# Patient Record
Sex: FEMALE | Race: BLACK OR AFRICAN AMERICAN | Employment: OTHER | URBAN - METROPOLITAN AREA
[De-identification: names, ages, dates, MRNs, and addresses within clinical notes are randomized per-mention and may not be internally consistent; named-entity substitution may affect disease eponyms.]

---

## 2017-03-29 ENCOUNTER — HOSPITAL ENCOUNTER (OUTPATIENT)
Dept: GENERAL RADIOLOGY | Age: 82
Discharge: HOME | End: 2017-03-29
Payer: MEDICARE

## 2017-03-29 DIAGNOSIS — M54.2 CERVICALGIA: ICD-10-CM

## 2017-03-29 PROCEDURE — 72040 X-RAY EXAM NECK SPINE 2-3 VW: CPT

## 2018-05-18 ENCOUNTER — APPOINTMENT (OUTPATIENT)
Dept: GENERAL RADIOLOGY | Age: 83
DRG: 215 | End: 2018-05-18
Payer: MEDICARE

## 2018-05-18 ENCOUNTER — HOSPITAL ENCOUNTER (INPATIENT)
Age: 83
LOS: 6 days | Discharge: HOME | DRG: 215 | End: 2018-05-25
Attending: EMERGENCY MEDICINE | Admitting: INTERNAL MEDICINE
Payer: MEDICARE

## 2018-05-18 PROBLEM — I10 HTN (HYPERTENSION): Status: ACTIVE | Noted: 2018-05-18

## 2018-05-18 PROBLEM — Z87.19 HX OF DIVERTICULITIS OF COLON: Status: ACTIVE | Noted: 2018-05-18

## 2018-05-18 PROBLEM — I21.4 NSTEMI (NON-ST ELEVATED MYOCARDIAL INFARCTION) (HCC): Status: ACTIVE | Noted: 2018-05-18

## 2018-05-18 LAB
ALBUMIN SERPL-MCNC: 3.7 G/DL (ref 3.5–5)
ALBUMIN/GLOB SERPL: 1 {RATIO} (ref 1.1–2.2)
ALP SERPL-CCNC: 166 U/L (ref 45–117)
ALT SERPL-CCNC: 29 U/L (ref 12–78)
ANION GAP SERPL CALC-SCNC: 4 MMOL/L (ref 5–15)
APPEARANCE UR: CLEAR
APTT PPP: 26.8 SEC (ref 22.1–32)
APTT PPP: 51.8 SEC (ref 22.1–32)
AST SERPL-CCNC: 80 U/L (ref 15–37)
ATRIAL RATE: 79 BPM
BACTERIA URNS QL MICRO: NEGATIVE /HPF
BASOPHILS # BLD: 0 K/UL (ref 0–0.1)
BASOPHILS # BLD: 0 K/UL (ref 0–0.1)
BASOPHILS NFR BLD: 0 % (ref 0–1)
BASOPHILS NFR BLD: 0 % (ref 0–1)
BILIRUB SERPL-MCNC: 0.4 MG/DL (ref 0.2–1)
BILIRUB UR QL: NEGATIVE
BNP SERPL-MCNC: 3385 PG/ML (ref 0–450)
BUN SERPL-MCNC: 20 MG/DL (ref 6–20)
BUN/CREAT SERPL: 16 (ref 12–20)
CALCIUM SERPL-MCNC: 9.3 MG/DL (ref 8.5–10.1)
CALCULATED P AXIS, ECG09: -5 DEGREES
CALCULATED R AXIS, ECG10: -31 DEGREES
CALCULATED T AXIS, ECG11: 123 DEGREES
CHLORIDE SERPL-SCNC: 105 MMOL/L (ref 97–108)
CK MB CFR SERPL CALC: 7.4 % (ref 0–2.5)
CK MB CFR SERPL CALC: 7.5 % (ref 0–2.5)
CK MB SERPL-MCNC: 49.2 NG/ML (ref 5–25)
CK MB SERPL-MCNC: 52 NG/ML (ref 5–25)
CK SERPL-CCNC: 669 U/L (ref 26–192)
CK SERPL-CCNC: 692 U/L (ref 26–192)
CO2 SERPL-SCNC: 28 MMOL/L (ref 21–32)
COLOR UR: ABNORMAL
CREAT SERPL-MCNC: 1.24 MG/DL (ref 0.55–1.02)
DIAGNOSIS, 93000: NORMAL
DIFFERENTIAL METHOD BLD: ABNORMAL
DIFFERENTIAL METHOD BLD: ABNORMAL
EOSINOPHIL # BLD: 0.3 K/UL (ref 0–0.4)
EOSINOPHIL # BLD: 0.4 K/UL (ref 0–0.4)
EOSINOPHIL NFR BLD: 4 % (ref 0–7)
EOSINOPHIL NFR BLD: 4 % (ref 0–7)
EPITH CASTS URNS QL MICRO: ABNORMAL /LPF
ERYTHROCYTE [DISTWIDTH] IN BLOOD BY AUTOMATED COUNT: 13.5 % (ref 11.5–14.5)
ERYTHROCYTE [DISTWIDTH] IN BLOOD BY AUTOMATED COUNT: 13.5 % (ref 11.5–14.5)
GLOBULIN SER CALC-MCNC: 3.7 G/DL (ref 2–4)
GLUCOSE SERPL-MCNC: 111 MG/DL (ref 65–100)
GLUCOSE UR STRIP.AUTO-MCNC: NEGATIVE MG/DL
HCT VFR BLD AUTO: 33.5 % (ref 35–47)
HCT VFR BLD AUTO: 34.8 % (ref 35–47)
HGB BLD-MCNC: 11.1 G/DL (ref 11.5–16)
HGB BLD-MCNC: 11.4 G/DL (ref 11.5–16)
HGB UR QL STRIP: NEGATIVE
HYALINE CASTS URNS QL MICRO: ABNORMAL /LPF (ref 0–5)
IMM GRANULOCYTES # BLD: 0 K/UL (ref 0–0.04)
IMM GRANULOCYTES # BLD: 0 K/UL (ref 0–0.04)
IMM GRANULOCYTES NFR BLD AUTO: 0 % (ref 0–0.5)
IMM GRANULOCYTES NFR BLD AUTO: 0 % (ref 0–0.5)
KETONES UR QL STRIP.AUTO: NEGATIVE MG/DL
LEUKOCYTE ESTERASE UR QL STRIP.AUTO: ABNORMAL
LYMPHOCYTES # BLD: 2.4 K/UL (ref 0.8–3.5)
LYMPHOCYTES # BLD: 2.5 K/UL (ref 0.8–3.5)
LYMPHOCYTES NFR BLD: 30 % (ref 12–49)
LYMPHOCYTES NFR BLD: 32 % (ref 12–49)
MCH RBC QN AUTO: 29.8 PG (ref 26–34)
MCH RBC QN AUTO: 29.8 PG (ref 26–34)
MCHC RBC AUTO-ENTMCNC: 32.8 G/DL (ref 30–36.5)
MCHC RBC AUTO-ENTMCNC: 33.1 G/DL (ref 30–36.5)
MCV RBC AUTO: 90.1 FL (ref 80–99)
MCV RBC AUTO: 90.9 FL (ref 80–99)
MONOCYTES # BLD: 0.5 K/UL (ref 0–1)
MONOCYTES # BLD: 0.7 K/UL (ref 0–1)
MONOCYTES NFR BLD: 7 % (ref 5–13)
MONOCYTES NFR BLD: 9 % (ref 5–13)
NEUTS SEG # BLD: 4.2 K/UL (ref 1.8–8)
NEUTS SEG # BLD: 4.7 K/UL (ref 1.8–8)
NEUTS SEG NFR BLD: 56 % (ref 32–75)
NEUTS SEG NFR BLD: 57 % (ref 32–75)
NITRITE UR QL STRIP.AUTO: NEGATIVE
NRBC # BLD: 0 K/UL (ref 0–0.01)
NRBC # BLD: 0 K/UL (ref 0–0.01)
NRBC BLD-RTO: 0 PER 100 WBC
NRBC BLD-RTO: 0 PER 100 WBC
P-R INTERVAL, ECG05: 230 MS
PH UR STRIP: 5.5 [PH] (ref 5–8)
PLATELET # BLD AUTO: 265 K/UL (ref 150–400)
PLATELET # BLD AUTO: 267 K/UL (ref 150–400)
PMV BLD AUTO: 8.9 FL (ref 8.9–12.9)
PMV BLD AUTO: 8.9 FL (ref 8.9–12.9)
POTASSIUM SERPL-SCNC: 4.2 MMOL/L (ref 3.5–5.1)
PROT SERPL-MCNC: 7.4 G/DL (ref 6.4–8.2)
PROT UR STRIP-MCNC: NEGATIVE MG/DL
Q-T INTERVAL, ECG07: 384 MS
QRS DURATION, ECG06: 84 MS
QTC CALCULATION (BEZET), ECG08: 440 MS
RBC # BLD AUTO: 3.72 M/UL (ref 3.8–5.2)
RBC # BLD AUTO: 3.83 M/UL (ref 3.8–5.2)
RBC #/AREA URNS HPF: ABNORMAL /HPF (ref 0–5)
SODIUM SERPL-SCNC: 137 MMOL/L (ref 136–145)
SP GR UR REFRACTOMETRY: 1.01 (ref 1–1.03)
THERAPEUTIC RANGE,PTTT: ABNORMAL SECS (ref 58–77)
THERAPEUTIC RANGE,PTTT: NORMAL SECS (ref 58–77)
TROPONIN I SERPL-MCNC: 7.97 NG/ML
TROPONIN I SERPL-MCNC: 8.26 NG/ML
UA: UC IF INDICATED,UAUC: ABNORMAL
UROBILINOGEN UR QL STRIP.AUTO: 0.2 EU/DL (ref 0.2–1)
VENTRICULAR RATE, ECG03: 79 BPM
WBC # BLD AUTO: 7.5 K/UL (ref 3.6–11)
WBC # BLD AUTO: 8.3 K/UL (ref 3.6–11)
WBC URNS QL MICRO: ABNORMAL /HPF (ref 0–4)

## 2018-05-18 PROCEDURE — 93005 ELECTROCARDIOGRAM TRACING: CPT

## 2018-05-18 PROCEDURE — 80053 COMPREHEN METABOLIC PANEL: CPT | Performed by: PHYSICIAN ASSISTANT

## 2018-05-18 PROCEDURE — 99218 HC RM OBSERVATION: CPT

## 2018-05-18 PROCEDURE — 99284 EMERGENCY DEPT VISIT MOD MDM: CPT

## 2018-05-18 PROCEDURE — 74011250637 HC RX REV CODE- 250/637: Performed by: NURSE PRACTITIONER

## 2018-05-18 PROCEDURE — 85025 COMPLETE CBC W/AUTO DIFF WBC: CPT | Performed by: PHYSICIAN ASSISTANT

## 2018-05-18 PROCEDURE — 82550 ASSAY OF CK (CPK): CPT | Performed by: PHYSICIAN ASSISTANT

## 2018-05-18 PROCEDURE — 82553 CREATINE MB FRACTION: CPT | Performed by: PHYSICIAN ASSISTANT

## 2018-05-18 PROCEDURE — 36415 COLL VENOUS BLD VENIPUNCTURE: CPT | Performed by: NURSE PRACTITIONER

## 2018-05-18 PROCEDURE — 83880 ASSAY OF NATRIURETIC PEPTIDE: CPT | Performed by: NURSE PRACTITIONER

## 2018-05-18 PROCEDURE — 85730 THROMBOPLASTIN TIME PARTIAL: CPT | Performed by: NURSE PRACTITIONER

## 2018-05-18 PROCEDURE — 96365 THER/PROPH/DIAG IV INF INIT: CPT

## 2018-05-18 PROCEDURE — 72050 X-RAY EXAM NECK SPINE 4/5VWS: CPT

## 2018-05-18 PROCEDURE — 74011000250 HC RX REV CODE- 250: Performed by: NURSE PRACTITIONER

## 2018-05-18 PROCEDURE — 71046 X-RAY EXAM CHEST 2 VIEWS: CPT

## 2018-05-18 PROCEDURE — 84484 ASSAY OF TROPONIN QUANT: CPT | Performed by: PHYSICIAN ASSISTANT

## 2018-05-18 PROCEDURE — 74011250636 HC RX REV CODE- 250/636: Performed by: NURSE PRACTITIONER

## 2018-05-18 PROCEDURE — 74011250637 HC RX REV CODE- 250/637: Performed by: PHYSICIAN ASSISTANT

## 2018-05-18 PROCEDURE — 81001 URINALYSIS AUTO W/SCOPE: CPT | Performed by: EMERGENCY MEDICINE

## 2018-05-18 PROCEDURE — 96366 THER/PROPH/DIAG IV INF ADDON: CPT

## 2018-05-18 RX ORDER — LATANOPROST 50 UG/ML
1 SOLUTION/ DROPS OPHTHALMIC
Status: DISCONTINUED | OUTPATIENT
Start: 2018-05-19 | End: 2018-05-25 | Stop reason: HOSPADM

## 2018-05-18 RX ORDER — ASPIRIN 81 MG/1
81 TABLET ORAL DAILY
Status: ON HOLD | COMMUNITY

## 2018-05-18 RX ORDER — LATANOPROST 50 UG/ML
1 SOLUTION/ DROPS OPHTHALMIC
Status: ON HOLD | COMMUNITY

## 2018-05-18 RX ORDER — ALLOPURINOL 100 MG/1
100 TABLET ORAL DAILY
Status: DISCONTINUED | OUTPATIENT
Start: 2018-05-19 | End: 2018-05-25 | Stop reason: HOSPADM

## 2018-05-18 RX ORDER — HYDROCODONE BITARTRATE AND ACETAMINOPHEN 5; 325 MG/1; MG/1
1 TABLET ORAL
Status: COMPLETED | OUTPATIENT
Start: 2018-05-18 | End: 2018-05-18

## 2018-05-18 RX ORDER — HEPARIN SODIUM 5000 [USP'U]/ML
2000 INJECTION, SOLUTION INTRAVENOUS; SUBCUTANEOUS AS NEEDED
Status: DISCONTINUED | OUTPATIENT
Start: 2018-05-18 | End: 2018-05-25 | Stop reason: HOSPADM

## 2018-05-18 RX ORDER — SODIUM CHLORIDE 0.9 % (FLUSH) 0.9 %
5-10 SYRINGE (ML) INJECTION AS NEEDED
Status: DISCONTINUED | OUTPATIENT
Start: 2018-05-18 | End: 2018-05-25 | Stop reason: HOSPADM

## 2018-05-18 RX ORDER — ACETAMINOPHEN 500 MG
1000 TABLET ORAL
Status: DISCONTINUED | OUTPATIENT
Start: 2018-05-18 | End: 2018-05-25 | Stop reason: HOSPADM

## 2018-05-18 RX ORDER — ASPIRIN 81 MG/1
81 TABLET ORAL DAILY
Status: DISCONTINUED | OUTPATIENT
Start: 2018-05-19 | End: 2018-05-25 | Stop reason: HOSPADM

## 2018-05-18 RX ORDER — HEPARIN SODIUM 5000 [USP'U]/ML
4000 INJECTION, SOLUTION INTRAVENOUS; SUBCUTANEOUS AS NEEDED
Status: DISCONTINUED | OUTPATIENT
Start: 2018-05-18 | End: 2018-05-25 | Stop reason: HOSPADM

## 2018-05-18 RX ORDER — LORATADINE 10 MG/1
10 TABLET ORAL DAILY
Status: DISCONTINUED | OUTPATIENT
Start: 2018-05-19 | End: 2018-05-25 | Stop reason: HOSPADM

## 2018-05-18 RX ORDER — SODIUM CHLORIDE 0.9 % (FLUSH) 0.9 %
5-10 SYRINGE (ML) INJECTION EVERY 8 HOURS
Status: DISCONTINUED | OUTPATIENT
Start: 2018-05-19 | End: 2018-05-25 | Stop reason: HOSPADM

## 2018-05-18 RX ORDER — TIMOLOL MALEATE 5 MG/ML
1 SOLUTION OPHTHALMIC DAILY
Status: ON HOLD | COMMUNITY

## 2018-05-18 RX ORDER — TIMOLOL MALEATE 5 MG/ML
1 SOLUTION/ DROPS OPHTHALMIC DAILY
Status: DISCONTINUED | OUTPATIENT
Start: 2018-05-19 | End: 2018-05-20

## 2018-05-18 RX ORDER — ACETAMINOPHEN 500 MG
1000 TABLET ORAL
Status: ON HOLD | COMMUNITY

## 2018-05-18 RX ORDER — CYANOCOBALAMIN (VITAMIN B-12) 500 MCG
5000 TABLET ORAL DAILY
COMMUNITY
End: 2020-12-03

## 2018-05-18 RX ORDER — FLUTICASONE PROPIONATE 50 MCG
1 SPRAY, SUSPENSION (ML) NASAL DAILY
Status: DISCONTINUED | OUTPATIENT
Start: 2018-05-19 | End: 2018-05-25 | Stop reason: HOSPADM

## 2018-05-18 RX ORDER — ONDANSETRON 4 MG/1
4 TABLET, ORALLY DISINTEGRATING ORAL
Status: COMPLETED | OUTPATIENT
Start: 2018-05-18 | End: 2018-05-18

## 2018-05-18 RX ORDER — ALPRAZOLAM 0.25 MG/1
0.25 TABLET ORAL
Status: DISCONTINUED | OUTPATIENT
Start: 2018-05-18 | End: 2018-05-25 | Stop reason: HOSPADM

## 2018-05-18 RX ORDER — ATORVASTATIN CALCIUM 40 MG/1
80 TABLET, FILM COATED ORAL
Status: DISCONTINUED | OUTPATIENT
Start: 2018-05-19 | End: 2018-05-25 | Stop reason: HOSPADM

## 2018-05-18 RX ORDER — NITROGLYCERIN 20 MG/100ML
10 INJECTION INTRAVENOUS
Status: DISCONTINUED | OUTPATIENT
Start: 2018-05-18 | End: 2018-05-25 | Stop reason: HOSPADM

## 2018-05-18 RX ORDER — FLUTICASONE PROPIONATE 50 MCG
1 SPRAY, SUSPENSION (ML) NASAL DAILY
Status: ON HOLD | COMMUNITY

## 2018-05-18 RX ORDER — HEPARIN SODIUM 10000 [USP'U]/100ML
12-25 INJECTION, SOLUTION INTRAVENOUS
Status: DISCONTINUED | OUTPATIENT
Start: 2018-05-18 | End: 2018-05-25 | Stop reason: HOSPADM

## 2018-05-18 RX ORDER — LISINOPRIL 20 MG/1
20 TABLET ORAL DAILY
Status: DISCONTINUED | OUTPATIENT
Start: 2018-05-19 | End: 2018-05-20

## 2018-05-18 RX ORDER — HYDROCODONE BITARTRATE AND ACETAMINOPHEN 5; 325 MG/1; MG/1
1 TABLET ORAL
Status: DISCONTINUED | OUTPATIENT
Start: 2018-05-18 | End: 2018-05-25 | Stop reason: HOSPADM

## 2018-05-18 RX ORDER — GUAIFENESIN 100 MG/5ML
243 LIQUID (ML) ORAL
Status: COMPLETED | OUTPATIENT
Start: 2018-05-18 | End: 2018-05-18

## 2018-05-18 RX ORDER — METOPROLOL TARTRATE 25 MG/1
12.5 TABLET, FILM COATED ORAL EVERY 12 HOURS
Status: DISCONTINUED | OUTPATIENT
Start: 2018-05-19 | End: 2018-05-25 | Stop reason: HOSPADM

## 2018-05-18 RX ADMIN — Medication 10 ML: at 23:27

## 2018-05-18 RX ADMIN — HEPARIN SODIUM 12 UNITS/KG/HR: 10000 INJECTION, SOLUTION INTRAVENOUS at 15:39

## 2018-05-18 RX ADMIN — ASPIRIN 81 MG CHEWABLE TABLET 243 MG: 81 TABLET CHEWABLE at 13:01

## 2018-05-18 RX ADMIN — HYDROCODONE BITARTRATE AND ACETAMINOPHEN 1 TABLET: 5; 325 TABLET ORAL at 11:57

## 2018-05-18 RX ADMIN — METOPROLOL TARTRATE 12.5 MG: 25 TABLET ORAL at 23:57

## 2018-05-18 RX ADMIN — ONDANSETRON 4 MG: 4 TABLET, ORALLY DISINTEGRATING ORAL at 12:02

## 2018-05-18 RX ADMIN — NITROGLYCERIN 10 MCG/MIN: 20 INJECTION INTRAVENOUS at 14:44

## 2018-05-18 RX ADMIN — ATORVASTATIN CALCIUM 80 MG: 40 TABLET, FILM COATED ORAL at 23:56

## 2018-05-18 NOTE — ED NOTES
Assumed care of pt from triage. Pt ambulated to room without difficulty. Pt alert and oriented x 4. Denies SOB or chest pain but does have right sided shoulder pain and right neck pain constant, aching pain x 2 days  Pt has not ate since this morning, applesauce with meds. Daughters at bedside.

## 2018-05-18 NOTE — PROGRESS NOTES
TRANSFER - IN REPORT:    Verbal report received from [address], RN (name) on Karla Schmid  being received from ED (unit) for routine progression of care      Report consisted of patients Situation, Background, Assessment and   Recommendations(SBAR). Information from the following report(s) SBAR, ED Summary, Recent Results and Cardiac Rhythm NSR w/ 1st Degree AVB was reviewed with the receiving nurse. Opportunity for questions and clarification was provided. Assessment completed upon patients arrival to unit and care assumed. 2323 PTT 51.8 increased Heparin rate by 1u/kg/hr. Old rate 12. New Rate 13u/kg/hr or 9.5ml/hr. Verified by Bernard Allison RN. Next PTT due on 5/19 @ 0523. Verbal report given to oncoming nurse NIDA Veliz  Report consisted of patients Situation, Background, Assessment, and   Recommendations    Information was reviewed with the receiving nurse. Opportunity for questions and clarification was provided.       Mellissa Daniel RN

## 2018-05-18 NOTE — H&P
[id number] [facility] [address] [facility]  [phone]          CARDIOLOGY HISTORY AND PHYSICAL    Date of  Admission: 5/18/2018 11:32 AM     Admission type:Emergency     Subjective:      Concha Levy is a 80 y.o. female admitted for NSTEMI (non-ST elevated myocardial infarction) ([address] No previous cardiac hx, +HTN. Admitted with c/o right sided neck and back pain that started several days ago, constant, and this morning increase in intensity, and nausea. Denies SOB, chest discomfort, diaphoresis,  palpitations. ECG with ST wave changes ant/laterally, troponin 8.2, 7.9. NTproBNP 3385. Has received ASA, pain medications, zofran. Initial pain was 8/10, now 6/10. Family at bedside, patient is a very active 80year old with minimal hx of illnesses. Patient Active Problem List    Diagnosis Date Noted    NSTEMI (non-ST elevated myocardial infarction) ([address] 05/18/2018    HTN (hypertension) 05/18/2018    Hx of diverticulitis of colon 05/18/2018    Gout       aMnda Barrios NP  Past Medical History:   Diagnosis Date    Diverticulitis     Gout     HTN (hypertension) 5/18/2018    Hx of diverticulitis of colon 5/18/2018    Hypertension     Other ill-defined conditions(799.89)     high cholesterol      Social History     Social History    Marital status: SINGLE     Spouse name: N/A    Number of children: N/A    Years of education: N/A     Social History Main Topics    Smoking status: Never Smoker    Smokeless tobacco: Never Used    Alcohol use No    Drug use: No    Sexual activity: Not Asked     Other Topics Concern    None     Social History Narrative     Allergies   Allergen Reactions    Aspirin Nausea and Vomiting     Pt reports that she takes 81 mg ASA daily      History reviewed. No pertinent family history.    Current Facility-Administered Medications   Medication Dose Route Frequency    nitroglycerin (Tridil) 200 mcg/ml infusion  10 mcg/min IntraVENous TITRATE    heparin 25,000 units in D5W 250 ml infusion  12-25 Units/kg/hr IntraVENous TITRATE    heparin (porcine) injection 4,000 Units  4,000 Units IntraVENous PRN    Or    heparin (porcine) injection 2,000 Units  2,000 Units IntraVENous PRN     Current Outpatient Prescriptions   Medication Sig    cholecalciferol (VITAMIN D3) 400 unit tab tablet Take 400 Units by mouth daily.  aspirin delayed-release 81 mg tablet Take 81 mg by mouth daily.  timolol (TIMOPTIC-XE) 0.5 % ophthalmic gel-forming Administer 1 Drop to right eye daily.  latanoprost (XALATAN) 0.005 % ophthalmic solution Administer 1 Drop to both eyes nightly.  fluticasone (FLONASE ALLERGY RELIEF) 50 mcg/actuation nasal spray 1 [place] by Both Nostrils route daily.  acetaminophen (TYLENOL EXTRA STRENGTH) 500 mg tablet Take 1,000 mg by mouth every six (6) hours as needed for Pain.  fexofenadine (ALLEGRA) 180 mg tablet Take 180 mg by mouth nightly.  atorvastatin (LIPITOR) 80 mg tablet Take 80 mg by mouth nightly.  lisinopril (PRINIVIL, ZESTRIL) 20 mg tablet Take 20 mg by mouth daily.  allopurinol (ZYLOPRIM) 100 mg tablet Take 100 mg by mouth daily.  amLODIPine (NORVASC) 10 mg tablet Take 5 mg by mouth daily.  ALPRAZolam (XANAX) 0.25 mg tablet Take 0.25 mg by mouth daily as needed for Anxiety.          Review of Symptoms:  Constitutional: negative  Eyes: negative  Ears, nose, mouth, throat, and face: negative  Respiratory: negative  Cardiovascular: right jaw pain, neck and back pain  Gastrointestinal: negative  Genitourinary:negative  Musculoskeletal:negative  Neurological: negative  Behvioral/Psych: negative  Endocrine: negative     Objective:   Physical Exam:  General Appearance:  WNWD elderly AAF in no acute distress  Chest:   Clear  Cardiovascular:  Regular rate and rhythm, no murmur.   Abdomen:   Soft, non-tender, bowel sounds are active.   Extremities: no peripheral edema  Skin:  Warm and dry.     Visit Vitals    /64    Pulse 72    Temp 99.2 °F (37.3 °C)    Resp 11    Ht 5' 5\" (1.651 m)    Wt 73.2 kg (161 lb 6 oz)    SpO2 100%    BMI 26.85 kg/m2       Cardiographics    Telemetry: SR  ECG: SR with ant/lat [place] changes      Labs:  Recent Labs      05/18/18   1414  05/18/18   1214   WBC  8.3  7.5   HGB  11.1*  11.4*   HCT  33.5*  34.8*   PLT  265  267     Recent Labs      05/18/18   1214   NA  137   K  4.2   CL  105   CO2  28   GLU  111*   BUN  20   CREA  1.24*   CA  9.3   ALB  3.7   TBILI  0.4   SGOT  80*   ALT  29       Recent Labs      05/18/18   1414  05/18/18   1214   TROIQ  7.97*  8.26*   CPK  669*   --    CKMB  49.2*  52.0*          Assessment:       Active Problems:    NSTEMI (non-ST elevated myocardial infarction) ([facility] [address] (5/18/2018)      HTN (hypertension) (5/18/2018)      Hx of diverticulitis of colon (5/18/2018)         Plan:     NSTEMI:  Start IV heparin and IV nitroglycerin to manage CP, and ASA, low dose BB as BP allows. Continue on Lipitor, check lipids in AM.   Plan for cardiac cath, NPO currently, but may defer until tomorrow if symptoms subside. Dr. Peewee Corbin and I discussed the risks/benefits/alternatives of cardiac catheterization +/- PCI with the patient. Risks include (but are not limited to) bleeding, infection, cva/mi/tamponade/death. The patient understands and wishes to proceed. HTN:  Discontinue Norvasc, starting BB, continue on Lisinopril      Pt seen and examined in details. Agree with NP A&P. Pt now stable. Pain free. Continue current meds. No need for emergency cath. Admit to IVCU. Will follow.      Orly Orantes MD

## 2018-05-18 NOTE — PROGRESS NOTES
Pharmacy Clarification of Prior to Admission Medication Regimen     The patient was interviewed regarding clarification of the prior to admission medication regimen. Patient's family was present in room and obtained permission from patient to discuss drug regimen with visitor(s) present. Patient was questioned regarding use of any other inhalers, topical products, over the counter medications, herbal medications, vitamin products or ophthalmic/nasal/otic medication use. Information Obtained From: Rx Query and patient    Pertinent Pharmacy Findings: None      PTA medication list was corrected to the following:     Prior to Admission Medications   Prescriptions Last Dose Informant Patient Reported? Taking? ALPRAZolam (XANAX) 0.25 mg tablet 2018 at Unknown time Other Yes Yes   Sig: Take 0.25 mg by mouth daily as needed for Anxiety. acetaminophen (TYLENOL EXTRA STRENGTH) 500 mg tablet 2018 at Unknown time Self Yes Yes   Sig: Take 1,000 mg by mouth every six (6) hours as needed for Pain. allopurinol (ZYLOPRIM) 100 mg tablet 2018 at Unknown time Other Yes Yes   Sig: Take 100 mg by mouth daily. amLODIPine (NORVASC) 10 mg tablet 2018 at Unknown time Other Yes Yes   Sig: Take 5 mg by mouth daily. aspirin delayed-release 81 mg tablet 2018 at Unknown time Other Yes Yes   Sig: Take 81 mg by mouth daily. atorvastatin (LIPITOR) 80 mg tablet 2018 at Unknown time Other Yes Yes   Sig: Take 80 mg by mouth nightly. cholecalciferol (VITAMIN D3) 400 unit tab tablet 2018 at Unknown time Other Yes Yes   Sig: Take 400 Units by mouth daily. fexofenadine (ALLEGRA) 180 mg tablet 2018 at Unknown time Other Yes Yes   Sig: Take 180 mg by mouth nightly. fluticasone (FLONASE ALLERGY RELIEF) 50 mcg/actuation nasal spray 2018 at Unknown time Other Yes Yes   [place] by Both Nostrils route daily.    latanoprost (XALATAN) 0.005 % ophthalmic solution 2018 at Unknown time Other Yes Yes   Sig: Administer 1 Drop to both eyes nightly. lisinopril (PRINIVIL, ZESTRIL) 20 mg tablet 5/18/2018 at Unknown time Other Yes Yes   Sig: Take 20 mg by mouth daily. timolol (TIMOPTIC-XE) 0.5 % ophthalmic gel-forming 5/18/2018 at Unknown time Other Yes Yes   Sig: Administer 1 Drop to right eye daily.       Facility-Administered Medications: None          Thank you,  Ruiz Mirza CPhT  Medication History Pharmacy Technician

## 2018-05-18 NOTE — ED NOTES
Received patient to exam room, sitting in a chair in a position of comfort, call bell within reach; pt reports right sided neck and back pain x several days, she denies trauma, CP or SOB

## 2018-05-18 NOTE — ED PROVIDER NOTES
EMERGENCY DEPARTMENT HISTORY AND PHYSICAL EXAM      Date: 5/18/2018  Patient Name: Madelyn Vega    History of Presenting Illness     Chief Complaint   Patient presents with    Shoulder Pain     pt reports right shoulder, neck and arm pain x3 days    Arm Pain    Neck Pain     History Provided By: Patient    HPI: Madelyn Vega, 80 y.o. female with PMHx significant for HTN, gout, diverticulitis, and PShx for hysterectomy, presents ambulatory with her granddaughter to the ED with cc of new, gradual onset of a constant, worsening, moderate right sided neck, shoulder and upper back pain that began 3 days ago. pt denies any trauma or other injuries. She states she was sitting in her car when onset occurred. She denies any exacerbation of pain with neck movement. She specifically denies chest pain, SOB, or cough. Pt attempted to treat her sxs with Tylenol with no relief. Pt was unable to sleep last night secondary to pain. She denies any hx of neck surgeries. She denies any abdominal pain. She denies any changes in her bowel or urinary habits. Pt denies any hx of DM, HTN or HLD. Chief Complaint: neck/shoulder pain  Duration: 3  Days  Timing:  Gradual, Constant and Worsening  Location: right neck and shoulder  Quality: n/a  Severity: Moderate  Modifying Factors: tried Tylenol with no relief   Associated Symptoms: denies any other associated signs or symptoms    There are no other complaints, changes, or physical findings at this time.     Social Hx: - Tobacco, - EtOH, - Illicit Drugs    PCP: Kaiden White NP    Current Facility-Administered Medications   Medication Dose Route Frequency Provider Last Rate Last Dose    nitroglycerin (Tridil) 200 mcg/ml infusion  10 mcg/min IntraVENous TITRATE Macarena Wisdom NP 3 mL/hr at 05/18/18 1444 10 mcg/min at 05/18/18 1444    heparin 25,000 units in D5W 250 ml infusion  12-25 Units/kg/hr IntraVENous TITRATE Macarena Wisdom NP 8.8 mL/hr at 05/18/18 1539 12 Units/kg/hr at 05/18/18 1539    heparin (porcine) injection 4,000 Units  4,000 Units IntraVENous PRN Temdimas Rahman NP        Or    heparin (porcine) injection 2,000 Units  2,000 Units IntraVENous PRN Tony Rahman NP         Current Outpatient Prescriptions   Medication Sig Dispense Refill    cholecalciferol (VITAMIN D3) 400 unit tab tablet Take 400 Units by mouth daily.  aspirin delayed-release 81 mg tablet Take 81 mg by mouth daily.  timolol (TIMOPTIC-XE) 0.5 % ophthalmic gel-forming Administer 1 Drop to right eye daily.  latanoprost (XALATAN) 0.005 % ophthalmic solution Administer 1 Drop to both eyes nightly.  fluticasone (FLONASE ALLERGY RELIEF) 50 mcg/actuation nasal spray 1 [place] by Both Nostrils route daily.  acetaminophen (TYLENOL EXTRA STRENGTH) 500 mg tablet Take 1,000 mg by mouth every six (6) hours as needed for Pain.  fexofenadine (ALLEGRA) 180 mg tablet Take 180 mg by mouth nightly.  atorvastatin (LIPITOR) 80 mg tablet Take 80 mg by mouth nightly.  lisinopril (PRINIVIL, ZESTRIL) 20 mg tablet Take 20 mg by mouth daily.  allopurinol (ZYLOPRIM) 100 mg tablet Take 100 mg by mouth daily.  amLODIPine (NORVASC) 10 mg tablet Take 5 mg by mouth daily.  ALPRAZolam (XANAX) 0.25 mg tablet Take 0.25 mg by mouth daily as needed for Anxiety. Past History     Past Medical History:  Past Medical History:   Diagnosis Date    Diverticulitis     Gout     HTN (hypertension) 5/18/2018    Hx of diverticulitis of colon 5/18/2018    Hypertension     Other ill-defined conditions(799.89)     high cholesterol     Past Surgical History:  Past Surgical History:   Procedure Laterality Date    HX HYSTERECTOMY      NH COLONOSCOPY FLX DX W/COLLJ SPEC WHEN PFRMD  10/18/2013          Family History:  History reviewed. No pertinent family history.     Social History:  Social History   Substance Use Topics    Smoking status: Never Smoker    Smokeless tobacco: Never Used    Alcohol use No     Allergies: Allergies   Allergen Reactions    Aspirin Nausea and Vomiting     Pt reports that she takes 81 mg ASA daily     Review of Systems   Review of Systems   Constitutional: Negative for chills, fatigue and fever. HENT: Negative. Negative for congestion, rhinorrhea and sore throat. Eyes: Negative. Respiratory: Negative for cough, shortness of breath and wheezing. Cardiovascular: Negative for chest pain, palpitations and leg swelling. Gastrointestinal: Negative for abdominal pain, blood in stool, constipation, diarrhea, nausea and vomiting. Endocrine: Negative. Genitourinary: Negative for difficulty urinating, dysuria and hematuria. Musculoskeletal: Positive for arthralgias (right shoulder), back pain (upper) and neck pain (right sided). Negative for neck stiffness. Skin: Negative for rash and wound. Allergic/Immunologic: Negative. Neurological: Negative for dizziness, weakness, numbness and headaches. Hematological: Negative. Psychiatric/Behavioral: Negative. Negative for agitation and confusion. Physical Exam   Physical Exam   Constitutional: She is oriented to person, place, and time. She appears well-developed and well-nourished. No distress. HENT:   Head: Normocephalic and atraumatic. Nose: Nose normal.   Mouth/Throat: Oropharynx is clear and moist and mucous membranes are normal. No oropharyngeal exudate. Eyes: Conjunctivae and EOM are normal. Pupils are equal, round, and reactive to light. Right eye exhibits no discharge. Left eye exhibits no discharge. No scleral icterus. Neck: Normal range of motion. Neck supple. No JVD present. No tracheal deviation present. No thyromegaly present. Cardiovascular: Normal rate, regular rhythm, normal heart sounds and intact distal pulses. Exam reveals no gallop and no friction rub. No murmur heard.   No reproducible wall chest tenderness    Pulmonary/Chest: Effort normal and breath sounds normal. No stridor. No respiratory distress. She has no wheezes. She has no rales. Lungs clear to ausculation. Abdominal: Soft. Bowel sounds are normal. She exhibits no distension and no mass. There is no tenderness. There is no guarding. Musculoskeletal: Normal range of motion. She exhibits no edema or tenderness. Decreased A/P ROM to right shoulder due to tenderness with palpation/movement, no deformity, no crepitus, no erythema/rash/lesion/heat. 2+ distal pulses, NVI, sensation grossly intact to light touch. Lymphadenopathy:     She has no cervical adenopathy. Neurological: She is alert and oriented to person, place, and time. She exhibits normal muscle tone. Coordination normal.   Skin: Skin is warm and dry. No rash noted. She is not diaphoretic. Psychiatric: She has a normal mood and affect. Her behavior is normal. Judgment and thought content normal.   Nursing note and vitals reviewed.     Diagnostic Study Results     Labs -     Recent Results (from the past 12 hour(s))   EKG, 12 LEAD, INITIAL    Collection Time: 05/18/18 11:58 AM   Result Value Ref Range    Ventricular Rate 79 BPM    Atrial Rate 79 BPM    P-R Interval 230 ms    QRS Duration 84 ms    Q-T Interval 384 ms    QTC Calculation (Bezet) 440 ms    Calculated P Axis -5 degrees    Calculated R Axis -31 degrees    Calculated T Axis 123 degrees    Diagnosis       Sinus rhythm with 1st degree AV block  Left axis deviation  Septal infarct , age undetermined  When compared with ECG of 11-AUG-2016 19:53,  Septal infarct is now present  T wave amplitude has increased in Anterior leads  Inverted T waves have replaced nonspecific T wave abnormality in Lateral   leads     CBC WITH AUTOMATED DIFF    Collection Time: 05/18/18 12:14 PM   Result Value Ref Range    WBC 7.5 3.6 - 11.0 K/uL    RBC 3.83 3.80 - 5.20 M/uL    HGB 11.4 (L) 11.5 - 16.0 g/dL    HCT 34.8 (L) 35.0 - 47.0 %    MCV 90.9 80.0 - 99.0 FL    MCH 29.8 26.0 - 34.0 PG MCHC 32.8 30.0 - 36.5 g/dL    RDW 13.5 11.5 - 14.5 %    PLATELET 466 926 - 349 K/uL    MPV 8.9 8.9 - 12.9 FL    NRBC 0.0 0  WBC    ABSOLUTE NRBC 0.00 0.00 - 0.01 K/uL    NEUTROPHILS 56 32 - 75 %    LYMPHOCYTES 32 12 - 49 %    MONOCYTES 7 5 - 13 %    EOSINOPHILS 4 0 - 7 %    BASOPHILS 0 0 - 1 %    IMMATURE GRANULOCYTES 0 0.0 - 0.5 %    ABS. NEUTROPHILS 4.2 1.8 - 8.0 K/UL    ABS. LYMPHOCYTES 2.4 0.8 - 3.5 K/UL    ABS. MONOCYTES 0.5 0.0 - 1.0 K/UL    ABS. EOSINOPHILS 0.3 0.0 - 0.4 K/UL    ABS. BASOPHILS 0.0 0.0 - 0.1 K/UL    ABS. IMM. GRANS. 0.0 0.00 - 0.04 K/UL    DF AUTOMATED     METABOLIC PANEL, COMPREHENSIVE    Collection Time: 05/18/18 12:14 PM   Result Value Ref Range    Sodium 137 136 - 145 mmol/L    Potassium 4.2 3.5 - 5.1 mmol/L    Chloride 105 97 - 108 mmol/L    CO2 28 21 - 32 mmol/L    Anion gap 4 (L) 5 - 15 mmol/L    Glucose 111 (H) 65 - 100 mg/dL    BUN 20 6 - 20 MG/DL    Creatinine 1.24 (H) 0.55 - 1.02 MG/DL    BUN/Creatinine ratio 16 12 - 20      GFR est AA 50 (L) >60 ml/min/1.73m2    GFR est non-AA 41 (L) >60 ml/min/1.73m2    Calcium 9.3 8.5 - 10.1 MG/DL    Bilirubin, total 0.4 0.2 - 1.0 MG/DL    ALT (SGPT) 29 12 - 78 U/L    AST (SGOT) 80 (H) 15 - 37 U/L    Alk. phosphatase 166 (H) 45 - 117 U/L    Protein, total 7.4 6.4 - 8.2 g/dL    Albumin 3.7 3.5 - 5.0 g/dL    Globulin 3.7 2.0 - 4.0 g/dL    A-G Ratio 1.0 (L) 1.1 - 2.2     CK W/ REFLX CKMB    Collection Time: 05/18/18 12:14 PM   Result Value Ref Range     (H) 26 - 192 U/L   TROPONIN I    Collection Time: 05/18/18 12:14 PM   Result Value Ref Range    Troponin-I, Qt. 8.26 (H) <0.05 ng/mL   CK-MB,QUANT.     Collection Time: 05/18/18 12:14 PM   Result Value Ref Range    CK - MB 52.0 (H) <3.6 NG/ML    CK-MB Index 7.5 (H) 0 - 2.5     PTT    Collection Time: 05/18/18  2:14 PM   Result Value Ref Range    aPTT 26.8 22.1 - 32.0 sec    aPTT, therapeutic range     58.0 - 77.0 SECS   CBC WITH AUTOMATED DIFF    Collection Time: 05/18/18  2:14 PM Result Value Ref Range    WBC 8.3 3.6 - 11.0 K/uL    RBC 3.72 (L) 3.80 - 5.20 M/uL    HGB 11.1 (L) 11.5 - 16.0 g/dL    HCT 33.5 (L) 35.0 - 47.0 %    MCV 90.1 80.0 - 99.0 FL    MCH 29.8 26.0 - 34.0 PG    MCHC 33.1 30.0 - 36.5 g/dL    RDW 13.5 11.5 - 14.5 %    PLATELET 264 168 - 385 K/uL    MPV 8.9 8.9 - 12.9 FL    NRBC 0.0 0  WBC    ABSOLUTE NRBC 0.00 0.00 - 0.01 K/uL    NEUTROPHILS 57 32 - 75 %    LYMPHOCYTES 30 12 - 49 %    MONOCYTES 9 5 - 13 %    EOSINOPHILS 4 0 - 7 %    BASOPHILS 0 0 - 1 %    IMMATURE GRANULOCYTES 0 0.0 - 0.5 %    ABS. NEUTROPHILS 4.7 1.8 - 8.0 K/UL    ABS. LYMPHOCYTES 2.5 0.8 - 3.5 K/UL    ABS. MONOCYTES 0.7 0.0 - 1.0 K/UL    ABS. EOSINOPHILS 0.4 0.0 - 0.4 K/UL    ABS. BASOPHILS 0.0 0.0 - 0.1 K/UL    ABS. IMM. GRANS. 0.0 0.00 - 0.04 K/UL    DF AUTOMATED     TROPONIN I    Collection Time: 05/18/18  2:14 PM   Result Value Ref Range    Troponin-I, Qt. 7.97 (H) <0.05 ng/mL   NT-PRO BNP    Collection Time: 05/18/18  2:14 PM   Result Value Ref Range    NT pro-BNP 3385 (H) 0 - 450 PG/ML   CK W/ CKMB & INDEX    Collection Time: 05/18/18  2:14 PM   Result Value Ref Range     (H) 26 - 192 U/L    CK - MB 49.2 (H) <3.6 NG/ML    CK-MB Index 7.4 (H) 0 - 2.5       Radiologic Studies -     XR SPINE CERV 4 OR 5 V   Final Result     History: Neck pain.     AP, lateral, bilateral oblique, swimmer's, and odontoid radiographs of the  cervical spine demonstrate multilevel disc space narrowing. There is uncinate  process hypertrophy and facet arthropathy with neural foraminal narrowing  bilaterally. A fracture is not seen     IMPRESSION  IMPRESSION: Multilevel spondylosis. Interval progression since March 29 at C4-5. CXR Results  (Last 48 hours)               05/18/18 1233  XR CHEST PA LAT Final result    Impression:  IMPRESSION: Normal chest.           Narrative:  EXAM:  XR CHEST PA LAT. INDICATION: Neck and back pain. COMPARISON: 11/20/2016.        FINDINGS:    PA and lateral radiographs of the chest were obtained. Lungs: The lungs are clear of mass, nodule, airspace disease or edema. Pleura: There is no pleural effusion or pneumothorax. Mediastinum: The cardiac and mediastinal contours and pulmonary vascularity are   normal.   Bones and soft tissues: The bones and soft tissues are within normal limits. Medical Decision Making   I am the first provider for this patient. I reviewed the vital signs, available nursing notes, past medical history, past surgical history, family history and social history. Vital Signs-Reviewed the patient's vital signs. Patient Vitals for the past 12 hrs:   Temp Pulse Resp BP SpO2   05/18/18 1625 - - - - 100 %   05/18/18 1616 - 71 14 147/57 96 %   05/18/18 1600 - 65 20 142/55 98 %   05/18/18 1545 - 74 10 144/63 99 %   05/18/18 1542 - 71 11 - 98 %   05/18/18 1528 - 65 11 - 97 %   05/18/18 1515 - 71 12 146/58 97 %   05/18/18 1500 - 72 9 142/54 98 %   05/18/18 1445 - 72 11 154/64 100 %   05/18/18 1444 - 70 - 154/64 -   05/18/18 1434 - 71 8 - 98 %   05/18/18 1430 - 71 10 143/86 100 %   05/18/18 1421 - 76 10 155/56 99 %   05/18/18 1354 - 67 11 - 97 %   05/18/18 1345 - 69 10 143/46 100 %   05/18/18 1330 - 85 15 149/56 99 %   05/18/18 1324 - - - 186/76 -   05/18/18 1130 99.2 °F (37.3 °C) 79 16 149/80 100 %     Pulse Oximetry Analysis - 100% on RA    EKG interpretation: (Preliminary)  Rhythm: sinus with AV block; and regular . Rate (approx.): 79 bpm; Axis: left axis deviation; IA interval: prolonged; QRS interval: normal ; ST/T wave: non-specific ST changes; Interpreted by Jeannie Broussard DO. Records Reviewed: Nursing Notes and Old Medical Records    Provider Notes (Medical Decision Making):   Pt with diffuse upper back, R shoulder and neck pain. She denies hx of trauma. Labs showed an elevated troponin. Pt denies any CP or SOB. Will treat with ASA, consult cards and admit. ED Course:   Initial assessment performed.  The patients presenting problems have been discussed, and they are in agreement with the care plan formulated and outlined with them. I have encouraged them to ask questions as they arise throughout their visit. PROGRESS NOTE:  12:50 PM  Lab notified that pt's troponin is 8.26. Transferring care to Sravani Palma DO  Written by MARY Silva, as dictated by Amando Saenz PA-C    This note is prepared by Siva Macdonald acting as Scribe for [employer]. The scribe's documentation has been prepared under my direction and personally reviewed by me in its entirety. I confirm that the note above accurately reflects all work, treatment, procedures, and medical decision making performed by me. ELIZABETH Velasquez    1:17 PM  RCA paged for a consult. CONSULT NOTE:   1:28 PM  Sravani Palma DO spoke with Danisha Heaton NP  Specialty: cardiology  Discussed pt's hx, disposition, and available diagnostic and imaging results. Reviewed care plans. Consultant agrees with plans as outlined. Ej Harris will evaluate pt in the ER. Written by MARY Copeland, as dictated by Sravani Palma DO.    2:29 PM - Dr. Omer Ocampo is admitting pt to cardiology. Disposition:   2:29 PM   Patient is being admitted to the hospital by Dr. Omer Ocampo. The results of their tests and reasons for their admission have been discussed with them and/or available family. They convey agreement and understanding for the need to be admitted and for their admission diagnosis. Consultation has been made with the inpatient physician specialist for hospitalization. PLAN: Admit to Cardiology    Diagnosis     Clinical Impression: No diagnosis found. Attestation: This note is prepared by Dolores Chaudhry, acting as Scribe for Sravani Palma DO. The scribe's documentation has been prepared under my direction and personally reviewed by me in its entirety.  I confirm that the note above accurately reflects all work, treatment, procedures, and medical decision making performed by me.   Carmelia Duverney, DO

## 2018-05-18 NOTE — IP AVS SNAPSHOT
[address] [facility] 
[phone] Patient: Edgard Hawkins MRN: [id number] Kyle Tabor About your hospitalization You were admitted on:  May 18, 2018 You last received care in the:  [facility] 2 INTRVNTNL CARDIO You were discharged on:  May 25, 2018 Why you were hospitalized Your primary diagnosis was:  Nstemi (Non-St Elevated Myocardial Infarction) (Hcc) Your diagnoses also included:  Htn (Hypertension), Hx Of Diverticulitis Of Colon, S/P Coronary Artery Stent Placement Follow-up Information Follow up With Details Comments Contact Info Courtney Cason NP   [facility] 74 [facility] [phone] Dajuan Gant MD Schedule an appointment as soon as possible for a visit in 2 weeks  [address] [place] [address] 
[phone] [facility] C[facility] REHAB Go on 7/3/2018 cardiac rehab orientation at . Linda Lau [address] [facility] [address]   [address] [id number] [phone] Your Scheduled Appointments Tuesday July 03, 2018 10:30 AM EDT  
CR INTAKE with [facility] CARDIOPULM SPECIALTY  
[facility] CARDIOPULM [facility] ([address]) 200 [facility]  
[phone] Discharge Orders None A check iglesia indicates which time of day the medication should be taken. My Medications START taking these medications Instructions Each Dose to Equal  
 Morning Noon Evening Bedtime  
 metoprolol tartrate 25 mg tablet Commonly known as:  LOPRESSOR Your last dose was: Your next dose is: Take 0.5 Tabs by mouth every twelve (12) hours. 12.5 mg  
    
   
   
   
  
 potassium chloride SR 20 mEq tablet Commonly known as:  K-TAB Start taking on:  5/26/2018 Your last dose was: Your next dose is: Take 1 Tab by mouth daily. 20 mEq ticagrelor 90 mg tablet Commonly known as:  Renetta [employer] Your last dose was: Your next dose is: Take 1 Tab by mouth every twelve (12) hours every twelve (12) hours. 90 mg CONTINUE taking these medications Instructions Each Dose to Equal  
 Morning Noon Evening Bedtime  
 allopurinol 100 mg tablet Commonly known as:  Chao Veloz Your last dose was: Your next dose is: Take 100 mg by mouth daily. 100 mg  
    
   
   
   
  
 ALPRAZolam 0.25 mg tablet Commonly known as:  Donata Fess Your last dose was: Your next dose is: Take 0.25 mg by mouth daily as needed for Anxiety. 0.25 mg  
    
   
   
   
  
 aspirin delayed-release 81 mg tablet Your last dose was: Your next dose is: Take 81 mg by mouth daily. 81 mg  
    
   
   
   
  
 atorvastatin 80 mg tablet Commonly known as:  LIPITOR Your last dose was: Your next dose is: Take 80 mg by mouth nightly. 80 mg  
    
   
   
   
  
 cholecalciferol 400 unit Tab tablet Commonly known as:  VITAMIN D3 Your last dose was: Your next dose is: Take 400 Units by mouth daily. 400 Units  
    
   
   
   
  
 fexofenadine 180 mg tablet Commonly known as:  Yesi Burnett Your last dose was: Your next dose is: Take 180 mg by mouth nightly. 180 mg  
    
   
   
   
  
 FLONASE ALLERGY RELIEF 50 mcg/actuation nasal spray Generic drug:  fluticasone Your last dose was: Your next dose is:    
   
   
 1 Spray by Both Nostrils route daily. 1 Spray  
    
   
   
   
  
 lisinopril 20 mg tablet Commonly known as:  Lexa Simmons Your last dose was: Your next dose is: Take 20 mg by mouth daily. 20 mg  
    
   
   
   
  
 timolol 0.5 % ophthalmic gel-forming Commonly known as:  TIMOPTIC-XE  
   
 Your last dose was: Your next dose is:    
   
   
 Administer 1 Drop to right eye daily. 1 Drop TYLENOL EXTRA STRENGTH 500 mg tablet Generic drug:  acetaminophen Your last dose was: Your next dose is: Take 1,000 mg by mouth every six (6) hours as needed for Pain. 1000 mg  
    
   
   
   
  
 XALATAN 0.005 % ophthalmic solution Generic drug:  latanoprost  
   
Your last dose was: Your next dose is:    
   
   
 Administer 1 Drop to both eyes nightly. 1 Drop STOP taking these medications   
 amLODIPine 10 mg tablet Commonly known as:  Boaz Murillo Where to Get Your Medications These medications were sent to [facility], [address] [address] Phone:  [phone]  
  metoprolol tartrate 25 mg tablet  
 potassium chloride SR 20 mEq tablet  
 ticagrelor 90 mg tablet Discharge Instructions [address] [address]  [phone] CARDIOLOGY DISCHARGE INSTRUCTIONS Patient ID: 
Marlon Gomez [id number] 
16 y.o. 
8/15/1932 Admit Date: 5/18/2018 Discharge Date: 5/25/2018 Admitting Physician: Elvira Kenny MD  
 
Discharge Physician: Cecelia Preciado NP Admission Diagnoses:  
NSTEMI (non-ST elevated myocardial infarction) ([facility] [address] NSTEMI (non-ST elevated myocardial infarction) ([facility] [address] Discharge Diagnoses:  
Principal Problem: 
  NSTEMI (non-ST elevated myocardial infarction) ([facility] [address] (5/18/2018) Active Problems: 
  HTN (hypertension) (5/18/2018) Hx of diverticulitis of colon (5/18/2018) S/P coronary artery stent placement (5/25/2018) Overview: 5/24/18 PCI/ANA MARÍA to LAD and prox LCx Discharge Condition: Good Cardiology Procedures this Admission:  Left heart catheterization with PCI Disposition: home Reference discharge instructions provided by nursing for diet and activity. Signed: 
Daja Reardon NP 
5/25/2018 10:29 AM 
 
CARDIAC CATHETERIZATION/PCI DISCHARGE INSTRUCTIONS It is normal to feel tired the first couple days. Take it easy and follow the physicians instructions. CHECK THE CATHETER INSERTION SITE DAILY: 
 
You may shower 24 hours after the procedure, remove the bandage during showering. Wash with soap and water and pat dry. Gentle cleaning of the site with soap and water is sufficient, cover with a dry clean dressing or bandage. Do not apply creams or powders to the area. Do not sit in a bathtub or pool of water for 7 days or until wound has completely healed. Temporary bruising and discomfort is normal and may last a few weeks. You may have a  formation of a small lump at the site which may last up to 6 weeks. CALL THE PHYSICIANS: 
 
If the site becomes red, swollen or feels warm to the touch If there is bleeding or drainage or if there is unusual pain at the groin or down the leg. If there is any bleeding, lie down, apply pressure or have someone apply pressure with a clean cloth until the bleeding stops. If the bleeding continues, call 911 to be transported to the hospital. 
DO  [address] ACTIVITY: 
 
For the first 24-48 hours or as instructed by the physician: No lifting, pushing or pulling over 10 pounds and no straining the insertion site. Do not life grocery bags or the garbage can, do not run the vacuum  or  for 7 days. Start with short walks as in the hospital and gradually increase as tolerated each day. It is recommended to walk 30 minutes 5-7 days per week. Follow your physicians instructions on activity. Avoid walking outside in extremes of heat or cold. Walk inside when it is cold and windy or hot and humid. Things to keep in mind: No driving for at least 24 hours, or as designated by your physician. Limit the number of times you go up and down the stairs Take rests and pace yourself with activity. Be careful and do not strain with bowel movements. MEDICATIONS: 
 
Take all medications as prescribed Call your physician if you have any questions Keep an updated list of your medications with you at all times and give a list to your physician and pharmacist 
 
 
 
SIGNS AND SYMPTOMS: 
 
Be cautious of symptoms of angina or recurrent symptoms such as chest discomfort, unusual shortness of breath or fatigue. These could be symptoms of restenosis, a new blockage or a heart attack. If your symptoms are relieved with rest it is still recommended that you notify your physician of recurrent chest pain or discomfort. FOR CHEST PAIN or symptoms of angina not relieved with rest:  If the discomfort is not relieved with rest, and you have been prescribed Nitroglycerin, take as directed (taken under the tongue, one at a time 5 minutes apart for a total of 3 doses). If the discomfort is not relieved after the 3rd nitroglycerin, call 911. If you have not been prescribed Nitroglycerin  and your chest discomfort is not relieved with rest, call 911. AFTER CARE: 
 
Follow up with your physician as instructed. Follow a heart healthy diet with proper portion control, daily stress management, daily exercise, blood pressure and cholesterol control , and smoking cessation. [employer] Announcement We are excited to announce that we are making your provider's discharge notes available to you in [employer]. You will see these notes when they are completed and signed by the physician that discharged you from your recent hospital stay.   If you have any questions or concerns about any information you see in [facility], please call the [facility] Department where you were seen or reach out to your Primary Care Provider for more information about your plan of care. Introducing [facility]! Isabella Sykes introduces [employer] patient portal. Now you can access parts of your medical record, email your doctor's office, and request medication refills online. 1. In your internet browser, go to [email or web address] 2. Click on the First Time User? Click Here link in the Sign In box. You will see the New Member Sign Up page. 3. Enter your [employer] Access Code exactly as it appears below. You will not need to use this code after youve completed the sign-up process. If you do not sign up before the expiration date, you must request a new code. · [employer] Access Code: [email or web address] Expires: 8/16/2018  5:18 PM 
 
4. Enter the last four digits of your Social Security Number (xxxx) and Date of Birth (mm/dd/yyyy) as indicated and click Submit. You will be taken to the next sign-up page. 5. Create a [employer] ID. This will be your [employer] login ID and cannot be changed, so think of one that is secure and easy to remember. 6. Create a [employer] password. You can change your password at any time. 7. Enter your Password Reset Question and Answer. This can be used at a later time if you forget your password. 8. Enter your e-mail address. You will receive e-mail notification when new information is available in [address] 9. Click Sign Up. You can now view and download portions of your medical record. 10. Click the Download Summary menu link to download a portable copy of your medical information. If you have questions, please visit the Frequently Asked Questions section of the [employer] website. Remember, [employer] is NOT to be used for urgent needs. For medical emergencies, dial 911. Now available from your iPhone and Android! Introducing Giancarlo Michael As a [facility] [employer] patient, I wanted to make you aware of our electronic visit tool called Giancarlo DiggsWanshen. [employer] 24/7 allows you to connect within minutes with a medical provider 24 hours a day, seven days a week via a mobile device or tablet or logging into a secure website from your computer. You can access Giancarlo Curious Hatrocaelfin from anywhere in the [place]. A virtual visit might be right for you when you have a simple condition and feel like you just dont want to get out of bed, or cant get away from work for an appointment, when your regular [facility] provider is not available (evenings, weekends or holidays), or when youre out of town and need minor care. Electronic visits cost only $49 and if the StormyLanternCRM 24/7 provider determines a prescription is needed to treat your condition, one can be electronically transmitted to a nearby pharmacy*. Please take a moment to enroll today if you have not already done so. The enrollment process is free and takes just a few minutes. To enroll, please download the SDI-Solution 24/zePASS dallas to your tablet or phone, or visit [email or web address] to enroll on your computer. And, as an [address] [place] [address] patient with a [employer] account, the results of your visits will be scanned into your electronic medical record and your primary care provider will be able to view the scanned results. We urge you to continue to see your regular [facility] provider for your ongoing medical care. And while your primary care provider may not be the one available when you seek a Giancarlo Michael virtual visit, the peace of mind you get from getting a real diagnosis real time can be priceless. For more information on Giancarlo Osoriorocaelfin, view our Frequently Asked Questions (FAQs) at [email or web address]. Sincerely, 
 
Keisha Rivas MD 
Chief Medical Officer [employer] *:  certain medications cannot be prescribed via Giancarlo Michael Providers Seen During Your Hospitalization Provider Specialty Primary office phone Davina Beltre DO Emergency Medicine [phone] Aldo Zendejas MD Cardiology [phone] Your Primary Care Physician (PCP) Primary Care Physician Office Phone Office Fax Christiana Holbrook [phone] You are allergic to the following Allergen Reactions Aspirin Nausea and Vomiting Pt reports that she takes 81 mg ASA daily Recent Documentation Height Weight Breastfeeding? BMI OB Status Smoking Status 1.6 m 73.2 kg No 28.59 kg/m2 Hysterectomy Never Smoker Emergency Contacts Name Discharge Info Relation Home Work Mobile Shayy Ros DISCHARGE CAREGIVER [3] Child [2] [phone] Patient Belongings The following personal items are in your possession at time of discharge: 
  Dental Appliances: Uppers, Lowers  Visual Aid: Glasses, With patient      Home Medications: None   Jewelry: Earrings (gray metal earrings)  Clothing: With patient, Undergarments, Socks, Shirt, Pants, Jacket/Coat, Footwear    Other Valuables: None  Personal Items Sent to Safe: Declines Please provide this summary of care documentation to your next provider. Signatures-by signing, you are acknowledging that this After Visit Summary has been reviewed with you and you have received a copy. Patient Signature:  ____________________________________________________________ Date:  ____________________________________________________________  
  
Elbertenddarrionyn Finger Provider Signature:  ____________________________________________________________ Date:  ____________________________________________________________

## 2018-05-18 NOTE — ED NOTES
TRANSFER - OUT REPORT:    Verbal report given to Darshan dodson RN(name) on Valentina Cotton  being transferred to IVCU(unit) for routine progression of care       Report consisted of patients Situation, Background, Assessment and   Recommendations(SBAR). Information from the following report(s) SBAR, Kardex, ED Summary and MAR was reviewed with the receiving nurse. Lines:   Peripheral IV 05/18/18 Right Antecubital (Active)   Site Assessment Clean, dry, & intact 5/18/2018  1:25 PM   Phlebitis Assessment 0 5/18/2018  1:25 PM   Infiltration Assessment 0 5/18/2018  1:25 PM   Dressing Status Clean, dry, & intact 5/18/2018  1:25 PM   Dressing Type Transparent 5/18/2018  1:25 PM   Hub Color/Line Status Flushed 5/18/2018  1:25 PM   Action Taken Blood drawn 5/18/2018  1:25 PM       Peripheral IV 05/18/18 Left Hand (Active)   Site Assessment Clean, dry, & intact 5/18/2018  3:37 PM   Phlebitis Assessment 0 5/18/2018  3:37 PM   Infiltration Assessment 0 5/18/2018  3:37 PM   Dressing Status Clean, dry, & intact 5/18/2018  3:37 PM   Dressing Type Transparent 5/18/2018  3:37 PM   Hub Color/Line Status Flushed 5/18/2018  3:37 PM        Opportunity for questions and clarification was provided. Patient transported with:   Monitor, RN, pt belongings, labels/chart, and family members.

## 2018-05-18 NOTE — ED NOTES
Pt went to restroom but forgot to provide urine sample. Urine cup placed at bedside and family aware.

## 2018-05-18 NOTE — IP AVS SNAPSHOT
[address] [facility] 
[phone] Patient: Vikki Barrow MRN: [id number] Gisell Ji A check iglesia indicates which time of day the medication should be taken. My Medications START taking these medications Instructions Each Dose to Equal  
 Morning Noon Evening Bedtime  
 metoprolol tartrate 25 mg tablet Commonly known as:  LOPRESSOR Your last dose was: Your next dose is: Take 0.5 Tabs by mouth every twelve (12) hours. 12.5 mg  
    
   
   
   
  
 potassium chloride SR 20 mEq tablet Commonly known as:  K-TAB Start taking on:  5/26/2018 Your last dose was: Your next dose is: Take 1 Tab by mouth daily. 20 mEq  
    
   
   
   
  
 ticagrelor 90 mg tablet Commonly known as:  Renetta [employer] Your last dose was: Your next dose is: Take 1 Tab by mouth every twelve (12) hours every twelve (12) hours. 90 mg CONTINUE taking these medications Instructions Each Dose to Equal  
 Morning Noon Evening Bedtime  
 allopurinol 100 mg tablet Commonly known as:  Kristen Hereford Your last dose was: Your next dose is: Take 100 mg by mouth daily. 100 mg  
    
   
   
   
  
 ALPRAZolam 0.25 mg tablet Commonly known as:  Clarke Paul Your last dose was: Your next dose is: Take 0.25 mg by mouth daily as needed for Anxiety. 0.25 mg  
    
   
   
   
  
 aspirin delayed-release 81 mg tablet Your last dose was: Your next dose is: Take 81 mg by mouth daily. 81 mg  
    
   
   
   
  
 atorvastatin 80 mg tablet Commonly known as:  LIPITOR Your last dose was: Your next dose is: Take 80 mg by mouth nightly. 80 mg  
    
   
   
   
  
 cholecalciferol 400 unit Tab tablet Commonly known as:  VITAMIN D3  
   
 Your last dose was: Your next dose is: Take 400 Units by mouth daily. 400 Units  
    
   
   
   
  
 fexofenadine 180 mg tablet Commonly known as:  Jd Guillen Your last dose was: Your next dose is: Take 180 mg by mouth nightly. 180 mg  
    
   
   
   
  
 FLONASE ALLERGY RELIEF 50 mcg/actuation nasal spray Generic drug:  fluticasone Your last dose was: Your next dose is:    
   
   
 1 Spray by Both Nostrils route daily. 1 Spray  
    
   
   
   
  
 lisinopril 20 mg tablet Commonly known as:  Manuel Tomas Your last dose was: Your next dose is: Take 20 mg by mouth daily. 20 mg  
    
   
   
   
  
 timolol 0.5 % ophthalmic gel-forming Commonly known as:  TIMOPTIC-XE Your last dose was: Your next dose is:    
   
   
 Administer 1 Drop to right eye daily. 1 Drop TYLENOL EXTRA STRENGTH 500 mg tablet Generic drug:  acetaminophen Your last dose was: Your next dose is: Take 1,000 mg by mouth every six (6) hours as needed for Pain. 1000 mg  
    
   
   
   
  
 XALATAN 0.005 % ophthalmic solution Generic drug:  latanoprost  
   
Your last dose was: Your next dose is:    
   
   
 Administer 1 Drop to both eyes nightly. 1 Drop STOP taking these medications   
 amLODIPine 10 mg tablet Commonly known as:  Debbie Amaro Where to Get Your Medications [facility] [address]  [address] [place] [address] Phone:  [phone]  
  metoprolol tartrate 25 mg tablet  
 potassium chloride SR 20 mEq tablet  
 ticagrelor 90 mg tablet

## 2018-05-19 LAB
ANION GAP SERPL CALC-SCNC: 6 MMOL/L (ref 5–15)
APTT PPP: 32.3 SEC (ref 22.1–32)
APTT PPP: 51.8 SEC (ref 22.1–32)
APTT PPP: 80.7 SEC (ref 22.1–32)
ARTERIAL PATENCY WRIST A: NORMAL
ATRIAL RATE: 73 BPM
BASE DEFICIT BLDA-SCNC: 0.8 MMOL/L
BDY SITE: NORMAL
BREATHS.SPONTANEOUS ON VENT: 20
BUN SERPL-MCNC: 19 MG/DL (ref 6–20)
BUN/CREAT SERPL: 15 (ref 12–20)
CALCIUM SERPL-MCNC: 9 MG/DL (ref 8.5–10.1)
CALCULATED R AXIS, ECG10: -35 DEGREES
CALCULATED T AXIS, ECG11: 164 DEGREES
CHLORIDE SERPL-SCNC: 107 MMOL/L (ref 97–108)
CHOLEST SERPL-MCNC: 126 MG/DL
CO2 SERPL-SCNC: 25 MMOL/L (ref 21–32)
CREAT SERPL-MCNC: 1.23 MG/DL (ref 0.55–1.02)
DIAGNOSIS, 93000: NORMAL
GLUCOSE SERPL-MCNC: 109 MG/DL (ref 65–100)
HCO3 BLDA-SCNC: 23 MMOL/L (ref 22–26)
HDLC SERPL-MCNC: 57 MG/DL
HDLC SERPL: 2.2 {RATIO} (ref 0–5)
LDLC SERPL CALC-MCNC: 56.2 MG/DL (ref 0–100)
LIPID PROFILE,FLP: NORMAL
P-R INTERVAL, ECG05: 238 MS
PCO2 BLDA: 35 MMHG (ref 35–45)
PH BLDA: 7.43 [PH] (ref 7.35–7.45)
PO2 BLDA: 80 MMHG (ref 80–100)
POTASSIUM SERPL-SCNC: 4 MMOL/L (ref 3.5–5.1)
Q-T INTERVAL, ECG07: 396 MS
QRS DURATION, ECG06: 82 MS
QTC CALCULATION (BEZET), ECG08: 436 MS
SAO2 % BLD: 96 % (ref 92–97)
SAO2% DEVICE SAO2% SENSOR NAME: NORMAL
SODIUM SERPL-SCNC: 138 MMOL/L (ref 136–145)
SPECIMEN SITE: NORMAL
THERAPEUTIC RANGE,PTTT: ABNORMAL SECS (ref 58–77)
TRIGL SERPL-MCNC: 64 MG/DL (ref ?–150)
VENTRICULAR RATE, ECG03: 73 BPM
VLDLC SERPL CALC-MCNC: 12.8 MG/DL

## 2018-05-19 PROCEDURE — 74011250636 HC RX REV CODE- 250/636: Performed by: INTERNAL MEDICINE

## 2018-05-19 PROCEDURE — C1769 GUIDE WIRE: HCPCS

## 2018-05-19 PROCEDURE — 99218 HC RM OBSERVATION: CPT

## 2018-05-19 PROCEDURE — 4A023N7 MEASUREMENT OF CARDIAC SAMPLING AND PRESSURE, LEFT HEART, PERCUTANEOUS APPROACH: ICD-10-PCS | Performed by: INTERNAL MEDICINE

## 2018-05-19 PROCEDURE — 93005 ELECTROCARDIOGRAM TRACING: CPT

## 2018-05-19 PROCEDURE — 77030015766

## 2018-05-19 PROCEDURE — 36415 COLL VENOUS BLD VENIPUNCTURE: CPT | Performed by: NURSE PRACTITIONER

## 2018-05-19 PROCEDURE — 99153 MOD SED SAME PHYS/QHP EA: CPT

## 2018-05-19 PROCEDURE — B2111ZZ FLUOROSCOPY OF MULTIPLE CORONARY ARTERIES USING LOW OSMOLAR CONTRAST: ICD-10-PCS | Performed by: INTERNAL MEDICINE

## 2018-05-19 PROCEDURE — C1894 INTRO/SHEATH, NON-LASER: HCPCS

## 2018-05-19 PROCEDURE — 74011250636 HC RX REV CODE- 250/636: Performed by: NURSE PRACTITIONER

## 2018-05-19 PROCEDURE — 36600 WITHDRAWAL OF ARTERIAL BLOOD: CPT | Performed by: THORACIC SURGERY (CARDIOTHORACIC VASCULAR SURGERY)

## 2018-05-19 PROCEDURE — 74011250637 HC RX REV CODE- 250/637: Performed by: NURSE PRACTITIONER

## 2018-05-19 PROCEDURE — 77030019698 HC SYR ANGI MDLON MRTM -A

## 2018-05-19 PROCEDURE — 77030019569 HC BND COMPR RAD TERU -B

## 2018-05-19 PROCEDURE — 80061 LIPID PANEL: CPT | Performed by: NURSE PRACTITIONER

## 2018-05-19 PROCEDURE — 74011250636 HC RX REV CODE- 250/636

## 2018-05-19 PROCEDURE — 85730 THROMBOPLASTIN TIME PARTIAL: CPT | Performed by: NURSE PRACTITIONER

## 2018-05-19 PROCEDURE — 80048 BASIC METABOLIC PNL TOTAL CA: CPT | Performed by: NURSE PRACTITIONER

## 2018-05-19 PROCEDURE — 77030010221 HC SPLNT WR POS TELE -B

## 2018-05-19 PROCEDURE — 74011000250 HC RX REV CODE- 250

## 2018-05-19 PROCEDURE — 74011000250 HC RX REV CODE- 250: Performed by: INTERNAL MEDICINE

## 2018-05-19 PROCEDURE — 74011636320 HC RX REV CODE- 636/320

## 2018-05-19 PROCEDURE — 65660000000 HC RM CCU STEPDOWN

## 2018-05-19 PROCEDURE — 74011250637 HC RX REV CODE- 250/637: Performed by: INTERNAL MEDICINE

## 2018-05-19 PROCEDURE — 82803 BLOOD GASES ANY COMBINATION: CPT | Performed by: THORACIC SURGERY (CARDIOTHORACIC VASCULAR SURGERY)

## 2018-05-19 PROCEDURE — B2151ZZ FLUOROSCOPY OF LEFT HEART USING LOW OSMOLAR CONTRAST: ICD-10-PCS | Performed by: INTERNAL MEDICINE

## 2018-05-19 PROCEDURE — 77030004549 HC CATH ANGI DX PRF MRTM -A

## 2018-05-19 PROCEDURE — 77030008543 HC TBNG MON PRSS MRTM -A

## 2018-05-19 PROCEDURE — 74011250637 HC RX REV CODE- 250/637: Performed by: THORACIC SURGERY (CARDIOTHORACIC VASCULAR SURGERY)

## 2018-05-19 RX ORDER — VERAPAMIL HYDROCHLORIDE 2.5 MG/ML
INJECTION, SOLUTION INTRAVENOUS
Status: DISCONTINUED
Start: 2018-05-19 | End: 2018-05-25 | Stop reason: HOSPADM

## 2018-05-19 RX ORDER — BIVALIRUDIN 250 MG/5ML
INJECTION, POWDER, LYOPHILIZED, FOR SOLUTION INTRAVENOUS
Status: DISCONTINUED
Start: 2018-05-19 | End: 2018-05-25 | Stop reason: HOSPADM

## 2018-05-19 RX ORDER — SODIUM CHLORIDE 900 MG/100ML
INJECTION INTRAVENOUS
Status: DISCONTINUED
Start: 2018-05-19 | End: 2018-05-19 | Stop reason: ALTCHOICE

## 2018-05-19 RX ORDER — LIDOCAINE HYDROCHLORIDE 10 MG/ML
1-30 INJECTION, SOLUTION EPIDURAL; INFILTRATION; INTRACAUDAL; PERINEURAL
Status: DISCONTINUED | OUTPATIENT
Start: 2018-05-19 | End: 2018-05-19 | Stop reason: ALTCHOICE

## 2018-05-19 RX ORDER — HEPARIN SODIUM 200 [USP'U]/100ML
500 INJECTION, SOLUTION INTRAVENOUS ONCE
Status: COMPLETED | OUTPATIENT
Start: 2018-05-19 | End: 2018-05-20

## 2018-05-19 RX ORDER — FENTANYL CITRATE 50 UG/ML
25-50 INJECTION, SOLUTION INTRAMUSCULAR; INTRAVENOUS
Status: DISCONTINUED | OUTPATIENT
Start: 2018-05-19 | End: 2018-05-19 | Stop reason: ALTCHOICE

## 2018-05-19 RX ORDER — MIDAZOLAM HYDROCHLORIDE 1 MG/ML
.5-2 INJECTION, SOLUTION INTRAMUSCULAR; INTRAVENOUS
Status: DISCONTINUED | OUTPATIENT
Start: 2018-05-19 | End: 2018-05-19 | Stop reason: ALTCHOICE

## 2018-05-19 RX ORDER — DOPAMINE HYDROCHLORIDE 320 MG/100ML
INJECTION, SOLUTION INTRAVENOUS
Status: DISCONTINUED
Start: 2018-05-19 | End: 2018-05-19 | Stop reason: ALTCHOICE

## 2018-05-19 RX ORDER — HEPARIN SODIUM 1000 [USP'U]/ML
INJECTION, SOLUTION INTRAVENOUS; SUBCUTANEOUS
Status: DISCONTINUED
Start: 2018-05-19 | End: 2018-05-19 | Stop reason: ALTCHOICE

## 2018-05-19 RX ORDER — SODIUM CHLORIDE 9 MG/ML
100 INJECTION, SOLUTION INTRAVENOUS CONTINUOUS
Status: DISPENSED | OUTPATIENT
Start: 2018-05-19 | End: 2018-05-19

## 2018-05-19 RX ORDER — FENTANYL CITRATE 50 UG/ML
INJECTION, SOLUTION INTRAMUSCULAR; INTRAVENOUS
Status: COMPLETED
Start: 2018-05-19 | End: 2018-05-19

## 2018-05-19 RX ORDER — HEPARIN SODIUM 200 [USP'U]/100ML
500 INJECTION, SOLUTION INTRAVENOUS ONCE
Status: COMPLETED | OUTPATIENT
Start: 2018-05-19 | End: 2018-05-24

## 2018-05-19 RX ORDER — HEPARIN SODIUM 1000 [USP'U]/ML
2500 INJECTION, SOLUTION INTRAVENOUS; SUBCUTANEOUS ONCE
Status: DISCONTINUED | OUTPATIENT
Start: 2018-05-19 | End: 2018-05-19 | Stop reason: ALTCHOICE

## 2018-05-19 RX ORDER — DOCUSATE SODIUM 100 MG/1
100 CAPSULE, LIQUID FILLED ORAL
Status: DISCONTINUED | OUTPATIENT
Start: 2018-05-19 | End: 2018-05-25 | Stop reason: HOSPADM

## 2018-05-19 RX ORDER — HEPARIN SODIUM 200 [USP'U]/100ML
INJECTION, SOLUTION INTRAVENOUS
Status: DISCONTINUED
Start: 2018-05-19 | End: 2018-05-25 | Stop reason: HOSPADM

## 2018-05-19 RX ORDER — VERAPAMIL HYDROCHLORIDE 2.5 MG/ML
2.5 INJECTION, SOLUTION INTRAVENOUS ONCE
Status: COMPLETED | OUTPATIENT
Start: 2018-05-19 | End: 2018-05-19

## 2018-05-19 RX ORDER — SODIUM CHLORIDE 9 MG/ML
999 INJECTION, SOLUTION INTRAVENOUS CONTINUOUS
Status: DISCONTINUED | OUTPATIENT
Start: 2018-05-19 | End: 2018-05-25 | Stop reason: HOSPADM

## 2018-05-19 RX ORDER — ASCORBIC ACID 500 MG
1000 TABLET ORAL 2 TIMES DAILY
Status: DISCONTINUED | OUTPATIENT
Start: 2018-05-19 | End: 2018-05-25 | Stop reason: HOSPADM

## 2018-05-19 RX ORDER — MIDAZOLAM HYDROCHLORIDE 1 MG/ML
INJECTION, SOLUTION INTRAMUSCULAR; INTRAVENOUS
Status: COMPLETED
Start: 2018-05-19 | End: 2018-05-19

## 2018-05-19 RX ORDER — LIDOCAINE HYDROCHLORIDE 10 MG/ML
INJECTION, SOLUTION EPIDURAL; INFILTRATION; INTRACAUDAL; PERINEURAL
Status: COMPLETED
Start: 2018-05-19 | End: 2018-05-19

## 2018-05-19 RX ADMIN — VERAPAMIL HYDROCHLORIDE 2.5 MG: 2.5 INJECTION INTRAVENOUS at 10:06

## 2018-05-19 RX ADMIN — METOPROLOL TARTRATE 12.5 MG: 25 TABLET ORAL at 08:43

## 2018-05-19 RX ADMIN — Medication 10 ML: at 05:39

## 2018-05-19 RX ADMIN — MIDAZOLAM 1 MG: 1 INJECTION INTRAMUSCULAR; INTRAVENOUS at 10:01

## 2018-05-19 RX ADMIN — LIDOCAINE HYDROCHLORIDE 1 ML: 10 INJECTION, SOLUTION EPIDURAL; INFILTRATION; INTRACAUDAL; PERINEURAL at 10:05

## 2018-05-19 RX ADMIN — HEPARIN SODIUM 12 UNITS/KG/HR: 10000 INJECTION, SOLUTION INTRAVENOUS at 22:38

## 2018-05-19 RX ADMIN — IOPAMIDOL 60 ML: 755 INJECTION, SOLUTION INTRAVENOUS at 10:29

## 2018-05-19 RX ADMIN — DOCUSATE SODIUM 100 MG: 100 CAPSULE, LIQUID FILLED ORAL at 15:21

## 2018-05-19 RX ADMIN — Medication 10 ML: at 21:35

## 2018-05-19 RX ADMIN — SODIUM CHLORIDE 999 ML/HR: 900 INJECTION, SOLUTION INTRAVENOUS at 10:29

## 2018-05-19 RX ADMIN — METOPROLOL TARTRATE 12.5 MG: 25 TABLET ORAL at 21:29

## 2018-05-19 RX ADMIN — HEPARIN SODIUM 11 UNITS/KG/HR: 10000 INJECTION, SOLUTION INTRAVENOUS at 14:25

## 2018-05-19 RX ADMIN — FENTANYL CITRATE 50 MCG: 50 INJECTION, SOLUTION INTRAMUSCULAR; INTRAVENOUS at 10:01

## 2018-05-19 RX ADMIN — HEPARIN SODIUM 1000 UNITS: 200 INJECTION, SOLUTION INTRAVENOUS at 10:02

## 2018-05-19 RX ADMIN — OXYCODONE HYDROCHLORIDE AND ACETAMINOPHEN 1000 MG: 500 TABLET ORAL at 21:29

## 2018-05-19 RX ADMIN — SODIUM CHLORIDE 500 ML: 900 INJECTION, SOLUTION INTRAVENOUS at 11:26

## 2018-05-19 RX ADMIN — ALLOPURINOL 100 MG: 100 TABLET ORAL at 13:24

## 2018-05-19 RX ADMIN — SODIUM CHLORIDE 100 ML/HR: 900 INJECTION, SOLUTION INTRAVENOUS at 16:30

## 2018-05-19 RX ADMIN — ATORVASTATIN CALCIUM 80 MG: 40 TABLET, FILM COATED ORAL at 21:29

## 2018-05-19 RX ADMIN — NITROGLYCERIN 200 MCG: 5 INJECTION, SOLUTION INTRAVENOUS at 10:06

## 2018-05-19 RX ADMIN — Medication 10 ML: at 13:25

## 2018-05-19 RX ADMIN — MIDAZOLAM HYDROCHLORIDE 1 MG: 1 INJECTION, SOLUTION INTRAMUSCULAR; INTRAVENOUS at 10:05

## 2018-05-19 RX ADMIN — ASPIRIN 81 MG: 81 TABLET, COATED ORAL at 08:45

## 2018-05-19 RX ADMIN — MIDAZOLAM HYDROCHLORIDE 1 MG: 1 INJECTION, SOLUTION INTRAMUSCULAR; INTRAVENOUS at 10:01

## 2018-05-19 NOTE — PROGRESS NOTES
5/19/2018 8:14 AM    Admit Date: 5/18/2018    Admit Diagnosis: NSTEMI (non-ST elevated myocardial infarction) ([facility] [address]    Subjective:     Malia Cheung   denies chest pain, chest pressure/discomfort, dyspnea, palpitations, irregular heart beats, near-syncope, syncope, fatigue, orthopnea, paroxysmal nocturnal dyspnea, exertional chest pressure/discomfort, claudication, lower extremity edema.     Visit Vitals    /52 (BP 1 Location: Left arm, BP Patient Position: At rest)    Pulse 70    Temp 98.3 °F (36.8 °C)    Resp 16    Ht 5' 5\" (1.651 m)    Wt 161 lb 6 oz (73.2 kg)    SpO2 100%    Breastfeeding No    BMI 26.85 kg/m2     Current Facility-Administered Medications   Medication Dose Route Frequency    nitroglycerin (Tridil) 200 mcg/ml infusion  10 mcg/min IntraVENous TITRATE    heparin 25,000 units in D5W 250 ml infusion  12-25 Units/kg/hr IntraVENous TITRATE    heparin (porcine) injection 4,000 Units  4,000 Units IntraVENous PRN    Or    heparin (porcine) injection 2,000 Units  2,000 Units IntraVENous PRN    acetaminophen (TYLENOL) tablet 1,000 mg  1,000 mg Oral Q6H PRN    allopurinol (ZYLOPRIM) tablet 100 mg  100 mg Oral DAILY    ALPRAZolam (XANAX) tablet 0.25 mg  0.25 mg Oral DAILY PRN    aspirin delayed-release tablet 81 mg  81 mg Oral DAILY    atorvastatin (LIPITOR) tablet 80 mg  80 mg Oral QHS    loratadine (CLARITIN) tablet 10 mg  10 mg Oral DAILY    fluticasone (FLONASE) 50 mcg/actuation nasal spray 1 Spray  1 Spray Both Nostrils DAILY    latanoprost (XALATAN) 0.005 % ophthalmic solution 1 Drop  1 Drop Both Eyes QHS    lisinopril (PRINIVIL, ZESTRIL) tablet 20 mg  20 mg Oral DAILY    timolol (TIMOPTIC) 0.5 % ophthalmic solution 1 Drop  1 Drop Both Eyes DAILY    metoprolol tartrate (LOPRESSOR) tablet 12.5 mg  12.5 mg Oral Q12H    sodium chloride (NS) flush 5-10 mL  5-10 mL IntraVENous Q8H    sodium chloride (NS) flush 5-10 mL  5-10 mL IntraVENous PRN    HYDROcodone-acetaminophen (NORCO) 5-325 mg per tablet 1 Tab  1 Tab Oral Q4H PRN         Objective:      Visit Vitals    /52 (BP 1 Location: Left arm, BP Patient Position: At rest)    Pulse 70    Temp 98.3 °F (36.8 °C)    Resp 16    Ht 5' 5\" (1.651 m)    Wt 161 lb 6 oz (73.2 kg)    SpO2 100%    Breastfeeding No    BMI 26.85 kg/m2       Physical Exam:  Abdomen: soft, non-tender.  Bowel sounds   Extremities normal, atraumatic, no cyanosis or edema  Heart: regular rate and rhythm, S1, S2 normal, no murmur, click, rub or gallop  Lungs: clear to auscultation bilaterally  Neurologic: Grossly normal    Data Review:   Labs:    Recent Results (from the past 24 hour(s))   EKG, 12 LEAD, INITIAL    Collection Time: 05/18/18 11:58 AM   Result Value Ref Range    Ventricular Rate 79 BPM    Atrial Rate 79 BPM    P-R Interval 230 ms    QRS Duration 84 ms    Q-T Interval 384 ms    QTC Calculation (Bezet) 440 ms    Calculated P Axis -5 degrees    Calculated R Axis -31 degrees    Calculated T Axis 123 degrees    Diagnosis       Sinus rhythm with 1st degree AV block  Left axis deviation  Loss of anteroseptal forces  Nonspecific ST and T wave abnormality  When compared with ECG of 11-AUG-2016 19:53,  T wave amplitude has increased in Anterior leads  Nonspecific ST and T wave abnormality are more pronounced  Confirmed by Jolie Adam ([id number]) on 5/18/2018 5:48:56 PM     CBC WITH AUTOMATED DIFF    Collection Time: 05/18/18 12:14 PM   Result Value Ref Range    WBC 7.5 3.6 - 11.0 K/uL    RBC 3.83 3.80 - 5.20 M/uL    HGB 11.4 (L) 11.5 - 16.0 g/dL    HCT 34.8 (L) 35.0 - 47.0 %    MCV 90.9 80.0 - 99.0 FL    MCH 29.8 26.0 - 34.0 PG    MCHC 32.8 30.0 - 36.5 g/dL    RDW 13.5 11.5 - 14.5 %    PLATELET 691 439 - 015 K/uL    MPV 8.9 8.9 - 12.9 FL    NRBC 0.0 0  WBC    ABSOLUTE NRBC 0.00 0.00 - 0.01 K/uL    NEUTROPHILS 56 32 - 75 %    LYMPHOCYTES 32 12 - 49 %    MONOCYTES 7 5 - 13 %    EOSINOPHILS 4 0 - 7 %    BASOPHILS 0 0 - 1 % IMMATURE GRANULOCYTES 0 0.0 - 0.5 %    ABS. NEUTROPHILS 4.2 1.8 - 8.0 K/UL    ABS. LYMPHOCYTES 2.4 0.8 - 3.5 K/UL    ABS. MONOCYTES 0.5 0.0 - 1.0 K/UL    ABS. EOSINOPHILS 0.3 0.0 - 0.4 K/UL    ABS. BASOPHILS 0.0 0.0 - 0.1 K/UL    ABS. IMM. GRANS. 0.0 0.00 - 0.04 K/UL    DF AUTOMATED     METABOLIC PANEL, COMPREHENSIVE    Collection Time: 05/18/18 12:14 PM   Result Value Ref Range    Sodium 137 136 - 145 mmol/L    Potassium 4.2 3.5 - 5.1 mmol/L    Chloride 105 97 - 108 mmol/L    CO2 28 21 - 32 mmol/L    Anion gap 4 (L) 5 - 15 mmol/L    Glucose 111 (H) 65 - 100 mg/dL    BUN 20 6 - 20 MG/DL    Creatinine 1.24 (H) 0.55 - 1.02 MG/DL    BUN/Creatinine ratio 16 12 - 20      GFR est AA 50 (L) >60 ml/min/1.73m2    GFR est non-AA 41 (L) >60 ml/min/1.73m2    Calcium 9.3 8.5 - 10.1 MG/DL    Bilirubin, total 0.4 0.2 - 1.0 MG/DL    ALT (SGPT) 29 12 - 78 U/L    AST (SGOT) 80 (H) 15 - 37 U/L    Alk. phosphatase 166 (H) 45 - 117 U/L    Protein, total 7.4 6.4 - 8.2 g/dL    Albumin 3.7 3.5 - 5.0 g/dL    Globulin 3.7 2.0 - 4.0 g/dL    A-G Ratio 1.0 (L) 1.1 - 2.2     CK W/ REFLX CKMB    Collection Time: 05/18/18 12:14 PM   Result Value Ref Range     (H) 26 - 192 U/L   TROPONIN I    Collection Time: 05/18/18 12:14 PM   Result Value Ref Range    Troponin-I, Qt. 8.26 (H) <0.05 ng/mL   CK-MB,QUANT.     Collection Time: 05/18/18 12:14 PM   Result Value Ref Range    CK - MB 52.0 (H) <3.6 NG/ML    CK-MB Index 7.5 (H) 0 - 2.5     PTT    Collection Time: 05/18/18  2:14 PM   Result Value Ref Range    aPTT 26.8 22.1 - 32.0 sec    aPTT, therapeutic range     58.0 - 77.0 SECS   CBC WITH AUTOMATED DIFF    Collection Time: 05/18/18  2:14 PM   Result Value Ref Range    WBC 8.3 3.6 - 11.0 K/uL    RBC 3.72 (L) 3.80 - 5.20 M/uL    HGB 11.1 (L) 11.5 - 16.0 g/dL    HCT 33.5 (L) 35.0 - 47.0 %    MCV 90.1 80.0 - 99.0 FL    MCH 29.8 26.0 - 34.0 PG    MCHC 33.1 30.0 - 36.5 g/dL    RDW 13.5 11.5 - 14.5 %    PLATELET 713 238 - 790 K/uL    MPV 8.9 8.9 - 12.9 FL    NRBC 0.0 0  WBC    ABSOLUTE NRBC 0.00 0.00 - 0.01 K/uL    NEUTROPHILS 57 32 - 75 %    LYMPHOCYTES 30 12 - 49 %    MONOCYTES 9 5 - 13 %    EOSINOPHILS 4 0 - 7 %    BASOPHILS 0 0 - 1 %    IMMATURE GRANULOCYTES 0 0.0 - 0.5 %    ABS. NEUTROPHILS 4.7 1.8 - 8.0 K/UL    ABS. LYMPHOCYTES 2.5 0.8 - 3.5 K/UL    ABS. MONOCYTES 0.7 0.0 - 1.0 K/UL    ABS. EOSINOPHILS 0.4 0.0 - 0.4 K/UL    ABS. BASOPHILS 0.0 0.0 - 0.1 K/UL    ABS. IMM.  GRANS. 0.0 0.00 - 0.04 K/UL    DF AUTOMATED     TROPONIN I    Collection Time: 05/18/18  2:14 PM   Result Value Ref Range    Troponin-I, Qt. 7.97 (H) <0.05 ng/mL   NT-PRO BNP    Collection Time: 05/18/18  2:14 PM   Result Value Ref Range    NT pro-BNP 3385 (H) 0 - 450 PG/ML   CK W/ CKMB & INDEX    Collection Time: 05/18/18  2:14 PM   Result Value Ref Range     (H) 26 - 192 U/L    CK - MB 49.2 (H) <3.6 NG/ML    CK-MB Index 7.4 (H) 0 - 2.5     URINALYSIS W/ REFLEX CULTURE    Collection Time: 05/18/18  7:59 PM   Result Value Ref Range    Color YELLOW/STRAW      Appearance CLEAR CLEAR      Specific gravity 1.008 1.003 - 1.030      pH (UA) 5.5 5.0 - 8.0      Protein NEGATIVE  NEG mg/dL    Glucose NEGATIVE  NEG mg/dL    Ketone NEGATIVE  NEG mg/dL    Bilirubin NEGATIVE  NEG      Blood NEGATIVE  NEG      Urobilinogen 0.2 0.2 - 1.0 EU/dL    Nitrites NEGATIVE  NEG      Leukocyte Esterase TRACE (A) NEG      WBC 0-4 0 - 4 /hpf    RBC 0-5 0 - 5 /hpf    Epithelial cells FEW FEW /lpf    Bacteria NEGATIVE  NEG /hpf    UA:UC IF INDICATED CULTURE NOT INDICATED BY UA RESULT CNI      Hyaline cast 0-2 0 - 5 /lpf   PTT    Collection Time: 05/18/18  9:34 PM   Result Value Ref Range    aPTT 51.8 (H) 22.1 - 32.0 sec    aPTT, therapeutic range     58.0 - 77.0 SECS   EKG, 12 LEAD, INITIAL    Collection Time: 05/19/18  4:31 AM   Result Value Ref Range    Ventricular Rate 73 BPM    Atrial Rate 73 BPM    P-R Interval 238 ms    QRS Duration 82 ms    Q-T Interval 396 ms    QTC Calculation (Bezet) 436 ms Calculated R Axis -35 degrees    Calculated T Axis 164 degrees    Diagnosis       Sinus rhythm with 1st degree AV block  Left axis deviation  Nonspecific ST and T wave abnormality  When compared with ECG of 18-MAY-2018 11:58,  Criteria for Septal infarct are no longer present     PTT    Collection Time: 05/19/18  5:26 AM   Result Value Ref Range    aPTT 80.7 (H) 22.1 - 32.0 sec    aPTT, therapeutic range     58.0 - 39.6 SECS   METABOLIC PANEL, BASIC    Collection Time: 05/19/18  5:26 AM   Result Value Ref Range    Sodium 138 136 - 145 mmol/L    Potassium 4.0 3.5 - 5.1 mmol/L    Chloride 107 97 - 108 mmol/L    CO2 25 21 - 32 mmol/L    Anion gap 6 5 - 15 mmol/L    Glucose 109 (H) 65 - 100 mg/dL    BUN 19 6 - 20 MG/DL    Creatinine 1.23 (H) 0.55 - 1.02 MG/DL    BUN/Creatinine ratio 15 12 - 20      GFR est AA 50 (L) >60 ml/min/1.73m2    GFR est non-AA 42 (L) >60 ml/min/1.73m2    Calcium 9.0 8.5 - 10.1 MG/DL       Telemetry: normal sinus rhythm      Assessment:     Active Problems:    NSTEMI (non-ST elevated myocardial infarction) ([facility] [address] (5/18/2018)      HTN (hypertension) (5/18/2018)      Hx of diverticulitis of colon (5/18/2018)        Plan:     1. NSTEMI: for cardiac cath today. I discussed the risks/benefits/alternatives of the procedure with the patient. Risks include (but are not limited to) bleeding, infection, cva/mi/tamponade/death. The patient understands and agrees to proceed. On IV heparin, NTG, aspirin, statin and BB. 2. HTN: BP controlled. Continue current meds. 3. FLP pending.

## 2018-05-19 NOTE — PROGRESS NOTES
Problem: Falls - Risk of  Goal: *Absence of Falls  Document Sin Fall Risk and appropriate interventions in the flowsheet.    Outcome: Progressing Towards Goal  Fall Risk Interventions:            Medication Interventions: Evaluate medications/consider consulting pharmacy, Patient to call before getting OOB, Teach patient to arise slowly, Assess postural VS orthostatic hypotension    Elimination Interventions: Call light in reach, Patient to call for help with toileting needs, Toilet paper/wipes in reach, Toileting schedule/hourly rounds

## 2018-05-19 NOTE — PROGRESS NOTES
2330 Bedside report received from Health system and care assumed. Report given with SBAR , recent labs, and MAR . Heparin rate verified at bedside changes made according to protocol . Nitroglycerin gtt infusing , pt has no c/o chest pain . 0350 pt awake unable to sleep . Given tablet to view cardiac cath education  Video . [id number] Bedside shift change report given to Health system  (oncoming nurse) by me (offgoing nurse). Report given with SBAR, MAR and Recent Results.

## 2018-05-19 NOTE — PROGRESS NOTES
Bedside report received from Keren MONIQUE RN                  Assessment, Background, Procedure summary, Intake/Output, MAR, and recent results discussed. Care assumed. 3415 PTT 80.7 Decreased Heparin rate by 2u/kg/hr. Old rate 13. New Rate 11u/kg/hr or 9.5ml/hr. Verified by Giovani Tate RN. Next PTT due 5-19 @ 1422.  0930 Heparin gtt stopped prior to procedure. Cath team in room to transfer patient. TRANSFER - IN REPORT:    Verbal report received from Paris Timbo (name) on Thurlow Silver  being received from [facility] (unit) for routine post - op      Report consisted of patients Situation, Background, Assessment and   Recommendations(SBAR). Information from the following report(s) Procedure Summary, Recent Results and Cardiac Rhythm NSR was reviewed with the receiving nurse. Opportunity for questions and clarification was provided. Assessment completed upon patients arrival to unit and care assumed. Patient receiving 1 liter bolus for hypotension. Patient remains hypotensive following completion of bolus. Notified Dr. Bailey Anne and administered additional 500cc bolus per order. Dr. Bailey Anne in room to speak with patient and patient's daughter and granddaughter. Dr. Kavon Fuentes aware of consult. Restart Heparin gtt per Dr. Bailey Anne. Spoke with pharmacy regaurding rate at which heparin is to be resarted. Will obtain PTT and call back to determine start rate. 1445 PTT 32.3. Pharmacy notified. Informed to start PTT @ previous rate of 11u/kg/hr. Rate verified with Giovani Tate RN. Next PTT due on 5/19 @ 2045.

## 2018-05-19 NOTE — CONSULTS
Dafne Muniz is an 80year-old lady who complained she could not sleep because of pain in right side of neck and right shoulder. Dr. Bailey Anne asks us to consider CABG in treatment of CAD including LMCA stenosis. For four nights prior to presentation she had pain in right neck and right shoulder without chest pain and unrelated to exertion. She attributed this to too much air conditioning. On presentation she was seen to have elevated troponin-I consistent with NSTEMI. She has remained hemodynamically stable, denies dyspnea, and denies chest pain. Cardiac cath today via RRA shows Cx 100 at origin, probably old based on collaterals from RCA; LMCA terminal 80; LAD prox irregular; D2 origin 80; RCA dominant, mural irregularities. Contrast ventriculogram in WARNER shows good wall motion of LV with some MR that probably is catheter-induced. Echocardiogram is pending. There is past history of systemic hypertension, gout, glaucoma. She denies stroke and symptoms of transient neurologic ischemia. She denies diabetes. She is a non-smoker. In the past, she was a social drinker. Surgery has been limited to hysterectomy. She lives with a daughter. A grand-daughter accompanies her now. She is alert and coherent. I hear no neck bruits. Carotid, brachial, left radial, femoral pulses are palpable. The left dorsalis pedis is palpable but I feel no other ankle pulses. Lungs are clear to auscultation. Rhythm is regular. I hear no heart murmur. Abdomen is nontender. There is no ankle edema. Impression:   Coronary artery disease    Atypical angina    NSTEMI    Preserved LV systolic function    LMCA stenosis   Systemic hypertension   Gout   Glaucoma    I discussed with her the diagnosis of CAD and treatment options along with their pros and cons. Dr. Bailey Anne is not enthusiastic about the PCI option.   Risks discussed with respect to the surgical option included death, stroke, bleeding requiring transfusion or reoperation, failure of wound healing or wound infection, respiratory failure, renal failure, graft failure, MI, and limb loss. Echo, PFTs pending.

## 2018-05-19 NOTE — PROGRESS NOTES
Mrs. Carolina Tyson is an 80 YOF, admitted to OBS, dx NSTEMI. CM reviewed medical record, met with Patient/Family (present at bedside), introduced self and explained role. OBS letter given, signed, placed on chart. Patient lives with clay Estrella [phone] at home in [place], [place]. Reported continued independence with ADLs/IADLs, no DME, family assists with transportation. Disposition pending further recommendations. Patient stated transitional care goal remains to return home with family when medically cleared for discharge. No additional psychosocial needs/problems identified at this time, no additional questions/concerns reported. CM provided support, encouragement. Reason for Admission:   Dx NSTEMI                   RRAT Score:          13           Plan for utilizing home health:      Pending further recommendations                    Likelihood of Readmission:   Low                         Transition of Care Plan:                  Disposition TBD pending further recommendations    Care Management Interventions  PCP Verified by CM: Yes  Mode of Transport at Discharge:  Other (see comment) (pending disposition, family salvatore assist with d/c transportation)  Transition of Care Consult (CM Consult): Discharge Planning (disposition pending further commendations, Patient transitional care goal remains to return home)  Discharge Durable Medical Equipment: No  Physical Therapy Consult: No  Occupational Therapy Consult: No  Speech Therapy Consult: No  Current Support Network: Own Home, Other (lives with daughter Veronique Estrella [phone])  Confirm Follow Up Transport: Family  Plan discussed with Pt/Family/Caregiver: Yes  Discharge Location  Discharge Placement: [facility] [address] [facility], [address] [facility]

## 2018-05-20 LAB
APTT PPP: 67.8 SEC (ref 22.1–32)
APTT PPP: 71.8 SEC (ref 22.1–32)
THERAPEUTIC RANGE,PTTT: ABNORMAL SECS (ref 58–77)
THERAPEUTIC RANGE,PTTT: ABNORMAL SECS (ref 58–77)

## 2018-05-20 PROCEDURE — 74011250637 HC RX REV CODE- 250/637: Performed by: THORACIC SURGERY (CARDIOTHORACIC VASCULAR SURGERY)

## 2018-05-20 PROCEDURE — 65660000000 HC RM CCU STEPDOWN

## 2018-05-20 PROCEDURE — 74011250637 HC RX REV CODE- 250/637: Performed by: NURSE PRACTITIONER

## 2018-05-20 PROCEDURE — 85730 THROMBOPLASTIN TIME PARTIAL: CPT | Performed by: NURSE PRACTITIONER

## 2018-05-20 PROCEDURE — 74011250636 HC RX REV CODE- 250/636: Performed by: NURSE PRACTITIONER

## 2018-05-20 PROCEDURE — 74011250637 HC RX REV CODE- 250/637: Performed by: INTERNAL MEDICINE

## 2018-05-20 PROCEDURE — 36415 COLL VENOUS BLD VENIPUNCTURE: CPT | Performed by: NURSE PRACTITIONER

## 2018-05-20 PROCEDURE — 74011000250 HC RX REV CODE- 250: Performed by: NURSE PRACTITIONER

## 2018-05-20 RX ORDER — TIMOLOL MALEATE 5 MG/ML
1 SOLUTION/ DROPS OPHTHALMIC DAILY
Status: DISCONTINUED | OUTPATIENT
Start: 2018-05-20 | End: 2018-05-25 | Stop reason: HOSPADM

## 2018-05-20 RX ADMIN — METOPROLOL TARTRATE 12.5 MG: 25 TABLET ORAL at 21:07

## 2018-05-20 RX ADMIN — ALLOPURINOL 100 MG: 100 TABLET ORAL at 08:02

## 2018-05-20 RX ADMIN — NITROGLYCERIN 10 MCG/MIN: 20 INJECTION INTRAVENOUS at 07:49

## 2018-05-20 RX ADMIN — ASPIRIN 81 MG: 81 TABLET, COATED ORAL at 08:02

## 2018-05-20 RX ADMIN — ACETAMINOPHEN 1000 MG: 500 TABLET, FILM COATED ORAL at 08:02

## 2018-05-20 RX ADMIN — Medication 10 ML: at 05:40

## 2018-05-20 RX ADMIN — ATORVASTATIN CALCIUM 80 MG: 40 TABLET, FILM COATED ORAL at 21:07

## 2018-05-20 RX ADMIN — Medication 10 ML: at 13:32

## 2018-05-20 RX ADMIN — HEPARIN SODIUM 12 UNITS/KG/HR: 10000 INJECTION, SOLUTION INTRAVENOUS at 19:53

## 2018-05-20 RX ADMIN — OXYCODONE HYDROCHLORIDE AND ACETAMINOPHEN 1000 MG: 500 TABLET ORAL at 17:48

## 2018-05-20 RX ADMIN — ALPRAZOLAM 0.25 MG: 0.25 TABLET ORAL at 21:10

## 2018-05-20 RX ADMIN — DOCUSATE SODIUM 100 MG: 100 CAPSULE, LIQUID FILLED ORAL at 11:24

## 2018-05-20 RX ADMIN — Medication 10 ML: at 21:08

## 2018-05-20 RX ADMIN — OXYCODONE HYDROCHLORIDE AND ACETAMINOPHEN 1000 MG: 500 TABLET ORAL at 08:02

## 2018-05-20 RX ADMIN — METOPROLOL TARTRATE 12.5 MG: 25 TABLET ORAL at 08:02

## 2018-05-20 NOTE — PROGRESS NOTES
Bedside report received from  NIDA Veliz                 Assessment, Background, Procedure summary, Intake/Output, MAR, and recent results discussed. Care assumed. Bilateral BP completed. IS teaching completed. Verbal report given to oncoming nurse NIDA Veliz  Report consisted of patients Situation, Background, Assessment, and   Recommendations    Information was reviewed with the receiving nurse. Opportunity for questions and clarification was provided.       Gerda Baltazar RN

## 2018-05-20 NOTE — PROGRESS NOTES
Shift summary     1930 Bedside report received from Henry Kendrick 69  and care assumed. Report given with SBAR , recent labs,  and MAR . Pt remains on heparin drip at 11 units/kg/hr . S/P radial cath today , r radial dressing dry and intact + pulses noted . Denies c/o pain at site . grnadaughter in room with pt .   2230 PTT 51.8 heparin dose increased by 1 as per protocol. Next lab draw at 0430  0530 PTT 71.8 no rate change needed pt made aware . Next lab draw at 1045  0710Bedside shift change report given to [facility]  (oncoming nurse) by me (offgoing nurse). Report given with SBAR, MAR and Recent Results.

## 2018-05-20 NOTE — PROGRESS NOTES
5/20/2018 7:23 AM    Admit Date: 5/18/2018    Admit Diagnosis: NSTEMI (non-ST elevated myocardial infarction) (HCC);NSTEMI*    Subjective:     Delight Sinks  denies chest pain, chest pressure/discomfort, dyspnea, palpitations, irregular heart beats, near-syncope, syncope, fatigue, orthopnea, paroxysmal nocturnal dyspnea, exertional chest pressure/discomfort.     Visit Vitals    /55 (BP 1 Location: Left arm, BP Patient Position: At rest)    Pulse 95    Temp 98.9 °F (37.2 °C)    Resp 18    Ht 5' 5\" (1.651 m)    Wt 161 lb 6 oz (73.2 kg)    SpO2 100%    Breastfeeding No    BMI 26.85 kg/m2     Current Facility-Administered Medications   Medication Dose Route Frequency    bivalirudin (ANGIOMAX) 250 mg injection        0.9% sodium chloride infusion  999 mL/hr IntraVENous CONTINUOUS    ascorbic acid (vitamin C) (VITAMIN C) tablet 1,000 mg  1,000 mg Oral BID    docusate sodium (COLACE) capsule 100 mg  100 mg Oral DAILY PRN    nitroglycerin (Tridil) 200 mcg/ml infusion  10 mcg/min IntraVENous TITRATE    heparin 25,000 units in D5W 250 ml infusion  12-25 Units/kg/hr IntraVENous TITRATE    heparin (porcine) injection 4,000 Units  4,000 Units IntraVENous PRN    Or    heparin (porcine) injection 2,000 Units  2,000 Units IntraVENous PRN    acetaminophen (TYLENOL) tablet 1,000 mg  1,000 mg Oral Q6H PRN    allopurinol (ZYLOPRIM) tablet 100 mg  100 mg Oral DAILY    ALPRAZolam (XANAX) tablet 0.25 mg  0.25 mg Oral DAILY PRN    aspirin delayed-release tablet 81 mg  81 mg Oral DAILY    atorvastatin (LIPITOR) tablet 80 mg  80 mg Oral QHS    loratadine (CLARITIN) tablet 10 mg  10 mg Oral DAILY    fluticasone (FLONASE) 50 mcg/actuation nasal spray 1 Spray  1 Spray Both Nostrils DAILY    latanoprost (XALATAN) 0.005 % ophthalmic solution 1 Drop  1 Drop Both Eyes QHS    timolol (TIMOPTIC) 0.5 % ophthalmic solution 1 Drop  1 Drop Both Eyes DAILY    metoprolol tartrate (LOPRESSOR) tablet 12.5 mg  12.5 mg Oral Q12H    sodium chloride (NS) flush 5-10 mL  5-10 mL IntraVENous Q8H    sodium chloride (NS) flush 5-10 mL  5-10 mL IntraVENous PRN    HYDROcodone-acetaminophen (NORCO) 5-325 mg per tablet 1 Tab  1 Tab Oral Q4H PRN         Objective:      Visit Vitals    /55 (BP 1 Location: Left arm, BP Patient Position: At rest)    Pulse 95    Temp 98.9 °F (37.2 °C)    Resp 18    Ht 5' 5\" (1.651 m)    Wt 161 lb 6 oz (73.2 kg)    SpO2 100%    Breastfeeding No    BMI 26.85 kg/m2       Physical Exam:  Abdomen: soft, non-tender. Bowel sounds normal.   Extremities: no cyanosis or edema  Heart: regular rate and rhythm, S1, S2 normal, no murmur, click, rub or gallop  Lungs: clear to auscultation bilaterally  Neurologic: Grossly normal    Data Review:   Labs:    Recent Results (from the past 24 hour(s))   PTT    Collection Time: 05/19/18  1:23 PM   Result Value Ref Range    aPTT 32.3 (H) 22.1 - 32.0 sec    aPTT, therapeutic range     58.0 - 77.0 SECS   PTT    Collection Time: 05/19/18  9:18 PM   Result Value Ref Range    aPTT 51.8 (H) 22.1 - 32.0 sec    aPTT, therapeutic range     58.0 - 77.0 SECS   BLOOD GAS, ARTERIAL    Collection Time: 05/19/18  9:41 PM   Result Value Ref Range    pH 7.43 7.35 - 7.45      PCO2 35 35.0 - 45.0 mmHg    PO2 80 80 - 100 mmHg    O2 SAT 96 92 - 97 %    BICARBONATE 23 22 - 26 mmol/L    BASE DEFICIT 0.8 mmol/L    O2 METHOD ROOM AIR      SPONTANEOUS RATE 20.0      Sample source ARTERIAL      SITE LEFT BRACHIAL      NIA'S TEST N/A     PTT    Collection Time: 05/20/18  4:46 AM   Result Value Ref Range    aPTT 71.8 (H) 22.1 - 32.0 sec    aPTT, therapeutic range     58.0 - 77.0 SECS       Telemetry: normal sinus rhythm      Assessment:     Active Problems:    NSTEMI (non-ST elevated myocardial infarction) ([facility] [address] (5/18/2018)      HTN (hypertension) (5/18/2018)      Hx of diverticulitis of colon (5/18/2018)        Plan:     1. NSTEMI: multivessel disease. Echo pending.  CTS evaluation for CABG underway. Pt still thinking. Dc lisinopril for now due to borderline BP. Continue other meds.  D/w pt

## 2018-05-21 LAB
APTT PPP: 53.4 SEC (ref 22.1–32)
APTT PPP: 55.3 SEC (ref 22.1–32)
APTT PPP: 90.2 SEC (ref 22.1–32)
THERAPEUTIC RANGE,PTTT: ABNORMAL SECS (ref 58–77)

## 2018-05-21 PROCEDURE — 74011250637 HC RX REV CODE- 250/637: Performed by: INTERNAL MEDICINE

## 2018-05-21 PROCEDURE — 85730 THROMBOPLASTIN TIME PARTIAL: CPT | Performed by: INTERNAL MEDICINE

## 2018-05-21 PROCEDURE — 93880 EXTRACRANIAL BILAT STUDY: CPT

## 2018-05-21 PROCEDURE — 93922 UPR/L XTREMITY ART 2 LEVELS: CPT

## 2018-05-21 PROCEDURE — 74011250637 HC RX REV CODE- 250/637: Performed by: NURSE PRACTITIONER

## 2018-05-21 PROCEDURE — 74011250637 HC RX REV CODE- 250/637: Performed by: PHYSICIAN ASSISTANT

## 2018-05-21 PROCEDURE — 93306 TTE W/DOPPLER COMPLETE: CPT

## 2018-05-21 PROCEDURE — 84443 ASSAY THYROID STIM HORMONE: CPT | Performed by: PHYSICIAN ASSISTANT

## 2018-05-21 PROCEDURE — 74011250636 HC RX REV CODE- 250/636: Performed by: PHYSICIAN ASSISTANT

## 2018-05-21 PROCEDURE — 74011250637 HC RX REV CODE- 250/637: Performed by: THORACIC SURGERY (CARDIOTHORACIC VASCULAR SURGERY)

## 2018-05-21 PROCEDURE — 36415 COLL VENOUS BLD VENIPUNCTURE: CPT | Performed by: INTERNAL MEDICINE

## 2018-05-21 PROCEDURE — 65660000000 HC RM CCU STEPDOWN

## 2018-05-21 RX ORDER — ADHESIVE BANDAGE
30 BANDAGE TOPICAL DAILY PRN
Status: DISCONTINUED | OUTPATIENT
Start: 2018-05-21 | End: 2018-05-25 | Stop reason: HOSPADM

## 2018-05-21 RX ORDER — FUROSEMIDE 40 MG/1
40 TABLET ORAL
Status: DISCONTINUED | OUTPATIENT
Start: 2018-05-21 | End: 2018-05-24

## 2018-05-21 RX ORDER — MINERAL OIL
180 ENEMA (ML) RECTAL DAILY
Status: DISCONTINUED | OUTPATIENT
Start: 2018-05-21 | End: 2018-05-25 | Stop reason: HOSPADM

## 2018-05-21 RX ORDER — POTASSIUM CHLORIDE 750 MG/1
20 TABLET, FILM COATED, EXTENDED RELEASE ORAL DAILY
Status: DISCONTINUED | OUTPATIENT
Start: 2018-05-21 | End: 2018-05-25 | Stop reason: HOSPADM

## 2018-05-21 RX ORDER — SENNOSIDES 8.6 MG/1
1 TABLET ORAL DAILY
Status: DISCONTINUED | OUTPATIENT
Start: 2018-05-21 | End: 2018-05-25 | Stop reason: HOSPADM

## 2018-05-21 RX ADMIN — OXYCODONE HYDROCHLORIDE AND ACETAMINOPHEN 1000 MG: 500 TABLET ORAL at 18:38

## 2018-05-21 RX ADMIN — ALPRAZOLAM 0.25 MG: 0.25 TABLET ORAL at 22:51

## 2018-05-21 RX ADMIN — METOPROLOL TARTRATE 12.5 MG: 25 TABLET ORAL at 08:13

## 2018-05-21 RX ADMIN — FUROSEMIDE 40 MG: 40 TABLET ORAL at 10:24

## 2018-05-21 RX ADMIN — Medication 10 ML: at 21:39

## 2018-05-21 RX ADMIN — METOPROLOL TARTRATE 12.5 MG: 25 TABLET ORAL at 21:38

## 2018-05-21 RX ADMIN — SENNOSIDES 8.6 MG: 8.6 TABLET, FILM COATED ORAL at 08:12

## 2018-05-21 RX ADMIN — MAGNESIUM HYDROXIDE 30 ML: 400 SUSPENSION ORAL at 22:04

## 2018-05-21 RX ADMIN — TIMOLOL MALEATE 1 DROP: 5 SOLUTION/ DROPS OPHTHALMIC at 08:15

## 2018-05-21 RX ADMIN — ERYTHROPOIETIN 8000 UNITS: 4000 INJECTION, SOLUTION INTRAVENOUS; SUBCUTANEOUS at 19:45

## 2018-05-21 RX ADMIN — Medication 180 MG: at 12:57

## 2018-05-21 RX ADMIN — LATANOPROST 1 DROP: 50 SOLUTION/ DROPS OPHTHALMIC at 21:37

## 2018-05-21 RX ADMIN — ATORVASTATIN CALCIUM 80 MG: 40 TABLET, FILM COATED ORAL at 21:38

## 2018-05-21 RX ADMIN — ALLOPURINOL 100 MG: 100 TABLET ORAL at 08:13

## 2018-05-21 RX ADMIN — Medication 10 ML: at 06:06

## 2018-05-21 RX ADMIN — OXYCODONE HYDROCHLORIDE AND ACETAMINOPHEN 1000 MG: 500 TABLET ORAL at 08:16

## 2018-05-21 RX ADMIN — POTASSIUM CHLORIDE 20 MEQ: 750 TABLET, EXTENDED RELEASE ORAL at 10:24

## 2018-05-21 RX ADMIN — ASPIRIN 81 MG: 81 TABLET, COATED ORAL at 08:13

## 2018-05-21 RX ADMIN — Medication 10 ML: at 12:58

## 2018-05-21 RX ADMIN — FUROSEMIDE 40 MG: 40 TABLET ORAL at 17:25

## 2018-05-21 NOTE — PROGRESS NOTES
[id number] [facility]  Preliminary Report: Ankle Brachial Index    Pressure (mmHg) Right    Left    Brachial:  125   128  Ankle PTA:  109   125  Ankle DPA:  123   128  Great Toe:  62   66    Waveform:  Right   Left  CFA:       Popliteal:      PTA:   Biphasic  Biphasic  DPA:   Triphasic  Triphasic  Great Toe:  Reduced  Reduced    Right MANN:   0.96  Left MANN: 1.00  Right TBI: 0.48  Left TBI: 0.52    Final report to follow.     CFA = Common Femoral Artery  PTA = Posterior Tibial Artery  DPA = Dorsalis Pedis Artery  MANN = Ankle Brachial Index  TBI = Toe Brachial Index  NC = Non-compressible

## 2018-05-21 NOTE — PROGRESS NOTES
Bedside report received from NIDA Veliz                  Assessment, Background, Procedure summary, Intake/Output, MAR, and recent results discussed. Care assumed. PFT's, ECHO, & Venous & Carotid dopplers completed. 1024 PTT 53.4 Decreased Heparin rate by 2u/kg/hr. Old rate 12. New Rate 10u/kg/hr. Verified by Andrew Vasquez, NIDA, RN. Next PTT due 5/21@ 1645      1832 PTT 55.3  increased Heparin rate by 2u/kg/hr. Old rate 10. New Rate 11u/kg/hr. Verified by Thai Valencia RN, RN. Next PTT due 5/22 @ 0032    Verbal report given to oncoming nurse NIDA Veliz    Report consisted of patients Situation, Background, Assessment, and   Recommendations    Information was reviewed with the receiving nurse. Opportunity for questions and clarification was provided.       Iglesia Johnson RN

## 2018-05-21 NOTE — PROGRESS NOTES
[id number] [facility]  Preliminary Report:  Carotid Duplex Scan    Right:  Moderate plaque noted in the right carotid system. Right ICA velocities suggest 50-69% diameter reduction. Right vertebral artery flow is antegrade. Left:  Moderate plaque noted in the left carotid system. Left ICA velocities suggest 50-69% diameter reduction. Left vertebral artery flow is antegrade. Final report to follow.

## 2018-05-21 NOTE — CDMP QUERY
Dr. Shira Benitez. :  Patient is noted to have a BMI of 26.85. Please clarify if this patient is:     =>Morbidly obese (BMI ³ 40)  =>Obese (BMI 30 - 39.9)  =>Overweight (BMI 25 - 29.9)  =>Other explanation of clinical findings  =>Unable to determine (no explanation for clinical findings)    Presentation: 5'5\", 161 lbs 6 oz = BMI 26.85    REFERENCE:  The [address] [place] [address] has issued a statement indicating that, \"Individuals who are overweight, obese, or morbidly obese are at an increased risk for certain medical conditions when compared to persons of normal weight. Therefore, these conditions are always clinically significant and reportable when documented by the provider. Please clarify and document your clinical opinion in the progress notes and discharge summary, including the definitive and or presumptive diagnosis, (suspected or probable), related to the above clinical findings. Please include clinical findings supporting your diagnosis.      Thank Keo Fitzgerald  [phone]

## 2018-05-21 NOTE — PROGRESS NOTES
5/21/2018 11:24 AM    Admit Date: 5/18/2018    Admit Diagnosis: NSTEMI (non-ST elevated myocardial infarction) ([facility]);NSTEMI*    Subjective:     Kameron Escobedo   denies chest pain, chest pressure/discomfort, dyspnea, palpitations, irregular heart beats, near-syncope, syncope, orthopnea, paroxysmal nocturnal dyspnea.     Visit Vitals    /47 (BP 1 Location: Left arm, BP Patient Position: At rest)    Pulse 80    Temp 98 °F (36.7 °C)    Resp 18    Ht 5' 5\" (1.651 m)    Wt 161 lb 6 oz (73.2 kg)    SpO2 99%    Breastfeeding No    BMI 26.85 kg/m2     Current Facility-Administered Medications   Medication Dose Route Frequency    senna (SENOKOT) tablet 8.6 mg  1 Tab Oral DAILY    fexofenadine (ALLEGRA) tablet 180 mg (Patient Supplied)  180 mg Oral DAILY    furosemide (LASIX) tablet 40 mg  40 mg Oral ACB&D    potassium chloride SR (KLOR-CON 10) tablet 20 mEq  20 mEq Oral DAILY    epoetin letty (EPOGEN;PROCRIT) injection 8,000 Units  8,000 Units SubCUTAneous Q MON, WED & FRI    timolol (TIMOPTIC) 0.5 % ophthalmic solution 1 Drop (Patient Supplied)  1 Drop Right Eye DAILY    propylene glycol 0.6 % drop 1-2 Drop (Patient Supplied)  1-2 Drop Ophthalmic PRN    bivalirudin (ANGIOMAX) 250 mg injection        0.9% sodium chloride infusion  999 mL/hr IntraVENous CONTINUOUS    ascorbic acid (vitamin C) (VITAMIN C) tablet 1,000 mg  1,000 mg Oral BID    docusate sodium (COLACE) capsule 100 mg  100 mg Oral DAILY PRN    nitroglycerin (Tridil) 200 mcg/ml infusion  10 mcg/min IntraVENous TITRATE    heparin 25,000 units in D5W 250 ml infusion  12-25 Units/kg/hr IntraVENous TITRATE    heparin (porcine) injection 4,000 Units  4,000 Units IntraVENous PRN    Or    heparin (porcine) injection 2,000 Units  2,000 Units IntraVENous PRN    acetaminophen (TYLENOL) tablet 1,000 mg  1,000 mg Oral Q6H PRN    allopurinol (ZYLOPRIM) tablet 100 mg  100 mg Oral DAILY    ALPRAZolam (XANAX) tablet 0.25 mg  0.25 mg Oral DAILY PRN    aspirin delayed-release tablet 81 mg  81 mg Oral DAILY    atorvastatin (LIPITOR) tablet 80 mg  80 mg Oral QHS    loratadine (CLARITIN) tablet 10 mg  10 mg Oral DAILY    fluticasone (FLONASE) 50 mcg/actuation nasal spray 1 Spray  1 Spray Both Nostrils DAILY    latanoprost (XALATAN) 0.005 % ophthalmic solution 1 Drop (Patient Supplied)  1 Drop Both Eyes QHS    metoprolol tartrate (LOPRESSOR) tablet 12.5 mg  12.5 mg Oral Q12H    sodium chloride (NS) flush 5-10 mL  5-10 mL IntraVENous Q8H    sodium chloride (NS) flush 5-10 mL  5-10 mL IntraVENous PRN    HYDROcodone-acetaminophen (NORCO) 5-325 mg per tablet 1 Tab  1 Tab Oral Q4H PRN         Objective:      Visit Vitals    /47 (BP 1 Location: Left arm, BP Patient Position: At rest)    Pulse 80    Temp 98 °F (36.7 °C)    Resp 18    Ht 5' 5\" (1.651 m)    Wt 161 lb 6 oz (73.2 kg)    SpO2 99%    Breastfeeding No    BMI 26.85 kg/m2       Physical Exam:  Abdomen: soft, non-tender.  Bowel sounds normal.   Extremities: no cyanosis or edema  Heart: regular rate and rhythm, S1, S2 normal, no murmur, click, rub or gallop  Lungs: clear to auscultation bilaterally  Neurologic: Grossly normal    Data Review:   Labs:    Recent Results (from the past 24 hour(s))   PTT    Collection Time: 05/20/18 11:36 AM   Result Value Ref Range    aPTT 67.8 (H) 22.1 - 32.0 sec    aPTT, therapeutic range     58.0 - 77.0 SECS   PTT    Collection Time: 05/21/18 12:40 AM   Result Value Ref Range    aPTT 90.2 (HH) 22.1 - 32.0 sec    aPTT, therapeutic range     58.0 - 77.0 SECS   PTT    Collection Time: 05/21/18  8:30 AM   Result Value Ref Range    aPTT 53.4 (H) 22.1 - 32.0 sec    aPTT, therapeutic range     58.0 - 77.0 SECS       Telemetry: normal sinus rhythm      Assessment:     Active Problems:    NSTEMI (non-ST elevated myocardial infarction) ([facility] [address] (5/18/2018)      HTN (hypertension) (5/18/2018)      Hx of diverticulitis of colon (5/18/2018)        Plan: Undergoing CABG work up. Continue current meds.

## 2018-05-21 NOTE — PROGRESS NOTES
05/21/18 1152   Bedside Spirometry   FEV-1/Actual(liters) 1.32 Liters   FEV-1/ Predicted (liters) 2.03 Liters   FVC (liters) 1.65 Liters   Patient Postioning Sitting up in bed   Patient Effort Reproducable     The patient had a poor effort with short blow.     FEV1/FVC  = 80 and the  %Pred  108%  FEV1                                 % Pred   65%  FVC                                   % Pred 103%

## 2018-05-21 NOTE — PROGRESS NOTES
Procedure: MV Replacement + CAB   Risk of Mortality: 12.578%   Morbidity or Mortality: 53.747%   Long Length of Stay: 40.04%   Short Length of Stay: 2.001%   Permanent Stroke: 3.73%   Prolonged Ventilation: 42.452%   DSW Infection: 0.699%   Renal Failure: 19.489%   Reoperation: 20.14%

## 2018-05-21 NOTE — PROGRESS NOTES
0245 PTT 90.2 heparin drip off x 1 hour then restarted at 9 units/kg/hr per protocol . Next lab draw at 0730  0400 pt remains on 10 mcg Nitro denies c/o of shoulder pain or chest pain . 0715 Bedside shift change report given to Henry Kendrick 69  (oncoming nurse) by me (offgoing nurse). Report given with SBAR, MAR and Recent Results.

## 2018-05-21 NOTE — CARDIO/PULMONARY
Cardiac Rehab:     Pt visited, family at the bedside. MI education folder, with catheterization brochure, to bedside of [place]. Educated using teach back method. Reviewed MI diagnosis definition and purpose of intervention. Discussed risk factors for CAD to include the following: family history, elevated BMI, hyperlipidemia, hypertension, diabetes, stress, and smoking. Smoking Cessation Program link added to AVS. Discussed Heart Healthy/Low Sodium (2000 mg) diet. Reviewed the importance of medication compliance, the purpose of metoprolol, and potential side effects. Coronary Artery Bypass Graft teaching folder provided. Instructed to leave at the bedside an not to take packet home. Briefly discussed testing expected today and informed pt cardiac rehab will follow for pre op teaching as appropriate. Emphasized the value of cardiac rehab. Discussed Cardiac Rehab Program format, benefits, and encouraged enrollment to assist with risk modification and management. Initial Cardiac Rehab Program intake appointment scheduled for July 3, 2018 @ 56 and appointment information is on the AVS.    [place] verbalized understanding with questions answered.   Jennifer Gomez RN

## 2018-05-21 NOTE — PROGRESS NOTES
Cardiac Surgery Preoperative Note    Admit Date: 2018      Plan/Recommendations/Medical Decision Making:     NSTEMI on heparin and Tridil infusions    BNP 3400 Add diuresis    MR on cath but likely catheter induced ECHO today    PFT, Carotid dopplers today    Cr 1.23 Will need IV fluids when NPO for surgery    Reviewed B-Blocker/aspirin/statin/ACE criteria    Patient seen and reviewed with Dr. Indra Luque MD        Assessment:     Active Problems:    NSTEMI (non-ST elevated myocardial infarction) ([facility] [address] (2018)      HTN (hypertension) (2018)      Hx of diverticulitis of colon (2018)            Summary:     Stable hemodynamics in sinus rhythm without ectopy on no support. No chest pain or shortness of breath. No pain on heparin infusion. Patient more on board for surgery plans. Objective:     Visit Vitals    /47 (BP 1 Location: Left arm, BP Patient Position: At rest)    Pulse 80    Temp 98 °F (36.7 °C)    Resp 18    Ht 5' 5\" (1.651 m)    Wt 161 lb 6 oz (73.2 kg)    SpO2 99%    Breastfeeding No    BMI 26.85 kg/m2       Temp (24hrs), Av.5 °F (36.9 °C), Min:98 °F (36.7 °C), Max:99.1 °F (37.3 °C)      Last 3 Recorded Weights in this Encounter    18 1130   Weight: 161 lb 6 oz (73.2 kg)       Lab Data Reviewed: Recent Labs      18   0526  18   1414   WBC   --   8.3   HGB   --   11.1*   HCT   --   33.5*   PLT   --   265   CREA  1.23*   --        CXR Results  (Last 48 hours)    None          Last 24hr Input/Output:    Intake/Output Summary (Last 24 hours) at 18 0830  Last data filed at 18 2051   Gross per 24 hour   Intake           380.67 ml   Output                0 ml   Net           380.67 ml          Admission Weight: Last Weight   Weight: 161 lb 6 oz (73.2 kg) Weight: 161 lb 6 oz (73.2 kg)       EXAM:      Lungs:   Clear to auscultation bilaterally.        Heart:  Regular rate and rhythm, S1, S2 normal, no murmur, no click, no rub      Abdomen:   Soft, non-tender. Bowel sounds present. No masses,  No organomegaly. Extremities:  No edema     Neurologic:  Gross motor and sensory apparatus intact.          Signed By: DAYNE Ramirez

## 2018-05-21 NOTE — PROCEDURES
[facility]  *** FINAL REPORT ***    Name: Nadia Lindo  MRN: [id number]    Inpatient  : 15 Aug 1932  HIS Order #: [id number]  [address] [facility] Visit #: [id number]  Date: 21 May 2018    TYPE OF TEST: Cerebrovascular Duplex    REASON FOR TEST  Pre-Op CABG    Right Carotid:-             Proximal               Mid                 Distal  cm/s  Systolic  Diastolic  Systolic  Diastolic  Systolic  Diastolic  CCA:     03.3      11.0                            64.0      11.0  Bulb:  ECA:    101.0       0.0  ICA:    153.0      34.0       49.0      13.0       59.0      14.0  ICA/CCA:  2.4       3.1    ICA Stenosis: 50-69%    Right Vertebral:-  Finding: Antegrade  Sys:       66.0  Anni:       14.0    Right Subclavian: Normal    Left Carotid:-            Proximal                Mid                 Distal  cm/s  Systolic  Diastolic  Systolic  Diastolic  Systolic  Diastolic  CCA:     89.7      10.0                            92.0      20.0  Bulb:  ECA:    138.0      11.0  ICA:    205.0      42.0       91.0      21.0       66.0      16.0  ICA/CCA:  2.2       2.1    ICA Stenosis: 50-69%    Left Vertebral:-  Finding: Antegrade  Sys:       55.0  Anni:       16.0    Left Subclavian: Normal    INTERPRETATION/FINDINGS  PROCEDURE:  Carotid Duplex Examination using B-mode, color and  spectral Doppler of the extracranial cerebrovascular arteries. 1. Bilateral 50-69% stenosis of the internal carotid arteries. 2. No significant stenosis in the external carotid arteries  bilaterally. 3. Antegrade flow in both vertebral arteries. 4. Normal flow in both subclavian arteries. Plaque Morphology:  1. Calcific plaque in the bulb and right ICA. 2. Calcific plaque in the bulb and left ICA. ADDITIONAL COMMENTS    I have personally reviewed the data relevant to the interpretation of  this  study. TECHNOLOGIST: Dirk Bush RVT, JO  Signed: 2018 11:42 AM    PHYSICIAN: Amelia Looney MD  Signed: 2018 03:09 PM

## 2018-05-21 NOTE — PROCEDURES
[facility]  *** FINAL REPORT ***    Name: Marquise Bronson  MRN: [id number]    Inpatient  : 15 Aug 1932  HIS Order #: [id number]  [address] [facility] Visit #: [id number]  Date: 21 May 2018    TYPE OF TEST: Peripheral Arterial Testing    REASON FOR TEST  PAD    Right Leg  Doppler:    Normal  PVR:        Normal  Ankle/Brachial: 0.96    Left Leg  Doppler:    Normal  PVR:        Normal  Ankle/Brachial: 1.00    INTERPRETATION/FINDINGS  PROCEDURE:  Ankle, brachial and digital arterial pressures, CW Doppler   waveforms and digital PPG waveforms were performed. 1. Mild peripheral arterial disease indicated at rest in the right  leg. 2. No evidence of significant peripheral arterial disease at rest in  the left leg. 3. The right ankle/brachial index is 0.96 and the left ankle/brachial  index is 1.00.  4. The right great toe/brachial index is 0.48 and the left great  toe/brachial index is 0.52. ADDITIONAL COMMENTS    I have personally reviewed the data relevant to the interpretation of  this  study. TECHNOLOGIST: Juliano Duran.  RANDALL Bush, JO  Signed: 2018 11:41 AM    PHYSICIAN: Hudson Dougherty MD  Signed: 2018 03:09 PM

## 2018-05-22 LAB
APTT PPP: 63.6 SEC (ref 22.1–32)
APTT PPP: 67.5 SEC (ref 22.1–32)
EST. AVERAGE GLUCOSE BLD GHB EST-MCNC: 120 MG/DL
HBA1C MFR BLD: 5.8 % (ref 4.2–6.3)
HGB BLD-MCNC: 9.4 G/DL (ref 11.5–16)
THERAPEUTIC RANGE,PTTT: ABNORMAL SECS (ref 58–77)
THERAPEUTIC RANGE,PTTT: ABNORMAL SECS (ref 58–77)
TSH SERPL DL<=0.05 MIU/L-ACNC: 1.98 UIU/ML (ref 0.36–3.74)

## 2018-05-22 PROCEDURE — 36415 COLL VENOUS BLD VENIPUNCTURE: CPT | Performed by: PHYSICIAN ASSISTANT

## 2018-05-22 PROCEDURE — 85730 THROMBOPLASTIN TIME PARTIAL: CPT | Performed by: INTERNAL MEDICINE

## 2018-05-22 PROCEDURE — 74011250637 HC RX REV CODE- 250/637: Performed by: PHYSICIAN ASSISTANT

## 2018-05-22 PROCEDURE — 74011250637 HC RX REV CODE- 250/637: Performed by: THORACIC SURGERY (CARDIOTHORACIC VASCULAR SURGERY)

## 2018-05-22 PROCEDURE — 65660000000 HC RM CCU STEPDOWN

## 2018-05-22 PROCEDURE — 74011250636 HC RX REV CODE- 250/636: Performed by: NURSE PRACTITIONER

## 2018-05-22 PROCEDURE — 85018 HEMOGLOBIN: CPT | Performed by: INTERNAL MEDICINE

## 2018-05-22 PROCEDURE — 74011250637 HC RX REV CODE- 250/637: Performed by: NURSE PRACTITIONER

## 2018-05-22 PROCEDURE — 83036 HEMOGLOBIN GLYCOSYLATED A1C: CPT | Performed by: PHYSICIAN ASSISTANT

## 2018-05-22 RX ADMIN — OXYCODONE HYDROCHLORIDE AND ACETAMINOPHEN 1000 MG: 500 TABLET ORAL at 08:59

## 2018-05-22 RX ADMIN — POTASSIUM CHLORIDE 20 MEQ: 750 TABLET, EXTENDED RELEASE ORAL at 08:55

## 2018-05-22 RX ADMIN — Medication 10 ML: at 15:17

## 2018-05-22 RX ADMIN — FLUTICASONE PROPIONATE 1 SPRAY: 50 SPRAY, METERED NASAL at 09:27

## 2018-05-22 RX ADMIN — Medication 10 ML: at 22:25

## 2018-05-22 RX ADMIN — FUROSEMIDE 40 MG: 40 TABLET ORAL at 08:55

## 2018-05-22 RX ADMIN — Medication 10 ML: at 05:39

## 2018-05-22 RX ADMIN — ASPIRIN 81 MG: 81 TABLET, COATED ORAL at 08:55

## 2018-05-22 RX ADMIN — LORATADINE 10 MG: 10 TABLET ORAL at 08:55

## 2018-05-22 RX ADMIN — HEPARIN SODIUM 11 UNITS/KG/HR: 10000 INJECTION, SOLUTION INTRAVENOUS at 05:43

## 2018-05-22 RX ADMIN — METOPROLOL TARTRATE 12.5 MG: 25 TABLET ORAL at 22:26

## 2018-05-22 RX ADMIN — ATORVASTATIN CALCIUM 80 MG: 40 TABLET, FILM COATED ORAL at 22:25

## 2018-05-22 RX ADMIN — OXYCODONE HYDROCHLORIDE AND ACETAMINOPHEN 1000 MG: 500 TABLET ORAL at 17:54

## 2018-05-22 RX ADMIN — SENNOSIDES 8.6 MG: 8.6 TABLET, FILM COATED ORAL at 08:55

## 2018-05-22 RX ADMIN — Medication 180 MG: at 09:01

## 2018-05-22 RX ADMIN — LATANOPROST 1 DROP: 50 SOLUTION/ DROPS OPHTHALMIC at 22:00

## 2018-05-22 RX ADMIN — TIMOLOL MALEATE 1 DROP: 5 SOLUTION/ DROPS OPHTHALMIC at 09:01

## 2018-05-22 RX ADMIN — METOPROLOL TARTRATE 12.5 MG: 25 TABLET ORAL at 08:55

## 2018-05-22 RX ADMIN — ALLOPURINOL 100 MG: 100 TABLET ORAL at 08:55

## 2018-05-22 RX ADMIN — FUROSEMIDE 40 MG: 40 TABLET ORAL at 17:53

## 2018-05-22 NOTE — PROGRESS NOTES
Problem: Falls - Risk of  Goal: *Absence of Falls  Document Sin Fall Risk and appropriate interventions in the flowsheet.    Outcome: Progressing Towards Goal  Fall Risk Interventions:            Medication Interventions: Assess postural VS orthostatic hypotension, Evaluate medications/consider consulting pharmacy, Patient to call before getting OOB, Teach patient to arise slowly    Elimination Interventions: Call light in reach, Patient to call for help with toileting needs, Toilet paper/wipes in reach, Toileting schedule/hourly rounds

## 2018-05-22 NOTE — PROGRESS NOTES
1930 Bedside report received from [facility]  and care [facility]. Report given with SBAR , recent labs,  and MAR . Pt continues on Heparin titrated to 12 units ./kg Nitro at 10 mcg pt without c/o chest pain . Tena Loose Pt c/o constipation received colace and senokot with no relief . Encouraged to increase PO .   2100 medicated with MOM   2330 good results from MOM , PTT drawn . 0115 called lab for PTT results , states its still runing. 0135 PT 63.1 no rate change needed for heparin  0730 Ptt drawn and sent Bedside shift change report given to [id number] [facility]  (oncoming nurse) by me (offgoing nurse). Report given with SBAR, MAR and Recent Results.

## 2018-05-22 NOTE — PROGRESS NOTES
5/22/2018 8:51 AM    Admit Date: 5/18/2018    Admit Diagnosis: NSTEMI (non-ST elevated myocardial infarction) ([facility]);NSTEMI*    Subjective:     Barry Bores   denies chest pain, chest pressure/discomfort, dyspnea, palpitations, irregular heart beats, near-syncope, syncope, fatigue, orthopnea, paroxysmal nocturnal dyspnea, exertional chest pressure/discomfort.     Visit Vitals    /54 (BP 1 Location: Left arm, BP Patient Position: At rest)    Pulse 80    Temp 99.6 °F (37.6 °C)    Resp 17    Ht 5' 5\" (1.651 m)    Wt 161 lb 6 oz (73.2 kg)    SpO2 96%    Breastfeeding No    BMI 26.85 kg/m2     Current Facility-Administered Medications   Medication Dose Route Frequency    senna (SENOKOT) tablet 8.6 mg  1 Tab Oral DAILY    fexofenadine (ALLEGRA) tablet 180 mg (Patient Supplied)  180 mg Oral DAILY    furosemide (LASIX) tablet 40 mg  40 mg Oral ACB&D    potassium chloride SR (KLOR-CON 10) tablet 20 mEq  20 mEq Oral DAILY    epoetin letty (EPOGEN;PROCRIT) injection 8,000 Units  8,000 Units SubCUTAneous Q MON, WED & FRI    magnesium hydroxide (MILK OF MAGNESIA) 400 mg/5 mL oral suspension 30 mL  30 mL Oral DAILY PRN    timolol (TIMOPTIC) 0.5 % ophthalmic solution 1 Drop (Patient Supplied)  1 Drop Right Eye DAILY    propylene glycol 0.6 % drop 1-2 Drop (Patient Supplied)  1-2 Drop Ophthalmic PRN    bivalirudin (ANGIOMAX) 250 mg injection        0.9% sodium chloride infusion  999 mL/hr IntraVENous CONTINUOUS    ascorbic acid (vitamin C) (VITAMIN C) tablet 1,000 mg  1,000 mg Oral BID    docusate sodium (COLACE) capsule 100 mg  100 mg Oral DAILY PRN    nitroglycerin (Tridil) 200 mcg/ml infusion  10 mcg/min IntraVENous TITRATE    heparin 25,000 units in D5W 250 ml infusion  12-25 Units/kg/hr IntraVENous TITRATE    heparin (porcine) injection 4,000 Units  4,000 Units IntraVENous PRN    Or    heparin (porcine) injection 2,000 Units  2,000 Units IntraVENous PRN    acetaminophen (TYLENOL) tablet 1,000 mg  1,000 mg Oral Q6H PRN    allopurinol (ZYLOPRIM) tablet 100 mg  100 mg Oral DAILY    ALPRAZolam (XANAX) tablet 0.25 mg  0.25 mg Oral DAILY PRN    aspirin delayed-release tablet 81 mg  81 mg Oral DAILY    atorvastatin (LIPITOR) tablet 80 mg  80 mg Oral QHS    loratadine (CLARITIN) tablet 10 mg  10 mg Oral DAILY    fluticasone (FLONASE) 50 mcg/actuation nasal spray 1 Spray  1 Spray Both Nostrils DAILY    latanoprost (XALATAN) 0.005 % ophthalmic solution 1 Drop (Patient Supplied)  1 Drop Both Eyes QHS    metoprolol tartrate (LOPRESSOR) tablet 12.5 mg  12.5 mg Oral Q12H    sodium chloride (NS) flush 5-10 mL  5-10 mL IntraVENous Q8H    sodium chloride (NS) flush 5-10 mL  5-10 mL IntraVENous PRN    HYDROcodone-acetaminophen (NORCO) 5-325 mg per tablet 1 Tab  1 Tab Oral Q4H PRN         Objective:      Visit Vitals    /54 (BP 1 Location: Left arm, BP Patient Position: At rest)    Pulse 80    Temp 99.6 °F (37.6 °C)    Resp 17    Ht 5' 5\" (1.651 m)    Wt 161 lb 6 oz (73.2 kg)    SpO2 96%    Breastfeeding No    BMI 26.85 kg/m2       Physical Exam:  Abdomen: soft, non-tender.  Bowel sounds normal.   Extremities: no cyanosis or edema  Heart: regular rate and rhythm, S1, S2 normal, no murmur, click, rub or gallop  Lungs: clear to auscultation bilaterally  Neurologic: Grossly normal    Data Review:   Labs:    Recent Results (from the past 24 hour(s))   PTT    Collection Time: 05/21/18  4:41 PM   Result Value Ref Range    aPTT 55.3 (H) 22.1 - 32.0 sec    aPTT, therapeutic range     58.0 - 77.0 SECS   PTT    Collection Time: 05/21/18 11:22 PM   Result Value Ref Range    aPTT 63.6 (H) 22.1 - 32.0 sec    aPTT, therapeutic range     58.0 - 77.0 SECS   HEMOGLOBIN    Collection Time: 05/22/18  3:41 AM   Result Value Ref Range    HGB 9.4 (L) 11.5 - 16.0 g/dL   HEMOGLOBIN A1C WITH EAG    Collection Time: 05/22/18  3:45 AM   Result Value Ref Range    Hemoglobin A1c 5.8 4.2 - 6.3 %    Est. average glucose 120 mg/dL   PTT    Collection Time: 05/22/18  7:36 AM   Result Value Ref Range    aPTT 67.5 (H) 22.1 - 32.0 sec    aPTT, therapeutic range     58.0 - 77.0 SECS       Telemetry: normal sinus rhythm      Assessment:     Active Problems:    NSTEMI (non-ST elevated myocardial infarction) ([facility] [address] (5/18/2018)      HTN (hypertension) (5/18/2018)      Hx of diverticulitis of colon (5/18/2018)        Plan:     Case reviewed with CTS. Due to risk, not candidate for surgery. Option of high risk PCI d/w pt. She wants to think about it. Continue current meds.

## 2018-05-22 NOTE — PROGRESS NOTES
Spiritual Care Assessment/Progress Note  [facility]      NAME: Karla Schmid      MRN: [id number]  AGE: 80 y.o.  SEX: female  Zoroastrianism Affiliation: Gnosticist   Language: English     5/22/2018     Total Time (in minutes): 10     Spiritual Assessment begun in [facility] INTRV[facility] CARDIO through conversation with:         [x]Patient        [] Family    [] Friend(s)        Reason for Consult: Initial/Spiritual assessment, patient floor     Spiritual beliefs: (Please include comment if needed)     [x] Identifies with a raquel tradition:     [] Supported by a raquel community:      [] Claims no spiritual orientation:      [] Seeking spiritual identity:           [] Adheres to an individual form of spirituality:      [] Not able to assess:                     Identified resources for coping:      [] Prayer                               [] Music                  [] Guided Imagery     [x] Family/friends                 [] Pet visits     [] Devotional reading                         [] Unknown     [] Other:                                               Interventions offered during this visit: (See comments for more details)    Patient Interventions: Zoroastrianism beliefs/image of God discussed, Initial/Spiritual assessment, patient floor, Iconic (affirming the presence of God/Higher Power), Coping skills reviewed/reinforced           Plan of Care:     [] Support spiritual and/or cultural needs    [] Support AMD and/or advance care planning process      [] Support grieving process   [] Coordinate Rites and/or Rituals    [] Coordination with community clergy   [] No spiritual needs identified at this time   [] Detailed Plan of Care below (See Comments)  [] Make referral to Music Therapy  [] Make referral to Pet Therapy     [] Make referral to Addiction services  [] Make referral to [facility]  [] Make referral to Spiritual Care Partner  [] No future visits requested        [x] Follow up visits as needed Initial Spiritual Assessment in 2156. Pt laying in bed watching television appeared to be in positive spirits displaying a big smile. Led pt in processing. Found that pt is waiting to see if she will have surgery on Thursday. Pt did not express feeling too concerned about the procedure. Provided active listening as pt shared that she has great support from family. Pt identifies as Parris [address] pt's raquel. Provided blessing and advised of availability leaving card with pt. Will follow up as needed. NATALIE Balbuena. Damir Combs

## 2018-05-22 NOTE — PROGRESS NOTES
Problem: Falls - Risk of  Goal: *Absence of Falls  Document Sin Fall Risk and appropriate interventions in the flowsheet.    Outcome: Progressing Towards Goal  Fall Risk Interventions:            Medication Interventions: Assess postural VS orthostatic hypotension    Elimination Interventions: Call light in reach, Patient to call for help with toileting needs, Toilet paper/wipes in reach, Toileting schedule/hourly rounds

## 2018-05-23 LAB
APTT PPP: 68.9 SEC (ref 22.1–32)
ERYTHROCYTE [DISTWIDTH] IN BLOOD BY AUTOMATED COUNT: 13.6 % (ref 11.5–14.5)
HCT VFR BLD AUTO: 31.8 % (ref 35–47)
HGB BLD-MCNC: 10.7 G/DL (ref 11.5–16)
MCH RBC QN AUTO: 30.1 PG (ref 26–34)
MCHC RBC AUTO-ENTMCNC: 33.6 G/DL (ref 30–36.5)
MCV RBC AUTO: 89.6 FL (ref 80–99)
NRBC # BLD: 0 K/UL (ref 0–0.01)
NRBC BLD-RTO: 0 PER 100 WBC
PLATELET # BLD AUTO: 266 K/UL (ref 150–400)
PMV BLD AUTO: 9.3 FL (ref 8.9–12.9)
RBC # BLD AUTO: 3.55 M/UL (ref 3.8–5.2)
THERAPEUTIC RANGE,PTTT: ABNORMAL SECS (ref 58–77)
WBC # BLD AUTO: 7.5 K/UL (ref 3.6–11)

## 2018-05-23 PROCEDURE — 85027 COMPLETE CBC AUTOMATED: CPT | Performed by: INTERNAL MEDICINE

## 2018-05-23 PROCEDURE — 74011250636 HC RX REV CODE- 250/636: Performed by: PHYSICIAN ASSISTANT

## 2018-05-23 PROCEDURE — 36415 COLL VENOUS BLD VENIPUNCTURE: CPT | Performed by: INTERNAL MEDICINE

## 2018-05-23 PROCEDURE — 74011000250 HC RX REV CODE- 250: Performed by: NURSE PRACTITIONER

## 2018-05-23 PROCEDURE — 85730 THROMBOPLASTIN TIME PARTIAL: CPT | Performed by: INTERNAL MEDICINE

## 2018-05-23 PROCEDURE — 74011250637 HC RX REV CODE- 250/637: Performed by: NURSE PRACTITIONER

## 2018-05-23 PROCEDURE — 65660000000 HC RM CCU STEPDOWN

## 2018-05-23 PROCEDURE — 74011250637 HC RX REV CODE- 250/637: Performed by: THORACIC SURGERY (CARDIOTHORACIC VASCULAR SURGERY)

## 2018-05-23 PROCEDURE — 74011250637 HC RX REV CODE- 250/637: Performed by: PHYSICIAN ASSISTANT

## 2018-05-23 RX ORDER — SODIUM CHLORIDE 9 MG/ML
75 INJECTION, SOLUTION INTRAVENOUS CONTINUOUS
Status: DISCONTINUED | OUTPATIENT
Start: 2018-05-24 | End: 2018-05-25 | Stop reason: HOSPADM

## 2018-05-23 RX ADMIN — TICAGRELOR 90 MG: 90 TABLET ORAL at 22:26

## 2018-05-23 RX ADMIN — OXYCODONE HYDROCHLORIDE AND ACETAMINOPHEN 1000 MG: 500 TABLET ORAL at 17:56

## 2018-05-23 RX ADMIN — OXYCODONE HYDROCHLORIDE AND ACETAMINOPHEN 1000 MG: 500 TABLET ORAL at 09:06

## 2018-05-23 RX ADMIN — FLUTICASONE PROPIONATE 1 SPRAY: 50 SPRAY, METERED NASAL at 09:07

## 2018-05-23 RX ADMIN — ALLOPURINOL 100 MG: 100 TABLET ORAL at 09:00

## 2018-05-23 RX ADMIN — POTASSIUM CHLORIDE 20 MEQ: 750 TABLET, EXTENDED RELEASE ORAL at 09:00

## 2018-05-23 RX ADMIN — FUROSEMIDE 40 MG: 40 TABLET ORAL at 17:54

## 2018-05-23 RX ADMIN — ASPIRIN 81 MG: 81 TABLET, COATED ORAL at 09:00

## 2018-05-23 RX ADMIN — Medication 10 ML: at 22:25

## 2018-05-23 RX ADMIN — ATORVASTATIN CALCIUM 80 MG: 40 TABLET, FILM COATED ORAL at 22:25

## 2018-05-23 RX ADMIN — ERYTHROPOIETIN 8000 UNITS: 4000 INJECTION, SOLUTION INTRAVENOUS; SUBCUTANEOUS at 17:56

## 2018-05-23 RX ADMIN — SENNOSIDES 8.6 MG: 8.6 TABLET, FILM COATED ORAL at 09:00

## 2018-05-23 RX ADMIN — TIMOLOL MALEATE 1 DROP: 5 SOLUTION/ DROPS OPHTHALMIC at 09:10

## 2018-05-23 RX ADMIN — FUROSEMIDE 40 MG: 40 TABLET ORAL at 09:01

## 2018-05-23 RX ADMIN — METOPROLOL TARTRATE 12.5 MG: 25 TABLET ORAL at 22:25

## 2018-05-23 RX ADMIN — NITROGLYCERIN 10 MCG/MIN: 20 INJECTION INTRAVENOUS at 09:58

## 2018-05-23 RX ADMIN — Medication 10 ML: at 06:52

## 2018-05-23 RX ADMIN — Medication 180 MG: at 09:09

## 2018-05-23 RX ADMIN — METOPROLOL TARTRATE 12.5 MG: 25 TABLET ORAL at 09:01

## 2018-05-23 RX ADMIN — TICAGRELOR 180 MG: 90 TABLET ORAL at 09:06

## 2018-05-23 NOTE — PROGRESS NOTES
[id number] [address]  [phone]      Cardiology Progress Note      5/23/2018 8:30 AM    Admit Date: 5/18/2018    Admit Diagnosis:   NSTEMI (non-ST elevated myocardial infarction) [facility])  NSTEMI (non-ST elevated myocardial infarction) [facility])    Subjective:     Tg Veronika no c/o CP, SOB. Nitro IV restarted yesterday for CP, continues on 10mcg, and IV heparin. Daughter at bedside. Patient has decided to proceed with PCI tomorrow with Dr. Sandra Malik.      Visit Vitals    /59 (BP 1 Location: Right arm, BP Patient Position: At rest)    Pulse 82    Temp 98.1 °F (36.7 °C)    Resp 16    Ht 5' 5\" (1.651 m)    Wt 73.2 kg (161 lb 6 oz)    SpO2 94%    Breastfeeding No    BMI 26.85 kg/m2       Current Facility-Administered Medications   Medication Dose Route Frequency    senna (SENOKOT) tablet 8.6 mg  1 Tab Oral DAILY    fexofenadine (ALLEGRA) tablet 180 mg (Patient Supplied)  180 mg Oral DAILY    furosemide (LASIX) tablet 40 mg  40 mg Oral ACB&D    potassium chloride SR (KLOR-CON 10) tablet 20 mEq  20 mEq Oral DAILY    epoetin letty (EPOGEN;PROCRIT) injection 8,000 Units  8,000 Units SubCUTAneous Q MON, WED & FRI    magnesium hydroxide (MILK OF MAGNESIA) 400 mg/5 mL oral suspension 30 mL  30 mL Oral DAILY PRN    timolol (TIMOPTIC) 0.5 % ophthalmic solution 1 Drop (Patient Supplied)  1 Drop Right Eye DAILY    propylene glycol 0.6 % drop 1-2 Drop (Patient Supplied)  1-2 Drop Ophthalmic PRN    bivalirudin (ANGIOMAX) 250 mg injection        0.9% sodium chloride infusion  999 mL/hr IntraVENous CONTINUOUS    ascorbic acid (vitamin C) (VITAMIN C) tablet 1,000 mg  1,000 mg Oral BID    docusate sodium (COLACE) capsule 100 mg  100 mg Oral DAILY PRN    nitroglycerin (Tridil) 200 mcg/ml infusion  10 mcg/min IntraVENous TITRATE    heparin 25,000 units in D5W 250 ml infusion  12-25 Units/kg/hr IntraVENous TITRATE    heparin (porcine) injection 4,000 Units  4,000 Units IntraVENous PRN    Or    heparin (porcine) injection 2,000 Units  2,000 Units IntraVENous PRN    acetaminophen (TYLENOL) tablet 1,000 mg  1,000 mg Oral Q6H PRN    allopurinol (ZYLOPRIM) tablet 100 mg  100 mg Oral DAILY    ALPRAZolam (XANAX) tablet 0.25 mg  0.25 mg Oral DAILY PRN    aspirin delayed-release tablet 81 mg  81 mg Oral DAILY    atorvastatin (LIPITOR) tablet 80 mg  80 mg Oral QHS    loratadine (CLARITIN) tablet 10 mg  10 mg Oral DAILY    fluticasone (FLONASE) 50 mcg/actuation nasal spray 1 Spray  1 Spray Both Nostrils DAILY    latanoprost (XALATAN) 0.005 % ophthalmic solution 1 Drop (Patient Supplied)  1 Drop Both Eyes QHS    metoprolol tartrate (LOPRESSOR) tablet 12.5 mg  12.5 mg Oral Q12H    sodium chloride (NS) flush 5-10 mL  5-10 mL IntraVENous Q8H    sodium chloride (NS) flush 5-10 mL  5-10 mL IntraVENous PRN    HYDROcodone-acetaminophen (NORCO) 5-325 mg per tablet 1 Tab  1 Tab Oral Q4H PRN       Objective:      Physical Exam:  General Appearance:  elderly AAF in no acute idstress  Chest:   Clear  Cardiovascular:  Regular rate and rhythm, 1/6 murmur.   Abdomen:   Soft, non-tender, bowel sounds are active.   Extremities: no peripheral edema  Skin:  Warm and dry.     Data Review:   Recent Labs      05/23/18   0806  05/22/18   0341   WBC  7.5   --    HGB  10.7*  9.4*   HCT  31.8*   --    PLT  266   --      No results for input(s): NA, K, CL, CO2, GLU, BUN, CREA, CA, MG, PHOS, ALB, TBIL, TBILI, SGOT, ALT, INR in the last 72 hours. No lab exists for component: INREXT    No results for input(s): TROIQ, CPK, CKMB in the last 72 hours.       Intake/Output Summary (Last 24 hours) at 05/23/18 0830  Last data filed at 05/22/18 1456   Gross per 24 hour   Intake              540 ml   Output                0 ml   Net              540 ml        Telemetry: SR    Assessment:     Active Problems:    NSTEMI (non-ST elevated myocardial infarction) ([facility] [address] (5/18/2018)      HTN (hypertension) (5/18/2018) Hx of diverticulitis of colon (5/18/2018)        Plan:     NSTEMI:  Multi vessel disease, agrees to PCI tomorrow  Starting Brilinta today with 180mg load now, and will continue on 90mg BID  Continue on ASA, BB, statin, nitro and heparin IV    Latanya Paredes ACNP  Cardiology      Pt seen and examined in details. Agree with NP A&P. I discussed the risks/benefits/alternatives of the procedure with the patient. Risks include (but are not limited to) bleeding, infection, cva/mi/tamponade/death. The patient understands and agrees to proceed.     Lexy Castorena MD

## 2018-05-23 NOTE — PROGRESS NOTES
Problem: Falls - Risk of  Goal: *Absence of Falls  Document Sin Fall Risk and appropriate interventions in the flowsheet.    Outcome: Progressing Towards Goal  Fall Risk Interventions:            Medication Interventions: Patient to call before getting OOB    Elimination Interventions: Bed/chair exit alarm, Call light in reach, Toilet paper/wipes in reach

## 2018-05-23 NOTE — PROGRESS NOTES
Bedside and Verbal shift change report given to Jesse Medrano RN (oncoming nurse) by Kelsey Sutton RN (offgoing nurse). Report included the following information SBAR, Kardex, Intake/Output, MAR, Accordion, Recent Results and Cardiac Rhythm NSR.

## 2018-05-24 ENCOUNTER — ANESTHESIA EVENT (OUTPATIENT)
Dept: NON INVASIVE DIAGNOSTICS | Age: 83
DRG: 215 | End: 2018-05-24
Payer: MEDICARE

## 2018-05-24 ENCOUNTER — ANESTHESIA (OUTPATIENT)
Dept: NON INVASIVE DIAGNOSTICS | Age: 83
DRG: 215 | End: 2018-05-24
Payer: MEDICARE

## 2018-05-24 LAB
ACT BLD: 147 SECS (ref 79–138)
ACT BLD: 175 SECS (ref 79–138)
ANION GAP SERPL CALC-SCNC: 10 MMOL/L (ref 5–15)
BUN SERPL-MCNC: 31 MG/DL (ref 6–20)
BUN/CREAT SERPL: 19 (ref 12–20)
CALCIUM SERPL-MCNC: 9 MG/DL (ref 8.5–10.1)
CHLORIDE SERPL-SCNC: 97 MMOL/L (ref 97–108)
CO2 SERPL-SCNC: 28 MMOL/L (ref 21–32)
CREAT SERPL-MCNC: 1.62 MG/DL (ref 0.55–1.02)
GLUCOSE SERPL-MCNC: 121 MG/DL (ref 65–100)
POTASSIUM SERPL-SCNC: 3.4 MMOL/L (ref 3.5–5.1)
SODIUM SERPL-SCNC: 135 MMOL/L (ref 136–145)

## 2018-05-24 PROCEDURE — 74011000250 HC RX REV CODE- 250

## 2018-05-24 PROCEDURE — 74011250637 HC RX REV CODE- 250/637: Performed by: THORACIC SURGERY (CARDIOTHORACIC VASCULAR SURGERY)

## 2018-05-24 PROCEDURE — B246ZZ3 ULTRASONOGRAPHY OF RIGHT AND LEFT HEART, INTRAVASCULAR: ICD-10-PCS | Performed by: INTERNAL MEDICINE

## 2018-05-24 PROCEDURE — 77030013797 HC KT TRNSDUC PRSSR EDWD -A

## 2018-05-24 PROCEDURE — C1724 CATH, TRANS ATHEREC,ROTATION: HCPCS

## 2018-05-24 PROCEDURE — 36415 COLL VENOUS BLD VENIPUNCTURE: CPT | Performed by: NURSE PRACTITIONER

## 2018-05-24 PROCEDURE — C1753 CATH, INTRAVAS ULTRASOUND: HCPCS

## 2018-05-24 PROCEDURE — 02703ZZ DILATION OF CORONARY ARTERY, ONE ARTERY, PERCUTANEOUS APPROACH: ICD-10-PCS | Performed by: INTERNAL MEDICINE

## 2018-05-24 PROCEDURE — C1887 CATHETER, GUIDING: HCPCS

## 2018-05-24 PROCEDURE — 77030008543 HC TBNG MON PRSS MRTM -A

## 2018-05-24 PROCEDURE — 74011250636 HC RX REV CODE- 250/636

## 2018-05-24 PROCEDURE — 77030002996 HC SUT SLK J&J -A

## 2018-05-24 PROCEDURE — 77030010221 HC SPLNT WR POS TELE -B

## 2018-05-24 PROCEDURE — B2151ZZ FLUOROSCOPY OF LEFT HEART USING LOW OSMOLAR CONTRAST: ICD-10-PCS | Performed by: INTERNAL MEDICINE

## 2018-05-24 PROCEDURE — C1769 GUIDE WIRE: HCPCS

## 2018-05-24 PROCEDURE — 77030029065 HC DRSG HEMO QCLOT ZMED -B

## 2018-05-24 PROCEDURE — B4101ZZ FLUOROSCOPY OF ABDOMINAL AORTA USING LOW OSMOLAR CONTRAST: ICD-10-PCS | Performed by: INTERNAL MEDICINE

## 2018-05-24 PROCEDURE — 4A023N7 MEASUREMENT OF CARDIAC SAMPLING AND PRESSURE, LEFT HEART, PERCUTANEOUS APPROACH: ICD-10-PCS | Performed by: INTERNAL MEDICINE

## 2018-05-24 PROCEDURE — 77030014287

## 2018-05-24 PROCEDURE — 77030019569 HC BND COMPR RAD TERU -B

## 2018-05-24 PROCEDURE — 027135Z DILATION OF CORONARY ARTERY, TWO ARTERIES WITH TWO DRUG-ELUTING INTRALUMINAL DEVICES, PERCUTANEOUS APPROACH: ICD-10-PCS | Performed by: INTERNAL MEDICINE

## 2018-05-24 PROCEDURE — 74011250636 HC RX REV CODE- 250/636: Performed by: NURSE PRACTITIONER

## 2018-05-24 PROCEDURE — 92978 ENDOLUMINL IVUS OCT C 1ST: CPT

## 2018-05-24 PROCEDURE — C1894 INTRO/SHEATH, NON-LASER: HCPCS

## 2018-05-24 PROCEDURE — 74011250637 HC RX REV CODE- 250/637: Performed by: PHYSICIAN ASSISTANT

## 2018-05-24 PROCEDURE — 80048 BASIC METABOLIC PNL TOTAL CA: CPT | Performed by: NURSE PRACTITIONER

## 2018-05-24 PROCEDURE — C1725 CATH, TRANSLUMIN NON-LASER: HCPCS

## 2018-05-24 PROCEDURE — 65660000000 HC RM CCU STEPDOWN

## 2018-05-24 PROCEDURE — B2111ZZ FLUOROSCOPY OF MULTIPLE CORONARY ARTERIES USING LOW OSMOLAR CONTRAST: ICD-10-PCS | Performed by: INTERNAL MEDICINE

## 2018-05-24 PROCEDURE — 77030019697 HC SYR ANGI INFL MRTM -B

## 2018-05-24 PROCEDURE — 77030029641 HC PMP CARD PERC IMPELLA CP ABIM -L

## 2018-05-24 PROCEDURE — 74011000250 HC RX REV CODE- 250: Performed by: INTERNAL MEDICINE

## 2018-05-24 PROCEDURE — 5A0221D ASSISTANCE WITH CARDIAC OUTPUT USING IMPELLER PUMP, CONTINUOUS: ICD-10-PCS | Performed by: INTERNAL MEDICINE

## 2018-05-24 PROCEDURE — 02HA3RJ INSERTION OF SHORT-TERM EXTERNAL HEART ASSIST SYSTEM INTO HEART, INTRAOPERATIVE, PERCUTANEOUS APPROACH: ICD-10-PCS | Performed by: INTERNAL MEDICINE

## 2018-05-24 PROCEDURE — 74011636320 HC RX REV CODE- 636/320

## 2018-05-24 PROCEDURE — C1874 STENT, COATED/COV W/DEL SYS: HCPCS

## 2018-05-24 PROCEDURE — 74011250636 HC RX REV CODE- 250/636: Performed by: INTERNAL MEDICINE

## 2018-05-24 PROCEDURE — 74011250637 HC RX REV CODE- 250/637: Performed by: NURSE PRACTITIONER

## 2018-05-24 PROCEDURE — 85347 COAGULATION TIME ACTIVATED: CPT

## 2018-05-24 PROCEDURE — 77030004549 HC CATH ANGI DX PRF MRTM -A

## 2018-05-24 PROCEDURE — 77030019698 HC SYR ANGI MDLON MRTM -A

## 2018-05-24 PROCEDURE — 77030018729 HC ELECTRD DEFIB PAD CARD -B

## 2018-05-24 RX ORDER — LIDOCAINE HYDROCHLORIDE 20 MG/ML
INJECTION, SOLUTION EPIDURAL; INFILTRATION; INTRACAUDAL; PERINEURAL AS NEEDED
Status: DISCONTINUED | OUTPATIENT
Start: 2018-05-24 | End: 2018-05-24 | Stop reason: HOSPADM

## 2018-05-24 RX ORDER — HEPARIN SODIUM 200 [USP'U]/100ML
INJECTION, SOLUTION INTRAVENOUS
Status: DISCONTINUED
Start: 2018-05-24 | End: 2018-05-25 | Stop reason: HOSPADM

## 2018-05-24 RX ORDER — HEPARIN SODIUM 1000 [USP'U]/ML
INJECTION, SOLUTION INTRAVENOUS; SUBCUTANEOUS
Status: DISCONTINUED
Start: 2018-05-24 | End: 2018-05-25 | Stop reason: HOSPADM

## 2018-05-24 RX ORDER — SODIUM CHLORIDE 9 MG/ML
INJECTION, SOLUTION INTRAVENOUS
Status: DISCONTINUED | OUTPATIENT
Start: 2018-05-24 | End: 2018-05-24 | Stop reason: HOSPADM

## 2018-05-24 RX ORDER — PROPOFOL 10 MG/ML
INJECTION, EMULSION INTRAVENOUS
Status: DISCONTINUED | OUTPATIENT
Start: 2018-05-24 | End: 2018-05-24 | Stop reason: HOSPADM

## 2018-05-24 RX ORDER — FENTANYL CITRATE 50 UG/ML
25-50 INJECTION, SOLUTION INTRAMUSCULAR; INTRAVENOUS
Status: DISCONTINUED | OUTPATIENT
Start: 2018-05-24 | End: 2018-05-24 | Stop reason: HOSPADM

## 2018-05-24 RX ORDER — HEPARIN SODIUM 200 [USP'U]/100ML
500 INJECTION, SOLUTION INTRAVENOUS ONCE
Status: COMPLETED | OUTPATIENT
Start: 2018-05-24 | End: 2018-05-24

## 2018-05-24 RX ORDER — HEPARIN SODIUM 200 [USP'U]/100ML
INJECTION, SOLUTION INTRAVENOUS
Status: COMPLETED
Start: 2018-05-24 | End: 2018-05-24

## 2018-05-24 RX ORDER — HEPARIN SODIUM 1000 [USP'U]/ML
1000-10000 INJECTION, SOLUTION INTRAVENOUS; SUBCUTANEOUS
Status: DISCONTINUED | OUTPATIENT
Start: 2018-05-24 | End: 2018-05-24 | Stop reason: HOSPADM

## 2018-05-24 RX ORDER — LIDOCAINE HYDROCHLORIDE 10 MG/ML
1-30 INJECTION, SOLUTION EPIDURAL; INFILTRATION; INTRACAUDAL; PERINEURAL
Status: DISCONTINUED | OUTPATIENT
Start: 2018-05-24 | End: 2018-05-24 | Stop reason: HOSPADM

## 2018-05-24 RX ORDER — HEPARIN SODIUM 1000 [USP'U]/ML
INJECTION, SOLUTION INTRAVENOUS; SUBCUTANEOUS
Status: COMPLETED
Start: 2018-05-24 | End: 2018-05-24

## 2018-05-24 RX ORDER — PHENYLEPHRINE HCL IN 0.9% NACL 0.4MG/10ML
SYRINGE (ML) INTRAVENOUS AS NEEDED
Status: DISCONTINUED | OUTPATIENT
Start: 2018-05-24 | End: 2018-05-24 | Stop reason: HOSPADM

## 2018-05-24 RX ORDER — SODIUM CHLORIDE 9 MG/ML
75 INJECTION, SOLUTION INTRAVENOUS CONTINUOUS
Status: DISPENSED | OUTPATIENT
Start: 2018-05-24 | End: 2018-05-25

## 2018-05-24 RX ORDER — FENTANYL CITRATE 50 UG/ML
INJECTION, SOLUTION INTRAMUSCULAR; INTRAVENOUS AS NEEDED
Status: DISCONTINUED | OUTPATIENT
Start: 2018-05-24 | End: 2018-05-24 | Stop reason: HOSPADM

## 2018-05-24 RX ORDER — LIDOCAINE HYDROCHLORIDE 10 MG/ML
INJECTION, SOLUTION EPIDURAL; INFILTRATION; INTRACAUDAL; PERINEURAL
Status: COMPLETED
Start: 2018-05-24 | End: 2018-05-24

## 2018-05-24 RX ORDER — MIDAZOLAM HYDROCHLORIDE 1 MG/ML
INJECTION, SOLUTION INTRAMUSCULAR; INTRAVENOUS
Status: DISCONTINUED
Start: 2018-05-24 | End: 2018-05-25 | Stop reason: HOSPADM

## 2018-05-24 RX ORDER — IODIXANOL 320 MG/ML
INJECTION, SOLUTION INTRAVASCULAR
Status: COMPLETED
Start: 2018-05-24 | End: 2018-05-24

## 2018-05-24 RX ORDER — MIDAZOLAM HYDROCHLORIDE 1 MG/ML
.5-2 INJECTION, SOLUTION INTRAMUSCULAR; INTRAVENOUS
Status: DISCONTINUED | OUTPATIENT
Start: 2018-05-24 | End: 2018-05-24 | Stop reason: HOSPADM

## 2018-05-24 RX ORDER — VERAPAMIL HYDROCHLORIDE 2.5 MG/ML
2.5 INJECTION, SOLUTION INTRAVENOUS ONCE
Status: COMPLETED | OUTPATIENT
Start: 2018-05-24 | End: 2018-05-24

## 2018-05-24 RX ORDER — FENTANYL CITRATE 50 UG/ML
INJECTION, SOLUTION INTRAMUSCULAR; INTRAVENOUS
Status: DISCONTINUED
Start: 2018-05-24 | End: 2018-05-25 | Stop reason: HOSPADM

## 2018-05-24 RX ORDER — FENTANYL CITRATE 50 UG/ML
INJECTION, SOLUTION INTRAMUSCULAR; INTRAVENOUS
Status: COMPLETED
Start: 2018-05-24 | End: 2018-05-24

## 2018-05-24 RX ORDER — PROPOFOL 10 MG/ML
INJECTION, EMULSION INTRAVENOUS AS NEEDED
Status: DISCONTINUED | OUTPATIENT
Start: 2018-05-24 | End: 2018-05-24 | Stop reason: HOSPADM

## 2018-05-24 RX ORDER — VERAPAMIL HYDROCHLORIDE 2.5 MG/ML
INJECTION, SOLUTION INTRAVENOUS
Status: COMPLETED
Start: 2018-05-24 | End: 2018-05-24

## 2018-05-24 RX ORDER — IODIXANOL 320 MG/ML
0-100 INJECTION, SOLUTION INTRAVASCULAR
Status: DISCONTINUED | OUTPATIENT
Start: 2018-05-24 | End: 2018-05-24 | Stop reason: HOSPADM

## 2018-05-24 RX ORDER — LIDOCAINE HYDROCHLORIDE 10 MG/ML
INJECTION INFILTRATION; PERINEURAL
Status: DISCONTINUED
Start: 2018-05-24 | End: 2018-05-25 | Stop reason: HOSPADM

## 2018-05-24 RX ORDER — VERAPAMIL HYDROCHLORIDE 2.5 MG/ML
INJECTION, SOLUTION INTRAVENOUS
Status: DISCONTINUED
Start: 2018-05-24 | End: 2018-05-25 | Stop reason: HOSPADM

## 2018-05-24 RX ADMIN — LIDOCAINE HYDROCHLORIDE 1 ML: 10 INJECTION, SOLUTION EPIDURAL; INFILTRATION; INTRACAUDAL; PERINEURAL at 08:02

## 2018-05-24 RX ADMIN — FENTANYL CITRATE 25 MCG: 50 INJECTION, SOLUTION INTRAMUSCULAR; INTRAVENOUS at 07:50

## 2018-05-24 RX ADMIN — TICAGRELOR 90 MG: 90 TABLET ORAL at 22:26

## 2018-05-24 RX ADMIN — ASPIRIN 81 MG: 81 TABLET, COATED ORAL at 10:50

## 2018-05-24 RX ADMIN — LIDOCAINE HYDROCHLORIDE 40 MG: 20 INJECTION, SOLUTION EPIDURAL; INFILTRATION; INTRACAUDAL; PERINEURAL at 07:50

## 2018-05-24 RX ADMIN — Medication 120 MCG: at 08:06

## 2018-05-24 RX ADMIN — ATORVASTATIN CALCIUM 80 MG: 40 TABLET, FILM COATED ORAL at 22:26

## 2018-05-24 RX ADMIN — FENTANYL CITRATE 25 MCG: 50 INJECTION, SOLUTION INTRAMUSCULAR; INTRAVENOUS at 08:39

## 2018-05-24 RX ADMIN — NITROGLYCERIN 200 MCG: 5 INJECTION, SOLUTION INTRAVENOUS at 08:05

## 2018-05-24 RX ADMIN — VERAPAMIL HYDROCHLORIDE 2.5 MG: 2.5 INJECTION, SOLUTION INTRAVENOUS at 08:05

## 2018-05-24 RX ADMIN — IODIXANOL 75 ML: 320 INJECTION, SOLUTION INTRAVASCULAR at 10:15

## 2018-05-24 RX ADMIN — HEPARIN SODIUM 6 ML/HR: 5000 INJECTION, SOLUTION INTRAVENOUS; SUBCUTANEOUS at 08:30

## 2018-05-24 RX ADMIN — PROPOFOL 20 MG: 10 INJECTION, EMULSION INTRAVENOUS at 07:50

## 2018-05-24 RX ADMIN — METOPROLOL TARTRATE 12.5 MG: 25 TABLET ORAL at 06:31

## 2018-05-24 RX ADMIN — SODIUM CHLORIDE 75 ML/HR: 900 INJECTION, SOLUTION INTRAVENOUS at 15:00

## 2018-05-24 RX ADMIN — Medication 180 MG: at 10:51

## 2018-05-24 RX ADMIN — Medication 120 MCG: at 08:09

## 2018-05-24 RX ADMIN — HEPARIN SODIUM 1000 UNITS: 200 INJECTION, SOLUTION INTRAVENOUS at 07:44

## 2018-05-24 RX ADMIN — Medication 10 ML: at 06:32

## 2018-05-24 RX ADMIN — PROPOFOL 50 MCG/KG/MIN: 10 INJECTION, EMULSION INTRAVENOUS at 07:48

## 2018-05-24 RX ADMIN — HEPARIN SODIUM 1000 UNITS: 200 INJECTION, SOLUTION INTRAVENOUS at 07:45

## 2018-05-24 RX ADMIN — POTASSIUM CHLORIDE 20 MEQ: 750 TABLET, EXTENDED RELEASE ORAL at 10:51

## 2018-05-24 RX ADMIN — SODIUM CHLORIDE: 9 INJECTION, SOLUTION INTRAVENOUS at 08:55

## 2018-05-24 RX ADMIN — Medication 10 ML: at 22:27

## 2018-05-24 RX ADMIN — VERAPAMIL HYDROCHLORIDE 2.5 MG: 2.5 INJECTION INTRAVENOUS at 08:05

## 2018-05-24 RX ADMIN — TICAGRELOR 90 MG: 90 TABLET ORAL at 06:31

## 2018-05-24 RX ADMIN — SODIUM CHLORIDE 75 ML/HR: 900 INJECTION, SOLUTION INTRAVENOUS at 01:10

## 2018-05-24 RX ADMIN — TIMOLOL MALEATE 1 DROP: 5 SOLUTION/ DROPS OPHTHALMIC at 09:00

## 2018-05-24 RX ADMIN — HEPARIN SODIUM 7000 UNITS: 1000 INJECTION, SOLUTION INTRAVENOUS; SUBCUTANEOUS at 08:19

## 2018-05-24 RX ADMIN — IODIXANOL 90 ML: 320 INJECTION, SOLUTION INTRAVASCULAR at 08:48

## 2018-05-24 RX ADMIN — FLUTICASONE PROPIONATE 1 SPRAY: 50 SPRAY, METERED NASAL at 10:52

## 2018-05-24 RX ADMIN — SODIUM CHLORIDE: 9 INJECTION, SOLUTION INTRAVENOUS at 07:39

## 2018-05-24 RX ADMIN — IODIXANOL 90 ML: 320 INJECTION, SOLUTION INTRAVASCULAR at 09:45

## 2018-05-24 RX ADMIN — TICAGRELOR 90 MG: 90 TABLET ORAL at 10:53

## 2018-05-24 RX ADMIN — METOPROLOL TARTRATE 12.5 MG: 25 TABLET ORAL at 18:41

## 2018-05-24 RX ADMIN — OXYCODONE HYDROCHLORIDE AND ACETAMINOPHEN 1000 MG: 500 TABLET ORAL at 18:40

## 2018-05-24 RX ADMIN — ALLOPURINOL 100 MG: 100 TABLET ORAL at 10:50

## 2018-05-24 RX ADMIN — IODIXANOL 90 ML: 320 INJECTION, SOLUTION INTRAVASCULAR at 09:17

## 2018-05-24 RX ADMIN — LIDOCAINE HYDROCHLORIDE 10 ML: 10 INJECTION, SOLUTION EPIDURAL; INFILTRATION; INTRACAUDAL; PERINEURAL at 08:40

## 2018-05-24 RX ADMIN — FENTANYL CITRATE 25 MCG: 50 INJECTION, SOLUTION INTRAMUSCULAR; INTRAVENOUS at 09:46

## 2018-05-24 RX ADMIN — Medication 120 MCG: at 08:00

## 2018-05-24 RX ADMIN — LATANOPROST 1 DROP: 50 SOLUTION/ DROPS OPHTHALMIC at 20:00

## 2018-05-24 RX ADMIN — SENNOSIDES 8.6 MG: 8.6 TABLET, FILM COATED ORAL at 10:50

## 2018-05-24 RX ADMIN — OXYCODONE HYDROCHLORIDE AND ACETAMINOPHEN 1000 MG: 500 TABLET ORAL at 10:50

## 2018-05-24 NOTE — ANESTHESIA PREPROCEDURE EVALUATION
Anesthetic History   No history of anesthetic complications            Review of Systems / Medical History  Patient summary reviewed, nursing notes reviewed and pertinent labs reviewed    Pulmonary  Within defined limits                 Neuro/Psych   Within defined limits           Cardiovascular    Hypertension: well controlled          Past MI, CAD and hyperlipidemia    Exercise tolerance: <4 METS  Comments: NSTEMI (5/18/18)    TTE (5/21/18):   Mild MR, mild AS, mild to moderate AI, EF=55-60%   GI/Hepatic/Renal               Comments: H/O Diverticulosis/Diverticulitis Endo/Other  Within defined limits           Other Findings   Comments: Gout         Physical Exam    Airway  Mallampati: II  TM Distance: > 6 cm  Neck ROM: normal range of motion   Mouth opening: Normal     Cardiovascular  Regular rate and rhythm,  S1 and S2 normal,  no murmur, click, rub, or gallop             Dental    Dentition: Full upper dentures and Lower partial plate     Pulmonary  Breath sounds clear to auscultation               Abdominal  GI exam deferred       Other Findings            Anesthetic Plan    ASA: 4  Anesthesia type: MAC          Induction: Intravenous  Anesthetic plan and risks discussed with: Patient

## 2018-05-24 NOTE — PROGRESS NOTES
TRANSFER - OUT REPORT:    Verbal report given to Domingo RN(name) on [facility]  being transferred to IVCU(unit) for routine post - op       Report consisted of patients Situation, Background, Assessment and   Recommendations(SBAR). Information from the following report(s) SBAR, Procedure Summary, MAR and Recent Results was reviewed with the receiving nurse. Lines:   Peripheral IV 05/18/18 Right Antecubital (Active)   Site Assessment Clean, dry, & intact 5/24/2018  8:00 AM   Phlebitis Assessment 0 5/24/2018  8:00 AM   Infiltration Assessment 0 5/24/2018  8:00 AM   Dressing Status Clean, dry, & intact 5/24/2018  8:00 AM   Dressing Type Tape;Transparent 5/24/2018  8:00 AM   Hub Color/Line Status Pink;Capped 5/24/2018  8:00 AM   Action Taken Blood drawn 5/18/2018  1:25 PM       Peripheral IV 05/18/18 Left Hand (Active)   Site Assessment Clean, dry, & intact 5/24/2018  8:00 AM   Phlebitis Assessment 0 5/24/2018  8:00 AM   Infiltration Assessment 0 5/24/2018  8:00 AM   Dressing Status Clean, dry, & intact 5/24/2018  8:00 AM   Dressing Type Tape;Other (comments) 5/24/2018  8:00 AM   Hub Color/Line Status Pink;Capped 5/24/2018  8:00 AM        Opportunity for questions and clarification was provided.       Patient transported with:   Registered Nurse  Tech

## 2018-05-24 NOTE — PROGRESS NOTES
Problem: Falls - Risk of  Goal: *Absence of Falls  Document Sin Fall Risk and appropriate interventions in the flowsheet.    Outcome: Progressing Towards Goal  Fall Risk Interventions:            Medication Interventions: Patient to call before getting OOB, Assess postural VS orthostatic hypotension    Elimination Interventions: Bed/chair exit alarm, Call light in reach, Patient to call for help with toileting needs

## 2018-05-24 NOTE — PROGRESS NOTES
5/24/2018 7:55 AM    Admit Date: 5/18/2018    Admit Diagnosis: NSTEMI (non-ST elevated myocardial infarction) (HCC);NSTEMI*    Subjective:     Leonard Caruso   denies chest pain, chest pressure/discomfort, dyspnea, palpitations, irregular heart beats, near-syncope, syncope, fatigue, orthopnea, paroxysmal nocturnal dyspnea, exertional chest pressure/discomfort.     Visit Vitals    /60 (BP 1 Location: Right arm, BP Patient Position: At rest)    Pulse 93    Temp 98.5 °F (36.9 °C)    Resp 16    Ht 5' 3\" (1.6 m)    Wt 161 lb 6 oz (73.2 kg)    SpO2 97%    Breastfeeding No    BMI 28.59 kg/m2     Current Facility-Administered Medications   Medication Dose Route Frequency    fentaNYL citrate (PF) injection 25-50 mcg  25-50 mcg IntraVENous Multiple    lidocaine (PF) (XYLOCAINE) 10 mg/mL (1 %) injection 1-30 mL  1-30 mL IntraDERMal Multiple    midazolam (VERSED) injection 0.5-2 mg  0.5-2 mg IntraVENous Multiple    heparin (porcine) 1,000 unit/mL injection 1,000-10,000 Units  1,000-10,000 Units IntraVENous Multiple    nitroglycerin 100 mcg/ml compounded injection  2 mL IntraarTERial ONCE    verapamil (ISOPTIN) 2.5 mg/mL injection 2.5 mg  2.5 mg IntraarTERial ONCE    iodixanol (VISIPAQUE) 320 mg iodine/mL contrast injection 0-100 mL  0-100 mL IntraarTERial Multiple    nitroglycerin 100 mcg/ml compounded injection  0-1 Vial IntraCORONary Multiple    verapamil (ISOPTIN) 2.5 mg/mL injection        heparin (porcine) 1,000 unit/mL injection        nitroglycerin 1 mg/10mL injection        ticagrelor (BRILINTA) tablet 90 mg  90 mg Oral Q12H    0.9% sodium chloride infusion  75 mL/hr IntraVENous CONTINUOUS    senna (SENOKOT) tablet 8.6 mg  1 Tab Oral DAILY    fexofenadine (ALLEGRA) tablet 180 mg (Patient Supplied)  180 mg Oral DAILY    potassium chloride SR (KLOR-CON 10) tablet 20 mEq  20 mEq Oral DAILY    epoetin letty (EPOGEN;PROCRIT) injection 8,000 Units  8,000 Units SubCUTAneous Q MON, WED & FRI    magnesium hydroxide (MILK OF MAGNESIA) 400 mg/5 mL oral suspension 30 mL  30 mL Oral DAILY PRN    timolol (TIMOPTIC) 0.5 % ophthalmic solution 1 Drop (Patient Supplied)  1 Drop Right Eye DAILY    propylene glycol 0.6 % drop 1-2 Drop (Patient Supplied)  1-2 Drop Ophthalmic PRN    bivalirudin (ANGIOMAX) 250 mg injection        0.9% sodium chloride infusion  999 mL/hr IntraVENous CONTINUOUS    ascorbic acid (vitamin C) (VITAMIN C) tablet 1,000 mg  1,000 mg Oral BID    docusate sodium (COLACE) capsule 100 mg  100 mg Oral DAILY PRN    nitroglycerin (Tridil) 200 mcg/ml infusion  10 mcg/min IntraVENous TITRATE    heparin 25,000 units in D5W 250 ml infusion  12-25 Units/kg/hr IntraVENous TITRATE    heparin (porcine) injection 4,000 Units  4,000 Units IntraVENous PRN    Or    heparin (porcine) injection 2,000 Units  2,000 Units IntraVENous PRN    acetaminophen (TYLENOL) tablet 1,000 mg  1,000 mg Oral Q6H PRN    allopurinol (ZYLOPRIM) tablet 100 mg  100 mg Oral DAILY    ALPRAZolam (XANAX) tablet 0.25 mg  0.25 mg Oral DAILY PRN    aspirin delayed-release tablet 81 mg  81 mg Oral DAILY    atorvastatin (LIPITOR) tablet 80 mg  80 mg Oral QHS    loratadine (CLARITIN) tablet 10 mg  10 mg Oral DAILY    fluticasone (FLONASE) 50 mcg/actuation nasal spray 1 Spray  1 Spray Both Nostrils DAILY    latanoprost (XALATAN) 0.005 % ophthalmic solution 1 Drop (Patient Supplied)  1 Drop Both Eyes QHS    metoprolol tartrate (LOPRESSOR) tablet 12.5 mg  12.5 mg Oral Q12H    sodium chloride (NS) flush 5-10 mL  5-10 mL IntraVENous Q8H    sodium chloride (NS) flush 5-10 mL  5-10 mL IntraVENous PRN    HYDROcodone-acetaminophen (NORCO) 5-325 mg per tablet 1 Tab  1 Tab Oral Q4H PRN     Facility-Administered Medications Ordered in Other Encounters   Medication Dose Route Frequency    propofol (DIPRIVAN) 10 mg/mL injection   IntraVENous CONTINUOUS    lidocaine (PF) (XYLOCAINE) 20 mg/mL (2 %) injection IntraVENous PRN    fentaNYL citrate (PF) injection    PRN    propofol (DIPRIVAN) 10 mg/mL injection   IntraVENous PRN         Objective:      Visit Vitals    /60 (BP 1 Location: Right arm, BP Patient Position: At rest)    Pulse 93    Temp 98.5 °F (36.9 °C)    Resp 16    Ht 5' 3\" (1.6 m)    Wt 161 lb 6 oz (73.2 kg)    SpO2 97%    Breastfeeding No    BMI 28.59 kg/m2       Physical Exam:  Abdomen: soft, non-tender.  Bowel sounds normal.   Extremities: no cyanosis or edema  Heart: regular rate and rhythm, S1, S2 normal, no murmur, click, rub or gallop  Lungs: clear to auscultation bilaterally  Neurologic: Grossly normal    Data Review:   Labs:    Recent Results (from the past 24 hour(s))   PTT    Collection Time: 05/23/18  8:06 AM   Result Value Ref Range    aPTT 68.9 (H) 22.1 - 32.0 sec    aPTT, therapeutic range     58.0 - 77.0 SECS   CBC W/O DIFF    Collection Time: 05/23/18  8:06 AM   Result Value Ref Range    WBC 7.5 3.6 - 11.0 K/uL    RBC 3.55 (L) 3.80 - 5.20 M/uL    HGB 10.7 (L) 11.5 - 16.0 g/dL    HCT 31.8 (L) 35.0 - 47.0 %    MCV 89.6 80.0 - 99.0 FL    MCH 30.1 26.0 - 34.0 PG    MCHC 33.6 30.0 - 36.5 g/dL    RDW 13.6 11.5 - 14.5 %    PLATELET 007 374 - 194 K/uL    MPV 9.3 8.9 - 12.9 FL    NRBC 0.0 0  WBC    ABSOLUTE NRBC 0.00 0.00 - 7.81 K/uL   METABOLIC PANEL, BASIC    Collection Time: 05/24/18  1:16 AM   Result Value Ref Range    Sodium 135 (L) 136 - 145 mmol/L    Potassium 3.4 (L) 3.5 - 5.1 mmol/L    Chloride 97 97 - 108 mmol/L    CO2 28 21 - 32 mmol/L    Anion gap 10 5 - 15 mmol/L    Glucose 121 (H) 65 - 100 mg/dL    BUN 31 (H) 6 - 20 MG/DL    Creatinine 1.62 (H) 0.55 - 1.02 MG/DL    BUN/Creatinine ratio 19 12 - 20      GFR est AA 37 (L) >60 ml/min/1.73m2    GFR est non-AA 30 (L) >60 ml/min/1.73m2    Calcium 9.0 8.5 - 10.1 MG/DL       Telemetry: normal sinus rhythm      Assessment:     Active Problems:    NSTEMI (non-ST elevated myocardial infarction) ([facility] [address] (5/18/2018)      HTN (hypertension) (5/18/2018)      Hx of diverticulitis of colon (5/18/2018)        Plan: For high risk PCI today. Continue current meds. Dc lasix for now due to elevated Cr. Monitor.

## 2018-05-24 NOTE — PROGRESS NOTES
Bedside and Verbal shift change report given to [place] Andrew RN (oncoming nurse) by Adair Zapata RN (offgoing nurse). Report included the following information SBAR, Kardex, Intake/Output, MAR, Accordion, Recent Results and Cardiac Rhythm NSR.

## 2018-05-24 NOTE — PROGRESS NOTES
SHEATH PULL NOTE:    Patient informed of procedure with questions answered with review. Sheath site prepped with Chloraprep swab. 11 fr sheath in rfa pulled by ARY Stern RN. Hand hold and quick clot, with manual compression to site. No bleeding, no hematoma, no pain at site. Hemostasis obtained with hand hold/manual compression at site. Patient tolerated well. No change in status. Handhold for 25 minutes. No change at site. Occlusive dressing applied to site. No bleeding, no hematoma, no pain/discomfort at site. Groin instructions provided with review. Continue to monitor procedure site and patient status. *Advised patient to keep head flat and extremity flat to decrease risk of bleeding. *Recommended that patient not drink for ONE HOUR post sheath pull completion. *Recommended that patient not eat for TWO HOURS post sheath pull completion. *Instructed patient on rationale for delay of PO products to decrease risk for aspiration and if additional treatment to procedure site is required. Patient verbalized understanding of instructions with review.

## 2018-05-25 ENCOUNTER — TELEPHONE (OUTPATIENT)
Dept: CARDIOLOGY CLINIC | Age: 83
End: 2018-05-25

## 2018-05-25 ENCOUNTER — HOME HEALTH ADMISSION (OUTPATIENT)
Dept: HOME HEALTH SERVICES | Facility: HOME HEALTH | Age: 83
End: 2018-05-25

## 2018-05-25 VITALS
OXYGEN SATURATION: 96 % | SYSTOLIC BLOOD PRESSURE: 131 MMHG | RESPIRATION RATE: 16 BRPM | BODY MASS INDEX: 28.59 KG/M2 | HEART RATE: 94 BPM | DIASTOLIC BLOOD PRESSURE: 55 MMHG | WEIGHT: 161.38 LBS | HEIGHT: 63 IN | TEMPERATURE: 98.1 F

## 2018-05-25 PROBLEM — Z95.5 S/P CORONARY ARTERY STENT PLACEMENT: Status: ACTIVE | Noted: 2018-05-25

## 2018-05-25 LAB
ACT BLD: 158 SECS (ref 79–138)
ACT BLD: 246 SECS (ref 79–138)
ACT BLD: 252 SECS (ref 79–138)
ACT BLD: 323 SECS (ref 79–138)
ERYTHROCYTE [DISTWIDTH] IN BLOOD BY AUTOMATED COUNT: 13.6 % (ref 11.5–14.5)
HCT VFR BLD AUTO: 27.3 % (ref 35–47)
HGB BLD-MCNC: 9.1 G/DL (ref 11.5–16)
MCH RBC QN AUTO: 29.4 PG (ref 26–34)
MCHC RBC AUTO-ENTMCNC: 33.3 G/DL (ref 30–36.5)
MCV RBC AUTO: 88.3 FL (ref 80–99)
NRBC # BLD: 0 K/UL (ref 0–0.01)
NRBC BLD-RTO: 0 PER 100 WBC
PLATELET # BLD AUTO: 255 K/UL (ref 150–400)
PMV BLD AUTO: 9.1 FL (ref 8.9–12.9)
RBC # BLD AUTO: 3.09 M/UL (ref 3.8–5.2)
WBC # BLD AUTO: 7.4 K/UL (ref 3.6–11)

## 2018-05-25 PROCEDURE — 74011250637 HC RX REV CODE- 250/637: Performed by: PHYSICIAN ASSISTANT

## 2018-05-25 PROCEDURE — 36415 COLL VENOUS BLD VENIPUNCTURE: CPT | Performed by: INTERNAL MEDICINE

## 2018-05-25 PROCEDURE — 85027 COMPLETE CBC AUTOMATED: CPT | Performed by: INTERNAL MEDICINE

## 2018-05-25 PROCEDURE — 74011250637 HC RX REV CODE- 250/637: Performed by: NURSE PRACTITIONER

## 2018-05-25 PROCEDURE — 74011250637 HC RX REV CODE- 250/637: Performed by: THORACIC SURGERY (CARDIOTHORACIC VASCULAR SURGERY)

## 2018-05-25 RX ORDER — METOPROLOL TARTRATE 25 MG/1
12.5 TABLET, FILM COATED ORAL EVERY 12 HOURS
Qty: 30 TAB | Refills: 11 | Status: SHIPPED | OUTPATIENT
Start: 2018-05-25 | End: 2018-06-12 | Stop reason: SDUPTHER

## 2018-05-25 RX ORDER — POTASSIUM CHLORIDE 1500 MG/1
20 TABLET, FILM COATED, EXTENDED RELEASE ORAL DAILY
Qty: 30 TAB | Refills: 11 | Status: SHIPPED | OUTPATIENT
Start: 2018-05-26 | End: 2018-06-19 | Stop reason: SDUPTHER

## 2018-05-25 RX ADMIN — Medication 180 MG: at 08:22

## 2018-05-25 RX ADMIN — ALLOPURINOL 100 MG: 100 TABLET ORAL at 08:22

## 2018-05-25 RX ADMIN — ASPIRIN 81 MG: 81 TABLET, COATED ORAL at 08:22

## 2018-05-25 RX ADMIN — TICAGRELOR 90 MG: 90 TABLET ORAL at 08:22

## 2018-05-25 RX ADMIN — OXYCODONE HYDROCHLORIDE AND ACETAMINOPHEN 1000 MG: 500 TABLET ORAL at 08:25

## 2018-05-25 RX ADMIN — SENNOSIDES 8.6 MG: 8.6 TABLET, FILM COATED ORAL at 08:22

## 2018-05-25 RX ADMIN — TIMOLOL MALEATE 1 DROP: 5 SOLUTION/ DROPS OPHTHALMIC at 09:00

## 2018-05-25 RX ADMIN — FLUTICASONE PROPIONATE 1 SPRAY: 50 SPRAY, METERED NASAL at 09:00

## 2018-05-25 RX ADMIN — METOPROLOL TARTRATE 12.5 MG: 25 TABLET ORAL at 08:22

## 2018-05-25 RX ADMIN — POTASSIUM CHLORIDE 20 MEQ: 750 TABLET, EXTENDED RELEASE ORAL at 08:22

## 2018-05-25 NOTE — PROGRESS NOTES
CM received consult to evaluate for home health. Patient states she had not had home health in the past, but would be willing to have a nurse visit. She states that [facility] sends a nurse occasionally to visit. Patient was offered freedom of choice and has opted for [address] Referral sent via CC and signed [address] form placed on chart. Patient being taken home by her daughter and she will be staying in her own home with another daughter.     Nenita Medley RN, BSN, ACM   - Medical Oncology  [phone]

## 2018-05-25 NOTE — PROGRESS NOTES
Pt received discharge instructions and prescriptions. Pt states understanding of follow-up care and side effects of medications. Pt given opportunity for questions and clarifications. IV removed. Pt has all belongings.  Lonney Phoenix RN

## 2018-05-25 NOTE — ANESTHESIA POSTPROCEDURE EVALUATION
Post-Anesthesia Evaluation and Assessment    Patient: Hima Key MRN: [id number]  SSN: [id number]    YOB: 1932  Age: 80 y.o. Sex: female       Cardiovascular Function/Vital Signs  Visit Vitals    /55    Pulse 94    Temp 36.7 °C (98.1 °F)    Resp 16    Ht 5' 3\" (1.6 m)    Wt 73.2 kg (161 lb 6 oz)    SpO2 96%    Breastfeeding No    BMI 28.59 kg/m2       Patient is status post general, total IV anesthesia anesthesia for * No procedures listed *. Nausea/Vomiting: None    Postoperative hydration reviewed and adequate. Pain:  Pain Scale 1: Numeric (0 - 10) (05/25/18 1546)  Pain Intensity 1: 0 (05/25/18 0416)   Managed    Neurological Status:   Neuro  Neurologic State: Alert (05/25/18 1781)  Orientation Level: Oriented X4 (05/25/18 5354)  Cognition: Appropriate decision making; Appropriate safety awareness; Appropriate for age attention/concentration; Follows commands (05/25/18 1039)  Speech: Clear; Appropriate for age (05/23/18 1930)   At baseline    Mental Status and Level of Consciousness: Arousable    Pulmonary Status:   O2 Device: Room air (05/24/18 1045)   Adequate oxygenation and airway patent    Complications related to anesthesia: None    Post-anesthesia assessment completed.  No concerns    Signed By: Paola Overton MD     May 25, 2018

## 2018-05-25 NOTE — TELEPHONE ENCOUNTER
Pt called in,verified pt with two pt identifiers, pt advised she was just released from hospital today and wanted to know if okay to take her Vitamin C. I looked at her AVS and it was not noted to stop the Vitamin C. She advised that she was taking the Vitamin C in the hospital. Reymundo Oseguera her it would be okay for her to continue if they were giving her that in the hospital and a did not advise her to stop. She verbalized that she understood everything.

## 2018-05-25 NOTE — DISCHARGE SUMMARY
Cardiology Discharge Summary     Patient ID:  Kiya Garza  [id number]  32 y.o.  8/15/1932    Admit Date: 5/18/2018    Discharge Date: 5/25/2018     Admitting Physician: Galilea Urbina MD     Discharge Physician: Galilea Urbina MD    Admission Diagnoses:   NSTEMI (non-ST elevated myocardial infarction) (HCC);NSTEMI*    Discharge Diagnoses: Active Problems:    NSTEMI (non-ST elevated myocardial infarction) ([address] (5/18/2018)      HTN (hypertension) (5/18/2018)      Hx of diverticulitis of colon (5/18/2018)        Discharge Condition: Good    Cardiology Procedures this Admission:  Left heart catheterization with PCI  EchoCardiogram    Hospital Course: presented with NSTEMI. Turned down by CTS. Underwent PCI of LAD and LCx ostial lesion. Stable post procedure. Consults: Cardiac Surgery    Visit Vitals    /55    Pulse 94    Temp 98.1 °F (36.7 °C)    Resp 16    Ht 5' 3\" (1.6 m)    Wt 161 lb 6 oz (73.2 kg)    SpO2 96%    Breastfeeding No    BMI 28.59 kg/m2       Physical Exam  Abdomen: soft, non-tender. Bowel sounds normal.   Extremities: no cyanosis or edema  Heart: regular rate and rhythm, S1, S2 normal, no murmur, click, rub or gallop  Lungs: clear to auscultation bilaterally  Neurologic: Grossly normal  Pulses: 2+     Labs:   Recent Labs      05/25/18   0413  05/23/18   0806   WBC  7.4  7.5   HGB  9.1*  10.7*   HCT  27.3*  31.8*   PLT  255  266     Recent Labs      05/24/18   0116   NA  135*   K  3.4*   CL  97   CO2  28   GLU  121*   BUN  31*   CREA  1.62*   CA  9.0       No results for input(s): TROIQ, CPK, CKMB in the last 72 hours. Disposition: home    Patient Instructions:   Current Discharge Medication List          Referenced discharge instructions provided by nursing for diet and activity. Follow-up with me in couple of weeks. Signed:   Galilea Urbina MD  5/25/2018  8:42 AM

## 2018-05-25 NOTE — DISCHARGE INSTRUCTIONS
[address]  [phone]      [address] [place] [address] INSTRUCTIONS      Patient ID:  Kiya Garza  [id number]  80 y.o.  8/15/1932    Admit Date: 5/18/2018    Discharge Date: 5/25/2018     Admitting Physician: Galilea Urbina MD     Discharge Physician: Kamla Bedoya NP    Admission Diagnoses:   NSTEMI (non-ST elevated myocardial infarction) [facility])  NSTEMI (non-ST elevated myocardial infarction) [facility])    Discharge Diagnoses:   Principal Problem:    NSTEMI (non-ST elevated myocardial infarction) ([address] (5/18/2018)    Active Problems:    HTN (hypertension) (5/18/2018)      Hx of diverticulitis of colon (5/18/2018)      S/P coronary artery stent placement (5/25/2018)      Overview: 5/24/18 PCI/ANA MARÍA to LAD and prox LCx                      Discharge Condition: Good    Cardiology Procedures this Admission:  Left heart catheterization with PCI    Disposition: home      Reference discharge instructions provided by nursing for diet and activity. Signed:  Kamla Bedoya NP  5/25/2018  10:29 AM    CARDIAC CATHETERIZATION/PCI DISCHARGE INSTRUCTIONS    It is normal to feel tired the first couple days. Take it easy and follow the physicians instructions. CHECK THE CATHETER INSERTION SITE DAILY:    You may shower 24 hours after the procedure, remove the bandage during showering. Wash with soap and water and pat dry. Gentle cleaning of the site with soap and water is sufficient, cover with a dry clean dressing or bandage. Do not apply creams or powders to the area. Do not sit in a bathtub or pool of water for 7 days or until wound has completely healed. Temporary bruising and discomfort is normal and may last a few weeks. You may have a  formation of a small lump at the site which may last up to 6 weeks.     CALL THE PHYSICIANS:    If the site becomes red, swollen or feels warm to the touch  If there is bleeding or drainage or if there is unusual pain at the groin or down the leg. If there is any bleeding, lie down, apply pressure or have someone apply pressure with a clean cloth until the bleeding stops. If the bleeding continues, call 911 to be transported to the hospital.  DO NOT DRIVE YOURSELF, [address] ACTIVITY:    For the first 24-48 hours or as instructed by the physician:  No lifting, pushing or pulling over 10 pounds and no straining the insertion site. Do not life grocery bags or the garbage can, do not run the vacuum  or  for 7 days. Start with short walks as in the hospital and gradually increase as tolerated each day. It is recommended to walk 30 minutes 5-7 days per week. Follow your physicians instructions on activity. Avoid walking outside in extremes of heat or cold. Walk inside when it is cold and windy or hot and humid. Things to keep in mind:  No driving for at least 24 hours, or as designated by your physician. Limit the number of times you go up and down the stairs  Take rests and pace yourself with activity. Be careful and do not strain with bowel movements. MEDICATIONS:    Take all medications as prescribed  Call your physician if you have any questions  Keep an updated list of your medications with you at all times and give a list to your physician and pharmacist        SIGNS AND SYMPTOMS:    Be cautious of symptoms of angina or recurrent symptoms such as chest discomfort, unusual shortness of breath or fatigue. These could be symptoms of restenosis, a new blockage or a heart attack. If your symptoms are relieved with rest it is still recommended that you notify your physician of recurrent chest pain or discomfort.   FOR CHEST PAIN or symptoms of angina not relieved with rest:  If the discomfort is not relieved with rest, and you have been prescribed Nitroglycerin, take as directed (taken under the tongue, one at a time 5 minutes apart for a total of 3 doses). If the discomfort is not relieved after the 3rd nitroglycerin, call 911. If you have not been prescribed Nitroglycerin  and your chest discomfort is not relieved with rest, call 911. AFTER CARE:    Follow up with your physician as instructed. Follow a heart healthy diet with proper portion control, daily stress management, daily exercise, blood pressure and cholesterol control , and smoking cessation.

## 2018-05-28 ENCOUNTER — HOME CARE VISIT (OUTPATIENT)
Dept: HOME HEALTH SERVICES | Facility: HOME HEALTH | Age: 83
End: 2018-05-28

## 2018-05-30 ENCOUNTER — HOME CARE VISIT (OUTPATIENT)
Dept: SCHEDULING | Facility: HOME HEALTH | Age: 83
End: 2018-05-30

## 2018-05-30 PROCEDURE — G0299 HHS/HOSPICE OF RN EA 15 MIN: HCPCS

## 2018-06-12 ENCOUNTER — OFFICE VISIT (OUTPATIENT)
Dept: CARDIOLOGY CLINIC | Age: 83
End: 2018-06-12

## 2018-06-12 VITALS
DIASTOLIC BLOOD PRESSURE: 60 MMHG | OXYGEN SATURATION: 99 % | BODY MASS INDEX: 27.07 KG/M2 | WEIGHT: 152.8 LBS | SYSTOLIC BLOOD PRESSURE: 130 MMHG | RESPIRATION RATE: 18 BRPM | HEIGHT: 63 IN | HEART RATE: 80 BPM

## 2018-06-12 DIAGNOSIS — I10 ESSENTIAL HYPERTENSION: ICD-10-CM

## 2018-06-12 DIAGNOSIS — I25.10 CORONARY ARTERY DISEASE INVOLVING NATIVE CORONARY ARTERY OF NATIVE HEART WITHOUT ANGINA PECTORIS: Primary | ICD-10-CM

## 2018-06-12 DIAGNOSIS — I21.4 NSTEMI (NON-ST ELEVATED MYOCARDIAL INFARCTION) (HCC): ICD-10-CM

## 2018-06-12 DIAGNOSIS — Z95.5 S/P CORONARY ARTERY STENT PLACEMENT: ICD-10-CM

## 2018-06-12 RX ORDER — METOPROLOL TARTRATE 25 MG/1
12.5 TABLET, FILM COATED ORAL EVERY 12 HOURS
Qty: 90 TAB | Refills: 11 | Status: SHIPPED | OUTPATIENT
Start: 2018-06-12 | End: 2019-03-13 | Stop reason: SDUPTHER

## 2018-06-12 RX ORDER — ASCORBIC ACID 500 MG
1000 TABLET ORAL DAILY
COMMUNITY
End: 2019-03-04 | Stop reason: ALTCHOICE

## 2018-06-12 NOTE — PROGRESS NOTES
1. Have you been to the ER, urgent care clinic since your last visit? Hospitalized since your last visit? Yes this is hospital f/u.    2. Have you seen or consulted any other health care providers outside of the [address] [place] [address] since your last visit? Include any pap smears or colon screening. Seen by PCP.         Chief Complaint   Patient presents with   [facility] Follow Up     pt denies any cardiac symptoms

## 2018-06-12 NOTE — PROGRESS NOTES
6/12/2018 2:26 PM      Subjective:     Tonie Hooper is seen in office today for f/u visit after recent admission for NSTEMI. Underwent PCI and is doing very well. She denies chest pain, chest pressure/discomfort, dyspnea, palpitations, irregular heart beats, near-syncope, syncope, fatigue, orthopnea, paroxysmal nocturnal dyspnea, exertional chest pressure/discomfort, claudication, lower extremity edema, tachypnea. Visit Vitals    /60 (BP 1 Location: Right arm, BP Patient Position: Sitting)    Pulse 80    Resp 18    Ht 5' 3\" (1.6 m)    Wt 152 lb 12.8 oz (69.3 kg)    SpO2 99%    BMI 27.07 kg/m2     Current Outpatient Prescriptions   Medication Sig    ascorbic acid, vitamin C, (VITAMIN C) 500 mg tablet Take 1,000 mg by mouth daily.  metoprolol tartrate (LOPRESSOR) 25 mg tablet Take 0.5 Tabs by mouth every twelve (12) hours.  potassium chloride SR (K-TAB) 20 mEq tablet Take 1 Tab by mouth daily.  ticagrelor (BRILINTA) 90 mg tablet Take 1 Tab by mouth every twelve (12) hours every twelve (12) hours.  cholecalciferol (VITAMIN D3) 400 unit tab tablet Take 400 Units by mouth daily.  aspirin delayed-release 81 mg tablet Take 81 mg by mouth daily.  timolol (TIMOPTIC-XE) 0.5 % ophthalmic gel-forming Administer 1 Drop to right eye daily.  latanoprost (XALATAN) 0.005 % ophthalmic solution Administer 1 Drop to both eyes nightly.  fluticasone (FLONASE ALLERGY RELIEF) 50 mcg/actuation nasal spray 1 [place] by Both Nostrils route daily.  acetaminophen (TYLENOL EXTRA STRENGTH) 500 mg tablet Take 1,000 mg by mouth every six (6) hours as needed for Pain.  fexofenadine (ALLEGRA) 180 mg tablet Take 180 mg by mouth nightly.  atorvastatin (LIPITOR) 80 mg tablet Take 80 mg by mouth nightly.  lisinopril (PRINIVIL, ZESTRIL) 20 mg tablet Take 20 mg by mouth daily.  allopurinol (ZYLOPRIM) 100 mg tablet Take 100 mg by mouth daily.     ALPRAZolam (XANAX) 0.25 mg tablet Take 0.25 mg by mouth daily as needed for Anxiety. No current facility-administered medications for this visit. Objective:      Visit Vitals    /60 (BP 1 Location: Right arm, BP Patient Position: Sitting)    Pulse 80    Resp 18    Ht 5' 3\" (1.6 m)    Wt 152 lb 12.8 oz (69.3 kg)    SpO2 99%    BMI 27.07 kg/m2       Data Review:     EKG: Normal sinus rhythm, 1st degree AV block, Lateral T wave inversion. Reviewed and/or ordered active problem list, medication list tests    Past Medical History:   Diagnosis Date    Diverticulitis     Gout     HTN (hypertension) 5/18/2018    Hx of diverticulitis of colon 5/18/2018    Hypertension     Other ill-defined conditions(799.89)     high cholesterol    S/P coronary artery stent placement 5/25/2018      Past Surgical History:   Procedure Laterality Date    HX HYSTERECTOMY      [place] COLONOSCOPY FLX DX W/COLLJ SPEC WHEN PFRMD  10/18/2013          Allergies   Allergen Reactions    Aspirin Nausea and Vomiting     Pt reports that she takes 81 mg ASA daily      No family history on file. Social History     Social History    Marital status: SINGLE     Spouse name: N/A    Number of children: N/A    Years of education: N/A     Occupational History    Not on file. Social History Main Topics    Smoking status: Never Smoker    Smokeless tobacco: Never Used    Alcohol use No      Comment: occasionally    Drug use: No    Sexual activity: Not on file     Other Topics Concern    Not on file     Social History Narrative         Review of Systems     General: Not Present- Anorexia, Chills, Dietary Changes, Fatigue, Fever, Medication Changes, Night Sweats, Weight Gain > 10lbs. and Weight Loss > 10lbs. .  Skin: Not Present- Bruising and Excessive Sweating. HEENT: Not Present- Headache, Visual Loss and Vertigo. Respiratory: Not Present- Cough, Decreased Exercise Tolerance, Difficulty Breathing, Snoring and Wheezing.   Cardiovascular: Not Present- Abnormal Blood Pressure, Chest Pain, Claudications, Difficulty Breathing On Exertion, Edema, Fainting / Blacking Out, Irregular Heart Beat, Night Cramps, Orthopnea, Palpitations, Paroxysmal Nocturnal Dyspnea, Rapid Heart Rate, Shortness of Breath and Swelling of Extremities. Gastrointestinal: Not Present- Black, Tarry Stool, Bloody Stool, Diarrhea, Hematemesis, Rectal Bleeding and Vomiting. Musculoskeletal: Not Present- Muscle Pain and Muscle Weakness. Neurological: Not Present- Dizziness. Psychiatric: Not Present- Depression. Endocrine: Not Present- Cold Intolerance, Heat Intolerance and Thyroid Problems. Hematology: Not Present- Abnormal Bleeding, Anemia, Blood Clots and Easy Bruising.       Physical Exam   The physical exam findings are as follows:       General   Mental Status - Alert. General Appearance - Not in acute distress. Chest and Lung Exam   Inspection: Accessory muscles - No use of accessory muscles in breathing. Auscultation:   Breath sounds: - Normal.      Cardiovascular   Inspection: Jugular vein - Bilateral - Inspection Normal.  Palpation/Percussion:   Apical Impulse: - Normal.  Auscultation: Rhythm - Regular. Heart Sounds - S1 WNL and S2 WNL. No S3 or S4. Murmurs & Other Heart Sounds: Auscultation of the heart reveals - No Murmurs. Carotid arteries - No Carotid bruit. Peripheral Vascular   Upper Extremity: Inspection - Bilateral - No Cyanotic nailbeds or Digital clubbing. Lower Extremity:   Palpation: Edema - Bilateral - No edema. Assessment:       ICD-10-CM ICD-9-CM    1. Coronary artery disease involving native coronary artery of native heart without angina pectoris Q75.65 025.53 METABOLIC PANEL, COMPREHENSIVE   2. Essential hypertension I10 401.9 AMB POC EKG ROUTINE W/ 12 LEADS, INTER & REP      METABOLIC PANEL, COMPREHENSIVE   3. NSTEMI (non-ST elevated myocardial infarction) ([facility]) A64.4 308.12 METABOLIC PANEL, COMPREHENSIVE   4.  S/P coronary artery stent placement W53.9 K17.90 METABOLIC PANEL, COMPREHENSIVE       Plan:     1: CAD, NSTEMI: s/p PCI. Stable. Continue current meds. 2. HTN: BP controlled. Continue current meds. 3. On statin   4. Check CMP to f/u on Cr.

## 2018-06-12 NOTE — MR AVS SNAPSHOT
[address] [facility] [address] [facility] 
[phone] Patient: Kiya Garza MRN: [id number] Ellen Landa Visit Information Date & Time Provider Department Dept. Phone Encounter #  
 6/12/2018  1:15 PM Galilea Urbina, [address] [place] [address] [facility] [phone] [id number] Follow-up Instructions Return in about 6 months (around 12/12/2018). Upcoming Health Maintenance Date Due DTaP/Tdap/Td series (1 - Tdap) 8/15/1953 ZOSTER VACCINE AGE 60> 6/15/1992 GLAUCOMA SCREENING Q2Y 8/15/1997 Bone Densitometry (Dexa) Screening 8/15/1997 Pneumococcal 65+ Low/Medium Risk (1 of 2 - PCV13) 8/15/1997 MEDICARE YEARLY EXAM 3/20/2018 Influenza Age 5 to Adult 8/1/2018 Allergies as of 6/12/2018  Review Complete On: 6/12/2018 By: Galilea Urbina MD  
  
 Severity Noted Reaction Type Reactions Aspirin  07/28/2013   Intolerance Nausea and Vomiting Pt reports that she takes 81 mg ASA daily Current Immunizations  Never Reviewed No immunizations on file. Not reviewed this visit You Were Diagnosed With   
  
 Codes Comments Coronary artery disease involving native coronary artery of native heart without angina pectoris    -  Primary ICD-10-CM: I25.10 ICD-9-CM: 414.01 Essential hypertension     ICD-10-CM: I10 
ICD-9-CM: 401.9 NSTEMI (non-ST elevated myocardial infarction) ([facility]     ICD-10-CM: I21.4 ICD-9-CM: 410.70 S/P coronary artery stent placement     ICD-10-CM: Z95.5 ICD-9-CM: V45.82 Vitals BP Pulse Resp Height(growth percentile) Weight(growth percentile) SpO2  
 130/60 (BP 1 Location: Right arm, BP Patient Position: Sitting) 80 18 5' 3\" (1.6 m) 152 lb 12.8 oz (69.3 kg) 99% BMI OB Status Smoking Status 27.07 kg/m2 Hysterectomy Never Smoker Vitals History BMI and BSA Data Body Mass Index Body Surface Area 27.07 kg/m 2 1.76 m 2 Preferred Pharmacy Pharmacy Name Phone [facility], [place] - [address] [facility] [address] [phone] Your Updated Medication List  
  
   
This list is accurate as of 6/12/18  2:27 PM.  Always use your most recent med list.  
  
  
  
  
 allopurinol 100 mg tablet Commonly known as:  Mickey Polanco Take 100 mg by mouth daily. ALPRAZolam 0.25 mg tablet Commonly known as:  Iris Burgos Take 0.25 mg by mouth daily as needed for Anxiety. aspirin delayed-release 81 mg tablet Take 81 mg by mouth daily. atorvastatin 80 mg tablet Commonly known as:  LIPITOR Take 80 mg by mouth nightly. cholecalciferol 400 unit Tab tablet Commonly known as:  VITAMIN D3 Take 400 Units by mouth daily. fexofenadine 180 mg tablet Commonly known as:  Jazmin Asha Take 180 mg by mouth nightly. FLONASE ALLERGY RELIEF 50 mcg/actuation nasal spray Generic drug:  fluticasone 1 [place] by Both Nostrils route daily. lisinopril 20 mg tablet Commonly known as:  Lollie Jump Take 20 mg by mouth daily. metoprolol tartrate 25 mg tablet Commonly known as:  LOPRESSOR Take 0.5 Tabs by mouth every twelve (12) hours. potassium chloride SR 20 mEq tablet Commonly known as:  K-TAB Take 1 Tab by mouth daily. ticagrelor 90 mg tablet Commonly known as:  [employer] Take 1 Tab by mouth every twelve (12) hours every twelve (12) hours. timolol 0.5 % ophthalmic gel-forming Commonly known as:  TIMOPTIC-XE Administer 1 Drop to right eye daily. TYLENOL EXTRA STRENGTH 500 mg tablet Generic drug:  acetaminophen Take 1,000 mg by mouth every six (6) hours as needed for Pain. VITAMIN C 500 mg tablet Generic drug:  ascorbic acid (vitamin C) Take 1,000 mg by mouth daily. XALATAN 0.005 % ophthalmic solution Generic drug:  latanoprost  
Administer 1 Drop to both eyes nightly. Prescriptions Sent to Pharmacy Refills  
 ticagrelor (BRILINTA) 90 mg tablet 11 Sig: Take 1 Tab by mouth every twelve (12) hours every twelve (12) hours. Class: Normal  
 Pharmacy: [address] [place] [address] Ph #: [phone] Route: Oral  
 metoprolol tartrate (LOPRESSOR) 25 mg tablet 11 Sig: Take 0.5 Tabs by mouth every twelve (12) hours. Class: Normal  
 Pharmacy: [address] [place] [address] Ph #: [phone] Route: Oral  
  
We Performed the Following AMB POC EKG ROUTINE W/ 12 LEADS, INTER & REP [06622 CPT(R)] METABOLIC PANEL, COMPREHENSIVE [44098 CPT(R)] Follow-up Instructions Return in about 6 months (around 12/12/2018). To-Do List   
 07/03/2018 10:30 AM  
  Appointment with \[facility]\"" CARDIOPULM SPECIALTY at [facility] ([phone]) Introducing [facility]! Rhea Temple introduces [employer] patient portal. Now you can access parts of your medical record, email your doctor's office, and request medication refills online. 1. In your internet browser, go to [email or web address] 2. Click on the First Time User? Click Here link in the Sign In box. You will see the New Member Sign Up page. 3. Enter your [employer] Access Code exactly as it appears below. You will not need to use this code after youve completed the sign-up process. If you do not sign up before the expiration date, you must request a new code. · [employer] Access Code: [email or web address] Expires: 8/16/2018  5:18 PM 
 
4. Enter the last four digits of your Social Security Number (xxxx) and Date of Birth (mm/dd/yyyy) as indicated and click Submit. You will be taken to the next sign-up page. 5. Create a [employer] ID. This will be your [employer] login ID and cannot be changed, so think of one that is secure and easy to remember. 6. Create a [employer] password. You can change your password at any time. 7. Enter your Password Reset Question and Answer. This can be used at a later time if you forget your password. 8. Enter your e-mail address. You will receive e-mail notification when new information is available in [address] 9. Click Sign Up. You can now view and download portions of your medical record. 10. Click the Download Summary menu link to download a portable copy of your medical information. If you have questions, please visit the Frequently Asked Questions section of the [employer] website. Remember, [employer] is NOT to be used for urgent needs. For medical emergencies, dial 911. Now available from your iPhone and Android! Please provide this summary of care documentation to your next provider. Your primary care clinician is listed as Amador Velasco. If you have any questions after today's visit, please call [phone].

## 2018-06-13 LAB
ALBUMIN SERPL-MCNC: 4.3 G/DL (ref 3.5–4.7)
ALBUMIN/GLOB SERPL: 1.5 {RATIO} (ref 1.2–2.2)
ALP SERPL-CCNC: 145 IU/L (ref 39–117)
ALT SERPL-CCNC: 16 IU/L (ref 0–32)
AST SERPL-CCNC: 25 IU/L (ref 0–40)
BILIRUB SERPL-MCNC: 0.5 MG/DL (ref 0–1.2)
BUN SERPL-MCNC: 21 MG/DL (ref 8–27)
BUN/CREAT SERPL: 17 (ref 12–28)
CALCIUM SERPL-MCNC: 10.2 MG/DL (ref 8.7–10.3)
CHLORIDE SERPL-SCNC: 107 MMOL/L (ref 96–106)
CO2 SERPL-SCNC: 18 MMOL/L (ref 20–29)
CREAT SERPL-MCNC: 1.21 MG/DL (ref 0.57–1)
GFR SERPLBLD CREATININE-BSD FMLA CKD-EPI: 41 ML/MIN/1.73
GFR SERPLBLD CREATININE-BSD FMLA CKD-EPI: 47 ML/MIN/1.73
GLOBULIN SER CALC-MCNC: 2.8 G/DL (ref 1.5–4.5)
GLUCOSE SERPL-MCNC: 120 MG/DL (ref 65–99)
INTERPRETATION: NORMAL
POTASSIUM SERPL-SCNC: 6.3 MMOL/L (ref 3.5–5.2)
PROT SERPL-MCNC: 7.1 G/DL (ref 6–8.5)
SODIUM SERPL-SCNC: 141 MMOL/L (ref 134–144)

## 2018-06-14 ENCOUNTER — TELEPHONE (OUTPATIENT)
Dept: CARDIOLOGY CLINIC | Age: 83
End: 2018-06-14

## 2018-06-19 ENCOUNTER — TELEPHONE (OUTPATIENT)
Dept: CARDIOLOGY CLINIC | Age: 83
End: 2018-06-19

## 2018-06-19 NOTE — TELEPHONE ENCOUNTER
Refill request:      Ticagrelor, Metoprolol, and Potassium -  [facility]            Thanks-

## 2018-06-20 RX ORDER — POTASSIUM CHLORIDE 1500 MG/1
20 TABLET, FILM COATED, EXTENDED RELEASE ORAL DAILY
Qty: 90 TAB | Refills: 2 | Status: SHIPPED | COMMUNITY
Start: 2018-06-20 | End: 2018-12-28

## 2018-06-22 ENCOUNTER — TELEPHONE (OUTPATIENT)
Dept: CARDIOLOGY CLINIC | Age: 83
End: 2018-06-22

## 2018-06-22 NOTE — TELEPHONE ENCOUNTER
Please call [facility] regarding pt's birlinta med. It is to expensive for her fixed income. They used the med card and it gave it to her free for 30 days and another card given they were told they can't use.   Thanks

## 2018-06-25 RX ORDER — CLOPIDOGREL BISULFATE 75 MG/1
TABLET ORAL
COMMUNITY
End: 2018-06-25 | Stop reason: SDUPTHER

## 2018-06-25 NOTE — TELEPHONE ENCOUNTER
Verified patient with two identifiers. Spoke with pt advising her she can switch to plavix 75 mg daily. Pt stated she has 30 days of Brilinta, advised to continue to take the Brilinta until completed, then start plavix 75 mg daily. She verbalized understanding. MD Digna Kinney LPN        Caller: Unspecified (3 days ago,  8:57 AM)                     Switch her to plavix 75 mg daily.

## 2018-06-26 RX ORDER — CLOPIDOGREL BISULFATE 75 MG/1
75 TABLET ORAL DAILY
Qty: 90 TAB | Refills: 3 | Status: SHIPPED | OUTPATIENT
Start: 2018-06-26 | End: 2019-03-13 | Stop reason: SDUPTHER

## 2018-07-03 ENCOUNTER — HOSPITAL ENCOUNTER (OUTPATIENT)
Dept: CARDIAC REHAB | Age: 83
Discharge: HOME | End: 2018-07-03
Payer: MEDICARE

## 2018-07-03 VITALS — BODY MASS INDEX: 26.19 KG/M2 | WEIGHT: 153.4 LBS | HEIGHT: 64 IN

## 2018-07-03 DIAGNOSIS — I21.4 NON-ST ELEVATION MYOCARDIAL INFARCTION (NSTEMI) (HCC): ICD-10-CM

## 2018-07-03 PROCEDURE — 93798 PHYS/QHP OP CAR RHAB W/ECG: CPT

## 2018-07-03 NOTE — CARDIO/PULMONARY
Cardiopulmonary Rehab Orientation:      Patient is an 80year old patient of Dr. Duke Guajardo who presents to rehab for NSTEMI (5/18/2018) and PCI (5/24/2018). EF 55-60%. Patient presented to the ED with right sided neck and back pain that increased over a couple days with associated nausea. Elevated troponin. PCI to two vessels but patient has one vessel that is still 100% occluded. Medical history includes gout, HTN, and high blood pressure. Pt's VS were as follows: BP on R arm 133/62,  L arm 144/68, HR 80, oxygen saturation 100% RA   Lungs are clear to auscultation. Pt denies cough. No apparent edema noted. BMI 26.3. Heart rhythm on monitor showed NS. Smoking history assessed and patient is a non smoker. Pt immunizations, allergies, and medications were reviewed and are up to date. Patient completed 6MWT without symptoms or stops, and was able to walk 324 meteres. Patient has no limitations. Pt reported goals are:  1. Walk 2 days per week for 10-15 minutes  2. Increase fruits and vegetables to 5 servings daily. 3. Complete CP Rehab     PSYCHOSOCIAL:  Patient has a supportive family. She states she has very little stress but does take xanax for anxiety on occasion. She enjoys watching TV, going to parties, and eating out for stress relief.

## 2018-07-09 ENCOUNTER — HOSPITAL ENCOUNTER (OUTPATIENT)
Dept: CARDIAC REHAB | Age: 83
Discharge: HOME | End: 2018-07-09
Payer: MEDICARE

## 2018-07-09 VITALS — WEIGHT: 153 LBS | BODY MASS INDEX: 26.26 KG/M2

## 2018-07-11 ENCOUNTER — HOSPITAL ENCOUNTER (OUTPATIENT)
Dept: CARDIAC REHAB | Age: 83
Discharge: HOME | End: 2018-07-11
Payer: MEDICARE

## 2018-07-11 VITALS — BODY MASS INDEX: 26.33 KG/M2 | WEIGHT: 153.4 LBS

## 2018-07-11 PROCEDURE — 93798 PHYS/QHP OP CAR RHAB W/ECG: CPT

## 2018-07-16 ENCOUNTER — HOSPITAL ENCOUNTER (OUTPATIENT)
Dept: CARDIAC REHAB | Age: 83
Discharge: HOME | End: 2018-07-16
Payer: MEDICARE

## 2018-07-16 VITALS — WEIGHT: 152.2 LBS | BODY MASS INDEX: 26.13 KG/M2

## 2018-07-16 PROCEDURE — 93798 PHYS/QHP OP CAR RHAB W/ECG: CPT

## 2018-07-16 NOTE — CARDIO/PULMONARY
Cardiopulmonary Rehab Nursing Entry:    Pt has been scheduled to attend class on Mondays, unable to stay today due to transportation issues. She would like to attend this week on Wednesday. Computer adjusted. Pt advised that she needs to make arrangements to have her ride plan to stay longer on Mondays to attend class as scheduled during her orientation.

## 2018-07-18 ENCOUNTER — HOSPITAL ENCOUNTER (OUTPATIENT)
Dept: CARDIAC REHAB | Age: 83
Discharge: HOME | End: 2018-07-18
Payer: MEDICARE

## 2018-07-18 VITALS — WEIGHT: 153.3 LBS | BODY MASS INDEX: 26.31 KG/M2

## 2018-07-18 PROCEDURE — 93797 PHYS/QHP OP CAR RHAB WO ECG: CPT

## 2018-07-18 PROCEDURE — 93798 PHYS/QHP OP CAR RHAB W/ECG: CPT

## 2018-07-23 ENCOUNTER — HOSPITAL ENCOUNTER (OUTPATIENT)
Dept: CARDIAC REHAB | Age: 83
Discharge: HOME | End: 2018-07-23
Payer: MEDICARE

## 2018-07-23 VITALS — BODY MASS INDEX: 26.54 KG/M2 | WEIGHT: 154.6 LBS

## 2018-07-23 PROCEDURE — 93798 PHYS/QHP OP CAR RHAB W/ECG: CPT

## 2018-07-23 PROCEDURE — 93797 PHYS/QHP OP CAR RHAB WO ECG: CPT

## 2018-07-25 ENCOUNTER — HOSPITAL ENCOUNTER (OUTPATIENT)
Dept: CARDIAC REHAB | Age: 83
Discharge: HOME | End: 2018-07-25
Payer: MEDICARE

## 2018-07-25 VITALS — WEIGHT: 153.8 LBS | BODY MASS INDEX: 26.4 KG/M2

## 2018-07-25 PROCEDURE — 93798 PHYS/QHP OP CAR RHAB W/ECG: CPT

## 2018-07-30 ENCOUNTER — HOSPITAL ENCOUNTER (OUTPATIENT)
Dept: CARDIAC REHAB | Age: 83
Discharge: HOME | End: 2018-07-30
Payer: MEDICARE

## 2018-07-30 ENCOUNTER — APPOINTMENT (OUTPATIENT)
Dept: NON INVASIVE DIAGNOSTICS | Age: 83
DRG: 215 | End: 2018-07-30
Payer: MEDICARE

## 2018-07-30 VITALS — BODY MASS INDEX: 26.5 KG/M2 | WEIGHT: 154.4 LBS

## 2018-07-30 PROCEDURE — 93797 PHYS/QHP OP CAR RHAB WO ECG: CPT

## 2018-07-30 PROCEDURE — 93798 PHYS/QHP OP CAR RHAB W/ECG: CPT

## 2018-08-01 ENCOUNTER — HOSPITAL ENCOUNTER (OUTPATIENT)
Dept: CARDIAC REHAB | Age: 83
Discharge: HOME | End: 2018-08-01
Payer: MEDICARE

## 2018-08-01 VITALS — BODY MASS INDEX: 26.4 KG/M2 | WEIGHT: 153.8 LBS

## 2018-08-01 PROCEDURE — 93798 PHYS/QHP OP CAR RHAB W/ECG: CPT

## 2018-08-06 ENCOUNTER — HOSPITAL ENCOUNTER (OUTPATIENT)
Dept: CARDIAC REHAB | Age: 83
Discharge: HOME | End: 2018-08-06
Payer: MEDICARE

## 2018-08-06 VITALS — WEIGHT: 154 LBS | BODY MASS INDEX: 26.43 KG/M2

## 2018-08-06 PROCEDURE — 93797 PHYS/QHP OP CAR RHAB WO ECG: CPT

## 2018-08-06 PROCEDURE — 93798 PHYS/QHP OP CAR RHAB W/ECG: CPT

## 2018-08-08 ENCOUNTER — HOSPITAL ENCOUNTER (OUTPATIENT)
Dept: CARDIAC REHAB | Age: 83
Discharge: HOME | End: 2018-08-08
Payer: MEDICARE

## 2018-08-08 VITALS — WEIGHT: 152.2 LBS | BODY MASS INDEX: 26.13 KG/M2

## 2018-08-08 PROCEDURE — 93798 PHYS/QHP OP CAR RHAB W/ECG: CPT

## 2018-08-13 ENCOUNTER — HOSPITAL ENCOUNTER (OUTPATIENT)
Dept: CARDIAC REHAB | Age: 83
Discharge: HOME | End: 2018-08-13
Payer: MEDICARE

## 2018-08-13 VITALS — BODY MASS INDEX: 26.4 KG/M2 | WEIGHT: 153.8 LBS

## 2018-08-13 PROCEDURE — 93798 PHYS/QHP OP CAR RHAB W/ECG: CPT

## 2018-08-13 PROCEDURE — 93797 PHYS/QHP OP CAR RHAB WO ECG: CPT

## 2018-08-15 ENCOUNTER — HOSPITAL ENCOUNTER (OUTPATIENT)
Dept: CARDIAC REHAB | Age: 83
Discharge: HOME | End: 2018-08-15
Payer: MEDICARE

## 2018-08-15 VITALS — BODY MASS INDEX: 26.3 KG/M2 | WEIGHT: 153.2 LBS

## 2018-08-15 PROCEDURE — 93798 PHYS/QHP OP CAR RHAB W/ECG: CPT

## 2018-08-20 ENCOUNTER — HOSPITAL ENCOUNTER (OUTPATIENT)
Dept: CARDIAC REHAB | Age: 83
Discharge: HOME | End: 2018-08-20
Payer: MEDICARE

## 2018-08-20 VITALS — WEIGHT: 153.6 LBS | BODY MASS INDEX: 26.37 KG/M2

## 2018-08-20 PROCEDURE — 93798 PHYS/QHP OP CAR RHAB W/ECG: CPT

## 2018-08-22 ENCOUNTER — HOSPITAL ENCOUNTER (OUTPATIENT)
Dept: CARDIAC REHAB | Age: 83
Discharge: HOME | End: 2018-08-22
Payer: MEDICARE

## 2018-08-22 VITALS — WEIGHT: 153.6 LBS | BODY MASS INDEX: 26.37 KG/M2

## 2018-08-22 PROCEDURE — 93798 PHYS/QHP OP CAR RHAB W/ECG: CPT

## 2018-08-27 ENCOUNTER — HOSPITAL ENCOUNTER (OUTPATIENT)
Dept: CARDIAC REHAB | Age: 83
Discharge: HOME | End: 2018-08-27
Payer: MEDICARE

## 2018-08-27 VITALS — BODY MASS INDEX: 26.16 KG/M2 | WEIGHT: 152.4 LBS

## 2018-08-27 PROCEDURE — 93798 PHYS/QHP OP CAR RHAB W/ECG: CPT

## 2018-08-29 ENCOUNTER — HOSPITAL ENCOUNTER (OUTPATIENT)
Dept: CARDIAC REHAB | Age: 83
Discharge: HOME | End: 2018-08-29
Payer: MEDICARE

## 2018-08-29 VITALS — BODY MASS INDEX: 25.99 KG/M2 | WEIGHT: 151.4 LBS

## 2018-08-29 PROCEDURE — 93798 PHYS/QHP OP CAR RHAB W/ECG: CPT

## 2018-09-05 ENCOUNTER — HOSPITAL ENCOUNTER (OUTPATIENT)
Dept: CARDIAC REHAB | Age: 83
Discharge: HOME | End: 2018-09-05
Payer: MEDICARE

## 2018-09-05 VITALS — BODY MASS INDEX: 26.3 KG/M2 | WEIGHT: 153.2 LBS

## 2018-09-05 PROCEDURE — 93798 PHYS/QHP OP CAR RHAB W/ECG: CPT

## 2018-09-10 ENCOUNTER — HOSPITAL ENCOUNTER (OUTPATIENT)
Dept: CARDIAC REHAB | Age: 83
Discharge: HOME | End: 2018-09-10
Payer: MEDICARE

## 2018-09-10 VITALS — BODY MASS INDEX: 26.55 KG/M2 | WEIGHT: 154.7 LBS

## 2018-09-10 PROCEDURE — 93798 PHYS/QHP OP CAR RHAB W/ECG: CPT

## 2018-09-19 ENCOUNTER — HOSPITAL ENCOUNTER (OUTPATIENT)
Dept: CARDIAC REHAB | Age: 83
Discharge: HOME | End: 2018-09-19
Payer: MEDICARE

## 2018-09-19 VITALS — WEIGHT: 153.6 LBS | BODY MASS INDEX: 26.37 KG/M2

## 2018-09-19 PROCEDURE — 93798 PHYS/QHP OP CAR RHAB W/ECG: CPT

## 2018-09-24 ENCOUNTER — HOSPITAL ENCOUNTER (OUTPATIENT)
Dept: CARDIAC REHAB | Age: 83
Discharge: HOME | End: 2018-09-24
Payer: MEDICARE

## 2018-09-24 VITALS — WEIGHT: 154 LBS | BODY MASS INDEX: 26.43 KG/M2

## 2018-09-24 PROCEDURE — 93798 PHYS/QHP OP CAR RHAB W/ECG: CPT

## 2018-09-26 ENCOUNTER — HOSPITAL ENCOUNTER (OUTPATIENT)
Dept: CARDIAC REHAB | Age: 83
Discharge: HOME | End: 2018-09-26
Payer: MEDICARE

## 2018-09-26 VITALS — WEIGHT: 154 LBS | BODY MASS INDEX: 26.43 KG/M2

## 2018-09-26 PROCEDURE — 93798 PHYS/QHP OP CAR RHAB W/ECG: CPT

## 2018-10-01 ENCOUNTER — HOSPITAL ENCOUNTER (OUTPATIENT)
Dept: CARDIAC REHAB | Age: 83
Discharge: HOME | End: 2018-10-01
Payer: MEDICARE

## 2018-10-01 VITALS — BODY MASS INDEX: 26.3 KG/M2 | WEIGHT: 153.2 LBS

## 2018-10-01 PROCEDURE — 93798 PHYS/QHP OP CAR RHAB W/ECG: CPT

## 2018-10-03 ENCOUNTER — HOSPITAL ENCOUNTER (OUTPATIENT)
Dept: CARDIAC REHAB | Age: 83
Discharge: HOME | End: 2018-10-03
Payer: MEDICARE

## 2018-10-03 VITALS — WEIGHT: 152.6 LBS | BODY MASS INDEX: 26.19 KG/M2

## 2018-10-03 PROCEDURE — 93798 PHYS/QHP OP CAR RHAB W/ECG: CPT

## 2018-10-08 ENCOUNTER — HOSPITAL ENCOUNTER (OUTPATIENT)
Dept: CARDIAC REHAB | Age: 83
Discharge: HOME | End: 2018-10-08
Payer: MEDICARE

## 2018-10-08 VITALS — BODY MASS INDEX: 26.47 KG/M2 | WEIGHT: 154.2 LBS

## 2018-10-08 PROCEDURE — 93798 PHYS/QHP OP CAR RHAB W/ECG: CPT

## 2018-10-10 ENCOUNTER — HOSPITAL ENCOUNTER (OUTPATIENT)
Dept: CARDIAC REHAB | Age: 83
Discharge: HOME | End: 2018-10-10
Payer: MEDICARE

## 2018-10-10 VITALS — WEIGHT: 154.8 LBS | BODY MASS INDEX: 26.57 KG/M2

## 2018-10-10 PROCEDURE — 93798 PHYS/QHP OP CAR RHAB W/ECG: CPT

## 2018-10-15 ENCOUNTER — HOSPITAL ENCOUNTER (OUTPATIENT)
Dept: CARDIAC REHAB | Age: 83
Discharge: HOME | End: 2018-10-15
Payer: MEDICARE

## 2018-10-15 VITALS — WEIGHT: 154 LBS | BODY MASS INDEX: 26.43 KG/M2

## 2018-10-15 PROCEDURE — 93798 PHYS/QHP OP CAR RHAB W/ECG: CPT

## 2018-10-17 ENCOUNTER — HOSPITAL ENCOUNTER (OUTPATIENT)
Dept: CARDIAC REHAB | Age: 83
Discharge: HOME | End: 2018-10-17
Payer: MEDICARE

## 2018-10-17 VITALS — BODY MASS INDEX: 26.5 KG/M2 | WEIGHT: 154.4 LBS

## 2018-10-17 PROCEDURE — 93798 PHYS/QHP OP CAR RHAB W/ECG: CPT

## 2018-10-22 ENCOUNTER — HOSPITAL ENCOUNTER (OUTPATIENT)
Dept: CARDIAC REHAB | Age: 83
Discharge: HOME | End: 2018-10-22
Payer: MEDICARE

## 2018-10-22 VITALS — BODY MASS INDEX: 26.26 KG/M2 | WEIGHT: 153 LBS

## 2018-10-22 PROCEDURE — 93798 PHYS/QHP OP CAR RHAB W/ECG: CPT

## 2018-10-24 ENCOUNTER — HOSPITAL ENCOUNTER (OUTPATIENT)
Dept: CARDIAC REHAB | Age: 83
Discharge: HOME | End: 2018-10-24
Payer: MEDICARE

## 2018-10-24 VITALS — BODY MASS INDEX: 26.33 KG/M2 | WEIGHT: 153.4 LBS

## 2018-10-29 ENCOUNTER — HOSPITAL ENCOUNTER (OUTPATIENT)
Dept: CARDIAC REHAB | Age: 83
Discharge: HOME | End: 2018-10-29
Payer: MEDICARE

## 2018-10-29 VITALS — WEIGHT: 152.6 LBS | BODY MASS INDEX: 26.19 KG/M2

## 2018-10-29 PROCEDURE — 93798 PHYS/QHP OP CAR RHAB W/ECG: CPT

## 2018-10-31 ENCOUNTER — HOSPITAL ENCOUNTER (OUTPATIENT)
Dept: CARDIAC REHAB | Age: 83
Discharge: HOME | End: 2018-10-31
Payer: MEDICARE

## 2018-10-31 VITALS — BODY MASS INDEX: 26.23 KG/M2 | WEIGHT: 152.8 LBS

## 2018-10-31 PROCEDURE — 93798 PHYS/QHP OP CAR RHAB W/ECG: CPT

## 2018-10-31 NOTE — CARDIO/PULMONARY
Hubert Bernard Completed phase II cardiac rehab and attended 36 sessions from 7/3/18. Hubert Bernard is interested in maintaining optimal health and will work with Brenda Cerna MD.  Hubert Beranrd has improved her endurance and stamina through regular exercise. Her 6 minute walk test distance increased from 324 meters on intake to 357 meters on exit. Blood pressure is not WNL. Pt encouraged to continue following with MD, take meds, avoid stress and follow low salt diet. She has also improved her [employer], DASI and depression scores and these were reviewed with patient. Hubert Bernard plans to continue exercising at a [employer] exercise program 3 x a week. She will continue to try to increase her fruit and vegetable intake. Christian Ambrosio RN 
10/31/2018

## 2018-12-04 ENCOUNTER — OFFICE VISIT (OUTPATIENT)
Dept: CARDIOLOGY CLINIC | Age: 83
End: 2018-12-04

## 2018-12-04 VITALS
OXYGEN SATURATION: 99 % | BODY MASS INDEX: 25.88 KG/M2 | SYSTOLIC BLOOD PRESSURE: 162 MMHG | HEIGHT: 64 IN | DIASTOLIC BLOOD PRESSURE: 64 MMHG | HEART RATE: 78 BPM | WEIGHT: 151.6 LBS

## 2018-12-04 DIAGNOSIS — I21.4 NSTEMI (NON-ST ELEVATED MYOCARDIAL INFARCTION) (HCC): ICD-10-CM

## 2018-12-04 DIAGNOSIS — I10 ESSENTIAL HYPERTENSION: Primary | ICD-10-CM

## 2018-12-04 DIAGNOSIS — Z95.5 S/P CORONARY ARTERY STENT PLACEMENT: ICD-10-CM

## 2018-12-04 DIAGNOSIS — I25.10 CORONARY ARTERY DISEASE INVOLVING NATIVE CORONARY ARTERY OF NATIVE HEART WITHOUT ANGINA PECTORIS: ICD-10-CM

## 2018-12-04 RX ORDER — AMLODIPINE BESYLATE 5 MG/1
5 TABLET ORAL DAILY
Qty: 90 TAB | Refills: 1 | Status: SHIPPED | OUTPATIENT
Start: 2018-12-04 | End: 2018-12-14 | Stop reason: SDUPTHER

## 2018-12-04 NOTE — PROGRESS NOTES
Ayaan Herbert DNP, ANP-BC  Subjective/HPI:     Mike Rendon is a 80 y.o. female is here for 6 months follow-up post PTCA stenting of the LAD and proximal circumflex. Has a 90% lesion in the mid circumflex unable to cross. Patient reports she is feeling well, denies dyspnea on exertion or chest pain. Discussed her elevated blood pressure today and reports intermittently when she was at therapy she was advised of hypertensive readings however denies headache blurred vision lightheadedness or dizziness. She is compliant in taking all medications, recently diagnosed with iron deficiency started on prescription iron therapy reporting some intermittent constipation denies any blood in her stool. Cath 5.2018  CORONARY CIRCULATION:  --  Proximal LAD: There was a 90 % stenosis. There was MARIAMA grade 3 flow  through the vessel (brisk flow). --  Mid LAD: There was a 90 % stenosis. There was MARIAMA grade 3 flow through the vessel (brisk flow). --  Proximal  circumflex: There was a 90 % stenosis. There was MARIAMA grade 2 flow through  the vessel (partial perfusion). --  1ST LESION INTERVENTIONS:  --  A drug-eluting stent was performed on the 90 % lesion in the ostial  LAD. Following intervention there was a 0 % residual stenosis. --  Balloon  angioplasty was performed for post dilation, using a NC Trek RX 3x15mm  balloon, with 1 inflations and a maximum inflation pressure of 12 rosmery. --   A Synergy RX [id number] everolimus-eluting stent was placed across the  lesion and successfully deployed at a maximum inflation pressure of 11 rosmery. --  3RD LESION INTERVENTIONS:  --  A drug-eluting stent was performed on the 90 % lesion in the proximal  circumflex. Following intervention there was a 0 % residual stenosis. --   A Synergy RX [id number] everolimus-eluting stent was placed across the  lesion and successfully deployed at a maximum inflation pressure of 12 rosmery.     RECOMMENDATIONS:  Mid LCx has 90% stenosis, however unable to advance ballon or stent  through proximal lesion due to extreme angulation and new stent. Manage  medically for now. PCP Provider  Sebastien Saldivar NP  Past Medical History:   Diagnosis Date    Diverticulitis     Gout     HTN (hypertension) 5/18/2018    Hx of diverticulitis of colon 5/18/2018    Hypertension     Other ill-defined conditions(799.89)     high cholesterol    S/P coronary artery stent placement 5/25/2018      Past Surgical History:   Procedure Laterality Date    HX HYSTERECTOMY      [place] COLONOSCOPY FLX DX W/COLLJ SPEC WHEN PFRMD  10/18/2013          Allergies   Allergen Reactions    Aspirin Nausea and Vomiting     Pt reports that she takes 81 mg ASA daily      Family History   Problem Relation Age of Onset    Stroke Mother     Diabetes Sister       Current Outpatient Medications   Medication Sig    ferrous sulfate (IRON PO) Take 65 mg by mouth daily.  clopidogrel (PLAVIX) 75 mg tab Take 1 Tab by mouth daily.  metoprolol tartrate (LOPRESSOR) 25 mg tablet Take 0.5 Tabs by mouth every twelve (12) hours.  cholecalciferol (VITAMIN D3) 400 unit tab tablet Take 5,000 Units by mouth daily.  aspirin delayed-release 81 mg tablet Take 81 mg by mouth daily.  timolol (TIMOPTIC-XE) 0.5 % ophthalmic gel-forming Administer 1 Drop to right eye daily.  latanoprost (XALATAN) 0.005 % ophthalmic solution Administer 1 Drop to both eyes nightly.  fluticasone (FLONASE ALLERGY RELIEF) 50 mcg/actuation nasal spray 1 [place] by Both Nostrils route daily.  acetaminophen (TYLENOL EXTRA STRENGTH) 500 mg tablet Take 1,000 mg by mouth every six (6) hours as needed for Pain.  fexofenadine (ALLEGRA) 180 mg tablet Take 180 mg by mouth nightly.  atorvastatin (LIPITOR) 80 mg tablet Take 80 mg by mouth nightly.  lisinopril (PRINIVIL, ZESTRIL) 20 mg tablet Take 20 mg by mouth daily.  allopurinol (ZYLOPRIM) 100 mg tablet Take 100 mg by mouth daily.     ALPRAZolam (XANAX) 0.25 mg tablet Take 0.25 mg by mouth daily as needed for Anxiety.  potassium chloride SR (K-TAB) 20 mEq tablet Take 1 Tab by mouth daily.  ascorbic acid, vitamin C, (VITAMIN C) 500 mg tablet Take 1,000 mg by mouth daily. No current facility-administered medications for this visit. Vitals:    12/04/18 0908 12/04/18 0914   BP: 170/66 162/64   Pulse: 78    SpO2: 99%    Weight: 151 lb 9.6 oz (68.8 kg)    Height: 5' 4\" (1.626 m)      Social History     Socioeconomic History    Marital status: SINGLE     Spouse name: Not on file    Number of children: Not on file    Years of education: Not on file    Highest education level: Not on file   Social Needs    Financial resource strain: Not on file    Food insecurity - worry: Not on file    Food insecurity - inability: Not on file   [employer] needs - medical: Not on file   [employer] needs - non-medical: Not on file   Occupational History    Not on file   Tobacco Use    Smoking status: Never Smoker    Smokeless tobacco: Never Used   Substance and Sexual Activity    Alcohol use: No     Comment: occasionally    Drug use: No    Sexual activity: Not on file   Other Topics Concern    Not on file   Social History Narrative    Not on file       I have reviewed the nurses notes, vitals, problem list, allergy list, medical history, family, social history and medications. Review of Symptoms:    General: Pt denies excessive weight gain or loss. Pt is able to conduct ADL's  HEENT: Denies blurred vision, headaches, epistaxis and difficulty swallowing. Respiratory: Denies shortness of breath, RAMSEY, wheezing or stridor.   Cardiovascular: Denies precordial pain, palpitations, edema or PND  Gastrointestinal: Denies poor appetite, indigestion, abdominal pain or blood in stool  Musculoskeletal: Denies pain or swelling from muscles or joints  Neurologic: Denies tremor, paresthesias, or sensory motor disturbance  Skin: Denies rash, itching or texture change. Physical Exam:      General: Well developed, in no acute distress, cooperative and alert  HEENT: No carotid bruits, no JVD, trach is midline. Neck Supple, PEERL, EOM intact. Heart:  Normal S1/S2 negative S3 or S4. Regular, no murmur, gallop or rub.   Respiratory: Clear bilaterally x 4, no wheezing or rales  Abdomen:   Soft, non-tender, no masses, bowel sounds are active.   Extremities:  No edema, normal cap refill, no cyanosis, atraumatic. Neuro: A&Ox3, speech clear, gait stable. Skin: Skin color is normal. No rashes or lesions.  Non diaphoretic  Vascular: 2+ pulses symmetric in all extremities    Cardiographics    ECG: Initial rhythm with first-degree heart block  Results for orders placed or performed during the hospital encounter of 05/18/18   EKG, 12 LEAD, INITIAL   Result Value Ref Range    Ventricular Rate 73 BPM    Atrial Rate 73 BPM    P-R Interval 238 ms    QRS Duration 82 ms    Q-T Interval 396 ms    QTC Calculation (Bezet) 436 ms    Calculated R Axis -35 degrees    Calculated T Axis 164 degrees    Diagnosis       Sinus rhythm with 1st degree AV block  Left axis deviation  Nonspecific ST and T wave abnormality  When compared with ECG of 18-MAY-2018 11:58,  Criteria for Septal infarct are no longer present  Confirmed by Leeen List ([id number]) on 5/19/2018 1:10:37 PM           Cardiology Labs:  Lab Results   Component Value Date/Time    Cholesterol, total 126 05/19/2018 05:26 AM    HDL Cholesterol 57 05/19/2018 05:26 AM    LDL, calculated 56.2 05/19/2018 05:26 AM    Triglyceride 64 05/19/2018 05:26 AM    CHOL/HDL Ratio 2.2 05/19/2018 05:26 AM       Lab Results   Component Value Date/Time    Sodium 141 06/12/2018 02:44 PM    Potassium 6.3 (H) 06/12/2018 02:44 PM    Chloride 107 (H) 06/12/2018 02:44 PM    CO2 18 (L) 06/12/2018 02:44 PM    Anion gap 10 05/24/2018 01:16 AM    Glucose 120 (H) 06/12/2018 02:44 PM    BUN 21 06/12/2018 02:44 PM    Creatinine 1.21 (H) 06/12/2018 02:44 PM BUN/Creatinine ratio 17 06/12/2018 02:44 PM    GFR est AA 47 (L) 06/12/2018 02:44 PM    GFR est non-AA 41 (L) 06/12/2018 02:44 PM    Calcium 10.2 06/12/2018 02:44 PM    Bilirubin, total 0.5 06/12/2018 02:44 PM    AST (SGOT) 25 06/12/2018 02:44 PM    Alk. phosphatase 145 (H) 06/12/2018 02:44 PM    Protein, total 7.1 06/12/2018 02:44 PM    Albumin 4.3 06/12/2018 02:44 PM    Globulin 3.7 05/18/2018 12:14 PM    A-G Ratio 1.5 06/12/2018 02:44 PM    ALT (SGPT) 16 06/12/2018 02:44 PM           Assessment:     Assessment:     Diagnoses and all orders for this visit:    1. Essential hypertension  -     AMB POC EKG ROUTINE W/ 12 LEADS, INTER & REP    2. S/P coronary artery stent placement    3. Coronary artery disease involving native coronary artery of native heart without angina pectoris    4. NSTEMI (non-ST elevated myocardial infarction) ([facility]        ICD-10-CM ICD-9-CM    1. Essential hypertension I10 401.9 AMB POC EKG ROUTINE W/ 12 LEADS, INTER & REP   2. S/P coronary artery stent placement Z95.5 V45.82    3. Coronary artery disease involving native coronary artery of native heart without angina pectoris I25.10 414.01    4. NSTEMI (non-ST elevated myocardial infarction) ([facility] I21.4 410.70      Orders Placed This Encounter    AMB POC EKG ROUTINE W/ 12 LEADS, INTER & REP     Order Specific Question:   Reason for Exam:     Answer:   ROUTINE    ferrous sulfate (IRON PO)     Sig: Take 65 mg by mouth daily. Plan:     1. Atherosclerotic heart disease: 6 months post multivessel PTCA stenting with residual 90% lesion in the mid circumflex clinically stable and asymptomatic. Continue medical therapy. 2.  Hypertension: Elevated 162/64 confirmed reading, creatinine 1.21 we will continue lisinopril and add amlodipine 5 mg daily for hypertension and second antianginal therapy. Discussed with patient to obtain a home blood pressure monitor for closer surveillance.   3.  Hyperlipidemia: LDL 56 at the time of PTCA stenting continue statin therapy  4. Hyperkalemia seen on metabolic panel June 1934 we will repeat complete metabolic panel. Patient to follow-up in 3 months. Heidi Majano NP    This note was created using voice recognition software. Despite editing, there may be syntax errors. Patient seen and examined by me with nurse practitioner. Clarke Perales personally performed all components of the history, physical, and medical decision making and agree with the assessment and plan with minor modifications as noted. Last cath study reviewed.     Nonnie Heimlich, MD

## 2018-12-04 NOTE — PROGRESS NOTES
Chief Complaint   Patient presents with    Coronary Artery Disease     6 MO. F/U     1. Have you been to the ER, urgent care clinic since your last visit? Hospitalized since your last visit? NO    2. Have you seen or consulted any other health care providers outside of the [address] [place] [address] since your last visit? Include any pap smears or colon screening.  NO

## 2018-12-05 LAB
ALBUMIN SERPL-MCNC: 4.2 G/DL (ref 3.5–4.7)
ALBUMIN/GLOB SERPL: 1.6 {RATIO} (ref 1.2–2.2)
ALP SERPL-CCNC: 126 IU/L (ref 39–117)
ALT SERPL-CCNC: 14 IU/L (ref 0–32)
AST SERPL-CCNC: 25 IU/L (ref 0–40)
BILIRUB SERPL-MCNC: 0.4 MG/DL (ref 0–1.2)
BUN SERPL-MCNC: 32 MG/DL (ref 8–27)
BUN/CREAT SERPL: 22 (ref 12–28)
CALCIUM SERPL-MCNC: 9.9 MG/DL (ref 8.7–10.3)
CHLORIDE SERPL-SCNC: 107 MMOL/L (ref 96–106)
CHOLEST SERPL-MCNC: 154 MG/DL (ref 100–199)
CK SERPL-CCNC: 160 U/L (ref 24–173)
CO2 SERPL-SCNC: 24 MMOL/L (ref 20–29)
CREAT SERPL-MCNC: 1.44 MG/DL (ref 0.57–1)
GLOBULIN SER CALC-MCNC: 2.7 G/DL (ref 1.5–4.5)
GLUCOSE SERPL-MCNC: 100 MG/DL (ref 65–99)
HDLC SERPL-MCNC: 57 MG/DL
INTERPRETATION, 910389: NORMAL
INTERPRETATION: NORMAL
LDLC SERPL CALC-MCNC: 77 MG/DL (ref 0–99)
PDF IMAGE, 910387: NORMAL
POTASSIUM SERPL-SCNC: 5.1 MMOL/L (ref 3.5–5.2)
PROT SERPL-MCNC: 6.9 G/DL (ref 6–8.5)
SODIUM SERPL-SCNC: 144 MMOL/L (ref 134–144)
TRIGL SERPL-MCNC: 101 MG/DL (ref 0–149)
VLDLC SERPL CALC-MCNC: 20 MG/DL (ref 5–40)

## 2018-12-06 ENCOUNTER — TELEPHONE (OUTPATIENT)
Dept: CARDIOLOGY CLINIC | Age: 83
End: 2018-12-06

## 2018-12-06 DIAGNOSIS — I10 ESSENTIAL HYPERTENSION: Primary | ICD-10-CM

## 2018-12-06 DIAGNOSIS — Z95.5 S/P CORONARY ARTERY STENT PLACEMENT: ICD-10-CM

## 2018-12-06 DIAGNOSIS — I25.10 CORONARY ARTERY DISEASE INVOLVING NATIVE CORONARY ARTERY OF NATIVE HEART WITHOUT ANGINA PECTORIS: ICD-10-CM

## 2018-12-06 NOTE — PROGRESS NOTES
Mariela: Please call patient labs for cholesterol looks good, her renal function has slightly been strange and her potassium has been running on the higher range in the normal spectrum even higher over the summer. I want to reduce lisinopril from 20 mg down to 2.5 mg daily increase her amlodipine from 5 mg daily to 10 mg daily. Recheck BMP 1 month with blood pressure check.

## 2018-12-06 NOTE — TELEPHONE ENCOUNTER
----- Message from Mary Kay Brock MD sent at 12/5/2018  4:11 PM EST -----  Inform her labs are k  ----- Message -----  From: Marc Kaplan Lab Results In  Sent: 12/5/2018   8:42 AM  To: Mary Kay Brock MD        Result Notes for LIPID PANEL     Notes recorded by Jorge A Lopez NP on 12/6/2018 at 9:01 AM VICTOR MANUEL Sierra: Please call patient labs for cholesterol looks good, her renal function has slightly been strange and her potassium has been running on the higher range in the normal spectrum even higher over the summer.  I want to reduce lisinopril from 20 mg down to 2.5 mg daily increase her amlodipine from 5 mg daily to 10 mg daily.  Recheck BMP 1 month with blood pressure check. Tried to call pt but mail box is full. Will try tomorrow. 12/6/18      Tried to call pt again but mail box is full, 12/14/18. Called Sma on HIPPA form, explained I could not reach pt so I called daughter to relay lab results. Explained the results to the daughter above. Daughter wrote down medication changes. Advised her someone will call her to schedule the blood pressure check. She advised to mail out the lab slip to her mother. Advised the new scripts will be sent to pharmacy. Daughter verbalized understanding and had no further questions. Sent new scripts to Gama Mcdonough for approval and to send to pharmacy. Put lab slip in mail to pt. Will send to our PSR to schedule BP check in 1 month.

## 2018-12-14 RX ORDER — AMLODIPINE BESYLATE 10 MG/1
TABLET ORAL
Qty: 30 TAB | Refills: 3 | Status: SHIPPED | OUTPATIENT
Start: 2018-12-14 | End: 2019-04-10 | Stop reason: SDUPTHER

## 2018-12-14 RX ORDER — LISINOPRIL 2.5 MG/1
TABLET ORAL
Qty: 30 TAB | Refills: 3 | Status: SHIPPED | OUTPATIENT
Start: 2018-12-14 | End: 2019-04-10 | Stop reason: SDUPTHER

## 2018-12-19 ENCOUNTER — TELEPHONE (OUTPATIENT)
Dept: CARDIOLOGY CLINIC | Age: 83
End: 2018-12-19

## 2018-12-20 NOTE — TELEPHONE ENCOUNTER
Returned pt's call,verified pt with two pt identifiers, pt was confused about her medication change recently due to her abnormal lab work. Read over message from 12/6/18 regarding her labs results. Went over multiple times the medication changes. Advised Lisinopril 2.5 mg tab daily and Amlodipine 5 mg daily. Pt got her daughter on the phone with her. She advised she did receive the new Rx from the pharmacy and had the correct bottles. Pt and daughter both verbalized understanding.

## 2018-12-28 ENCOUNTER — OFFICE VISIT (OUTPATIENT)
Dept: CARDIOLOGY CLINIC | Age: 83
End: 2018-12-28

## 2018-12-28 VITALS
OXYGEN SATURATION: 99 % | BODY MASS INDEX: 25.73 KG/M2 | HEART RATE: 70 BPM | DIASTOLIC BLOOD PRESSURE: 60 MMHG | HEIGHT: 64 IN | WEIGHT: 150.7 LBS | SYSTOLIC BLOOD PRESSURE: 140 MMHG | RESPIRATION RATE: 16 BRPM

## 2018-12-28 DIAGNOSIS — I25.10 CORONARY ARTERY DISEASE INVOLVING NATIVE CORONARY ARTERY OF NATIVE HEART WITHOUT ANGINA PECTORIS: ICD-10-CM

## 2018-12-28 DIAGNOSIS — I10 ESSENTIAL HYPERTENSION: Primary | ICD-10-CM

## 2018-12-28 NOTE — PROGRESS NOTES
[address] [place] [address], [address] [facility]  [phone]     Subjective:      Fernanda Ortega is a 80 y.o. female is here for BP check. The patient denies chest pain/ shortness of breath, orthopnea, PND, LE edema, palpitations, syncope, or presyncope.        Patient Active Problem List    Diagnosis Date Noted    Essential hypertension 12/28/2018    Coronary artery disease involving native coronary artery of native heart without angina pectoris 06/12/2018    S/P coronary artery stent placement 05/25/2018    NSTEMI (non-ST elevated myocardial infarction) ([facility] 05/18/2018    HTN (hypertension) 05/18/2018    Hx of diverticulitis of colon 05/18/2018    Gout       Grady Scott NP  Past Medical History:   Diagnosis Date    Diverticulitis     Gout     HTN (hypertension) 5/18/2018    Hx of diverticulitis of colon 5/18/2018    Hyperlipidemia     Hypertension     Other ill-defined conditions(799.89)     high cholesterol    S/P coronary artery stent placement 5/25/2018      Past Surgical History:   Procedure Laterality Date    HX HYSTERECTOMY      [place] COLONOSCOPY FLX DX W/COLLJ SPEC WHEN PFRMD  10/18/2013          Allergies   Allergen Reactions    Aspirin Nausea and Vomiting     Pt reports that she takes 81 mg ASA daily      Family History   Problem Relation Age of Onset    Stroke Mother     Diabetes Sister       Social History     Socioeconomic History    Marital status: SINGLE     Spouse name: Not on file    Number of children: Not on file    Years of education: Not on file    Highest education level: Not on file   Social Needs    Financial resource strain: Not on file    Food insecurity - worry: Not on file    Food insecurity - inability: Not on file   [employer] needs - medical: Not on file   [employer] needs - non-medical: Not on file   Occupational History    Not on file   Tobacco Use    Smoking status: Never Smoker    Smokeless tobacco: Never Used Substance and Sexual Activity    Alcohol use: No     Comment: occasionally    Drug use: No    Sexual activity: Not on file   Other Topics Concern    Not on file   Social History Narrative    Not on file      Current Outpatient Medications   Medication Sig    amLODIPine (NORVASC) 10 mg tablet 1 tab by mouth daily    lisinopril (PRINIVIL, ZESTRIL) 2.5 mg tablet 1 tab by mouth daily    ferrous sulfate (IRON PO) Take 65 mg by mouth daily.  clopidogrel (PLAVIX) 75 mg tab Take 1 Tab by mouth daily.  ascorbic acid, vitamin C, (VITAMIN C) 500 mg tablet Take 1,000 mg by mouth daily.  metoprolol tartrate (LOPRESSOR) 25 mg tablet Take 0.5 Tabs by mouth every twelve (12) hours.  cholecalciferol (VITAMIN D3) 400 unit tab tablet Take 5,000 Units by mouth daily.  aspirin delayed-release 81 mg tablet Take 81 mg by mouth daily.  timolol (TIMOPTIC-XE) 0.5 % ophthalmic gel-forming Administer 1 Drop to right eye daily.  latanoprost (XALATAN) 0.005 % ophthalmic solution Administer 1 Drop to both eyes nightly.  fluticasone (FLONASE ALLERGY RELIEF) 50 mcg/actuation nasal spray 1 [place] by Both Nostrils route daily.  acetaminophen (TYLENOL EXTRA STRENGTH) 500 mg tablet Take 1,000 mg by mouth every six (6) hours as needed for Pain.  fexofenadine (ALLEGRA) 180 mg tablet Take 180 mg by mouth nightly.  atorvastatin (LIPITOR) 80 mg tablet Take 80 mg by mouth nightly.  allopurinol (ZYLOPRIM) 100 mg tablet Take 100 mg by mouth daily.  ALPRAZolam (XANAX) 0.25 mg tablet Take 0.25 mg by mouth daily as needed for Anxiety. No current facility-administered medications for this visit.           Review of Symptoms:  11 systems reviewed, negative other than as stated in the HPI    Physical ExamPhysical Exam:    Vitals:    12/28/18 1315 12/28/18 1316   BP: 130/60 140/60   Pulse: 70    Resp: 16    SpO2: 99%    Weight: 150 lb 11.2 oz (68.4 kg)    Height: 5' 4\" (1.626 m)      Body mass index is 25.87 kg/m². General PE   Gen:  NAD  Mental Status - Alert. General Appearance - Not in acute distress. Chest and Lung Exam   Inspection: Accessory muscles - No use of accessory muscles in breathing. Auscultation:   Breath sounds: - Normal.   Cardiovascular   Inspection: Jugular vein - Bilateral - Inspection Normal.   Palpation/Percussion:   Apical Impulse: - Normal.   Auscultation: Rhythm - Regular. Heart Sounds - S1 WNL and S2 WNL. No S3 or S4. Murmurs & Other Heart Sounds: Auscultation of the heart reveals - No Murmurs. Peripheral Vascular   Upper Extremity: Inspection - Bilateral - No Cyanotic nailbeds or Digital clubbing. Lower Extremity:   Palpation: Edema - Bilateral - No edema. Abdomen:   Soft, non-tender, bowel sounds are active. Neuro: A&O times 3, CN and motor grossly WNL    Labs:   Lab Results   Component Value Date/Time    Cholesterol, total 154 12/04/2018 10:08 AM    Cholesterol, total 126 05/19/2018 05:26 AM    HDL Cholesterol 57 12/04/2018 10:08 AM    HDL Cholesterol 57 05/19/2018 05:26 AM    LDL, calculated 77 12/04/2018 10:08 AM    LDL, calculated 56.2 05/19/2018 05:26 AM    Triglyceride 101 12/04/2018 10:08 AM    Triglyceride 64 05/19/2018 05:26 AM    CHOL/HDL Ratio 2.2 05/19/2018 05:26 AM     Lab Results   Component Value Date/Time     (H) 05/18/2018 02:14 PM     Lab Results   Component Value Date/Time    Sodium 144 12/04/2018 10:08 AM    Potassium 5.1 12/04/2018 10:08 AM    Chloride 107 (H) 12/04/2018 10:08 AM    CO2 24 12/04/2018 10:08 AM    Anion gap 10 05/24/2018 01:16 AM    Glucose 100 (H) 12/04/2018 10:08 AM    BUN 32 (H) 12/04/2018 10:08 AM    Creatinine 1.44 (H) 12/04/2018 10:08 AM    BUN/Creatinine ratio 22 12/04/2018 10:08 AM    GFR est AA 38 (L) 12/04/2018 10:08 AM    GFR est non-AA 33 (L) 12/04/2018 10:08 AM    Calcium 9.9 12/04/2018 10:08 AM    Bilirubin, total 0.4 12/04/2018 10:08 AM    AST (SGOT) 25 12/04/2018 10:08 AM    Alk.  phosphatase 126 (H) 12/04/2018 10:08 AM Protein, total 6.9 12/04/2018 10:08 AM    Albumin 4.2 12/04/2018 10:08 AM    Globulin 3.7 05/18/2018 12:14 PM    A-G Ratio 1.6 12/04/2018 10:08 AM    ALT (SGPT) 14 12/04/2018 10:08 AM       EKG:       Assessment:     Assessment:      1. Essential hypertension    2. Coronary artery disease involving native coronary artery of native heart without angina pectoris        No orders of the defined types were placed in this encounter. Plan:     Patient presents for BP check. Hypertension:   Improved with the addition of amlodipine 5 mg daily for hypertension and second antianginal therapy  States home readings has been in the 130s-140s (SBP). Continue current therapy    Discussed importance of diet and exercise - eventual goal of 30 - 60 minutes, 5-7 times a week as per AHA guideline. She has been walking daily, doing well. Continue current care and f/u with Dr Hollis Chavarria as previously discussed.     Annabelle Mcdonald NP

## 2018-12-28 NOTE — PROGRESS NOTES
1. Have you been to the ER, urgent care clinic since your last visit? Hospitalized since your last visit? No    2. Have you seen or consulted any other health care providers outside of the [address] [place] [address] since your last visit? Include any pap smears or colon screening.  No     Patient home BP this am 139/57 pulse 79 she has no cardiac complaints

## 2019-03-04 ENCOUNTER — OFFICE VISIT (OUTPATIENT)
Dept: CARDIOLOGY CLINIC | Age: 84
End: 2019-03-04

## 2019-03-04 VITALS
OXYGEN SATURATION: 97 % | DIASTOLIC BLOOD PRESSURE: 60 MMHG | WEIGHT: 152.1 LBS | SYSTOLIC BLOOD PRESSURE: 120 MMHG | HEART RATE: 70 BPM | HEIGHT: 64 IN | BODY MASS INDEX: 25.97 KG/M2 | RESPIRATION RATE: 16 BRPM

## 2019-03-04 DIAGNOSIS — I10 ESSENTIAL HYPERTENSION: ICD-10-CM

## 2019-03-04 DIAGNOSIS — Z95.5 S/P CORONARY ARTERY STENT PLACEMENT: ICD-10-CM

## 2019-03-04 DIAGNOSIS — E78.00 HIGH CHOLESTEROL: ICD-10-CM

## 2019-03-04 DIAGNOSIS — I25.10 CORONARY ARTERY DISEASE INVOLVING NATIVE CORONARY ARTERY OF NATIVE HEART WITHOUT ANGINA PECTORIS: Primary | ICD-10-CM

## 2019-03-04 NOTE — PROGRESS NOTES
Nava Navarro DNP, ANP-BC  Subjective/HPI:     Marlon Gomez is a 80 y.o. female is here for routine f/u. The patient denies chest pain/ shortness of breath, orthopnea, PND, LE edema, palpitations, syncope, presyncope or fatigue. Patient reports feeling well in her usual state of health. On dual antiplatelet therapy without excessive bruising or bleeding. Has some mild iron deficiency anemia on iron supplements her stool is persistently black. Blood pressure has been relatively stable since the addition of amlodipine at last visit, due to hyperkalemia her dose of lisinopril was decreased with resolution of her hyperkalemia on post medication changes labs. Using Zyrtec and Flonase for seasonal allergies with intermittent sinus pressure, she is aware of refraining from using decongestants. PCP Provider  Dejah Herrera NP  Past Medical History:   Diagnosis Date    Diverticulitis     Gout     HTN (hypertension) 5/18/2018    Hx of diverticulitis of colon 5/18/2018    Hyperlipidemia     Hypertension     Other ill-defined conditions(799.89)     high cholesterol    S/P coronary artery stent placement 5/25/2018      Past Surgical History:   Procedure Laterality Date    HX HYSTERECTOMY      ID COLONOSCOPY FLX DX W/COLLJ SPEC WHEN PFRMD  10/18/2013          Allergies   Allergen Reactions    Aspirin Nausea and Vomiting     Pt reports that she takes 81 mg ASA daily      Family History   Problem Relation Age of Onset    Stroke Mother     Diabetes Sister       Current Outpatient Medications   Medication Sig    docusate sodium (STOOL SOFTENER PO) Take  by mouth.  amLODIPine (NORVASC) 10 mg tablet 1 tab by mouth daily    lisinopril (PRINIVIL, ZESTRIL) 2.5 mg tablet 1 tab by mouth daily    ferrous sulfate (IRON PO) Take 65 mg by mouth daily.  clopidogrel (PLAVIX) 75 mg tab Take 1 Tab by mouth daily.     metoprolol tartrate (LOPRESSOR) 25 mg tablet Take 0.5 Tabs by mouth every twelve (12) hours.  cholecalciferol (VITAMIN D3) 400 unit tab tablet Take 5,000 Units by mouth daily.  aspirin delayed-release 81 mg tablet Take 81 mg by mouth daily.  timolol (TIMOPTIC-XE) 0.5 % ophthalmic gel-forming Administer 1 Drop to right eye daily.  latanoprost (XALATAN) 0.005 % ophthalmic solution Administer 1 Drop to both eyes nightly.  fluticasone (FLONASE ALLERGY RELIEF) 50 mcg/actuation nasal spray 1 [place] by Both Nostrils route daily.  acetaminophen (TYLENOL EXTRA STRENGTH) 500 mg tablet Take 1,000 mg by mouth every six (6) hours as needed for Pain.  fexofenadine (ALLEGRA) 180 mg tablet Take 180 mg by mouth nightly.  atorvastatin (LIPITOR) 80 mg tablet Take 80 mg by mouth nightly.  allopurinol (ZYLOPRIM) 100 mg tablet Take 100 mg by mouth daily.  ALPRAZolam (XANAX) 0.25 mg tablet Take 0.25 mg by mouth daily as needed for Anxiety. No current facility-administered medications for this visit.        Vitals:    03/04/19 0902 03/04/19 0903   BP: 130/60 120/60   Pulse: 70    Resp: 16    SpO2: 97%    Weight: 152 lb 1.6 oz (69 kg)    Height: 5' 4\" (1.626 m)      Social History     Socioeconomic History    Marital status: SINGLE     Spouse name: Not on file    Number of children: Not on file    Years of education: Not on file    Highest education level: Not on file   Social Needs    Financial resource strain: Not on file    Food insecurity - worry: Not on file    Food insecurity - inability: Not on file   [employer] needs - medical: Not on file   [employer] needs - non-medical: Not on file   Occupational History    Not on file   Tobacco Use    Smoking status: Never Smoker    Smokeless tobacco: Never Used   Substance and Sexual Activity    Alcohol use: No     Comment: occasionally    Drug use: No    Sexual activity: Not on file   Other Topics Concern    Not on file   Social History Narrative    Not on file       I have reviewed the nurses notes, vitals, problem list, allergy list, medical history, family, social history and medications. Review of Symptoms:    General: Pt denies excessive weight gain or loss. Pt is able to conduct ADL's  HEENT: Denies blurred vision, headaches, epistaxis and difficulty swallowing. Respiratory: Denies shortness of breath, RAMSEY, wheezing or stridor. Cardiovascular: Denies precordial pain, palpitations, edema or PND  Gastrointestinal: Denies poor appetite, indigestion, abdominal pain or blood in stool  Musculoskeletal: Denies pain or swelling from muscles or joints  Neurologic: Denies tremor, paresthesias, or sensory motor disturbance  Skin: Denies rash, itching or texture change. Physical Exam:      General: Well developed, in no acute distress, cooperative and alert  HEENT: No carotid bruits, no JVD, trach is midline. Neck Supple, PEERL, EOM intact. Heart:  Normal S1/S2 negative S3 or S4. Regular, no murmur, gallop or rub.   Respiratory: Clear bilaterally x 4, no wheezing or rales  Abdomen:   Soft, non-tender, no masses, bowel sounds are active.   Extremities:  No edema, normal cap refill, no cyanosis, atraumatic. Neuro: A&Ox3, speech clear, gait stable. Skin: Skin color is normal. No rashes or lesions.  Non diaphoretic  Vascular: 2+ pulses symmetric in all extremities    Cardiographics    ECG: Normal sinus rhythm with first-degree heart block  Results for orders placed or performed during the hospital encounter of 05/18/18   EKG, 12 LEAD, INITIAL   Result Value Ref Range    Ventricular Rate 73 BPM    Atrial Rate 73 BPM    P-R Interval 238 ms    QRS Duration 82 ms    Q-T Interval 396 ms    QTC Calculation (Bezet) 436 ms    Calculated R Axis -35 degrees    Calculated T Axis 164 degrees    Diagnosis       Sinus rhythm with 1st degree AV block  Left axis deviation  Nonspecific ST and T wave abnormality  When compared with ECG of 18-MAY-2018 11:58,  Criteria for Septal infarct are no longer present  Confirmed by Cherie Elder Nieves Crowley ([id number]) on 5/19/2018 1:10:37 PM           Cardiology Labs:  Lab Results   Component Value Date/Time    Cholesterol, total 154 12/04/2018 10:08 AM    HDL Cholesterol 57 12/04/2018 10:08 AM    LDL, calculated 77 12/04/2018 10:08 AM    Triglyceride 101 12/04/2018 10:08 AM    CHOL/HDL Ratio 2.2 05/19/2018 05:26 AM       Lab Results   Component Value Date/Time    Sodium 144 12/04/2018 10:08 AM    Potassium 5.1 12/04/2018 10:08 AM    Chloride 107 (H) 12/04/2018 10:08 AM    CO2 24 12/04/2018 10:08 AM    Anion gap 10 05/24/2018 01:16 AM    Glucose 100 (H) 12/04/2018 10:08 AM    BUN 32 (H) 12/04/2018 10:08 AM    Creatinine 1.44 (H) 12/04/2018 10:08 AM    BUN/Creatinine ratio 22 12/04/2018 10:08 AM    GFR est AA 38 (L) 12/04/2018 10:08 AM    GFR est non-AA 33 (L) 12/04/2018 10:08 AM    Calcium 9.9 12/04/2018 10:08 AM    Bilirubin, total 0.4 12/04/2018 10:08 AM    AST (SGOT) 25 12/04/2018 10:08 AM    Alk. phosphatase 126 (H) 12/04/2018 10:08 AM    Protein, total 6.9 12/04/2018 10:08 AM    Albumin 4.2 12/04/2018 10:08 AM    Globulin 3.7 05/18/2018 12:14 PM    A-G Ratio 1.6 12/04/2018 10:08 AM    ALT (SGPT) 14 12/04/2018 10:08 AM           Assessment:     Assessment:     Diagnoses and all orders for this visit:    1. Coronary artery disease involving native coronary artery of native heart without angina pectoris    2. Essential hypertension    3. S/P coronary artery stent placement    4. High cholesterol        ICD-10-CM ICD-9-CM    1. Coronary artery disease involving native coronary artery of native heart without angina pectoris I25.10 414.01    2. Essential hypertension I10 401.9    3. S/P coronary artery stent placement Z95.5 V45.82    4. High cholesterol E78.00 272.0      Orders Placed This Encounter    docusate sodium (STOOL SOFTENER PO)     Sig: Take  by mouth. Plan:     1.   Atherosclerotic heart disease: 10 months post multivessel PTCA stenting with residual 90% lesion in the mid circumflex clinically stable and asymptomatic. Continue medical therapy. 2.  Hypertension:  Improved with amlodipine now 120/60 continue current medications  3. Hyperlipidemia: LDL 77 December 2018 continue statin therapy  4. Hyperkalemia seen on metabolic panel June 9559, repeat potassium 5.1 now on 2.5 mg lisinopril. Patient may discontinue Plavix June 1, 2019, continue enteric-coated 81 mg aspirin lifelong. Patient to follow-up in 6 months    Richad Mortimer, NP    This note was created using voice recognition software. Despite editing, there may be syntax errors.

## 2019-03-13 RX ORDER — CLOPIDOGREL BISULFATE 75 MG/1
75 TABLET ORAL DAILY
Qty: 90 TAB | Refills: 0 | Status: SHIPPED | OUTPATIENT
Start: 2019-03-13 | End: 2019-09-16 | Stop reason: ALTCHOICE

## 2019-03-13 RX ORDER — METOPROLOL TARTRATE 25 MG/1
12.5 TABLET, FILM COATED ORAL EVERY 12 HOURS
Qty: 90 TAB | Refills: 2 | Status: ON HOLD | OUTPATIENT
Start: 2019-03-13

## 2019-03-13 NOTE — TELEPHONE ENCOUNTER
Pt called for refill on metoprolol and clopidogrel sent to [address] [place] [address] in [address] [place] [address],[address]    thanks

## 2019-04-10 RX ORDER — AMLODIPINE BESYLATE 10 MG/1
TABLET ORAL
Qty: 30 TAB | Refills: 3 | Status: SHIPPED | OUTPATIENT
Start: 2019-04-10 | End: 2022-10-28 | Stop reason: ALTCHOICE

## 2019-04-10 RX ORDER — LISINOPRIL 2.5 MG/1
TABLET ORAL
Qty: 30 TAB | Refills: 3 | Status: SHIPPED | OUTPATIENT
Start: 2019-04-10 | End: 2020-09-21

## 2019-05-15 ENCOUNTER — HOSPITAL ENCOUNTER (OUTPATIENT)
Dept: GENERAL RADIOLOGY | Age: 84
Discharge: HOME | End: 2019-05-15
Payer: MEDICARE

## 2019-05-15 DIAGNOSIS — R52 PAIN: ICD-10-CM

## 2019-05-15 PROCEDURE — 72100 X-RAY EXAM L-S SPINE 2/3 VWS: CPT

## 2019-09-16 ENCOUNTER — OFFICE VISIT (OUTPATIENT)
Dept: CARDIOLOGY CLINIC | Age: 84
End: 2019-09-16

## 2019-09-16 VITALS
BODY MASS INDEX: 26.5 KG/M2 | HEART RATE: 70 BPM | HEIGHT: 64 IN | WEIGHT: 155.2 LBS | OXYGEN SATURATION: 99 % | SYSTOLIC BLOOD PRESSURE: 130 MMHG | RESPIRATION RATE: 16 BRPM | DIASTOLIC BLOOD PRESSURE: 54 MMHG

## 2019-09-16 DIAGNOSIS — Z95.5 S/P CORONARY ARTERY STENT PLACEMENT: ICD-10-CM

## 2019-09-16 DIAGNOSIS — I25.10 CORONARY ARTERY DISEASE INVOLVING NATIVE CORONARY ARTERY OF NATIVE HEART WITHOUT ANGINA PECTORIS: Primary | ICD-10-CM

## 2019-09-16 DIAGNOSIS — I10 ESSENTIAL HYPERTENSION: ICD-10-CM

## 2019-09-16 NOTE — PROGRESS NOTES
Coral Yang DNP, ANP-BC  Subjective/HPI:     Orestes Dinh is a 80 y.o. female is here for routine f/u. The patient denies chest pain/ shortness of breath, orthopnea, PND, LE edema, palpitations, syncope, presyncope or fatigue. Patient reports feeling well in her usual state of health, denies dyspnea on exertion or chest pain. Has known lesion in the mid circumflex. She is greater than 1 year from PTCA stenting is off Plavix has remained on enteric-coated aspirin 81 mg daily. PCP Provider  Yen Hernandez NP  Past Medical History:   Diagnosis Date    Diverticulitis     Gout     HTN (hypertension) 5/18/2018    Hx of diverticulitis of colon 5/18/2018    Hyperlipidemia     Hypertension     Other ill-defined conditions(799.89)     high cholesterol    S/P coronary artery stent placement 5/25/2018      Past Surgical History:   Procedure Laterality Date    HX HYSTERECTOMY      [place] COLONOSCOPY FLX DX W/COLLJ SPEC WHEN PFRMD  10/18/2013          Allergies   Allergen Reactions    Aspirin Nausea and Vomiting     Pt reports that she takes 81 mg ASA daily      Family History   Problem Relation Age of Onset    Stroke Mother     Diabetes Sister       Current Outpatient Medications   Medication Sig    amLODIPine (NORVASC) 10 mg tablet TAKE 1 TABLET BY MOUTH  DAILY    lisinopril (PRINIVIL, ZESTRIL) 2.5 mg tablet TAKE 1 TABLET BY MOUTH DAILY    metoprolol tartrate (LOPRESSOR) 25 mg tablet Take 0.5 Tabs by mouth every twelve (12) hours.  docusate sodium (STOOL SOFTENER PO) Take  by mouth.  ferrous sulfate (IRON PO) Take 65 mg by mouth daily.  cholecalciferol (VITAMIN D3) 400 unit tab tablet Take 1,000 Units by mouth daily.  aspirin delayed-release 81 mg tablet Take 81 mg by mouth daily.  timolol (TIMOPTIC-XE) 0.5 % ophthalmic gel-forming Administer 1 Drop to right eye daily.  latanoprost (XALATAN) 0.005 % ophthalmic solution Administer 1 Drop to both eyes nightly.     fluticasone (FLONASE ALLERGY RELIEF) 50 mcg/actuation nasal spray 1 [place] by Both Nostrils route daily.  acetaminophen (TYLENOL EXTRA STRENGTH) 500 mg tablet Take 1,000 mg by mouth every six (6) hours as needed for Pain.  fexofenadine (ALLEGRA) 180 mg tablet Take 180 mg by mouth nightly.  atorvastatin (LIPITOR) 80 mg tablet Take 80 mg by mouth nightly.  allopurinol (ZYLOPRIM) 100 mg tablet Take 100 mg by mouth daily.  ALPRAZolam (XANAX) 0.25 mg tablet Take 0.25 mg by mouth daily as needed for Anxiety. No current facility-administered medications for this visit.        Vitals:    09/16/19 0908   BP: 130/54   Pulse: 70   Resp: 16   SpO2: 99%   Weight: 155 lb 3.2 oz (70.4 kg)   Height: 5' 4\" (1.626 m)     Social History     Socioeconomic History    Marital status: SINGLE     Spouse name: Not on file    Number of children: Not on file    Years of education: Not on file    Highest education level: Not on file   Occupational History    Not on file   Social Needs    Financial resource strain: Not on file    Food insecurity:     Worry: Not on file     Inability: Not on file    Transportation needs:     Medical: Not on file     Non-medical: Not on file   Tobacco Use    Smoking status: Never Smoker    Smokeless tobacco: Never Used   Substance and Sexual Activity    Alcohol use: No     Comment: occasionally    Drug use: No    Sexual activity: Not on file   Lifestyle    Physical activity:     Days per week: Not on file     Minutes per session: Not on file    Stress: Not on file   Relationships    Social connections:     Talks on phone: Not on file     Gets together: Not on file     Attends Christian service: Not on file     Active member of club or organization: Not on file     Attends meetings of clubs or organizations: Not on file     Relationship status: Not on file    Intimate partner violence:     Fear of current or ex partner: Not on file     Emotionally abused: Not on file Physically abused: Not on file     Forced sexual activity: Not on file   Other Topics Concern    Not on file   Social History Narrative    Not on file       I have reviewed the nurses notes, vitals, problem list, allergy list, medical history, family, social history and medications. Review of Symptoms:    General: Pt denies excessive weight gain or loss. Pt is able to conduct ADL's  HEENT: Denies blurred vision, headaches, epistaxis and difficulty swallowing. Respiratory: Denies shortness of breath, RAMSEY, wheezing or stridor. Cardiovascular: Denies precordial pain, palpitations, edema or PND  Gastrointestinal: Denies poor appetite, indigestion, abdominal pain or blood in stool  Musculoskeletal: Denies pain or swelling from muscles or joints  Neurologic: Denies tremor, paresthesias, or sensory motor disturbance  Skin: Denies rash, itching or texture change. Physical Exam:      General: Well developed, in no acute distress, cooperative and alert  HEENT: No carotid bruits, no JVD, trach is midline. Neck Supple, PERRL, EOM intact. Heart:  Normal S1/S2 negative S3 or S4. Regular, no murmur, gallop or rub. Respiratory: Clear bilaterally x 4, no wheezing or rales  Abdomen:   Soft, non-tender, no masses, bowel sounds are active. Extremities:  No edema, normal cap refill, no cyanosis, atraumatic. Neuro: A&Ox3, speech clear, gait stable. Skin: Skin color is normal. No rashes or lesions.  Non diaphoretic  Vascular: 2+ pulses symmetric in all extremities    Cardiographics    ECG: Sinus rhythm first-degree heart block  Results for orders placed or performed during the hospital encounter of 05/18/18   EKG, 12 LEAD, INITIAL   Result Value Ref Range    Ventricular Rate 73 BPM    Atrial Rate 73 BPM    P-R Interval 238 ms    QRS Duration 82 ms    Q-T Interval 396 ms    QTC Calculation (Bezet) 436 ms    Calculated R Axis -35 degrees    Calculated T Axis 164 degrees    Diagnosis       Sinus rhythm with 1st degree AV block  Left axis deviation  Nonspecific ST and T wave abnormality  When compared with ECG of 18-MAY-2018 11:58,  Criteria for Septal infarct are no longer present  Confirmed by Sheba Yeboah ([id number]) on 5/19/2018 1:10:37 PM           Cardiology Labs:  Lab Results   Component Value Date/Time    Cholesterol, total 154 12/04/2018 10:08 AM    HDL Cholesterol 57 12/04/2018 10:08 AM    LDL, calculated 77 12/04/2018 10:08 AM    Triglyceride 101 12/04/2018 10:08 AM    CHOL/HDL Ratio 2.2 05/19/2018 05:26 AM       Lab Results   Component Value Date/Time    Sodium 144 12/04/2018 10:08 AM    Potassium 5.1 12/04/2018 10:08 AM    Chloride 107 (H) 12/04/2018 10:08 AM    CO2 24 12/04/2018 10:08 AM    Anion gap 10 05/24/2018 01:16 AM    Glucose 100 (H) 12/04/2018 10:08 AM    BUN 32 (H) 12/04/2018 10:08 AM    Creatinine 1.44 (H) 12/04/2018 10:08 AM    BUN/Creatinine ratio 22 12/04/2018 10:08 AM    GFR est AA 38 (L) 12/04/2018 10:08 AM    GFR est non-AA 33 (L) 12/04/2018 10:08 AM    Calcium 9.9 12/04/2018 10:08 AM    Bilirubin, total 0.4 12/04/2018 10:08 AM    AST (SGOT) 25 12/04/2018 10:08 AM    Alk. phosphatase 126 (H) 12/04/2018 10:08 AM    Protein, total 6.9 12/04/2018 10:08 AM    Albumin 4.2 12/04/2018 10:08 AM    Globulin 3.7 05/18/2018 12:14 PM    A-G Ratio 1.6 12/04/2018 10:08 AM    ALT (SGPT) 14 12/04/2018 10:08 AM           Assessment:     Assessment:     Diagnoses and all orders for this visit:    1. Coronary artery disease involving native coronary artery of native heart without angina pectoris  -     AMB POC EKG ROUTINE W/ 12 LEADS, INTER & REP  -     METABOLIC PANEL, COMPREHENSIVE  -     CK  -     LIPID PANEL  -     TSH 3RD GENERATION  -     HEMOGLOBIN A1C W/O EAG    2. Essential hypertension  -     METABOLIC PANEL, COMPREHENSIVE  -     CK  -     LIPID PANEL  -     TSH 3RD GENERATION  -     HEMOGLOBIN A1C W/O EAG        ICD-10-CM ICD-9-CM    1.  Coronary artery disease involving native coronary artery of native heart without angina pectoris I25.10 414.01 AMB POC EKG ROUTINE W/ 12 LEADS, INTER & REP      METABOLIC PANEL, COMPREHENSIVE      CK      LIPID PANEL      TSH 3RD GENERATION      HEMOGLOBIN A1C W/O EAG   2. Essential hypertension T61 473.4 METABOLIC PANEL, COMPREHENSIVE      CK      LIPID PANEL      TSH 3RD GENERATION      HEMOGLOBIN A1C W/O EAG     Orders Placed This Encounter    METABOLIC PANEL, COMPREHENSIVE    CK    LIPID PANEL    TSH 3RD GENERATION    HEMOGLOBIN A1C W/O EAG    AMB POC EKG ROUTINE W/ 12 LEADS, INTER & REP     Order Specific Question:   Reason for Exam:     Answer:   routine        Plan:     1.  Atherosclerotic heart disease:  PTCA stenting, residual 90% lesion in the mid circumflex clinically stable and asymptomatic.  Continue medical therapy. Off Plavix. Continue enteric-coated aspirin 81 mg  2.  Hypertension: Stable 130/54  3.  Hyperlipidemia: LDL 77 December 2018 continue statin therapy we will recheck labs prior to years end.  4.  Recurrent gout: We will check O0K, metabolic panel, not on diuretics. Stable from cardiac perspective follow-up in 6 months. Amelia Hercules NP    This note was created using voice recognition software. Despite editing, there may be syntax errors. Patient seen and examined by me with nurse practitioner. I personally performed all components of the history, physical, and medical decision making and agree with the assessment and plan as noted.     Eric Ash MD

## 2019-09-16 NOTE — PROGRESS NOTES
1. Have you been to the ER, urgent care clinic since your last visit? Hospitalized since your last visit? No    2. Have you seen or consulted any other health care providers outside of the [address] [place] [address] since your last visit? Include any pap smears or colon screening. Dr Naya James eye exam 6/2019    Chief Complaint   Patient presents with    Follow-up    Coronary Artery Disease        Patient has no cardiac complaints.

## 2019-10-09 LAB
ALBUMIN SERPL-MCNC: 4.6 G/DL (ref 3.5–4.7)
ALBUMIN/GLOB SERPL: 1.7 {RATIO} (ref 1.2–2.2)
ALP SERPL-CCNC: 167 IU/L (ref 39–117)
ALT SERPL-CCNC: 13 IU/L (ref 0–32)
AST SERPL-CCNC: 23 IU/L (ref 0–40)
BILIRUB SERPL-MCNC: 0.4 MG/DL (ref 0–1.2)
BUN SERPL-MCNC: 26 MG/DL (ref 8–27)
BUN/CREAT SERPL: 20 (ref 12–28)
CALCIUM SERPL-MCNC: 10.2 MG/DL (ref 8.7–10.3)
CHLORIDE SERPL-SCNC: 99 MMOL/L (ref 96–106)
CHOLEST SERPL-MCNC: 185 MG/DL (ref 100–199)
CK SERPL-CCNC: 155 U/L (ref 24–173)
CO2 SERPL-SCNC: 25 MMOL/L (ref 20–29)
CREAT SERPL-MCNC: 1.29 MG/DL (ref 0.57–1)
GLOBULIN SER CALC-MCNC: 2.7 G/DL (ref 1.5–4.5)
GLUCOSE SERPL-MCNC: 99 MG/DL (ref 65–99)
HBA1C MFR BLD: 6.1 % (ref 4.8–5.6)
HDLC SERPL-MCNC: 66 MG/DL
INTERPRETATION, 910389: NORMAL
INTERPRETATION: NORMAL
LDLC SERPL CALC-MCNC: 93 MG/DL (ref 0–99)
PDF IMAGE, 910387: NORMAL
POTASSIUM SERPL-SCNC: 5.2 MMOL/L (ref 3.5–5.2)
PROT SERPL-MCNC: 7.3 G/DL (ref 6–8.5)
SODIUM SERPL-SCNC: 138 MMOL/L (ref 134–144)
TRIGL SERPL-MCNC: 131 MG/DL (ref 0–149)
TSH SERPL DL<=0.005 MIU/L-ACNC: 2.16 UIU/ML (ref 0.45–4.5)
VLDLC SERPL CALC-MCNC: 26 MG/DL (ref 5–40)

## 2020-08-31 ENCOUNTER — HOSPITAL ENCOUNTER (OUTPATIENT)
Dept: GENERAL RADIOLOGY | Age: 85
Discharge: HOME | End: 2020-08-31
Payer: MEDICARE

## 2020-08-31 DIAGNOSIS — M25.571 RIGHT ANKLE PAIN: ICD-10-CM

## 2020-08-31 DIAGNOSIS — M54.2 CERVICALGIA: ICD-10-CM

## 2020-08-31 DIAGNOSIS — M25.572 LEFT ANKLE PAIN: ICD-10-CM

## 2020-08-31 PROCEDURE — 73620 X-RAY EXAM OF FOOT: CPT

## 2020-08-31 PROCEDURE — 73610 X-RAY EXAM OF ANKLE: CPT

## 2020-08-31 PROCEDURE — 72050 X-RAY EXAM NECK SPINE 4/5VWS: CPT

## 2020-09-21 ENCOUNTER — OFFICE VISIT (OUTPATIENT)
Dept: CARDIOLOGY CLINIC | Age: 85
End: 2020-09-21
Payer: MEDICARE

## 2020-09-21 VITALS
HEART RATE: 72 BPM | SYSTOLIC BLOOD PRESSURE: 140 MMHG | RESPIRATION RATE: 16 BRPM | OXYGEN SATURATION: 99 % | WEIGHT: 154.5 LBS | BODY MASS INDEX: 26.38 KG/M2 | HEIGHT: 64 IN | DIASTOLIC BLOOD PRESSURE: 62 MMHG

## 2020-09-21 DIAGNOSIS — I25.10 CORONARY ARTERY DISEASE INVOLVING NATIVE CORONARY ARTERY OF NATIVE HEART WITHOUT ANGINA PECTORIS: Primary | ICD-10-CM

## 2020-09-21 DIAGNOSIS — I10 ESSENTIAL HYPERTENSION: ICD-10-CM

## 2020-09-21 DIAGNOSIS — E78.00 HIGH CHOLESTEROL: ICD-10-CM

## 2020-09-21 PROBLEM — I21.4 NSTEMI (NON-ST ELEVATED MYOCARDIAL INFARCTION) (HCC): Status: RESOLVED | Noted: 2018-05-18 | Resolved: 2020-09-21

## 2020-09-21 PROCEDURE — 1101F PT FALLS ASSESS-DOCD LE1/YR: CPT | Performed by: INTERNAL MEDICINE

## 2020-09-21 PROCEDURE — G8427 DOCREV CUR MEDS BY ELIG CLIN: HCPCS | Performed by: INTERNAL MEDICINE

## 2020-09-21 PROCEDURE — G8536 NO DOC ELDER MAL SCRN: HCPCS | Performed by: INTERNAL MEDICINE

## 2020-09-21 PROCEDURE — 99214 OFFICE O/P EST MOD 30 MIN: CPT | Performed by: INTERNAL MEDICINE

## 2020-09-21 PROCEDURE — G8419 CALC BMI OUT NRM PARAM NOF/U: HCPCS | Performed by: INTERNAL MEDICINE

## 2020-09-21 PROCEDURE — 1090F PRES/ABSN URINE INCON ASSESS: CPT | Performed by: INTERNAL MEDICINE

## 2020-09-21 PROCEDURE — 93000 ELECTROCARDIOGRAM COMPLETE: CPT | Performed by: INTERNAL MEDICINE

## 2020-09-21 PROCEDURE — G8510 SCR DEP NEG, NO PLAN REQD: HCPCS | Performed by: INTERNAL MEDICINE

## 2020-09-21 RX ORDER — BACLOFEN 10 MG/1
TABLET ORAL
COMMUNITY
Start: 2020-09-11 | End: 2020-10-29

## 2020-09-21 RX ORDER — TIZANIDINE 2 MG/1
TABLET ORAL
COMMUNITY
Start: 2020-09-03 | End: 2020-10-19

## 2020-09-21 NOTE — PROGRESS NOTES
1. Have you been to the ER, urgent care clinic since your last visit? Hospitalized since your last visit? No.    2. Have you seen or consulted any other health care providers outside of the [address] [place] [address] since your last visit? Include any pap smears or colon screening.    No.        Chief Complaint   Patient presents with    Annual Exam     swelling in feet and ankles

## 2020-09-21 NOTE — PROGRESS NOTES
Lulu Cunningham, KHUSHBUP-BC    Subjective/HPI:     Stephania Keith is a 80 y.o. female is here for routine f/u. She has a PMHx of CAD, HTN, HLD and gout. She is doing well. She denies complaints of chest pains, dizziness, orthopnea, PND or edema. She has some swelling around her left ankle only, is awaiting results of X-ray imaging done by PCP. She noticed a cough last week, stopped her lisinopril and her cough has resolved. Has previously been on lisinopril for a long time. PCP Provider  Rosanna Pike NP    Past Medical History:   Diagnosis Date    Diverticulitis     Gout     HTN (hypertension) 5/18/2018    Hx of diverticulitis of colon 5/18/2018    Hyperlipidemia     Hypertension     Other ill-defined conditions(799.89)     high cholesterol    S/P coronary artery stent placement 5/25/2018        Allergies   Allergen Reactions    Aspirin Nausea and Vomiting     Pt reports that she takes 81 mg ASA daily        Outpatient Encounter Medications as of 9/21/2020   Medication Sig Dispense Refill    baclofen (LIORESAL) 10 mg tablet TAKE 1 TABLET BY MOUTH THREE TIMES A DAY AS NEEDED FOR MUSCLE SPASMS      tiZANidine (ZANAFLEX) 2 mg tablet TAKE 1 TABLET (2 MG TOTAL) BY MOUTH EVERY 8 (EIGHT) HOURS AS NEEDED FOR MUSCLE SPASMS      amLODIPine (NORVASC) 10 mg tablet TAKE 1 TABLET BY MOUTH  DAILY 30 Tab 3    lisinopril (PRINIVIL, ZESTRIL) 2.5 mg tablet TAKE 1 TABLET BY MOUTH DAILY 30 Tab 3    metoprolol tartrate (LOPRESSOR) 25 mg tablet Take 0.5 Tabs by mouth every twelve (12) hours. 90 Tab 2    docusate sodium (STOOL SOFTENER PO) Take  by mouth as needed.  ferrous sulfate (IRON PO) Take 65 mg by mouth daily.  cholecalciferol (VITAMIN D3) 400 unit tab tablet Take 1,000 Units by mouth daily.  aspirin delayed-release 81 mg tablet Take 81 mg by mouth daily.  timolol (TIMOPTIC-XE) 0.5 % ophthalmic gel-forming Administer 1 Drop to right eye daily.       latanoprost (XALATAN) 0.005 % ophthalmic solution Administer 1 Drop to both eyes nightly.  fluticasone (FLONASE ALLERGY RELIEF) 50 mcg/actuation nasal spray 1 [place] by Both Nostrils route daily.  acetaminophen (TYLENOL EXTRA STRENGTH) 500 mg tablet Take 1,000 mg by mouth every six (6) hours as needed for Pain.  fexofenadine (ALLEGRA) 180 mg tablet Take 180 mg by mouth nightly.  atorvastatin (LIPITOR) 80 mg tablet Take 80 mg by mouth nightly.  allopurinol (ZYLOPRIM) 100 mg tablet Take 100 mg by mouth daily.  ALPRAZolam (XANAX) 0.25 mg tablet Take 0.25 mg by mouth daily as needed for Anxiety. No facility-administered encounter medications on file as of 9/21/2020. Review of Symptoms:    Review of Systems   Constitutional: Negative for chills, fever and weight loss. HENT: Negative for nosebleeds. Eyes: Negative for blurred vision and double vision. Respiratory: Negative for cough, shortness of breath and wheezing. Cardiovascular: Negative for chest pain, palpitations, orthopnea, leg swelling and PND. Gastrointestinal: Negative for abdominal pain, blood in stool, diarrhea, nausea and vomiting. Musculoskeletal: Negative for joint pain. Skin: Negative for rash. Neurological: Negative for dizziness, tingling and loss of consciousness. Endo/Heme/Allergies: Does not bruise/bleed easily. Physical Exam:      General: Well developed, in no acute distress, cooperative and alert  HEENT: Bilateral carotid bruits, no JVD, trach is midline. Neck Supple, PEERL, EOM intact. Heart:  reg rate and rhythm; normal S1/S2; no murmurs, no gallops or rubs. Respiratory: Clear bilaterally x 4, no wheezing or rales  Abdomen:   Soft, non-tender, no distention, no masses. + BS. Extremities:  Normal cap refill, no cyanosis, atraumatic. No edema. Neuro: A&Ox3, speech clear, gait stable. Skin: Skin color is normal. No rashes or lesions.  Non diaphoretic  Vascular: 2+ pulses symmetric in all extremities    Vitals:    09/21/20 0910   BP: (!) 140/62   Pulse: 72   Resp: 16   SpO2: 99%   Weight: 154 lb 8 oz (70.1 kg)   Height: 5' 4\" (1.626 m)       ECG: sinus rhythm; 1st degree AVB, Zane = 250 ms    Cardiology Labs:    Lab Results   Component Value Date/Time    Cholesterol, total 185 10/07/2019 11:09 AM    HDL Cholesterol 66 10/07/2019 11:09 AM    LDL, calculated 93 10/07/2019 11:09 AM    LDL, calculated 77 12/04/2018 10:08 AM    LDL, calculated 56.2 05/19/2018 05:26 AM    VLDL, calculated 26 10/07/2019 11:09 AM    CHOL/HDL Ratio 2.2 05/19/2018 05:26 AM       Lab Results   Component Value Date/Time    Hemoglobin A1c 6.1 (H) 10/07/2019 11:09 AM       Lab Results   Component Value Date/Time    Sodium 138 10/07/2019 11:09 AM    Potassium 5.2 10/07/2019 11:09 AM    Chloride 99 10/07/2019 11:09 AM    CO2 25 10/07/2019 11:09 AM    Glucose 99 10/07/2019 11:09 AM    BUN 26 10/07/2019 11:09 AM    Creatinine 1.29 (H) 10/07/2019 11:09 AM    BUN/Creatinine ratio 20 10/07/2019 11:09 AM    GFR est AA 43 (L) 10/07/2019 11:09 AM    GFR est non-AA 37 (L) 10/07/2019 11:09 AM    Calcium 10.2 10/07/2019 11:09 AM    Anion gap 10 05/24/2018 01:16 AM    Bilirubin, total 0.4 10/07/2019 11:09 AM    ALT (SGPT) 13 10/07/2019 11:09 AM    Alk. phosphatase 167 (H) 10/07/2019 11:09 AM    Protein, total 7.3 10/07/2019 11:09 AM    Albumin 4.6 10/07/2019 11:09 AM    Globulin 3.7 05/18/2018 12:14 PM    A-G Ratio 1.7 10/07/2019 11:09 AM          Assessment:       ICD-10-CM ICD-9-CM    1. Coronary artery disease involving native coronary artery of native heart without angina pectoris  I25.10 414.01 AMB POC EKG ROUTINE W/ 12 LEADS, INTER & REP   2. Essential hypertension  I10 401.9 AMB POC EKG ROUTINE W/ 12 LEADS, INTER & REP   3. High cholesterol  E78.00 272.0         Plan:     1.  Coronary artery disease involving native coronary artery of native heart without angina pectoris  Hx of ANA MARÍA to LAD and LCx in 5/2018  Without anginal or anginal equivalent symptoms  Continue amlodipine, metoprolol, ASA and statin therapy    2. Essential hypertension  Off lisinopril due to ACEi cough. Waiting on PCP for replacement. Continue present anti-hypertensives; encouraged low sodium diet. 3. High cholesterol  LDL 93 in 10/2019  Continue statin therapy and low fat, low cholesterol diet  Check lipids for this year; consider addition of Zetia if LDL not at goal < 70    4. Carotid bruit  Carotid duplex done 4/2019 with < 50% stenosis of DAVID, minimal stenosis of LICA  Continue statin therapy    F/u with Dr. Alex Carreno in 6 months    Violeta Dunlap NP       Patient seen and examined by me with nurse practitioner. I personally performed all components of the history, physical, and medical decision making and agree with the assessment and plan as noted.     Bala Stoddard MD

## 2020-09-23 ENCOUNTER — TELEPHONE (OUTPATIENT)
Dept: CARDIOLOGY CLINIC | Age: 85
End: 2020-09-23

## 2020-09-23 DIAGNOSIS — I25.10 CORONARY ARTERY DISEASE INVOLVING NATIVE CORONARY ARTERY OF NATIVE HEART WITHOUT ANGINA PECTORIS: Primary | ICD-10-CM

## 2020-09-23 DIAGNOSIS — Z95.5 S/P CORONARY ARTERY STENT PLACEMENT: ICD-10-CM

## 2020-09-23 DIAGNOSIS — I10 ESSENTIAL HYPERTENSION: ICD-10-CM

## 2020-09-23 DIAGNOSIS — E78.00 HIGH CHOLESTEROL: ICD-10-CM

## 2020-09-23 LAB
ALBUMIN SERPL-MCNC: 4.4 G/DL (ref 3.6–4.6)
ALBUMIN/GLOB SERPL: 1.8 {RATIO} (ref 1.2–2.2)
ALP SERPL-CCNC: 137 IU/L (ref 39–117)
ALT SERPL-CCNC: 22 IU/L (ref 0–32)
AST SERPL-CCNC: 32 IU/L (ref 0–40)
BILIRUB SERPL-MCNC: 0.6 MG/DL (ref 0–1.2)
BUN SERPL-MCNC: 24 MG/DL (ref 8–27)
BUN/CREAT SERPL: 19 (ref 12–28)
CALCIUM SERPL-MCNC: 10.1 MG/DL (ref 8.7–10.3)
CHLORIDE SERPL-SCNC: 103 MMOL/L (ref 96–106)
CHOLEST SERPL-MCNC: 142 MG/DL (ref 100–199)
CK SERPL-CCNC: 499 U/L (ref 26–161)
CO2 SERPL-SCNC: 23 MMOL/L (ref 20–29)
CREAT SERPL-MCNC: 1.28 MG/DL (ref 0.57–1)
GLOBULIN SER CALC-MCNC: 2.5 G/DL (ref 1.5–4.5)
GLUCOSE SERPL-MCNC: 108 MG/DL (ref 65–99)
HDLC SERPL-MCNC: 59 MG/DL
INTERPRETATION, 910389: NORMAL
INTERPRETATION: NORMAL
LDLC SERPL CALC-MCNC: 66 MG/DL (ref 0–99)
PDF IMAGE, 910387: NORMAL
POTASSIUM SERPL-SCNC: 4.7 MMOL/L (ref 3.5–5.2)
PROT SERPL-MCNC: 6.9 G/DL (ref 6–8.5)
SODIUM SERPL-SCNC: 139 MMOL/L (ref 134–144)
TRIGL SERPL-MCNC: 92 MG/DL (ref 0–149)
VLDLC SERPL CALC-MCNC: 17 MG/DL (ref 5–40)

## 2020-09-23 NOTE — PROGRESS NOTES
Mariela,    Please call patient and inform that lipids are at goal, which is good news. However, her CK levels are very high compared to last check in 10/2019. This is a marker of muscle injury. We see this commonly in patients who are taking high dose of cholesterol medications. Last time we checked this level in 10/2019, she was on the same dose of atorvastatin, so I doubt this is coming from her medications. Please ask her if she has been experiencing any muscle aches or pains or joint aches/pains. I would recommend holding her atorvastatin for 1 month, recheck CK levels then. We'll decide further from there.     Thanks,  [employer]

## 2020-09-23 NOTE — TELEPHONE ENCOUNTER
----- Message from Hawa Caraballo NP sent at 9/23/2020  8:52 AM EDT -----  [facility],    Please call patient and inform that lipids are at goal, which is good news. However, her CK levels are very high compared to last check in 10/2019. This is a marker of muscle injury. We see this commonly in patients who are taking high dose of cholesterol medications. Last time we checked this level in 10/2019, she was on the same dose of atorvastatin, so I doubt this is coming from her medications. Please ask her if she has been experiencing any muscle aches or pains or joint aches/pains. I would recommend holding her atorvastatin for 1 month, recheck CK levels then. We'll decide further from there. Thanks,  [facility] pt,verified pt with two pt identifiers, advised pt her CK level is elevated. She notes some muscle aches/pains but is in physical therapy. Advised she will need to stop her atorvastatin medication for 1 month and then recheck her CK level and we will go from there. Asked pt if she understood what medication. She advised she did and she would stop it. Advised I would send her a lab slip with the date of recheck for her lab work. Pt verbalized understanding. Put lab slip in mail w/ note to stop atorvastatin and recheck labs in 1 month around oct. 24 , 20.

## 2020-09-30 ENCOUNTER — TELEPHONE (OUTPATIENT)
Dept: CARDIOLOGY CLINIC | Age: 85
End: 2020-09-30

## 2020-09-30 NOTE — TELEPHONE ENCOUNTER
Patient would like to know more about why she have to have more blood work done. Letter came in the mail from nurse. Thanks.

## 2020-09-30 NOTE — TELEPHONE ENCOUNTER
Returned call, verified pt with two pt identifiers, advised pt her lab work recheck is for her CK level check. Advised that was higher than normal and they had her stop her statin medication to see if that is causing it. After being off the statin medication for 1 month she is to recheck her labs and see if it comes down. She advised her Ortho dr called last week about the same time I did and told her to stop her medication also for 1  Month. Pt verbalized understanding and would get her lab rechecked in 1 month.

## 2020-10-17 ENCOUNTER — APPOINTMENT (OUTPATIENT)
Dept: CT IMAGING | Age: 85
End: 2020-10-17
Attending: NURSE PRACTITIONER
Payer: MEDICARE

## 2020-10-17 ENCOUNTER — HOSPITAL ENCOUNTER (OUTPATIENT)
Age: 85
Setting detail: OBSERVATION
Discharge: HOME | End: 2020-10-19
Attending: INTERNAL MEDICINE | Admitting: INTERNAL MEDICINE
Payer: MEDICARE

## 2020-10-17 DIAGNOSIS — I65.23 BILATERAL CAROTID ARTERY STENOSIS: ICD-10-CM

## 2020-10-17 DIAGNOSIS — E78.00 HIGH CHOLESTEROL: ICD-10-CM

## 2020-10-17 DIAGNOSIS — I10 ESSENTIAL HYPERTENSION: ICD-10-CM

## 2020-10-17 DIAGNOSIS — R55 SYNCOPE AND COLLAPSE: ICD-10-CM

## 2020-10-17 DIAGNOSIS — G45.9 TIA (TRANSIENT ISCHEMIC ATTACK): ICD-10-CM

## 2020-10-17 PROCEDURE — 99218 HC RM OBSERVATION: CPT

## 2020-10-17 RX ORDER — ENOXAPARIN SODIUM 100 MG/ML
30 INJECTION SUBCUTANEOUS EVERY 24 HOURS
Status: DISCONTINUED | OUTPATIENT
Start: 2020-10-18 | End: 2020-10-18

## 2020-10-17 RX ORDER — ACETAMINOPHEN 650 MG/1
650 SUPPOSITORY RECTAL
Status: DISCONTINUED | OUTPATIENT
Start: 2020-10-17 | End: 2020-10-19 | Stop reason: HOSPADM

## 2020-10-17 RX ORDER — ACETAMINOPHEN 325 MG/1
650 TABLET ORAL
Status: DISCONTINUED | OUTPATIENT
Start: 2020-10-17 | End: 2020-10-19 | Stop reason: HOSPADM

## 2020-10-17 RX ORDER — SODIUM CHLORIDE 0.9 % (FLUSH) 0.9 %
10 SYRINGE (ML) INJECTION
Status: COMPLETED | OUTPATIENT
Start: 2020-10-18 | End: 2020-10-18

## 2020-10-18 ENCOUNTER — APPOINTMENT (OUTPATIENT)
Dept: CT IMAGING | Age: 85
End: 2020-10-18
Attending: NURSE PRACTITIONER
Payer: MEDICARE

## 2020-10-18 LAB
ALBUMIN SERPL-MCNC: 3.6 G/DL (ref 3.5–5)
ALBUMIN/GLOB SERPL: 0.9 {RATIO} (ref 1.1–2.2)
ALP SERPL-CCNC: 123 U/L (ref 45–117)
ALT SERPL-CCNC: 20 U/L (ref 12–78)
ANION GAP SERPL CALC-SCNC: 7 MMOL/L (ref 5–15)
AST SERPL-CCNC: 25 U/L (ref 15–37)
BASOPHILS # BLD: 0.1 K/UL (ref 0–0.1)
BASOPHILS NFR BLD: 1 % (ref 0–1)
BILIRUB SERPL-MCNC: 0.2 MG/DL (ref 0.2–1)
BUN SERPL-MCNC: 17 MG/DL (ref 6–20)
BUN/CREAT SERPL: 15 (ref 12–20)
CALCIUM SERPL-MCNC: 9.4 MG/DL (ref 8.5–10.1)
CHLORIDE SERPL-SCNC: 105 MMOL/L (ref 97–108)
CHOLEST SERPL-MCNC: 249 MG/DL
CK SERPL-CCNC: 117 U/L (ref 26–192)
CO2 SERPL-SCNC: 24 MMOL/L (ref 21–32)
CREAT SERPL-MCNC: 1.15 MG/DL (ref 0.55–1.02)
DIFFERENTIAL METHOD BLD: ABNORMAL
EOSINOPHIL # BLD: 0.4 K/UL (ref 0–0.4)
EOSINOPHIL NFR BLD: 5 % (ref 0–7)
ERYTHROCYTE [DISTWIDTH] IN BLOOD BY AUTOMATED COUNT: 13.8 % (ref 11.5–14.5)
EST. AVERAGE GLUCOSE BLD GHB EST-MCNC: 120 MG/DL
GLOBULIN SER CALC-MCNC: 3.9 G/DL (ref 2–4)
GLUCOSE BLD STRIP.AUTO-MCNC: 102 MG/DL (ref 65–100)
GLUCOSE BLD STRIP.AUTO-MCNC: 105 MG/DL (ref 65–100)
GLUCOSE SERPL-MCNC: 138 MG/DL (ref 65–100)
HBA1C MFR BLD: 5.8 % (ref 4–5.6)
HCT VFR BLD AUTO: 33 % (ref 35–47)
HDLC SERPL-MCNC: 61 MG/DL
HDLC SERPL: 4.1 {RATIO} (ref 0–5)
HGB BLD-MCNC: 11.1 G/DL (ref 11.5–16)
IMM GRANULOCYTES # BLD AUTO: 0 K/UL (ref 0–0.04)
IMM GRANULOCYTES NFR BLD AUTO: 0 % (ref 0–0.5)
LDLC SERPL CALC-MCNC: 146.4 MG/DL (ref 0–100)
LIPID PROFILE,FLP: ABNORMAL
LYMPHOCYTES # BLD: 2.8 K/UL (ref 0.8–3.5)
LYMPHOCYTES NFR BLD: 39 % (ref 12–49)
MCH RBC QN AUTO: 30.6 PG (ref 26–34)
MCHC RBC AUTO-ENTMCNC: 33.6 G/DL (ref 30–36.5)
MCV RBC AUTO: 90.9 FL (ref 80–99)
MONOCYTES # BLD: 0.7 K/UL (ref 0–1)
MONOCYTES NFR BLD: 10 % (ref 5–13)
NEUTS SEG # BLD: 3.3 K/UL (ref 1.8–8)
NEUTS SEG NFR BLD: 45 % (ref 32–75)
NRBC # BLD: 0 K/UL (ref 0–0.01)
NRBC BLD-RTO: 0 PER 100 WBC
PLATELET # BLD AUTO: 303 K/UL (ref 150–400)
PMV BLD AUTO: 8.8 FL (ref 8.9–12.9)
POTASSIUM SERPL-SCNC: 4.2 MMOL/L (ref 3.5–5.1)
PROT SERPL-MCNC: 7.5 G/DL (ref 6.4–8.2)
RBC # BLD AUTO: 3.63 M/UL (ref 3.8–5.2)
SERVICE CMNT-IMP: ABNORMAL
SERVICE CMNT-IMP: ABNORMAL
SODIUM SERPL-SCNC: 136 MMOL/L (ref 136–145)
TRIGL SERPL-MCNC: 208 MG/DL (ref ?–150)
TSH SERPL DL<=0.05 MIU/L-ACNC: 1.48 UIU/ML (ref 0.36–3.74)
VLDLC SERPL CALC-MCNC: 41.6 MG/DL
WBC # BLD AUTO: 7.1 K/UL (ref 3.6–11)

## 2020-10-18 PROCEDURE — 80061 LIPID PANEL: CPT

## 2020-10-18 PROCEDURE — 82962 GLUCOSE BLOOD TEST: CPT

## 2020-10-18 PROCEDURE — 74011250637 HC RX REV CODE- 250/637: Performed by: NURSE PRACTITIONER

## 2020-10-18 PROCEDURE — 94760 N-INVAS EAR/PLS OXIMETRY 1: CPT

## 2020-10-18 PROCEDURE — 99218 HC RM OBSERVATION: CPT

## 2020-10-18 PROCEDURE — 84443 ASSAY THYROID STIM HORMONE: CPT

## 2020-10-18 PROCEDURE — 96372 THER/PROPH/DIAG INJ SC/IM: CPT

## 2020-10-18 PROCEDURE — 74011250636 HC RX REV CODE- 250/636: Performed by: NURSE PRACTITIONER

## 2020-10-18 PROCEDURE — 94761 N-INVAS EAR/PLS OXIMETRY MLT: CPT

## 2020-10-18 PROCEDURE — 85025 COMPLETE CBC W/AUTO DIFF WBC: CPT

## 2020-10-18 PROCEDURE — 83036 HEMOGLOBIN GLYCOSYLATED A1C: CPT

## 2020-10-18 PROCEDURE — 82550 ASSAY OF CK (CPK): CPT

## 2020-10-18 PROCEDURE — 74011000636 HC RX REV CODE- 636: Performed by: INTERNAL MEDICINE

## 2020-10-18 PROCEDURE — 36415 COLL VENOUS BLD VENIPUNCTURE: CPT

## 2020-10-18 PROCEDURE — 80053 COMPREHEN METABOLIC PANEL: CPT

## 2020-10-18 PROCEDURE — 70496 CT ANGIOGRAPHY HEAD: CPT

## 2020-10-18 PROCEDURE — 99205 OFFICE O/P NEW HI 60 MIN: CPT | Performed by: PSYCHIATRY & NEUROLOGY

## 2020-10-18 PROCEDURE — 70450 CT HEAD/BRAIN W/O DYE: CPT

## 2020-10-18 PROCEDURE — 74011000250 HC RX REV CODE- 250: Performed by: NURSE PRACTITIONER

## 2020-10-18 RX ORDER — PROMETHAZINE HYDROCHLORIDE 25 MG/1
12.5 TABLET ORAL
Status: DISCONTINUED | OUTPATIENT
Start: 2020-10-17 | End: 2020-10-19 | Stop reason: HOSPADM

## 2020-10-18 RX ORDER — ALPRAZOLAM 0.25 MG/1
0.25 TABLET ORAL
Status: DISCONTINUED | OUTPATIENT
Start: 2020-10-18 | End: 2020-10-19 | Stop reason: HOSPADM

## 2020-10-18 RX ORDER — SODIUM CHLORIDE 0.9 % (FLUSH) 0.9 %
5-40 SYRINGE (ML) INJECTION AS NEEDED
Status: DISCONTINUED | OUTPATIENT
Start: 2020-10-17 | End: 2020-10-19 | Stop reason: HOSPADM

## 2020-10-18 RX ORDER — ASPIRIN 81 MG/1
81 TABLET ORAL DAILY
Status: DISCONTINUED | OUTPATIENT
Start: 2020-10-18 | End: 2020-10-19 | Stop reason: HOSPADM

## 2020-10-18 RX ORDER — METOPROLOL TARTRATE 25 MG/1
12.5 TABLET, FILM COATED ORAL EVERY 12 HOURS
Status: DISCONTINUED | OUTPATIENT
Start: 2020-10-18 | End: 2020-10-19 | Stop reason: HOSPADM

## 2020-10-18 RX ORDER — LATANOPROST 50 UG/ML
1 SOLUTION/ DROPS OPHTHALMIC
Status: DISCONTINUED | OUTPATIENT
Start: 2020-10-18 | End: 2020-10-19 | Stop reason: HOSPADM

## 2020-10-18 RX ORDER — AMLODIPINE BESYLATE 5 MG/1
10 TABLET ORAL DAILY
Status: DISCONTINUED | OUTPATIENT
Start: 2020-10-18 | End: 2020-10-19 | Stop reason: HOSPADM

## 2020-10-18 RX ORDER — TIMOLOL MALEATE 5 MG/ML
1 SOLUTION/ DROPS OPHTHALMIC 2 TIMES DAILY
Status: DISCONTINUED | OUTPATIENT
Start: 2020-10-18 | End: 2020-10-18 | Stop reason: SDUPTHER

## 2020-10-18 RX ORDER — ENOXAPARIN SODIUM 100 MG/ML
40 INJECTION SUBCUTANEOUS DAILY
Status: DISCONTINUED | OUTPATIENT
Start: 2020-10-18 | End: 2020-10-19 | Stop reason: HOSPADM

## 2020-10-18 RX ORDER — TIMOLOL MALEATE 5 MG/ML
1 SOLUTION OPHTHALMIC DAILY
Status: DISCONTINUED | OUTPATIENT
Start: 2020-10-18 | End: 2020-10-18 | Stop reason: SDUPTHER

## 2020-10-18 RX ORDER — POLYETHYLENE GLYCOL 3350 17 G/17G
17 POWDER, FOR SOLUTION ORAL DAILY PRN
Status: DISCONTINUED | OUTPATIENT
Start: 2020-10-17 | End: 2020-10-19 | Stop reason: HOSPADM

## 2020-10-18 RX ORDER — ALLOPURINOL 100 MG/1
100 TABLET ORAL DAILY
Status: DISCONTINUED | OUTPATIENT
Start: 2020-10-18 | End: 2020-10-19 | Stop reason: HOSPADM

## 2020-10-18 RX ORDER — TIMOLOL MALEATE 5 MG/ML
1 SOLUTION/ DROPS OPHTHALMIC DAILY
Status: DISCONTINUED | OUTPATIENT
Start: 2020-10-18 | End: 2020-10-18 | Stop reason: SDUPTHER

## 2020-10-18 RX ORDER — TIZANIDINE 2 MG/1
2 TABLET ORAL
Status: DISCONTINUED | OUTPATIENT
Start: 2020-10-18 | End: 2020-10-19 | Stop reason: HOSPADM

## 2020-10-18 RX ORDER — TIMOLOL MALEATE 6.8 MG/ML
1 SOLUTION/ DROPS OPHTHALMIC 2 TIMES DAILY
Status: DISCONTINUED | OUTPATIENT
Start: 2020-10-18 | End: 2020-10-19 | Stop reason: HOSPADM

## 2020-10-18 RX ORDER — ONDANSETRON 2 MG/ML
4 INJECTION INTRAMUSCULAR; INTRAVENOUS
Status: DISCONTINUED | OUTPATIENT
Start: 2020-10-17 | End: 2020-10-19 | Stop reason: HOSPADM

## 2020-10-18 RX ORDER — SODIUM CHLORIDE 0.9 % (FLUSH) 0.9 %
5-40 SYRINGE (ML) INJECTION EVERY 8 HOURS
Status: DISCONTINUED | OUTPATIENT
Start: 2020-10-18 | End: 2020-10-19 | Stop reason: HOSPADM

## 2020-10-18 RX ADMIN — METOPROLOL TARTRATE 12.5 MG: 25 TABLET, FILM COATED ORAL at 01:34

## 2020-10-18 RX ADMIN — Medication 10 ML: at 15:17

## 2020-10-18 RX ADMIN — Medication 10 ML: at 00:00

## 2020-10-18 RX ADMIN — AMLODIPINE BESYLATE 10 MG: 5 TABLET ORAL at 09:48

## 2020-10-18 RX ADMIN — METOPROLOL TARTRATE 12.5 MG: 25 TABLET, FILM COATED ORAL at 09:47

## 2020-10-18 RX ADMIN — METOPROLOL TARTRATE 12.5 MG: 25 TABLET, FILM COATED ORAL at 21:26

## 2020-10-18 RX ADMIN — Medication 10 ML: at 03:35

## 2020-10-18 RX ADMIN — IOPAMIDOL 100 ML: 755 INJECTION, SOLUTION INTRAVENOUS at 00:00

## 2020-10-18 RX ADMIN — ALLOPURINOL 100 MG: 100 TABLET ORAL at 09:48

## 2020-10-18 RX ADMIN — Medication 10 ML: at 21:26

## 2020-10-18 RX ADMIN — Medication 10 ML: at 01:35

## 2020-10-18 RX ADMIN — ENOXAPARIN SODIUM 40 MG: 40 INJECTION SUBCUTANEOUS at 09:49

## 2020-10-18 RX ADMIN — TIMOLOL MALEATE 1 DROP: 6.8 SOLUTION/ DROPS OPHTHALMIC at 18:42

## 2020-10-18 RX ADMIN — ASPIRIN 81 MG: 81 TABLET, COATED ORAL at 09:49

## 2020-10-18 RX ADMIN — TIMOLOL MALEATE 1 DROP: 6.8 SOLUTION/ DROPS OPHTHALMIC at 11:28

## 2020-10-18 RX ADMIN — LATANOPROST 1 DROP: 50 SOLUTION OPHTHALMIC at 21:27

## 2020-10-18 RX ADMIN — LATANOPROST 1 DROP: 50 SOLUTION OPHTHALMIC at 01:34

## 2020-10-18 NOTE — PROGRESS NOTES
Auto sub: timolol (TIMOPTIC-XE) 0.5 % ophthalmic gel-forming 1 Drop daily to Timolol Maleate 0.5% 1 drop into affected eye(s) BID

## 2020-10-18 NOTE — PROGRESS NOTES
Hospitalist Progress Note    NAME: Dwaine Lind   :  8/15/1932   MRN:  [id number]     I reviewed pertinent labs and imaging, and discussed /agreed on the plan of care with Dr. Megan Noble. Assessment / Plan:  TIa POA  Recurrent headache   Per patient's family witnessed transient onset of weakness accompanied by change in speech    CT head 10/18/2020:   IMPRESSION:  No acute findings and no change since 2016. CTA head/nec 10/18/2020: IMPRESSION:  1. Severe bilateral internal carotid artery stenosis. 2.  Severe stenosis in the proximal right vertebral artery. 3.  No intracranial large vessel occlusion. 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 Code status: Full  Prophylaxis: Lovenox  Recommended Disposition: Home w/Family     Subjective:     Chief Complaint / Reason for Physician Visit  Resting comfortably, Mentation back to baseline. No signs of distress noted. Discussed with RN events overnight.      Review of Systems:  Symptom Y/N Comments  Symptom Y/N Comments   Fever/Chills n   Chest Pain n    Poor Appetite n   Edema     Cough n   Abdominal Pain     Sputum n   Joint Pain     SOB/RAMSEY n   Pruritis/Rash n    Nausea/vomit n   Tolerating PT/OT     Diarrhea n   Tolerating Diet y    Constipation n   Other       Could NOT obtain due to: Objective:     VITALS:   Last 24hrs VS reviewed since prior progress note. Most recent are:  Patient Vitals for the past 24 hrs:   Temp Pulse Resp BP SpO2   10/18/20 1528 98.5 °F (36.9 °C) 73 12 (!) 136/52 100 %   10/18/20 1134 97.8 °F (36.6 °C) 75 18 (!) 169/56 99 %   10/18/20 0637 97.6 °F (36.4 °C) 76 20 (!) 165/83 100 %   10/18/20 0340    (!) 170/64    10/18/20 0333 98 °F (36.7 °C) 78 18 (!) 189/78 97 %   10/17/20 2233 98 °F (36.7 °C) 76 20 (!) 165/66 100 %     No intake or output data in the 24 hours ending 10/18/20 1817     PHYSICAL EXAM:  General:  Alert, cooperative, no acute distress    EENT:   Anicteric sclerae. normocephalic  Resp:  CTA bilaterally, no wheezing or rales. No accessory muscle use  CV:  Regular  rhythm,  No edema  GI:  Soft, Non distended, Non tender.  +Bowel sounds  Neurologic:  Alert and oriented X 3, normal speech,   Psych:   Fair insight. Not anxious nor agitated  Skin:  No rashes. No jaundice    Reviewed most current lab test results and cultures  YES  Reviewed most current radiology test results   YES  Review and summation of old records today    NO  Reviewed patient's current orders and MAR    YES  PMH/ reviewed - no change compared to H&P  ________________________________________________________________________  Care Plan discussed with:    Comments   Patient y    Family      RN y    Care Manager     Consultant                        Multidiciplinary team rounds were held today with , nursing, pharmacist and clinical coordinator. Patient's plan of care was discussed; medications were reviewed and discharge planning was addressed.      ________________________________________________________________________    ________________________________________________________________________  [place] NP     Procedures: see electronic medical records for all procedures/Xrays and details which were not copied into this note but were reviewed prior to creation of Plan.      LABS:  I reviewed today's most current labs and imaging studies.   Pertinent labs include:  Recent Labs     10/18/20  0023   WBC 7.1   HGB 11.1*   HCT 33.0*        Recent Labs     10/18/20  0023      K 4.2      CO2 24   *   BUN 17   CREA 1.15*   CA 9.4   ALB 3.6   TBILI 0.2   ALT 20       Signed: Calli Ferrell NP

## 2020-10-18 NOTE — ACP (ADVANCE CARE PLANNING)
Advance Care Planning Note      NAME: Coby Mchugh   :  8/15/1932   MRN:  [id number]     Date/Time:  10/18/2020 2:44 AM    Active Diagnoses:  Hospital Problems  Date Reviewed: 2020          Codes Class Noted POA    TIA (transient ischemic attack) ICD-10-CM: G45.9  ICD-9-CM: 435.9  10/17/2020 Unknown              These active diagnoses are of sufficient risk that focused discussion on advance care planning is indicated in order to allow the patient to thoughtfully consider personal goals of care, and if situations arise that prevent the ability to personally give input, to ensure appropriate representation of their personal desires for different levels and aggressiveness of care. Discussion:   Code status addressed and wants to be a Full Code. Patient wants central line and vasopressors if needed. Patient would also want a feeding tube, if needed, for nutritional support. Patient  would like to assign Bernard Wooten (daughter)  as the surrogate decision maker. Persons present and participating in discussion: Blaze Hodgson NP      Time Spent:   Total time spent face-to-face in education and discussion:   35  minutes.          Amelia Wu NP   Hospitalist

## 2020-10-18 NOTE — PROGRESS NOTES
Problem: Falls - Risk of  Goal: *Absence of Falls  Description: Document Tristan Bone Fall Risk and appropriate interventions in the flowsheet.   Outcome: Progressing Towards Goal  Note: Fall Risk Interventions:  Mobility Interventions: Communicate number of staff needed for ambulation/transfer                             Problem: TIA/CVA Stroke: 0-24 hours  Goal: Off Pathway (Use only if patient is Off Pathway)  Outcome: Progressing Towards Goal  Goal: Activity/Safety  Outcome: Progressing Towards Goal  Goal: Consults, if ordered  Outcome: Progressing Towards Goal  Goal: Diagnostic Test/Procedures  Outcome: Progressing Towards Goal  Goal: Nutrition/Diet  Outcome: Progressing Towards Goal  Goal: Discharge Planning  Outcome: Progressing Towards Goal  Goal: Medications  Outcome: Progressing Towards Goal  Goal: Respiratory  Outcome: Progressing Towards Goal  Goal: Treatments/Interventions/Procedures  Outcome: Progressing Towards Goal  Goal: Minimize risk of bleeding post-thrombolytic infusion  Outcome: Progressing Towards Goal  Goal: Monitor for complications post-thrombolytic infusion  Outcome: Progressing Towards Goal  Goal: Psychosocial  Outcome: Progressing Towards Goal  Goal: *Hemodynamically stable  Outcome: Progressing Towards Goal  Goal: *Neurologically stable  Description: Absence of additional neurological deficits    Outcome: Progressing Towards Goal  Goal: *Verbalizes anxiety and depression are reduced or absent  Outcome: Progressing Towards Goal  Goal: *Absence of Signs of Aspiration on Current Diet  Outcome: Progressing Towards Goal  Goal: *Absence of deep venous thrombosis signs and symptoms(Stroke Metric)  Outcome: Progressing Towards Goal  Goal: *Ability to perform ADLs and demonstrates progressive mobility and function  Outcome: Progressing Towards Goal  Goal: *Stroke education started(Stroke Metric)  Outcome: Progressing Towards Goal  Goal: *Dysphagia screen performed(Stroke Metric)  Outcome: Progressing Towards Goal  Goal: *Rehab consulted(Stroke Metric)  Outcome: Progressing Towards Goal

## 2020-10-18 NOTE — H&P
Hospitalist Admission Note    NAME: Queen Adriano   :  8/15/1932   MRN:  [id number]     Date/Time:  10/17/2020 11:01 PM    Patient PCP: Paty Wright NP  ______________________________________________________________________  Given the patient's current clinical presentation, I have a high level of concern for decompensation if discharged from the emergency department. Complex decision making was performed, which includes reviewing the patient's available past medical records, laboratory results, and x-ray films. My assessment of this patient's clinical condition and my plan of care is as follows. Assessment / Plan:  TIA, POA  Recurrent headache, POA  - witnessed transient onset of weakness accompanied by weird verbal noise as per niece  - CTA head/ neck W/O contrast:  No acute findings and no change since 2016  - CTA Head Neck w contrast: pending  - MRI WO contrast: pending  - Carotid ultrasound  - neurovascular checks per stroke protocol  - Neuro consult  - monitor    CAD s/p stent placement (Hx ANA MARÍA to LAD and LCx 2018), POA  Hypertension, POA  Hyperlipidemia, POA  CKD 3, POA (baseline Crea: 1.)  - continue home bp meds  - continue holding Atorvastatin as per pt PCP (monitored for elevated CK level)  - hold nephrotoxins  - monitor    Gout, POA  Glaucoma, POA  Anxiety  - continue home meds      Code Status: Full  Surrogate Decision Maker: daughter Nel Corona)    DVT Prophylaxis: Lovenox  GI Prophylaxis: not indicated    Baseline: home, with family, independent with ADL      Subjective:   CHIEF COMPLAINT: transient onset of weakness, persistent headache    HISTORY OF PRESENT ILLNESS:     Catina Jade is a 80 y.o.  female who presents with witnessed transient onset of weakness accompanied by making odd verbal noise as per her niece who called 911, who brought her over to [facility] ED. Head CT was done which was negative for any acute process. Prior to the event, patient was doing grocery shopping and have episode of her headache, went home ad was sitting on the chair with her niece when the symptoms were noted. Patient denies any loss of consciousness, vision changes or any nausea and dizziness. Patient is being work up by her PCP for her persistent headache and was recently transitioned off her Lisinopril. Currently on Amlodipine and Lopressor. She reports her pain comes on a daily basis, with duration ranging from few minutes to almost the whole day. Denies any associated weakness. We were asked to admit for work up and evaluation of the above problems. Past Medical History:   Diagnosis Date    Diverticulitis     Gout     HTN (hypertension) 2018    Hx of diverticulitis of colon 2018    Hyperlipidemia     Hypertension     Other ill-defined conditions(799.89)     high cholesterol    S/P coronary artery stent placement 2018        Past Surgical History:   Procedure Laterality Date    HX HYSTERECTOMY      VT COLONOSCOPY FLX DX W/COLLJ SPEC WHEN PFRMD  10/18/2013            Social History     Tobacco Use    Smoking status: Never Smoker    Smokeless tobacco: Never Used   Substance Use Topics    Alcohol use: No        Family History:  Father: unknown  Mother: stroke (  Sister:  in childbirth  Brother: ETOH abuse, PVD ()    Allergies   Allergen Reactions    Aspirin Nausea and Vomiting     Pt reports that she takes 81 mg ASA daily    Lisinopril Cough        Prior to Admission medications    Medication Sig Start Date End Date Taking?  Authorizing Provider   baclofen (LIORESAL) 10 mg tablet TAKE 1 TABLET BY MOUTH THREE TIMES A DAY AS NEEDED FOR MUSCLE SPASMS 20   Provider, Historical   tiZANidine (ZANAFLEX) 2 mg tablet TAKE 1 TABLET (2 MG TOTAL) BY MOUTH EVERY 8 (EIGHT) HOURS AS NEEDED FOR MUSCLE SPASMS 9/3/20   Provider, Historical   amLODIPine (NORVASC) 10 mg tablet TAKE 1 TABLET BY MOUTH  DAILY 4/10/19   Lester Mora NP   metoprolol tartrate (LOPRESSOR) 25 mg tablet Take 0.5 Tabs by mouth every twelve (12) hours. 3/13/19   Vidhya Gee MD   docusate sodium (STOOL SOFTENER PO) Take  by mouth as needed. Provider, Historical   ferrous sulfate (IRON PO) Take 65 mg by mouth daily. Provider, Historical   cholecalciferol (VITAMIN D3) 400 unit tab tablet Take 1,000 Units by mouth daily. Provider, Historical   aspirin delayed-release 81 mg tablet Take 81 mg by mouth daily. Provider, Historical   timolol (TIMOPTIC-XE) 0.5 % ophthalmic gel-forming Administer 1 Drop to right eye daily. Provider, Historical   latanoprost (XALATAN) 0.005 % ophthalmic solution Administer 1 Drop to both eyes nightly. Provider, Historical   fluticasone (FLONASE ALLERGY RELIEF) 50 mcg/actuation nasal spray 1 [place] by Both Nostrils route daily. Provider, Historical   acetaminophen (TYLENOL EXTRA STRENGTH) 500 mg tablet Take 1,000 mg by mouth every six (6) hours as needed for Pain. Provider, Historical   fexofenadine (ALLEGRA) 180 mg tablet Take 180 mg by mouth nightly. Papo Kirkpatrick MD   atorvastatin (LIPITOR) 80 mg tablet Take 80 mg by mouth nightly. Papo Kirkpatrick MD   allopurinol (ZYLOPRIM) 100 mg tablet Take 100 mg by mouth daily. Papo Kirkpatrick MD   ALPRAZolam (XANAX) 0.25 mg tablet Take 0.25 mg by mouth daily as needed for Anxiety. Papo Kirkpatrick MD       REVIEW OF SYSTEMS:     I am not able to complete the review of systems because:    The patient is intubated and sedated    The patient has altered mental status due to his acute medical problems    The patient has baseline aphasia from prior stroke(s)    The patient has baseline dementia and is not reliable historian    The patient is in acute medical distress and unable to provide information           Total of 12 systems reviewed as follows:       POSITIVE= bolded text  Negative = text not bolded  General:  fever, chills, sweats, generalized weakness, weight loss/gain,      loss of appetite   Eyes:    blurred vision, eye pain, loss of vision, double vision  ENT:    rhinorrhea, pharyngitis   Respiratory:   cough, sputum production, SOB, RAMSEY, wheezing, pleuritic pain   Cardiology:   chest pain, palpitations, orthopnea, PND, edema, syncope   Gastrointestinal:  abdominal pain , N/V, diarrhea, dysphagia, constipation, bleeding   Genitourinary:  frequency, urgency, dysuria, hematuria, incontinence   Muskuloskeletal :  arthralgia, myalgia, back pain  Hematology:  easy bruising, nose or gum bleeding, lymphadenopathy   Dermatological: rash, ulceration, pruritis, color change / jaundice  Endocrine:   hot flashes or polydipsia   Neurological:  headache, dizziness, confusion, focal weakness, paresthesia,     Speech difficulties, memory loss, gait difficulty  Psychological: Feelings of anxiety, depression, agitation    Objective:   VITALS:    Visit Vitals  BP (!) 165/66 (BP 1 Location: Left arm, BP Patient Position: At rest)   Pulse 76   Temp 98 °F (36.7 °C)   Resp 20   SpO2 100%       PHYSICAL EXAM:    General:    Alert, cooperative, no distress, appears stated age. HEENT: Atraumatic, anicteric sclerae, pink conjunctivae     No oral ulcers, mucosa moist, throat clear, dentition fair  Neck:  Supple, symmetrical,  thyroid: non tender  Lungs:   Clear to auscultation bilaterally. No Wheezing or Rhonchi. No rales. Chest wall:  No tenderness  No Accessory muscle use. Heart:   Regular  rhythm,  No  murmur   No edema  Abdomen:   Soft, non-tender. Not distended. Bowel sounds normal  Extremities: No cyanosis. No clubbing,  trace ankle swelling left (injury related)    Skin turgor normal, Capillary refill normal, Radial dial pulse 2+  Skin:     Not pale. Not Jaundiced  No rashes   Psych:  Good insight. Not depressed. Not anxious or agitated. Neurologic: EOMs intact. No facial asymmetry. No aphasia or slurred speech.  Symmetrical strength, Sensation grossly intact. Alert and oriented X 4.     _______________________________________________________________________  Care Plan discussed with:    Comments   Patient y    Family      RN y    Care Manager                    Consultant:      _______________________________________________________________________  Expected  Disposition:   Home with Family y   HH/PT/OT/RN    SNF/LTC    KRISTOFER    ________________________________________________________________________        Comments    y Reviewed previous records   >50% of visit spent in counseling and coordination of care y Discussion with patient and/or family and questions answered       ________________________________________________________________________  Signed: Micha Diaz NP    Procedures: see electronic medical records for all procedures/Xrays and details which were not copied into this note but were reviewed prior to creation of Plan. LAB DATA REVIEWED:    No results found for this or any previous visit (from the past 24 hour(s)).

## 2020-10-18 NOTE — CONSULTS
Brief Vascular Surgery  Consult Note    Impression:  Severe bilateral carotid stenosis w/ syncope  No neuro deficits  MRI pending    Plan:   Will need carotid intervention at some point  Will follow up tomorrow and make final plan when MRI done    Carol Keith MD

## 2020-10-18 NOTE — PROGRESS NOTES
TRANSFER - IN REPORT:    Verbal report received from Milo (name) on Tamra Ramon  being received from [facility] (unit) for routine progression of care      Report consisted of patients Situation, Background, Assessment and   Recommendations(SBAR). Information from the following report(s) SBAR was reviewed with the receiving nurse. Opportunity for questions and clarification was provided. Assessment completed upon patients arrival to unit and care assumed.

## 2020-10-18 NOTE — PROGRESS NOTES
9: 45am-CM met with pt at bedside. CM introduced self and role. Observation notice provided in writing to patient and/or caregiver as well as verbal explanation of the policy. Patients who are in outpatient status also receive the Observation notice. CM will continue to follow patient for discharge planning needs and arrange for services as deemed necessary.     Arabella Chao [address]  [phone]

## 2020-10-18 NOTE — PROGRESS NOTES
* No surgery found *  * No surgery found *  Bedside and Verbal shift change report given to Fanta (oncoming nurse) by Giuseppe Lundberg (offgoing nurse). Report included the following information [facility]. [facility] Phone:   [phone]      Significant changes during shift:  Admitted to [facility], CT and CTA completed         Patient Information    Fausto Romero  80 y.o.  10/17/2020 10:43 PM by Morgan Benitez MD. Fausto Romero was admitted from Home    Problem List    Patient Active Problem List    Diagnosis Date Noted    TIA (transient ischemic attack) 10/17/2020    High cholesterol 09/21/2020    Essential hypertension 12/28/2018    Coronary artery disease involving native coronary artery of native heart without angina pectoris 06/12/2018    S/P coronary artery stent placement 05/25/2018    HTN (hypertension) 05/18/2018    Hx of diverticulitis of colon 05/18/2018    Gout      Past Medical History:   Diagnosis Date    Diverticulitis     Gout     HTN (hypertension) 5/18/2018    Hx of diverticulitis of colon 5/18/2018    Hyperlipidemia     Hypertension     Other ill-defined conditions(799.89)     high cholesterol    S/P coronary artery stent placement 5/25/2018         Core Measures:    CVA: Yes Yes  CHF:No No  PNA:No No    Activity Status:    OOB to Chair No  Ambulated this shift Yes   Bed Rest No      DVT prophylaxis:    DVT prophylaxis Med- Yes  DVT prophylaxis SCD or GABO- No     Wounds: (If Applicable)    Wounds- No    Location     Patient Safety:    Falls Score Total Score: 2  Safety Level_______  Bed Alarm On? Yes  Sitter?  No    Plan for upcoming shift: MRI, neuro consult, duplex carotid         Discharge Plan: No; to be determined     Active Consults:  IP CONSULT TO NEUROLOGY

## 2020-10-18 NOTE — CONSULTS
IP CONSULT TO NEUROLOGY  Consult performed by: Ken Rhodes MD  Consult ordered by: Richard Ordoñez NP            NEUROLOGY CONSULT    NAME Derik Alonso AGE 80 y.o. MRN [id number]  8/15/1932     REQUESTING PHYSICIAN: Ciara Palumbo MD      CHIEF COMPLAINT:  syncope     This is a 80 y.o. female who was at home and had an episode of syncope while eating. This was preceded by a headaches. No previous history of syncope or seizures. Recently had a change in antihypertensives (seems to have been started on metoprolol). Home meds include tizanidine. ASSESSMENT AND PLAN     1. Syncope  Hold the tizanidine  Take orthostatic pressures  CT normal     2. Essential hypertension  Continue amlodipine    3. Hyperlipidemia  Atorvastatin held because of elevated CK   She has been off it for 30 days. Repeat CK level    4. Headaches  continue tylenol as needed    5.  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  Agree with MRI         ALLERGIES:  Aspirin and Lisinopril     Current Facility-Administered Medications   Medication Dose Route Frequency    sodium chloride (NS) flush 5-40 mL  5-40 mL IntraVENous Q8H    sodium chloride (NS) flush 5-40 mL  5-40 mL IntraVENous PRN    polyethylene glycol (MIRALAX) packet 17 g  17 g Oral DAILY PRN    promethazine (PHENERGAN) tablet 12.5 mg  12.5 mg Oral Q6H PRN    Or    ondansetron (ZOFRAN) injection 4 mg  4 mg IntraVENous Q6H PRN    enoxaparin (LOVENOX) injection 40 mg  40 mg SubCUTAneous DAILY    allopurinoL (ZYLOPRIM) tablet 100 mg  100 mg Oral DAILY    ALPRAZolam (XANAX) tablet 0.25 mg  0.25 mg Oral DAILY PRN    amLODIPine (NORVASC) tablet 10 mg  10 mg Oral DAILY    aspirin delayed-release tablet 81 mg  81 mg Oral DAILY    latanoprost (XALATAN) 0.005 % ophthalmic solution 1 Drop  1 Drop Both Eyes QHS    metoprolol tartrate (LOPRESSOR) tablet 12.5 mg  12.5 mg Oral Q12H    tiZANidine (ZANAFLEX) tablet 2 mg  2 mg Oral Q8H PRN    timoloL maleate 0.5 % ophthalmic solution 1 Drop  1 Drop Right Eye BID    acetaminophen (TYLENOL) tablet 650 mg  650 mg Oral Q4H PRN    Or    acetaminophen (TYLENOL) solution 650 mg  650 mg Per NG tube Q4H PRN    Or    acetaminophen (TYLENOL) suppository 650 mg  650 mg Rectal Q4H PRN       Past Medical History:   Diagnosis Date    Diverticulitis     Gout     HTN (hypertension) 5/18/2018    Hx of diverticulitis of colon 5/18/2018    Hyperlipidemia     Hypertension     Other ill-defined conditions(799.89)     high cholesterol    S/P coronary artery stent placement 5/25/2018       Social History     Tobacco Use    Smoking status: Never Smoker    Smokeless tobacco: Never Used   Substance Use Topics    Alcohol use: No       Family History   Problem Relation Age of Onset    Stroke Mother     Diabetes Sister      Review of Systems   Constitutional: Negative for chills and fever. HENT: Negative for ear pain. Eyes: Negative for pain and discharge. Respiratory: Negative for cough and hemoptysis. Cardiovascular: Negative for chest pain and claudication. Gastrointestinal: Negative for constipation and diarrhea. Genitourinary: Negative for flank pain and hematuria. Musculoskeletal: Positive for joint pain and myalgias. Negative for back pain. Skin: Negative for itching and rash. Neurological: Positive for headaches. Endo/Heme/Allergies: Negative for environmental allergies. Does not bruise/bleed easily. Psychiatric/Behavioral: Negative for depression and hallucinations. Visit Vitals  BP (!) 165/83 (BP 1 Location: Right arm, BP Patient Position: At rest)   Pulse 76   Temp 97.6 °F (36.4 °C)   Resp 20   Ht 5' 4.8\" (1.646 m)   Wt 158 lb 3.2 oz (71.8 kg)   SpO2 100%   BMI 26.49 kg/m²      General: Well developed, well nourished.  Patient in no distress   Head: Normocephalic, atraumatic, anicteric sclera   Neck Normal ROM, No thyromegally   Lungs:  Clear to auscultation bilaterally   Cardiac: Regular rate and rhythm with no murmurs. Abd: Bowel sounds were audible. Ext: No pedal edema   Skin: Supple no rash     NeurologicExam:  Mental Status: Alert and oriented to person place and time   Speech: Fluent no aphasia or dysarthria. Cranial Nerves:  II - XII Intact   Motor:  Full and symmetric strength of upper and lower extremities  Normal bulk and tone. Reflexes:   Deep tendon reflexes 2+/4 and symmetric. Sensory:   Symmetric and intact with no deficits    Gait:  Gait is balanced and fluid with normal arm swing. Tremor:   No tremor noted. Cerebellar:  Coordination intact. Neurovascular: No carotid bruits. No JVD       REVIEWED IMAGING:      CT:  Results from Hospital Encounter encounter on 10/17/20   CTA HEAD NECK W CONT    *PRELIMINARY REPORT*    No acute large vessel occlusion. Calcified plaque in the right carotid  bifurcation with focal severe stenosis at the internal carotid artery origin. More extensive calcified plaque in the left carotid bifurcation with severe  stenosis. Patent cervical internal carotid arteries. Short segment severe  stenosis of the proximal right P2 segment. Patent slightly dominant left  vertebral artery. No significant intracranial stenosis or intracranial  occlusion. Preliminary report was provided by Dr. Danelle Weston, the on-call radiologist, at  2:55 AM on 10/18/2020    Final report to follow.     *END PRELIMINARY REPORT*       REVIEWED LABS:  Lab Results   Component Value Date/Time    WBC 7.1 10/18/2020 12:23 AM    HCT 33.0 (L) 10/18/2020 12:23 AM    HGB 11.1 (L) 10/18/2020 12:23 AM    PLATELET 762 67/46/5655 12:23 AM     Lab Results   Component Value Date/Time    Sodium 136 10/18/2020 12:23 AM    Potassium 4.2 10/18/2020 12:23 AM    Chloride 105 10/18/2020 12:23 AM    CO2 24 10/18/2020 12:23 AM    Glucose 138 (H) 10/18/2020 12:23 AM    BUN 17 10/18/2020 12:23 AM    Creatinine 1.15 (H) 10/18/2020 12:23 AM    Calcium 9.4 10/18/2020 12:23 AM       Lab Results   Component Value Date/Time    LDL, calculated 146.4 (H) 10/18/2020 12:23 AM     Lab Results   Component Value Date/Time    Hemoglobin A1c 5.8 (H) 10/18/2020 12:03 AM

## 2020-10-18 NOTE — PROGRESS NOTES
Problem: Falls - Risk of  Goal: *Absence of Falls  Description: Document Neil Foster Fall Risk and appropriate interventions in the flowsheet.   Outcome: Progressing Towards Goal  Note: Fall Risk Interventions:  Mobility Interventions: Communicate number of staff needed for ambulation/transfer, OT consult for ADLs         Medication Interventions: Bed/chair exit alarm, Patient to call before getting OOB                   Problem: Patient Education: Go to Patient Education Activity  Goal: Patient/Family Education  Outcome: Progressing Towards Goal     Problem: Patient Education: Go to Patient Education Activity  Goal: Patient/Family Education  Outcome: Progressing Towards Goal     Problem: TIA/CVA Stroke: 0-24 hours  Goal: Off Pathway (Use only if patient is Off Pathway)  Outcome: Progressing Towards Goal  Goal: Activity/Safety  Outcome: Progressing Towards Goal  Goal: Consults, if ordered  Outcome: Progressing Towards Goal  Goal: Diagnostic Test/Procedures  Outcome: Progressing Towards Goal  Goal: Nutrition/Diet  Outcome: Progressing Towards Goal  Goal: Discharge Planning  Outcome: Progressing Towards Goal  Goal: Medications  Outcome: Progressing Towards Goal  Goal: Respiratory  Outcome: Progressing Towards Goal  Goal: Treatments/Interventions/Procedures  Outcome: Progressing Towards Goal  Goal: Minimize risk of bleeding post-thrombolytic infusion  Outcome: Progressing Towards Goal  Goal: Monitor for complications post-thrombolytic infusion  Outcome: Progressing Towards Goal  Goal: Psychosocial  Outcome: Progressing Towards Goal  Goal: *Hemodynamically stable  Outcome: Progressing Towards Goal  Goal: *Neurologically stable  Description: Absence of additional neurological deficits    Outcome: Progressing Towards Goal  Goal: *Verbalizes anxiety and depression are reduced or absent  Outcome: Progressing Towards Goal  Goal: *Absence of Signs of Aspiration on Current Diet  Outcome: Progressing Towards Goal  Goal: *Absence of deep venous thrombosis signs and symptoms(Stroke Metric)  Outcome: Progressing Towards Goal  Goal: *Ability to perform ADLs and demonstrates progressive mobility and function  Outcome: Progressing Towards Goal  Goal: *Stroke education started(Stroke Metric)  Outcome: Progressing Towards Goal  Goal: *Dysphagia screen performed(Stroke Metric)  Outcome: Progressing Towards Goal  Goal: *Rehab consulted(Stroke Metric)  Outcome: Progressing Towards Goal     Problem: TIA/CVA Stroke: Day 2 Until Discharge  Goal: Off Pathway (Use only if patient is Off Pathway)  Outcome: Progressing Towards Goal  Goal: Activity/Safety  Outcome: Progressing Towards Goal  Goal: Diagnostic Test/Procedures  Outcome: Progressing Towards Goal  Goal: Nutrition/Diet  Outcome: Progressing Towards Goal  Goal: Discharge Planning  Outcome: Progressing Towards Goal  Goal: Medications  Outcome: Progressing Towards Goal  Goal: Respiratory  Outcome: Progressing Towards Goal  Goal: Treatments/Interventions/Procedures  Outcome: Progressing Towards Goal  Goal: Psychosocial  Outcome: Progressing Towards Goal  Goal: *Verbalizes anxiety and depression are reduced or absent  Outcome: Progressing Towards Goal  Goal: *Absence of aspiration  Outcome: Progressing Towards Goal  Goal: *Absence of deep venous thrombosis signs and symptoms(Stroke Metric)  Outcome: Progressing Towards Goal  Goal: *Optimal pain control at patient's stated goal  Outcome: Progressing Towards Goal  Goal: *Tolerating diet  Outcome: Progressing Towards Goal  Goal: *Ability to perform ADLs and demonstrates progressive mobility and function  Outcome: Progressing Towards Goal  Goal: *Stroke education continued(Stroke Metric)  Outcome: Progressing Towards Goal     Problem: Ischemic Stroke: Discharge Outcomes  Goal: *Verbalizes anxiety and depression are reduced or absent  Outcome: Progressing Towards Goal  Goal: *Verbalize understanding of risk factor modification(Stroke Metric)  Outcome: Progressing Towards Goal  Goal: *Hemodynamically stable  Outcome: Progressing Towards Goal  Goal: *Absence of aspiration pneumonia  Outcome: Progressing Towards Goal  Goal: *Aware of needed dietary changes  Outcome: Progressing Towards Goal  Goal: *Verbalize understanding of prescribed medications including anti-coagulants, anti-lipid, and/or anti-platelets(Stroke Metric)  Outcome: Progressing Towards Goal  Goal: *Tolerating diet  Outcome: Progressing Towards Goal  Goal: *Aware of follow-up diagnostics related to anticoagulants  Outcome: Progressing Towards Goal  Goal: *Ability to perform ADLs and demonstrates progressive mobility and function  Outcome: Progressing Towards Goal  Goal: *Absence of DVT(Stroke Metric)  Outcome: Progressing Towards Goal  Goal: *Absence of aspiration  Outcome: Progressing Towards Goal  Goal: *Optimal pain control at patient's stated goal  Outcome: Progressing Towards Goal  Goal: *Home safety concerns addressed  Outcome: Progressing Towards Goal  Goal: *Describes available resources and support systems  Outcome: Progressing Towards Goal  Goal: *Verbalizes understanding of activation of EMS(911) for stroke symptoms(Stroke Metric)  Outcome: Progressing Towards Goal  Goal: *Understands and describes signs and symptoms to report to providers(Stroke Metric)  Outcome: Progressing Towards Goal  Goal: *Neurolgocially stable (absence of additional neurological deficits)  Outcome: Progressing Towards Goal  Goal: *Verbalizes importance of follow-up with primary care physician(Stroke Metric)  Outcome: Progressing Towards Goal  Goal: *Smoking cessation discussed,if applicable(Stroke Metric)  Outcome: Progressing Towards Goal  Goal: *Depression screening completed(Stroke Metric)  Outcome: Progressing Towards Goal

## 2020-10-18 NOTE — PROGRESS NOTES
* No surgery found *  * No surgery found *  Bedside and Verbal shift change report given to Cindi Decker (oncoming nurse) by Stella Childs (offgoing nurse). Report included the following information SBAR. Zone Phone:   [phone]      Significant changes during shift:  See progress notes        Patient Information    Amy Rater  80 y.o.  10/17/2020 10:43 PM by Dari Staley MD. Amy Rater was admitted from Home    Problem List    Patient Active Problem List    Diagnosis Date Noted    TIA (transient ischemic attack) 10/17/2020    High cholesterol 09/21/2020    Essential hypertension 12/28/2018    Coronary artery disease involving native coronary artery of native heart without angina pectoris 06/12/2018    S/P coronary artery stent placement 05/25/2018    HTN (hypertension) 05/18/2018    Hx of diverticulitis of colon 05/18/2018    Gout      Past Medical History:   Diagnosis Date    Diverticulitis     Gout     HTN (hypertension) 5/18/2018    Hx of diverticulitis of colon 5/18/2018    Hyperlipidemia     Hypertension     Other ill-defined conditions(799.89)     high cholesterol    S/P coronary artery stent placement 5/25/2018         Core Measures:    CVA: Yes Yes  CHF:No No  PNA:No No    Activity Status:    OOB to Chair No  Ambulated this shift Yes   Bed Rest No      DVT prophylaxis:    DVT prophylaxis Med- Yes  DVT prophylaxis SCD or GABO- No     Wounds: (If Applicable)    Wounds- No    Location     Patient Safety:    Falls Score Total Score: 2  Safety Level_______  Bed Alarm On? Yes  Sitter?  No    Plan for upcoming shift: MRI, neuro consult, duplex carotid         Discharge Plan: No; to be determined     Active Consults:  IP CONSULT TO NEUROLOGY  IP CONSULT TO VASCULAR SURGERY

## 2020-10-19 ENCOUNTER — APPOINTMENT (OUTPATIENT)
Dept: MRI IMAGING | Age: 85
End: 2020-10-19
Attending: NURSE PRACTITIONER
Payer: MEDICARE

## 2020-10-19 ENCOUNTER — APPOINTMENT (OUTPATIENT)
Dept: VASCULAR SURGERY | Age: 85
End: 2020-10-19
Attending: NURSE PRACTITIONER
Payer: MEDICARE

## 2020-10-19 VITALS
TEMPERATURE: 98.6 F | DIASTOLIC BLOOD PRESSURE: 74 MMHG | OXYGEN SATURATION: 100 % | HEART RATE: 102 BPM | RESPIRATION RATE: 15 BRPM | HEIGHT: 65 IN | BODY MASS INDEX: 26.36 KG/M2 | WEIGHT: 158.2 LBS | SYSTOLIC BLOOD PRESSURE: 157 MMHG

## 2020-10-19 PROBLEM — I65.23 BILATERAL CAROTID ARTERY STENOSIS: Status: ACTIVE | Noted: 2020-10-19

## 2020-10-19 LAB
ALBUMIN SERPL-MCNC: 3.2 G/DL (ref 3.5–5)
ALBUMIN/GLOB SERPL: 0.9 {RATIO} (ref 1.1–2.2)
ALP SERPL-CCNC: 102 U/L (ref 45–117)
ALT SERPL-CCNC: 17 U/L (ref 12–78)
ANION GAP SERPL CALC-SCNC: 9 MMOL/L (ref 5–15)
AST SERPL-CCNC: 22 U/L (ref 15–37)
BILIRUB SERPL-MCNC: 0.4 MG/DL (ref 0.2–1)
BUN SERPL-MCNC: 17 MG/DL (ref 6–20)
BUN/CREAT SERPL: 16 (ref 12–20)
CALCIUM SERPL-MCNC: 9.5 MG/DL (ref 8.5–10.1)
CHLORIDE SERPL-SCNC: 106 MMOL/L (ref 97–108)
CO2 SERPL-SCNC: 24 MMOL/L (ref 21–32)
CREAT SERPL-MCNC: 1.07 MG/DL (ref 0.55–1.02)
ERYTHROCYTE [DISTWIDTH] IN BLOOD BY AUTOMATED COUNT: 14.1 % (ref 11.5–14.5)
GLOBULIN SER CALC-MCNC: 3.7 G/DL (ref 2–4)
GLUCOSE SERPL-MCNC: 89 MG/DL (ref 65–100)
HCT VFR BLD AUTO: 31.5 % (ref 35–47)
HGB BLD-MCNC: 10.3 G/DL (ref 11.5–16)
LEFT CCA DIST DIAS: 13.7 CM/S
LEFT CCA DIST SYS: 111.3 CM/S
LEFT CCA PROX DIAS: 11.2 CM/S
LEFT CCA PROX SYS: 104.1 CM/S
LEFT ECA DIAS: 10.72 CM/S
LEFT ECA SYS: 142.7 CM/S
LEFT ICA DIST DIAS: 10.2 CM/S
LEFT ICA DIST SYS: 54.4 CM/S
LEFT ICA MID DIAS: 23 CM/S
LEFT ICA MID SYS: 106.6 CM/S
LEFT ICA PROX DIAS: 23 CM/S
LEFT ICA PROX SYS: 150.8 CM/S
LEFT ICA/CCA SYS: 1.35
LEFT SUBCLAVIAN DIAS: 0 CM/S
LEFT SUBCLAVIAN SYS: 109.1 CM/S
LEFT VERTEBRAL DIAS: 12.23 CM/S
LEFT VERTEBRAL SYS: 89.2 CM/S
MCH RBC QN AUTO: 30.3 PG (ref 26–34)
MCHC RBC AUTO-ENTMCNC: 32.7 G/DL (ref 30–36.5)
MCV RBC AUTO: 92.6 FL (ref 80–99)
NRBC # BLD: 0 K/UL (ref 0–0.01)
NRBC BLD-RTO: 0 PER 100 WBC
PLATELET # BLD AUTO: 276 K/UL (ref 150–400)
PMV BLD AUTO: 8.7 FL (ref 8.9–12.9)
POTASSIUM SERPL-SCNC: 3.9 MMOL/L (ref 3.5–5.1)
PROT SERPL-MCNC: 6.9 G/DL (ref 6.4–8.2)
RBC # BLD AUTO: 3.4 M/UL (ref 3.8–5.2)
RIGHT CCA DIST DIAS: 7.7 CM/S
RIGHT CCA DIST SYS: 100.3 CM/S
RIGHT CCA PROX DIAS: 8.6 CM/S
RIGHT CCA PROX SYS: 77.4 CM/S
RIGHT ECA DIAS: 0 CM/S
RIGHT ECA SYS: 173.9 CM/S
RIGHT ICA DIST DIAS: 12.6 CM/S
RIGHT ICA DIST SYS: 60.8 CM/S
RIGHT ICA MID DIAS: 11.4 CM/S
RIGHT ICA MID SYS: 90.4 CM/S
RIGHT ICA PROX DIAS: 30.7 CM/S
RIGHT ICA PROX SYS: 232.7 CM/S
RIGHT ICA/CCA SYS: 2.3
RIGHT SUBCLAVIAN DIAS: 0 CM/S
RIGHT SUBCLAVIAN SYS: 85.2 CM/S
RIGHT VERTEBRAL DIAS: 8.93 CM/S
RIGHT VERTEBRAL SYS: 56.8 CM/S
SODIUM SERPL-SCNC: 139 MMOL/L (ref 136–145)
WBC # BLD AUTO: 6.7 K/UL (ref 3.6–11)

## 2020-10-19 PROCEDURE — 80053 COMPREHEN METABOLIC PANEL: CPT

## 2020-10-19 PROCEDURE — 74011250636 HC RX REV CODE- 250/636: Performed by: NURSE PRACTITIONER

## 2020-10-19 PROCEDURE — 93880 EXTRACRANIAL BILAT STUDY: CPT

## 2020-10-19 PROCEDURE — 85027 COMPLETE CBC AUTOMATED: CPT

## 2020-10-19 PROCEDURE — 93880 EXTRACRANIAL BILAT STUDY: CPT | Performed by: PSYCHIATRY & NEUROLOGY

## 2020-10-19 PROCEDURE — 36415 COLL VENOUS BLD VENIPUNCTURE: CPT

## 2020-10-19 PROCEDURE — 96372 THER/PROPH/DIAG INJ SC/IM: CPT

## 2020-10-19 PROCEDURE — 99226 PR SBSQ OBSERVATION CARE/DAY 35 MINUTES: CPT | Performed by: PSYCHIATRY & NEUROLOGY

## 2020-10-19 PROCEDURE — 99218 HC RM OBSERVATION: CPT

## 2020-10-19 PROCEDURE — 94760 N-INVAS EAR/PLS OXIMETRY 1: CPT

## 2020-10-19 PROCEDURE — 74011250637 HC RX REV CODE- 250/637: Performed by: NURSE PRACTITIONER

## 2020-10-19 PROCEDURE — 70551 MRI BRAIN STEM W/O DYE: CPT

## 2020-10-19 RX ORDER — ATORVASTATIN CALCIUM 80 MG/1
80 TABLET, FILM COATED ORAL
Qty: 30 TAB | Refills: 1 | Status: ON HOLD | OUTPATIENT
Start: 2020-10-19

## 2020-10-19 RX ORDER — ATORVASTATIN CALCIUM 40 MG/1
40 TABLET, FILM COATED ORAL
Status: DISCONTINUED | OUTPATIENT
Start: 2020-10-19 | End: 2020-10-19 | Stop reason: HOSPADM

## 2020-10-19 RX ADMIN — METOPROLOL TARTRATE 12.5 MG: 25 TABLET, FILM COATED ORAL at 10:09

## 2020-10-19 RX ADMIN — ASPIRIN 81 MG: 81 TABLET, COATED ORAL at 10:09

## 2020-10-19 RX ADMIN — ALLOPURINOL 100 MG: 100 TABLET ORAL at 10:09

## 2020-10-19 RX ADMIN — Medication 10 ML: at 05:16

## 2020-10-19 RX ADMIN — ENOXAPARIN SODIUM 40 MG: 40 INJECTION SUBCUTANEOUS at 10:10

## 2020-10-19 RX ADMIN — AMLODIPINE BESYLATE 10 MG: 5 TABLET ORAL at 10:10

## 2020-10-19 RX ADMIN — TIMOLOL MALEATE 1 DROP: 6.8 SOLUTION/ DROPS OPHTHALMIC at 10:12

## 2020-10-19 NOTE — DISCHARGE SUMMARY
Hospitalist Discharge Summary     Patient ID:  John Moore  [id number]  80 y.o.  8/15/1932  10/17/2020    PCP on record: Lamberto Love NP    Admit date: 10/17/2020  Discharge date and time: 10/19/2020    DISCHARGE DIAGNOSIS:  TIA   CKD  carotid artery stenosis   HTN   CONSULTATIONS:  IP CONSULT TO NEUROLOGY  IP CONSULT TO VASCULAR SURGERY    Excerpted HPI from H&P of Lenin Pelaez MD:     HISTORY OF PRESENT ILLNESS:     Basilia Tolbert is a 80 y.o.  female who presents with witnessed transient onset of weakness accompanied by making odd verbal noise as per her niece who called 911, who brought her over to [facility] ED. Head CT was done which was negative for any acute process. Prior to the event, patient was doing grocery shopping and have episode of her headache, went home ad was sitting on the chair with her niece when the symptoms were noted. Patient denies any loss of consciousness, vision changes or any nausea and dizziness. Patient is being work up by her PCP for her persistent headache and was recently transitioned off her Lisinopril. Currently on Amlodipine and Lopressor. She reports her pain comes on a daily basis, with duration ranging from few minutes to almost the whole day. Denies any associated weakness.     We were asked to admit for work up and evaluation of the above problems. ______________________________________________________________________  DISCHARGE SUMMARY/HOSPITAL COURSE:  for full details see H&P, daily progress notes, labs, consult notes. TIA  Recurrent headache    CT head 10/18/2020:   No acute finding, MRI negative , vascular surgery evaluating for carotid artery stenosis. advised She can be D/C'd on Lipitor and ASA  Follow up w/ me in 1-2 wks  Syncope POA  Check orthostatic blood pressure  CAD s/p stent placement 2018 POA  Resume Lipitor, lower dose.   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     Code status: Full  Prophylaxis: Lovenox  Recommended Disposition: Home w/Family          _______________________________________________________________________  Patient seen and examined by me on discharge day. Pertinent Findings:  Gen:    Not in distress  Chest: Clear lungs  CVS:   Regular rhythm. No edema  Abd:  Soft, not distended, not tender  Neuro:  Alert, oriented x3  _______________________________________________________________________  DISCHARGE MEDICATIONS:   Current Discharge Medication List      CONTINUE these medications which have NOT CHANGED    Details   amLODIPine (NORVASC) 10 mg tablet TAKE 1 TABLET BY MOUTH  DAILY  Qty: 30 Tab, Refills: 3    Comments: Please consider 90 day supplies to promote better adherence      metoprolol tartrate (LOPRESSOR) 25 mg tablet Take 0.5 Tabs by mouth every twelve (12) hours. Qty: 90 Tab, Refills: 2      cholecalciferol (VITAMIN D3) 400 unit tab tablet Take 1,000 Units by mouth daily. aspirin delayed-release 81 mg tablet Take 81 mg by mouth daily. timolol (TIMOPTIC-XE) 0.5 % ophthalmic gel-forming Administer 1 Drop to right eye daily. latanoprost (XALATAN) 0.005 % ophthalmic solution Administer 1 Drop to both eyes nightly. fluticasone (FLONASE ALLERGY RELIEF) 50 mcg/actuation nasal spray 1 [place] by Both Nostrils route daily. acetaminophen (TYLENOL EXTRA STRENGTH) 500 mg tablet Take 1,000 mg by mouth every six (6) hours as needed for Pain. fexofenadine (ALLEGRA) 180 mg tablet Take 180 mg by mouth nightly. ALPRAZolam (XANAX) 0.25 mg tablet Take 0.25 mg by mouth daily as needed for Anxiety.       baclofen (LIORESAL) 10 mg tablet TAKE 1 TABLET BY MOUTH THREE TIMES A DAY AS NEEDED FOR MUSCLE SPASMS      docusate sodium (STOOL SOFTENER PO) Take  by mouth as needed. ferrous sulfate (IRON PO) Take 65 mg by mouth daily. atorvastatin (LIPITOR) 80 mg tablet Take 80 mg by mouth nightly. allopurinol (ZYLOPRIM) 100 mg tablet Take 100 mg by mouth daily. STOP taking these medications       tiZANidine (ZANAFLEX) 2 mg tablet Comments:   Reason for Stopping:                 Patient Follow Up Instructions:    Activity: PT/OT per Home Health  Diet: Resume previous diet  Wound Care: None needed    Follow-up with  PCP shortly after discharge and vascular surgery 1-2 weeks   Follow-up tests/labs no labs pending     Follow-up Information     Follow up With Specialties Details Why Contact Bro Aburto NP [place] [phone]          ________________________________________________________________    Risk of deterioration: Moderate    Condition at Discharge:  Stable  __________________________________________________________________    Disposition  Home with family and home health services    ____________________________________________________________________    Code Status: Full Code  ___________________________________________________________________      Total time in minutes spent coordinating this discharge (includes going over instructions, follow-up, prescriptions, and preparing report for sign off to her PCP) :  >30 minutes    Signed:  Emani Miranda MD

## 2020-10-19 NOTE — PROGRESS NOTES
Problem: Falls - Risk of  Goal: *Absence of Falls  Description: Document Rosalina Fatimai Fall Risk and appropriate interventions in the flowsheet.   Outcome: Progressing Towards Goal  Note: Fall Risk Interventions:  Mobility Interventions: Communicate number of staff needed for ambulation/transfer         Medication Interventions: Evaluate medications/consider consulting pharmacy

## 2020-10-19 NOTE — PROGRESS NOTES
Vascular    No c/o  No neuro events  MRI negative  Not a candidate for TCAR due to heavy calcification  I have advised her to have staged CEA w/ left side first  After a very long talk w/ the patient and her daughter, the patient could not make a decision about surgery  She can be D/C'd on Lipitor and ASA  Follow up w/ me in 1-2 wks

## 2020-10-19 NOTE — PROGRESS NOTES
JÚNIOR Plan:  home with follow up appointments   Daughter will transport home    Update: 4:03PM    CM aware of d/c order. Attempted to make PCP appointment, however office unable to schedule appointment and advised that pt must call to schedule it. CM able to make appointment with vascular surgery. See AVS for appointment details. Daughter at bedside and will transport home. No barriers to d/c identified at this time. CM completed needs of pt at this time. Initial note: CM acknowledged pt's observation status. CM reviewed pt's chart. CM attempted to meet with pt at bedside to introduce self and role and to complete assessment. Pt currently off of the floor for testing. Attempt unsuccessful. CM will re-attempt to make contact with pt later today and continue to follow for d/c planning needs. Ignacio Greene, MSW       Update 10:43AM    Pt available for assessment. Met with pt and daughter, Florencia Tomas ([phone]), at bedside this AM to introduce self and role. Assessment completed on this day. Pt verified home address and demographics. Pt resides in a one level home with 3 to 4 steps to enter, however she has ramp access to enter the home. Her other daughter, Moni Junior, lives in the home with her. Pt reports being independent with ADLs at baseline and is ambulatory without a device. She does not drive and family can assist with transportation upon d/c. Pt known to be active with PCP, Rah Gannon, with last visit about two months ago. Preferred pharmacy is [facility] in [place], [place]. Pt anticipates returning home with family upon d/c. Pt would benefit from PT/OT evaluations to assist with determination of needs for d/c planning. CM will continue to follow for d/c planning needs. Care Management Interventions  PCP Verified by CM: Deanna Castle NP )  Last Visit to PCP: 08/19/20  Mode of Transport at Discharge:  Other (see comment)(family to transport home at d/c )  Transition of Care Consult (CM Consult): Discharge Planning  Discharge Durable Medical Equipment: No  Physical Therapy Consult: No  Occupational Therapy Consult: No  Speech Therapy Consult: No  Current Support Network: Relative's Home(lives with daughter, Hollis Wylie )  Confirm Follow Up Transport: Family  Discharge Location  Discharge Placement: Home with family assistance    BENITO Madison

## 2020-10-19 NOTE — PROGRESS NOTES
Bedside and Verbal shift change report given to Veda (oncoming nurse) by Vladimir Sal (offgoing nurse). Report included the following information SBAR, ED Summary, MAR, Recent Results and Cardiac Rhythm normal sinus. Problem: Falls - Risk of  Goal: *Absence of Falls  Description: Document Nicolas Bonilla Fall Risk and appropriate interventions in the flowsheet.   Outcome: Progressing Towards Goal  Note: Fall Risk Interventions:  Mobility Interventions: Communicate number of staff needed for ambulation/transfer     Medication Interventions: Evaluate medications/consider consulting pharmacy     Problem: Ischemic Stroke: Discharge Outcomes  Goal: *Hemodynamically stable  Outcome: Progressing Towards Goal  Goal: *Tolerating diet  Outcome: Progressing Towards Goal  Goal: *Ability to perform ADLs and demonstrates progressive mobility and function  Outcome: Progressing Towards Goal  Goal: *Absence of aspiration  Outcome: Progressing Towards Goal  Goal: *Neurolgocially stable (absence of additional neurological deficits)  Outcome: Progressing Towards Goal

## 2020-10-19 NOTE — PROGRESS NOTES
Chief Complaint: Syncope    Patient was laying in bed asleep. She has no complaints. We discussed the findings on the CTA and the risk for stroke in view of these findings. Discussed the prospect of surgery and the risk factors involved. Encourage the patient to strongly. Assesment and Plan  1. Syncope  Hold the tizanidine  Take orthostatic pressures  CT normal      2. Essential hypertension  Continue amlodipine     3. Hyperlipidemia  Atorvastatin held because of elevated CK   She has been off it for 30 days.  Repeat CK level     4. Headaches  continue tylenol as needed     5. 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  MRI pending       Allergies  Aspirin and Lisinopril     Medications  Current Facility-Administered Medications   Medication Dose Route Frequency    sodium chloride (NS) flush 5-40 mL  5-40 mL IntraVENous Q8H    sodium chloride (NS) flush 5-40 mL  5-40 mL IntraVENous PRN    polyethylene glycol (MIRALAX) packet 17 g  17 g Oral DAILY PRN    promethazine (PHENERGAN) tablet 12.5 mg  12.5 mg Oral Q6H PRN    Or    ondansetron (ZOFRAN) injection 4 mg  4 mg IntraVENous Q6H PRN    enoxaparin (LOVENOX) injection 40 mg  40 mg SubCUTAneous DAILY    allopurinoL (ZYLOPRIM) tablet 100 mg  100 mg Oral DAILY    ALPRAZolam (XANAX) tablet 0.25 mg  0.25 mg Oral DAILY PRN    amLODIPine (NORVASC) tablet 10 mg  10 mg Oral DAILY    aspirin delayed-release tablet 81 mg  81 mg Oral DAILY    latanoprost (XALATAN) 0.005 % ophthalmic solution 1 Drop  1 Drop Both Eyes QHS    metoprolol tartrate (LOPRESSOR) tablet 12.5 mg  12.5 mg Oral Q12H    tiZANidine (ZANAFLEX) tablet 2 mg  2 mg Oral Q8H PRN    timoloL maleate 0.5 % ophthalmic solution 1 Drop  1 Drop Right Eye BID    acetaminophen (TYLENOL) tablet 650 mg  650 mg Oral Q4H PRN    Or    acetaminophen (TYLENOL) solution 650 mg  650 mg Per NG tube Q4H PRN Or    acetaminophen (TYLENOL) suppository 650 mg  650 mg Rectal Q4H PRN      Review of Systems   Eyes: Negative for blurred vision and double vision. Respiratory: Negative for cough and shortness of breath. Cardiovascular: Negative for chest pain, palpitations and orthopnea. Gastrointestinal: Negative for nausea and vomiting. Neurological: Positive for headaches. Negative for dizziness. Psychiatric/Behavioral: Negative for depression. The patient is not nervous/anxious. Medical History  Past Medical History:   Diagnosis Date    Diverticulitis     Gout     HTN (hypertension) 5/18/2018    Hx of diverticulitis of colon 5/18/2018    Hyperlipidemia     Hypertension     Other ill-defined conditions(799.89)     high cholesterol    S/P coronary artery stent placement 5/25/2018             Exam:    Visit Vitals  /61   Pulse 65   Temp 98 °F (36.7 °C)   Resp 16   Ht 5' 4.8\" (1.646 m)   Wt 158 lb 3.2 oz (71.8 kg)   SpO2 99%   BMI 26.49 kg/m²        General: Well developed, well nourished. Patient in no apparent distress   Head: Normocephalic, atraumatic, anicteric sclera   Neck Normal ROM, No thyromegally   Cardiac: Regular rate and rhythm   Ext: No pedal edema   Skin: Supple no rash     NeurologicExam:  Mental Status: Alert and oriented to person place and time   Speech: Fluent no aphasia or dysarthria. Cranial Nerves:  II - XII Intact   Motor:  Full and symmetric strength of upper and lower ext. Normal bulk and tone. Sensory:   Symmetric and intact    Gait:  Gait is balanced and fluid with normal arm swing. Tremor:   No tremor noted. Cerebellar:  Coordination intact.          Lab Review  Lab Results   Component Value Date/Time    WBC 6.7 10/19/2020 05:15 AM    HCT 31.5 (L) 10/19/2020 05:15 AM    HGB 10.3 (L) 10/19/2020 05:15 AM    PLATELET 343 10/80/2489 05:15 AM       Lab Results   Component Value Date/Time    Sodium 139 10/19/2020 05:15 AM    Potassium 3.9 10/19/2020 05:15 AM Chloride 106 10/19/2020 05:15 AM    CO2 24 10/19/2020 05:15 AM    Glucose 89 10/19/2020 05:15 AM    BUN 17 10/19/2020 05:15 AM    Creatinine 1.07 (H) 10/19/2020 05:15 AM    Calcium 9.5 10/19/2020 05:15 AM           Lab Results   Component Value Date/Time    Hemoglobin A1c 5.8 (H) 10/18/2020 12:03 AM          Lab Results   Component Value Date/Time    Cholesterol, total 249 (H) 10/18/2020 12:23 AM    HDL Cholesterol 61 10/18/2020 12:23 AM    LDL, calculated 146.4 (H) 10/18/2020 12:23 AM    VLDL, calculated 41.6 10/18/2020 12:23 AM    Triglyceride 208 (H) 10/18/2020 12:23 AM    CHOL/HDL Ratio 4.1 10/18/2020 12:23 AM

## 2020-10-19 NOTE — PROGRESS NOTES
Hospitalist Progress Note    NAME: Julee Lucio   :  8/15/1932   MRN:  [id number]     I reviewed pertinent labs and imaging, and discussed /agreed on the plan of care with Dr. Justyna Powell. Assessment / Plan:  TIA  Recurrent headache    CT head 10/18/2020:   No acute finding, MRI pending, vascular surgery evaluating for carotid artery stenosis. Syncope POA  Check orthostatic blood pressure  CAD s/p stent placement  POA  Resume Lipitor, lower dose. 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 Code status: Full  Prophylaxis: Lovenox  Recommended Disposition: Home w/Family     Subjective:     Chief Complaint / Reason for Physician Visit  Patient denies any new weakness numbness. Ambulating with no difficulties. Discussed with RN events overnight. Review of Systems:  Symptom Y/N Comments  Symptom Y/N Comments   Fever/Chills n   Chest Pain n    Poor Appetite n   Edema     Cough n   Abdominal Pain     Sputum n   Joint Pain     SOB/RAMSEY n   Pruritis/Rash n    Nausea/vomit n   Tolerating PT/OT     Diarrhea n   Tolerating Diet y    Constipation n   Other       Could NOT obtain due to:      Objective:     VITALS:   Last 24hrs VS reviewed since prior progress note.  Most recent are:  Patient Vitals for the past 24 hrs:   Temp Pulse Resp BP SpO2   10/19/20 1146 98.3 °F (36.8 °C) 75 16 (!) 143/66 99 %   10/19/20 0641 98 °F (36.7 °C) 65 16 131/61 99 %   10/19/20 0241 97.9 °F (36.6 °C) 65 16 (!) 159/50 100 %   10/18/20 2236 98.1 °F (36.7 °C) 67 14 (!) 139/46 100 %   10/18/20 1841 97.2 °F (36.2 °C) 70 12 (!) 148/50 99 %   10/18/20 1528 98.5 °F (36.9 °C) 73 12 (!) 136/52 100 %     No intake or output data in the 24 hours ending 10/19/20 6347     PHYSICAL EXAM:  General:  Alert, cooperative, no acute distress    EENT:   Anicteric sclerae. normocephalic  Resp:  CTA bilaterally, no wheezing or rales. No accessory muscle use  CV:  Regular  rhythm,  No edema  GI:  Soft, Non distended, Non tender.  +Bowel sounds  Neurologic:  Alert and oriented X 3, normal speech,   Psych:   Fair insight. Not anxious nor agitated  Skin:  No rashes. No jaundice    Reviewed most current lab test results and cultures  YES  Reviewed most current radiology test results   YES  Review and summation of old records today    NO  Reviewed patient's current orders and MAR    YES  PMH/SH reviewed - no change compared to H&P  ________________________________________________________________________  Care Plan discussed with:    Comments   Patient y    Family      RN y    Care Manager     Consultant                        Multidiciplinary team rounds were held today with , nursing, pharmacist and clinical coordinator. Patient's plan of care was discussed; medications were reviewed and discharge planning was addressed. ________________________________________________________________________    ________________________________________________________________________  Lydia Mendes MD     Procedures: see electronic medical records for all procedures/Xrays and details which were not copied into this note but were reviewed prior to creation of Plan. LABS:  I reviewed today's most current labs and imaging studies.   Pertinent labs include:  Recent Labs     10/19/20  0515 10/18/20  0023   WBC 6.7 7.1   HGB 10.3* 11.1*   HCT 31.5* 33.0*    303     Recent Labs     10/19/20  0515 10/18/20  0023    136   K 3.9 4.2    105   CO2 24 24   GLU 89 138*   BUN 17 17   CREA 1.07* 1.15*   CA 9.5 9.4   ALB 3.2* 3.6   TBILI 0.4 0.2   ALT 17 20       Signed: Lydia Mendes MD

## 2020-10-19 NOTE — CONSULTS
Vascular Surgery Consult Note  10/19/2020    Subjective:     Beulah Dial is a 80 y.o.  female with a pmhx significant for HTN, HLD, and ASHD. She is admitted to the hospital with TIA after presenting w/ a general weakness and abnl speech. She has had no further events during admission and her MRI of the brain is negative for acute CVA. Her  CTA on arrival was significant for bilateral carotid stenosis of 80% and were have been asked to evaluate. Past Medical History:   Diagnosis Date    Diverticulitis     Gout     HTN (hypertension) 5/18/2018    Hx of diverticulitis of colon 5/18/2018    Hyperlipidemia     Hypertension     Other ill-defined conditions(799.89)     high cholesterol    S/P coronary artery stent placement 5/25/2018      Past Surgical History:   Procedure Laterality Date    HX HYSTERECTOMY      MS COLONOSCOPY FLX DX W/COLLJ SPEC WHEN PFRMD  10/18/2013          Family History   Problem Relation Age of Onset    Stroke Mother     Diabetes Sister       Social History     Tobacco Use    Smoking status: Never Smoker    Smokeless tobacco: Never Used   Substance Use Topics    Alcohol use: No       Prior to Admission medications    Medication Sig Start Date End Date Taking? Authorizing Provider   atorvastatin (LIPITOR) 80 mg tablet Take 1 Tab by mouth nightly. 10/19/20  Yes Mark Nunez MD   amLODIPine (NORVASC) 10 mg tablet TAKE 1 TABLET BY MOUTH  DAILY 4/10/19  Yes Syeda Floyd NP   metoprolol tartrate (LOPRESSOR) 25 mg tablet Take 0.5 Tabs by mouth every twelve (12) hours. 3/13/19  Yes Jaleesa Thorne MD   cholecalciferol (VITAMIN D3) 400 unit tab tablet Take 1,000 Units by mouth daily. Yes Provider, Historical   aspirin delayed-release 81 mg tablet Take 81 mg by mouth daily. Yes Provider, Historical   timolol (TIMOPTIC-XE) 0.5 % ophthalmic gel-forming Administer 1 Drop to right eye daily.    Yes Provider, Historical   latanoprost (XALATAN) 0.005 % ophthalmic solution Administer 1 Drop to both eyes nightly. Yes Provider, Historical   fluticasone (FLONASE ALLERGY RELIEF) 50 mcg/actuation nasal spray 1 [place] by Both Nostrils route daily. Yes Provider, Historical   acetaminophen (TYLENOL EXTRA STRENGTH) 500 mg tablet Take 1,000 mg by mouth every six (6) hours as needed for Pain. Yes Provider, Historical   fexofenadine (ALLEGRA) 180 mg tablet Take 180 mg by mouth nightly. Yes Other, MD Papo   ALPRAZolam (XANAX) 0.25 mg tablet Take 0.25 mg by mouth daily as needed for Anxiety. Yes Other, MD Papo   baclofen (LIORESAL) 10 mg tablet TAKE 1 TABLET BY MOUTH THREE TIMES A DAY AS NEEDED FOR MUSCLE SPASMS 9/11/20   Provider, Historical   tiZANidine (ZANAFLEX) 2 mg tablet TAKE 1 TABLET (2 MG TOTAL) BY MOUTH EVERY 8 (EIGHT) HOURS AS NEEDED FOR MUSCLE SPASMS 9/3/20   Provider, Historical   docusate sodium (STOOL SOFTENER PO) Take  by mouth as needed. Provider, Historical   ferrous sulfate (IRON PO) Take 65 mg by mouth daily. Provider, Historical   allopurinol (ZYLOPRIM) 100 mg tablet Take 100 mg by mouth daily. Other, MD Papo     Allergies   Allergen Reactions    Aspirin Nausea and Vomiting     Pt reports that she takes 81 mg ASA daily    Lisinopril Cough      Review of Systems   Constitutional: Positive for activity change. Negative for chills, fatigue and fever. Eyes: Negative for visual disturbance. Respiratory: Negative for cough, chest tightness and shortness of breath. Cardiovascular: Negative for chest pain and leg swelling. Gastrointestinal: Negative for nausea and vomiting. Endocrine: Negative for polydipsia and polyuria. Genitourinary: Negative. Musculoskeletal: Negative for gait problem. Skin: Negative for color change and wound. Allergic/Immunologic: Negative for immunocompromised state. Neurological: Positive for speech difficulty and weakness. Hematological: Negative. Psychiatric/Behavioral: Negative. Objective:       Patient Vitals for the past 24 hrs:   BP Temp Pulse Resp SpO2   10/19/20 1544 (!) 157/74  (!) 102  100 %   10/19/20 1541 (!) 169/74  89  99 %   10/19/20 1527 (!) 159/77 98.6 °F (37 °C) 88 15 98 %   10/19/20 1146 (!) 143/66 98.3 °F (36.8 °C) 75 16 99 %   10/19/20 0641 131/61 98 °F (36.7 °C) 65 16 99 %   10/19/20 0241 (!) 159/50 97.9 °F (36.6 °C) 65 16 100 %   10/18/20 2236 (!) 139/46 98.1 °F (36.7 °C) 67 14 100 %   10/18/20 1841 (!) 148/50 97.2 °F (36.2 °C) 70 12 99 %     Physical Exam  Constitutional:       Comments: Pleasantly confused AAF in NAD    HENT:      Head: Normocephalic. Nose: Nose normal.      Mouth/Throat:      Mouth: Mucous membranes are moist.   Eyes:      Pupils: Pupils are equal, round, and reactive to light. Neck:      Musculoskeletal: Normal range of motion. Vascular: Carotid bruit (Bilateral ) present. Cardiovascular:      Rate and Rhythm: Normal rate and regular rhythm. Pulmonary:      Effort: Pulmonary effort is normal.      Breath sounds: Normal breath sounds. Abdominal:      General: Abdomen is flat. Palpations: Abdomen is soft. Musculoskeletal: Normal range of motion. Skin:     General: Skin is warm. Neurological:      General: No focal deficit present. Mental Status: She is alert. Mental status is at baseline. Psychiatric:         Mood and Affect: Mood normal.         Thought Content: Thought content normal.         Cognition and Memory: Cognition is impaired. Memory is impaired.        Pertinent Test Results:   Recent Results (from the past 24 hour(s))   CBC W/O DIFF    Collection Time: 10/19/20  5:15 AM   Result Value Ref Range    WBC 6.7 3.6 - 11.0 K/uL    RBC 3.40 (L) 3.80 - 5.20 M/uL    HGB 10.3 (L) 11.5 - 16.0 g/dL    HCT 31.5 (L) 35.0 - 47.0 %    MCV 92.6 80.0 - 99.0 FL    MCH 30.3 26.0 - 34.0 PG    MCHC 32.7 30.0 - 36.5 g/dL    RDW 14.1 11.5 - 14.5 %    PLATELET 840 341 - 659 K/uL    MPV 8.7 (L) 8.9 - 12.9 FL    NRBC 0.0 0  WBC    ABSOLUTE NRBC 0.00 0.00 - 0.12 K/uL   METABOLIC PANEL, COMPREHENSIVE    Collection Time: 10/19/20  5:15 AM   Result Value Ref Range    Sodium 139 136 - 145 mmol/L    Potassium 3.9 3.5 - 5.1 mmol/L    Chloride 106 97 - 108 mmol/L    CO2 24 21 - 32 mmol/L    Anion gap 9 5 - 15 mmol/L    Glucose 89 65 - 100 mg/dL    BUN 17 6 - 20 MG/DL    Creatinine 1.07 (H) 0.55 - 1.02 MG/DL    BUN/Creatinine ratio 16 12 - 20      GFR est AA 59 (L) >60 ml/min/1.73m2    GFR est non-AA 48 (L) >60 ml/min/1.73m2    Calcium 9.5 8.5 - 10.1 MG/DL    Bilirubin, total 0.4 0.2 - 1.0 MG/DL    ALT (SGPT) 17 12 - 78 U/L    AST (SGOT) 22 15 - 37 U/L    Alk.  phosphatase 102 45 - 117 U/L    Protein, total 6.9 6.4 - 8.2 g/dL    Albumin 3.2 (L) 3.5 - 5.0 g/dL    Globulin 3.7 2.0 - 4.0 g/dL    A-G Ratio 0.9 (L) 1.1 - 2.2     DUPLEX CAROTID BILATERAL    Collection Time: 10/19/20  8:50 AM   Result Value Ref Range    Right subclavian sys 85.2 cm/s    RIGHT SUBCLAVIAN ARTERY D 0.00 cm/s    Right cca dist sys 100.3 cm/s    Right CCA dist alejandre 7.7 cm/s    Right CCA prox sys 77.4 cm/s    Right CCA prox alejandre 8.6 cm/s    Right ICA dist sys 60.8 cm/s    Right ICA dist alejandre 12.6 cm/s    Right ICA mid sys 90.4 cm/s    Right ICA mid alejandre 11.4 cm/s    Right ICA prox sys 232.7 cm/s    Right ICA prox alejandre 30.7 cm/s    Right eca sys 173.9 cm/s    RIGHT EXTERNAL CAROTID ARTERY D 0.00 cm/s    Right vertebral sys 56.8 cm/s    RIGHT VERTEBRAL ARTERY D 8.93 cm/s    Right ICA/CCA sys 2.3     Left subclavian sys 109.1 cm/s    LEFT SUBCLAVIAN ARTERY D 0.00 cm/s    Left CCA dist sys 111.3 cm/s    Left CCA dist alejandre 13.7 cm/s    Left CCA prox sys 104.1 cm/s    Left CCA prox alejandre 11.2 cm/s    Left ICA dist sys 54.4 cm/s    Left ICA dist alejandre 10.2 cm/s    Left ICA mid sys 106.6 cm/s    Left ICA mid alejandre 23.0 cm/s    Left ICA prox sys 150.8 cm/s    Left ICA prox alejandre 23.0 cm/s    Left ECA sys 142.7 cm/s    LEFT EXTERNAL CAROTID ARTERY D 10.72 cm/s    Left vertebral sys 89.2 cm/s    LEFT VERTEBRAL ARTERY D 12.23 cm/s    Left ICA/CCA sys 1.35        Assessmen/Plan:     Consult problem  Symptomatic severe carotid stenosis   · Bilateral 80%  · Not a candidate for TCAR due to heavy calcification  · I have advised her to have staged CEA w/ left side first  · Dr. Luis Ji had a very long talk w/ the patient and her daughter, the patient could not make a decision about surgery  · She can be D/C'd on Lipitor and ASA  · Close follow in the clinic in 1-2 weeks     Active problems  Hypertension  Hyperlipidemia  · .4  ASHD  Anemia  Newly diagnoses prediabetes  · HA1c 5.8  Management of comorbid conditions by primary team.    VTE Prophylaxis:  LMWH     Disposition:  Likely home     Vascular surgery signing off. We appreciate the opportunity to participate in the care of Ms. Mills. Patient should f/u in 1-2 weeks with Dr. Luis Ji. Please reconsult as needed.         Signed By: Rula Sanches NP     October 19, 2020

## 2020-10-19 NOTE — PROGRESS NOTES
Pt was given discharge instructions and informed where to  prescription. Pt refused wheelchair and ambulated to the front of the hospital with daughter and discharged home.

## 2020-10-21 LAB — CK SERPL-CCNC: 133 U/L (ref 26–161)

## 2020-10-29 ENCOUNTER — TELEPHONE (OUTPATIENT)
Dept: CARDIOLOGY CLINIC | Age: 85
End: 2020-10-29

## 2020-10-29 ENCOUNTER — HOSPITAL ENCOUNTER (OUTPATIENT)
Dept: PREADMISSION TESTING | Age: 85
Discharge: HOME | End: 2020-10-29
Attending: SURGERY
Payer: MEDICARE

## 2020-10-29 VITALS
SYSTOLIC BLOOD PRESSURE: 116 MMHG | HEART RATE: 66 BPM | HEIGHT: 64 IN | RESPIRATION RATE: 16 BRPM | WEIGHT: 151.68 LBS | OXYGEN SATURATION: 99 % | DIASTOLIC BLOOD PRESSURE: 63 MMHG | TEMPERATURE: 98 F | BODY MASS INDEX: 25.89 KG/M2

## 2020-10-29 LAB
ABO + RH BLD: NORMAL
APPEARANCE UR: CLEAR
APTT PPP: 24.8 SEC (ref 22.1–32)
BACTERIA URNS QL MICRO: ABNORMAL /HPF
BILIRUB UR QL: NEGATIVE
BLOOD GROUP ANTIBODIES SERPL: NORMAL
COLOR UR: ABNORMAL
COMMENT, HOLDF: NORMAL
EPITH CASTS URNS QL MICRO: ABNORMAL /LPF
ERYTHROCYTE [DISTWIDTH] IN BLOOD BY AUTOMATED COUNT: 14.1 % (ref 11.5–14.5)
GLUCOSE UR STRIP.AUTO-MCNC: NEGATIVE MG/DL
HCT VFR BLD AUTO: 33 % (ref 35–47)
HGB BLD-MCNC: 10.7 G/DL (ref 11.5–16)
HGB UR QL STRIP: NEGATIVE
HYALINE CASTS URNS QL MICRO: ABNORMAL /LPF (ref 0–5)
INR PPP: 1 (ref 0.9–1.1)
KETONES UR QL STRIP.AUTO: NEGATIVE MG/DL
LEUKOCYTE ESTERASE UR QL STRIP.AUTO: ABNORMAL
MCH RBC QN AUTO: 30.4 PG (ref 26–34)
MCHC RBC AUTO-ENTMCNC: 32.4 G/DL (ref 30–36.5)
MCV RBC AUTO: 93.8 FL (ref 80–99)
NITRITE UR QL STRIP.AUTO: NEGATIVE
NRBC # BLD: 0 K/UL (ref 0–0.01)
NRBC BLD-RTO: 0 PER 100 WBC
PH UR STRIP: 5 [PH] (ref 5–8)
PLATELET # BLD AUTO: 319 K/UL (ref 150–400)
PMV BLD AUTO: 8.6 FL (ref 8.9–12.9)
PROT UR STRIP-MCNC: NEGATIVE MG/DL
PROTHROMBIN TIME: 10.3 SEC (ref 9–11.1)
RBC # BLD AUTO: 3.52 M/UL (ref 3.8–5.2)
RBC #/AREA URNS HPF: ABNORMAL /HPF (ref 0–5)
SAMPLES BEING HELD,HOLD: NORMAL
SP GR UR REFRACTOMETRY: 1.02 (ref 1–1.03)
SPECIMEN EXP DATE BLD: NORMAL
THERAPEUTIC RANGE,PTTT: NORMAL SECS (ref 58–77)
UA: UC IF INDICATED,UAUC: ABNORMAL
UROBILINOGEN UR QL STRIP.AUTO: 0.2 EU/DL (ref 0.2–1)
WBC # BLD AUTO: 5.9 K/UL (ref 3.6–11)
WBC URNS QL MICRO: ABNORMAL /HPF (ref 0–4)

## 2020-10-29 PROCEDURE — 85610 PROTHROMBIN TIME: CPT

## 2020-10-29 PROCEDURE — 36415 COLL VENOUS BLD VENIPUNCTURE: CPT

## 2020-10-29 PROCEDURE — 85027 COMPLETE CBC AUTOMATED: CPT

## 2020-10-29 PROCEDURE — 86900 BLOOD TYPING SEROLOGIC ABO: CPT

## 2020-10-29 PROCEDURE — 81001 URINALYSIS AUTO W/SCOPE: CPT

## 2020-10-29 PROCEDURE — 85730 THROMBOPLASTIN TIME PARTIAL: CPT

## 2020-10-29 PROCEDURE — 87635 SARS-COV-2 COVID-19 AMP PRB: CPT

## 2020-10-29 RX ORDER — POLYETHYLENE GLYCOL 3350 17 G/17G
17 POWDER, FOR SOLUTION ORAL AS NEEDED
Status: ON HOLD | COMMUNITY

## 2020-10-29 RX ORDER — SODIUM CHLORIDE, SODIUM LACTATE, POTASSIUM CHLORIDE, CALCIUM CHLORIDE 600; 310; 30; 20 MG/100ML; MG/100ML; MG/100ML; MG/100ML
25 INJECTION, SOLUTION INTRAVENOUS CONTINUOUS
Status: CANCELLED | OUTPATIENT
Start: 2020-11-02

## 2020-10-29 RX ORDER — ASCORBIC ACID 500 MG
TABLET ORAL
COMMUNITY
End: 2020-12-03

## 2020-10-29 RX ORDER — LOSARTAN POTASSIUM 25 MG/1
50 TABLET ORAL DAILY
COMMUNITY
End: 2022-10-29

## 2020-10-29 NOTE — PERIOP NOTES
Incentive Spirometer        Using the incentive spirometer helps expand the small air sacs of your lungs, helps you breathe deeply, and helps improve your lung function. Use your incentive spirometer twice a day (10 breaths each time) prior to surgery. How to Use Your Incentive Spirometer:  1. Hold the incentive spirometer in an upright position. 2. Breathe out as usual.   3. Place the mouthpiece in your mouth and seal your lips tightly around it. 4. Take a deep breath. Breathe in slowly and as deeply as possible. Keep the blue flow rate guide between the arrows. 5. Hold your breath as long as possible. Then exhale slowly and allow the piston to fall to the bottom of the column. 6. Rest for a few seconds and repeat steps one through five at least 10 times. PAT Tidal Volume____1500___________  x______2__________  Date__10/29/2020_____    Estrella Jubilee THE INCENTIVE SPIROMETER WITH YOU TO THE HOSPITAL ON THE DAY OF YOUR SURGERY. Opportunity given to ask and answer questions as well as to observe return demonstration.     Patient signature_____________________________          Witness____________________________

## 2020-10-29 NOTE — TELEPHONE ENCOUNTER
INFORMATIONAL PURPOSES ONLY     Patient having surgery on 11/2/20 Left Carotid Endarterectomy.     Lucy Chavarria

## 2020-10-29 NOTE — PERIOP NOTES
[facility]  Preoperative Instructions    Surgery Date 11/2/2020          Time of Arrival 1000  Contact # [phone] home    1. On the day of your surgery, please report to the Surgical Services Registration Desk and sign in at your designated time. The Surgery Center is located to the right of the Emergency Room. 2. You must have someone with you to drive you home. You should not drive a car for 24 hours following surgery. Please make arrangements for a friend or family member to stay with you for the first 24 hours after your surgery. 3. Do not have anything to eat or drink (including water, gum, mints, coffee, juice) after midnight 11/1/2020. ? This may not apply to medications prescribed by your physician. ?(Please note below the special instructions with medications to take the morning of your procedure.)    4. We recommend you do not drink any alcoholic beverages for 24 hours before and after your surgery. 5. Contact your surgeons office for instructions on the following medications: non-steroidal anti-inflammatory drugs (i.e. Advil, Aleve), vitamins, and supplements. (Some surgeons will want you to stop these medications prior to surgery and others may allow you to take them)  **If you are currently taking Plavix, Coumadin, Aspirin and/or other blood-thinning agents, contact your surgeon for instructions. ** Your surgeon will partner with the physician prescribing these medications to determine if it is safe to stop or if you need to continue taking. Please do not stop taking these medications without instructions from your surgeon    6. Wear comfortable clothes. Wear glasses instead of contacts. Do not bring any money or jewelry. Please bring picture ID, insurance card, and any prearranged co-payment or hospital payment. Do not wear make-up, particularly mascara the morning of your surgery. Do not wear nail polish, particularly if you are having foot /hand surgery. Wear your hair loose or down, no ponytails, buns, mehnaz pins or clips. All body piercings must be removed. Please shower with antibacterial soap for three consecutive days before and on the morning of surgery, but do not apply any lotions, powders or deodorants after the shower on the day of surgery. Please use a fresh towels after each shower. Please sleep in clean clothes and change bed linens the night before surgery. Please do not shave for 48 hours prior to surgery. Shaving of the face is acceptable. 7. You should understand that if you do not follow these instructions your surgery may be cancelled. If your physical condition changes (I.e. fever, cold or flu) please contact your surgeon as soon as possible. 8. It is important that you be on time. If a situation occurs where you may be late, please call [phone] (OR Holding Area). 9. If you have any questions and or problems, please call [phone] (Pre-admission Testing). 10. Your surgery time may be subject to change. You will receive a phone call the evening prior if your time changes. 11.  If having outpatient surgery, you must have someone to drive you here, stay with you during the duration of your stay, and to drive you home at time of discharge. Special Instructions:   Patient advised to self-quarantine after Covid test and up to day of surgery. Practice incentive spirometer per instructions and bring to hospital day of surgery. TAKE ALL MEDICATIONS DAY OF SURGERY EXCEPT: Losartan      I understand a pre-operative phone call will be made to verify my surgery time. In the event that I am not available, I give permission for a message to be left on my answering service and/or with another person?   yes         ___________________       __________   10/29/2020 @ 1120    (Signature of Patient)             (Witness)                (Date and Time)

## 2020-10-30 ENCOUNTER — ANESTHESIA EVENT (OUTPATIENT)
Dept: SURGERY | Age: 85
DRG: 039 | End: 2020-10-30
Payer: MEDICARE

## 2020-10-30 LAB
HEALTH STATUS, XMCV2T: NORMAL
SARS-COV-2, COV2NT: NOT DETECTED
SOURCE, COVRS: NORMAL
SPECIMEN SOURCE, FCOV2M: NORMAL
SPECIMEN TYPE, XMCV1T: NORMAL

## 2020-11-02 ENCOUNTER — ANESTHESIA (OUTPATIENT)
Dept: SURGERY | Age: 85
DRG: 039 | End: 2020-11-02
Payer: MEDICARE

## 2020-11-02 ENCOUNTER — HOSPITAL ENCOUNTER (INPATIENT)
Age: 85
LOS: 1 days | Discharge: HOME | DRG: 039 | End: 2020-11-03
Attending: SURGERY | Admitting: SURGERY
Payer: MEDICARE

## 2020-11-02 DIAGNOSIS — I65.23 BILATERAL CAROTID ARTERY STENOSIS: Primary | ICD-10-CM

## 2020-11-02 PROBLEM — I65.22 CAROTID STENOSIS, SYMPTOMATIC W/O INFARCT, LEFT: Status: ACTIVE | Noted: 2020-11-02

## 2020-11-02 PROCEDURE — 77030008684 HC TU ET CUF COVD -B: Performed by: ANESTHESIOLOGY

## 2020-11-02 PROCEDURE — 77030005401 HC CATH RAD ARRO -A: Performed by: ANESTHESIOLOGY

## 2020-11-02 PROCEDURE — 77030026438 HC STYL ET INTUB CARD -A: Performed by: ANESTHESIOLOGY

## 2020-11-02 PROCEDURE — 76210000016 HC OR PH I REC 1 TO 1.5 HR: Performed by: SURGERY

## 2020-11-02 PROCEDURE — 74011000250 HC RX REV CODE- 250: Performed by: SURGERY

## 2020-11-02 PROCEDURE — 77030006689 HC BLD OPHTH BVR BD -A: Performed by: SURGERY

## 2020-11-02 PROCEDURE — 77030002986 HC SUT PROL J&J -A: Performed by: SURGERY

## 2020-11-02 PROCEDURE — 74011000250 HC RX REV CODE- 250: Performed by: NURSE ANESTHETIST, CERTIFIED REGISTERED

## 2020-11-02 PROCEDURE — 74011250637 HC RX REV CODE- 250/637: Performed by: ANESTHESIOLOGY

## 2020-11-02 PROCEDURE — 77030002916 HC SUT ETHLN J&J -A: Performed by: SURGERY

## 2020-11-02 PROCEDURE — 07B20ZX EXCISION OF LEFT NECK LYMPHATIC, OPEN APPROACH, DIAGNOSTIC: ICD-10-PCS | Performed by: SURGERY

## 2020-11-02 PROCEDURE — 77030002987 HC SUT PROL J&J -B: Performed by: SURGERY

## 2020-11-02 PROCEDURE — 74011250636 HC RX REV CODE- 250/636: Performed by: ANESTHESIOLOGY

## 2020-11-02 PROCEDURE — 77030010516 HC APPL HEMA CLP TELE -B: Performed by: SURGERY

## 2020-11-02 PROCEDURE — 03CL0ZZ EXTIRPATION OF MATTER FROM LEFT INTERNAL CAROTID ARTERY, OPEN APPROACH: ICD-10-PCS | Performed by: SURGERY

## 2020-11-02 PROCEDURE — 77030031139 HC SUT VCRL2 J&J -A: Performed by: SURGERY

## 2020-11-02 PROCEDURE — 77030038692 HC WND DEB SYS IRMX -B: Performed by: SURGERY

## 2020-11-02 PROCEDURE — 76060000038 HC ANESTHESIA 3.5 TO 4 HR: Performed by: SURGERY

## 2020-11-02 PROCEDURE — 74011250636 HC RX REV CODE- 250/636: Performed by: SURGERY

## 2020-11-02 PROCEDURE — 2709999900 HC NON-CHARGEABLE SUPPLY: Performed by: SURGERY

## 2020-11-02 PROCEDURE — 74011250637 HC RX REV CODE- 250/637: Performed by: SURGERY

## 2020-11-02 PROCEDURE — 77030020061 HC IV BLD WRMR ADMIN SET 3M -B: Performed by: ANESTHESIOLOGY

## 2020-11-02 PROCEDURE — 77030002933 HC SUT MCRYL J&J -A: Performed by: SURGERY

## 2020-11-02 PROCEDURE — 2709999900 HC NON-CHARGEABLE SUPPLY

## 2020-11-02 PROCEDURE — 77030014008 HC SPNG HEMSTAT J&J -C: Performed by: SURGERY

## 2020-11-02 PROCEDURE — 88307 TISSUE EXAM BY PATHOLOGIST: CPT

## 2020-11-02 PROCEDURE — 77030010507 HC ADH SKN DERMBND J&J -B: Performed by: SURGERY

## 2020-11-02 PROCEDURE — 76010000174 HC OR TIME 3.5 TO 4 HR INTENSV-TIER 1: Performed by: SURGERY

## 2020-11-02 PROCEDURE — 03UL0JZ SUPPLEMENT LEFT INTERNAL CAROTID ARTERY WITH SYNTHETIC SUBSTITUTE, OPEN APPROACH: ICD-10-PCS | Performed by: SURGERY

## 2020-11-02 PROCEDURE — 77030002924 HC SUT GORTX WLGO -B: Performed by: SURGERY

## 2020-11-02 PROCEDURE — 77030020256 HC SOL INJ NACL 0.9%  500ML: Performed by: SURGERY

## 2020-11-02 PROCEDURE — 74011250636 HC RX REV CODE- 250/636: Performed by: NURSE ANESTHETIST, CERTIFIED REGISTERED

## 2020-11-02 PROCEDURE — 65660000000 HC RM CCU STEPDOWN

## 2020-11-02 PROCEDURE — 77030040922 HC BLNKT HYPOTHRM STRY -A

## 2020-11-02 PROCEDURE — 77030019908 HC STETH ESOPH SIMS -A: Performed by: ANESTHESIOLOGY

## 2020-11-02 PROCEDURE — 77030031753 HC SHR ENDO COAG HARM J&J -E: Performed by: SURGERY

## 2020-11-02 PROCEDURE — 77030012406 HC DRN WND PENRS BARD -A: Performed by: SURGERY

## 2020-11-02 PROCEDURE — 77030013567 HC DRN WND RESERV BARD -A: Performed by: SURGERY

## 2020-11-02 PROCEDURE — C1768 GRAFT, VASCULAR: HCPCS | Performed by: SURGERY

## 2020-11-02 PROCEDURE — 03HY32Z INSERTION OF MONITORING DEVICE INTO UPPER ARTERY, PERCUTANEOUS APPROACH: ICD-10-PCS | Performed by: SURGERY

## 2020-11-02 DEVICE — THIN WALL CAROTID PATCH GELATIN IMPREGNATED THIN WALL KNITTED CAROTID PATCH TAPERED PATCH
Type: IMPLANTABLE DEVICE | Site: CAROTID | Status: FUNCTIONAL
Brand: THINWALL

## 2020-11-02 RX ORDER — OXYCODONE AND ACETAMINOPHEN 5; 325 MG/1; MG/1
2 TABLET ORAL
Status: DISCONTINUED | OUTPATIENT
Start: 2020-11-02 | End: 2020-11-03 | Stop reason: HOSPADM

## 2020-11-02 RX ORDER — HYDROMORPHONE HYDROCHLORIDE 1 MG/ML
0.2 INJECTION, SOLUTION INTRAMUSCULAR; INTRAVENOUS; SUBCUTANEOUS
Status: DISCONTINUED | OUTPATIENT
Start: 2020-11-02 | End: 2020-11-02 | Stop reason: HOSPADM

## 2020-11-02 RX ORDER — FENTANYL CITRATE 50 UG/ML
25 INJECTION, SOLUTION INTRAMUSCULAR; INTRAVENOUS
Status: DISCONTINUED | OUTPATIENT
Start: 2020-11-02 | End: 2020-11-02 | Stop reason: HOSPADM

## 2020-11-02 RX ORDER — SODIUM CHLORIDE, SODIUM LACTATE, POTASSIUM CHLORIDE, CALCIUM CHLORIDE 600; 310; 30; 20 MG/100ML; MG/100ML; MG/100ML; MG/100ML
50 INJECTION, SOLUTION INTRAVENOUS CONTINUOUS
Status: DISCONTINUED | OUTPATIENT
Start: 2020-11-02 | End: 2020-11-02 | Stop reason: HOSPADM

## 2020-11-02 RX ORDER — FENTANYL CITRATE 50 UG/ML
50 INJECTION, SOLUTION INTRAMUSCULAR; INTRAVENOUS AS NEEDED
Status: DISCONTINUED | OUTPATIENT
Start: 2020-11-02 | End: 2020-11-02 | Stop reason: HOSPADM

## 2020-11-02 RX ORDER — SODIUM CHLORIDE 0.9 % (FLUSH) 0.9 %
5-40 SYRINGE (ML) INJECTION AS NEEDED
Status: DISCONTINUED | OUTPATIENT
Start: 2020-11-02 | End: 2020-11-02 | Stop reason: HOSPADM

## 2020-11-02 RX ORDER — ONDANSETRON 2 MG/ML
INJECTION INTRAMUSCULAR; INTRAVENOUS AS NEEDED
Status: DISCONTINUED | OUTPATIENT
Start: 2020-11-02 | End: 2020-11-02 | Stop reason: HOSPADM

## 2020-11-02 RX ORDER — GLYCOPYRROLATE 0.2 MG/ML
INJECTION INTRAMUSCULAR; INTRAVENOUS AS NEEDED
Status: DISCONTINUED | OUTPATIENT
Start: 2020-11-02 | End: 2020-11-02 | Stop reason: HOSPADM

## 2020-11-02 RX ORDER — TIMOLOL MALEATE 5 MG/ML
1 SOLUTION OPHTHALMIC DAILY
Status: DISCONTINUED | OUTPATIENT
Start: 2020-11-03 | End: 2020-11-02 | Stop reason: SDUPTHER

## 2020-11-02 RX ORDER — MIDAZOLAM HYDROCHLORIDE 1 MG/ML
1 INJECTION, SOLUTION INTRAMUSCULAR; INTRAVENOUS AS NEEDED
Status: DISCONTINUED | OUTPATIENT
Start: 2020-11-02 | End: 2020-11-02 | Stop reason: HOSPADM

## 2020-11-02 RX ORDER — TIMOLOL MALEATE 6.8 MG/ML
1 SOLUTION/ DROPS OPHTHALMIC DAILY
Status: DISCONTINUED | OUTPATIENT
Start: 2020-11-03 | End: 2020-11-03 | Stop reason: HOSPADM

## 2020-11-02 RX ORDER — SUCCINYLCHOLINE CHLORIDE 20 MG/ML
INJECTION INTRAMUSCULAR; INTRAVENOUS AS NEEDED
Status: DISCONTINUED | OUTPATIENT
Start: 2020-11-02 | End: 2020-11-02 | Stop reason: HOSPADM

## 2020-11-02 RX ORDER — SODIUM CHLORIDE 0.9 % (FLUSH) 0.9 %
5-40 SYRINGE (ML) INJECTION EVERY 8 HOURS
Status: DISCONTINUED | OUTPATIENT
Start: 2020-11-02 | End: 2020-11-02 | Stop reason: HOSPADM

## 2020-11-02 RX ORDER — SODIUM CHLORIDE 9 MG/ML
50 INJECTION, SOLUTION INTRAVENOUS CONTINUOUS
Status: DISCONTINUED | OUTPATIENT
Start: 2020-11-02 | End: 2020-11-03

## 2020-11-02 RX ORDER — NEOSTIGMINE METHYLSULFATE 1 MG/ML
INJECTION, SOLUTION INTRAVENOUS AS NEEDED
Status: DISCONTINUED | OUTPATIENT
Start: 2020-11-02 | End: 2020-11-02 | Stop reason: HOSPADM

## 2020-11-02 RX ORDER — ASPIRIN 81 MG/1
81 TABLET ORAL DAILY
Status: DISCONTINUED | OUTPATIENT
Start: 2020-11-03 | End: 2020-11-03 | Stop reason: HOSPADM

## 2020-11-02 RX ORDER — ATORVASTATIN CALCIUM 40 MG/1
80 TABLET, FILM COATED ORAL
Status: DISCONTINUED | OUTPATIENT
Start: 2020-11-02 | End: 2020-11-03 | Stop reason: HOSPADM

## 2020-11-02 RX ORDER — LATANOPROST 50 UG/ML
1 SOLUTION/ DROPS OPHTHALMIC
Status: DISCONTINUED | OUTPATIENT
Start: 2020-11-02 | End: 2020-11-03 | Stop reason: HOSPADM

## 2020-11-02 RX ORDER — LANOLIN ALCOHOL/MO/W.PET/CERES
325 CREAM (GRAM) TOPICAL DAILY
Status: DISCONTINUED | OUTPATIENT
Start: 2020-11-03 | End: 2020-11-03 | Stop reason: HOSPADM

## 2020-11-02 RX ORDER — METOPROLOL TARTRATE 25 MG/1
12.5 TABLET, FILM COATED ORAL EVERY 12 HOURS
Status: DISCONTINUED | OUTPATIENT
Start: 2020-11-02 | End: 2020-11-03 | Stop reason: HOSPADM

## 2020-11-02 RX ORDER — OXYCODONE AND ACETAMINOPHEN 5; 325 MG/1; MG/1
1 TABLET ORAL
Status: DISCONTINUED | OUTPATIENT
Start: 2020-11-02 | End: 2020-11-03 | Stop reason: HOSPADM

## 2020-11-02 RX ORDER — ACETAMINOPHEN 325 MG/1
650 TABLET ORAL ONCE
Status: COMPLETED | OUTPATIENT
Start: 2020-11-02 | End: 2020-11-02

## 2020-11-02 RX ORDER — HEPARIN SODIUM 1000 [USP'U]/ML
INJECTION, SOLUTION INTRAVENOUS; SUBCUTANEOUS AS NEEDED
Status: DISCONTINUED | OUTPATIENT
Start: 2020-11-02 | End: 2020-11-02 | Stop reason: HOSPADM

## 2020-11-02 RX ORDER — ACETAMINOPHEN 325 MG/1
650 TABLET ORAL
Status: DISCONTINUED | OUTPATIENT
Start: 2020-11-02 | End: 2020-11-03 | Stop reason: HOSPADM

## 2020-11-02 RX ORDER — ROCURONIUM BROMIDE 10 MG/ML
INJECTION, SOLUTION INTRAVENOUS AS NEEDED
Status: DISCONTINUED | OUTPATIENT
Start: 2020-11-02 | End: 2020-11-02 | Stop reason: HOSPADM

## 2020-11-02 RX ORDER — LOSARTAN POTASSIUM 25 MG/1
25 TABLET ORAL DAILY
Status: DISCONTINUED | OUTPATIENT
Start: 2020-11-03 | End: 2020-11-03 | Stop reason: HOSPADM

## 2020-11-02 RX ORDER — ALLOPURINOL 100 MG/1
100 TABLET ORAL DAILY
Status: DISCONTINUED | OUTPATIENT
Start: 2020-11-03 | End: 2020-11-03 | Stop reason: HOSPADM

## 2020-11-02 RX ORDER — FACIAL-BODY WIPES
10 EACH TOPICAL DAILY PRN
Status: DISCONTINUED | OUTPATIENT
Start: 2020-11-02 | End: 2020-11-03 | Stop reason: HOSPADM

## 2020-11-02 RX ORDER — PROPOFOL 10 MG/ML
INJECTION, EMULSION INTRAVENOUS AS NEEDED
Status: DISCONTINUED | OUTPATIENT
Start: 2020-11-02 | End: 2020-11-02 | Stop reason: HOSPADM

## 2020-11-02 RX ORDER — ONDANSETRON 2 MG/ML
4 INJECTION INTRAMUSCULAR; INTRAVENOUS
Status: DISCONTINUED | OUTPATIENT
Start: 2020-11-02 | End: 2020-11-03 | Stop reason: HOSPADM

## 2020-11-02 RX ORDER — DEXAMETHASONE SODIUM PHOSPHATE 4 MG/ML
INJECTION, SOLUTION INTRA-ARTICULAR; INTRALESIONAL; INTRAMUSCULAR; INTRAVENOUS; SOFT TISSUE AS NEEDED
Status: DISCONTINUED | OUTPATIENT
Start: 2020-11-02 | End: 2020-11-02 | Stop reason: HOSPADM

## 2020-11-02 RX ORDER — FENTANYL CITRATE 50 UG/ML
INJECTION, SOLUTION INTRAMUSCULAR; INTRAVENOUS AS NEEDED
Status: DISCONTINUED | OUTPATIENT
Start: 2020-11-02 | End: 2020-11-02 | Stop reason: HOSPADM

## 2020-11-02 RX ORDER — ADHESIVE BANDAGE
30 BANDAGE TOPICAL DAILY PRN
Status: DISCONTINUED | OUTPATIENT
Start: 2020-11-02 | End: 2020-11-03 | Stop reason: HOSPADM

## 2020-11-02 RX ORDER — SODIUM CHLORIDE, SODIUM LACTATE, POTASSIUM CHLORIDE, CALCIUM CHLORIDE 600; 310; 30; 20 MG/100ML; MG/100ML; MG/100ML; MG/100ML
25 INJECTION, SOLUTION INTRAVENOUS CONTINUOUS
Status: DISCONTINUED | OUTPATIENT
Start: 2020-11-02 | End: 2020-11-02 | Stop reason: HOSPADM

## 2020-11-02 RX ORDER — LIDOCAINE HYDROCHLORIDE 20 MG/ML
INJECTION, SOLUTION EPIDURAL; INFILTRATION; INTRACAUDAL; PERINEURAL AS NEEDED
Status: DISCONTINUED | OUTPATIENT
Start: 2020-11-02 | End: 2020-11-02 | Stop reason: HOSPADM

## 2020-11-02 RX ORDER — LIDOCAINE HYDROCHLORIDE 10 MG/ML
0.1 INJECTION, SOLUTION EPIDURAL; INFILTRATION; INTRACAUDAL; PERINEURAL AS NEEDED
Status: DISCONTINUED | OUTPATIENT
Start: 2020-11-02 | End: 2020-11-02 | Stop reason: HOSPADM

## 2020-11-02 RX ORDER — EPHEDRINE SULFATE/0.9% NACL/PF 50 MG/5 ML
SYRINGE (ML) INTRAVENOUS AS NEEDED
Status: DISCONTINUED | OUTPATIENT
Start: 2020-11-02 | End: 2020-11-02 | Stop reason: HOSPADM

## 2020-11-02 RX ORDER — ONDANSETRON 2 MG/ML
4 INJECTION INTRAMUSCULAR; INTRAVENOUS AS NEEDED
Status: DISCONTINUED | OUTPATIENT
Start: 2020-11-02 | End: 2020-11-02 | Stop reason: HOSPADM

## 2020-11-02 RX ORDER — MORPHINE SULFATE 2 MG/ML
2 INJECTION, SOLUTION INTRAMUSCULAR; INTRAVENOUS
Status: DISCONTINUED | OUTPATIENT
Start: 2020-11-02 | End: 2020-11-03 | Stop reason: HOSPADM

## 2020-11-02 RX ORDER — AMLODIPINE BESYLATE 5 MG/1
10 TABLET ORAL DAILY
Status: DISCONTINUED | OUTPATIENT
Start: 2020-11-03 | End: 2020-11-03 | Stop reason: HOSPADM

## 2020-11-02 RX ADMIN — Medication 5 MG: at 13:29

## 2020-11-02 RX ADMIN — Medication 5 MG: at 13:55

## 2020-11-02 RX ADMIN — FENTANYL CITRATE 25 MCG: 50 INJECTION, SOLUTION INTRAMUSCULAR; INTRAVENOUS at 17:30

## 2020-11-02 RX ADMIN — Medication 3 MG: at 16:06

## 2020-11-02 RX ADMIN — ONDANSETRON HYDROCHLORIDE 4 MG: 2 INJECTION, SOLUTION INTRAMUSCULAR; INTRAVENOUS at 15:30

## 2020-11-02 RX ADMIN — SODIUM CHLORIDE 50 ML/HR: 900 INJECTION, SOLUTION INTRAVENOUS at 18:10

## 2020-11-02 RX ADMIN — PROPOFOL 40 MG: 10 INJECTION, EMULSION INTRAVENOUS at 12:57

## 2020-11-02 RX ADMIN — ATORVASTATIN CALCIUM 80 MG: 40 TABLET, FILM COATED ORAL at 22:39

## 2020-11-02 RX ADMIN — OXYCODONE HYDROCHLORIDE AND ACETAMINOPHEN 1 TABLET: 5; 325 TABLET ORAL at 22:39

## 2020-11-02 RX ADMIN — PROPOFOL 20 MG: 10 INJECTION, EMULSION INTRAVENOUS at 15:23

## 2020-11-02 RX ADMIN — FENTANYL CITRATE 12.5 MCG: 50 INJECTION, SOLUTION INTRAMUSCULAR; INTRAVENOUS at 15:32

## 2020-11-02 RX ADMIN — GLYCOPYRROLATE 0.2 MG: 0.2 INJECTION, SOLUTION INTRAMUSCULAR; INTRAVENOUS at 14:17

## 2020-11-02 RX ADMIN — ACETAMINOPHEN 650 MG: 325 TABLET ORAL at 10:31

## 2020-11-02 RX ADMIN — PROPOFOL 30 MG: 10 INJECTION, EMULSION INTRAVENOUS at 15:37

## 2020-11-02 RX ADMIN — Medication 10 MG: at 13:17

## 2020-11-02 RX ADMIN — PROPOFOL 20 MG: 10 INJECTION, EMULSION INTRAVENOUS at 13:41

## 2020-11-02 RX ADMIN — Medication 5 MG: at 13:33

## 2020-11-02 RX ADMIN — ROCURONIUM BROMIDE 10 MG: 10 INJECTION INTRAVENOUS at 13:42

## 2020-11-02 RX ADMIN — ROCURONIUM BROMIDE 25 MG: 10 INJECTION INTRAVENOUS at 13:02

## 2020-11-02 RX ADMIN — PROPOFOL 40 MG: 10 INJECTION, EMULSION INTRAVENOUS at 15:24

## 2020-11-02 RX ADMIN — SODIUM CHLORIDE, SODIUM LACTATE, POTASSIUM CHLORIDE, AND CALCIUM CHLORIDE: 600; 310; 30; 20 INJECTION, SOLUTION INTRAVENOUS at 15:58

## 2020-11-02 RX ADMIN — SODIUM CHLORIDE 25 MCG/MIN: 900 INJECTION, SOLUTION INTRAVENOUS at 13:17

## 2020-11-02 RX ADMIN — FENTANYL CITRATE 50 MCG: 50 INJECTION, SOLUTION INTRAMUSCULAR; INTRAVENOUS at 12:57

## 2020-11-02 RX ADMIN — LIDOCAINE HYDROCHLORIDE 80 MG: 20 INJECTION, SOLUTION EPIDURAL; INFILTRATION; INTRACAUDAL; PERINEURAL at 12:57

## 2020-11-02 RX ADMIN — GLYCOPYRROLATE 0.4 MG: 0.2 INJECTION, SOLUTION INTRAMUSCULAR; INTRAVENOUS at 16:06

## 2020-11-02 RX ADMIN — Medication 3 AMPULE: at 10:31

## 2020-11-02 RX ADMIN — LATANOPROST 1 DROP: 50 SOLUTION OPHTHALMIC at 22:39

## 2020-11-02 RX ADMIN — GLYCOPYRROLATE 0.1 MG: 0.2 INJECTION, SOLUTION INTRAMUSCULAR; INTRAVENOUS at 13:38

## 2020-11-02 RX ADMIN — ROCURONIUM BROMIDE 5 MG: 10 INJECTION INTRAVENOUS at 12:57

## 2020-11-02 RX ADMIN — SODIUM CHLORIDE 25 MCG/MIN: 900 INJECTION, SOLUTION INTRAVENOUS at 13:00

## 2020-11-02 RX ADMIN — WATER 2 G: 1 INJECTION INTRAMUSCULAR; INTRAVENOUS; SUBCUTANEOUS at 13:01

## 2020-11-02 RX ADMIN — SUCCINYLCHOLINE CHLORIDE 120 MG: 20 INJECTION, SOLUTION INTRAMUSCULAR; INTRAVENOUS at 12:57

## 2020-11-02 RX ADMIN — DEXAMETHASONE SODIUM PHOSPHATE 8 MG: 4 INJECTION, SOLUTION INTRAMUSCULAR; INTRAVENOUS at 13:07

## 2020-11-02 RX ADMIN — HEPARIN SODIUM 6000 UNITS: 1000 INJECTION, SOLUTION INTRAVENOUS; SUBCUTANEOUS at 14:22

## 2020-11-02 RX ADMIN — SODIUM CHLORIDE, SODIUM LACTATE, POTASSIUM CHLORIDE, AND CALCIUM CHLORIDE 25 ML/HR: 600; 310; 30; 20 INJECTION, SOLUTION INTRAVENOUS at 10:31

## 2020-11-02 RX ADMIN — ROCURONIUM BROMIDE 10 MG: 10 INJECTION INTRAVENOUS at 13:55

## 2020-11-02 RX ADMIN — METOPROLOL TARTRATE 12.5 MG: 25 TABLET, FILM COATED ORAL at 22:39

## 2020-11-02 RX ADMIN — Medication 5 MG: at 14:30

## 2020-11-02 NOTE — ANESTHESIA POSTPROCEDURE EVALUATION
Procedure(s):  LEFT CAROTID ENDARTERECTOMY AND INCISION BIOPSY OF CERVICAL LYMPH NODE.    general    Anesthesia Post Evaluation        Patient location during evaluation: PACU  Note status: Adequate. Level of consciousness: responsive to verbal stimuli and sleepy but conscious  Pain management: satisfactory to patient  Airway patency: patent  Anesthetic complications: no  Cardiovascular status: acceptable  Respiratory status: acceptable  Hydration status: acceptable  Comments: +Post-Anesthesia Evaluation and Assessment    Patient: Tia Watkins MRN: [id number]  SSN: [id number]   YOB: 1932  Age: 80 y.o. Sex: female      Cardiovascular Function/Vital Signs    BP (!) 138/50   Pulse 65   Temp 36.4 °C (97.6 °F)   Resp 19   Ht 5' 4\" (1.626 m)   Wt 66.2 kg (145 lb 15.1 oz)   SpO2 100%   BMI 25.05 kg/m²     Patient is status post Procedure(s):  LEFT CAROTID ENDARTERECTOMY AND INCISION BIOPSY OF CERVICAL LYMPH NODE. Nausea/Vomiting: Controlled. Postoperative hydration reviewed and adequate. Pain:  Pain Scale 1: Numeric (0 - 10) (11/02/20 1625)  Pain Intensity 1: 0 (11/02/20 1625)   Managed. Neurological Status:   Neuro (WDL): Exceptions to WDL(Neuro facial nerve checks WNL) (11/02/20 1620)   At baseline. Mental Status and Level of Consciousness: Arousable. Pulmonary Status:   O2 Device: Nasal cannula (11/02/20 1630)   Adequate oxygenation and airway patent. Complications related to anesthesia: None    Post-anesthesia assessment completed. No concerns. Signed By: Efrain Piña MD    11/2/2020  Post anesthesia nausea and vomiting:  controlled      INITIAL Post-op Vital signs:   Vitals Value Taken Time   /50 11/2/2020  4:45 PM   Temp 36.4 °C (97.6 °F) 11/2/2020  4:27 PM   Pulse 68 11/2/2020  4:59 PM   Resp 17 11/2/2020  4:59 PM   SpO2 99 % 11/2/2020  4:59 PM   Vitals shown include unvalidated device data.

## 2020-11-02 NOTE — PERIOP NOTES
Handoff Report  PACU    Report received from Antoinette Ding RN regarding Malissa Caruso. Surgeon(s):  Barry Kirkpatrick MD  And Procedure(s) (LRB):  LEFT CAROTID ENDARTERECTOMY AND INCISION BIOPSY OF CERVICAL LYMPH NODE (Left)  confirmed   with allergies, drains and dressings discussed. Anesthesia type, drugs, patient history, complications, estimated blood loss, vital signs, intake and output, and last pain medication, lines, reversal medications and temperature were reviewed. Verbal order received from Dr. Fletcher August to discontinue A line. 18 Spoke with patient's daughter and provided update and visitor information. TRANSFER - OUT REPORT:    Verbal report given to Kp Diaz RN (name) on Malissa Caruso  being transferred to PCU (unit) for routine post - op       Report consisted of patients Situation, Background, Assessment and   Recommendations(SBAR). Information from the following report(s) SBAR, Kardex, OR Summary, Procedure Summary, Intake/Output, MAR, Recent Results, Med Rec Status and Cardiac Rhythm NSR was reviewed with the receiving nurse. Lines:   Peripheral IV 11/02/20 Right Hand (Active)   Site Assessment Clean, dry, & intact 11/02/20 1745   Phlebitis Assessment 0 11/02/20 1745   Infiltration Assessment 0 11/02/20 1745   Dressing Status Clean, dry, & intact 11/02/20 1745   Dressing Type Tape;Transparent 11/02/20 1745   Hub Color/Line Status Pink;Capped 11/02/20 1745        Opportunity for questions and clarification was provided.       Patient transported with:   Monitor  Registered Nurse  Tech, patient belongings

## 2020-11-02 NOTE — BRIEF OP NOTE
Brief Postoperative Note    Patient: Yamila Bang  YOB: 1932  MRN: [id number]    Date of Procedure: 11/2/2020     Pre-Op Diagnosis: SEVERE LEFT CAROTID STENOSIS    Post-Op Diagnosis: Same as preoperative diagnosis. Procedure(s):  LEFT CAROTID ENDARTERECTOMY AND EXCISIONAL BIOPSY OF CERVICAL LYMPH NODES    Surgeon(s):  Faby Tee MD    Surgical Assistant: Surg Asst-1: Araseli Ogden    Anesthesia: General     Estimated Blood Loss (mL): 154    Complications: None    Specimens:   ID Type Source Tests Collected by Time Destination   1 : Large left cervical lymph nodes Preservative Lymph Node  Faby Tee MD 11/2/2020 1356 Pathology        Implants:   Implant Name Type Inv. Item Serial No.  Lot No. LRB No. Used Action   GRAFT PTCH TW GEL SEAL [id number] -- Gustavo Buckley[employer]re[employer]  GRAFT PTCH TW GEL SEAL [id number] -- [employer]se Bosworth [id number] [address] [place], [place] [id number] Left 1 Implanted       Drains:   Panfil[place]sp[place] Drain 11/02/20 Left Neck (Active)   Site Assessment Clean, dry, & intact 11/02/20 1700   Dressing Status Clean, dry, & intact 11/02/20 1700   Status Patent; Charged;Draining 11/02/20 1700   Drainage Color Sanguinous 11/02/20 1700   Output (ml) 10 ml 11/02/20 1700       Findings: Severe stenosis. Good result w/ CEA. Several enlarged nodes sent but are most likely reactive.     Electronically Signed by Rosamaria House MD on 11/2/2020 at 5:24 PM

## 2020-11-02 NOTE — ANESTHESIA PROCEDURE NOTES
Arterial Line Placement    Start time: 11/2/2020 11:31 AM  End time: 11/2/2020 11:37 AM  Performed by: Brian Arauz MD  Authorized by:  Brian Arauz MD     Pre-Procedure  Indications:  Arterial pressure monitoring  Preanesthetic Checklist: patient identified, risks and benefits discussed, site marked, patient being monitored, timeout performed and patient being monitored      Procedure:   Prep:  Chlorhexidine  Seldinger Technique?: Yes    Orientation:  Left  Location:  Radial artery  Catheter size:  20 G  Number of attempts:  1  Cont Cardiac Output Sensor: No      Assessment:   Post-procedure:  Line secured and sterile dressing applied  Patient Tolerance:  Patient tolerated the procedure well with no immediate complications

## 2020-11-02 NOTE — H&P
History and Physical    Subjective:     Tiana Brooks is a 80 y.o. female who has severe bilateral carotid stenosis, L>R w/ episodes of syncope. Past Medical History:   Diagnosis Date    CAD (coronary artery disease)     Diverticulitis     Glaucoma     Gout     HTN (hypertension) 5/18/2018    Hx of diverticulitis of colon 5/18/2018    Hyperlipidemia     Hypertension     Other ill-defined conditions(799.89)     high cholesterol    S/P coronary artery stent placement 05/25/2018    Stroke ([address] 10/2020    TIA      Past Surgical History:   Procedure Laterality Date    CARDIAC SURG PROCEDURE UNLIST  05/2018    x2 cardiac stent    HX HYSTERECTOMY      [place] COLONOSCOPY FLX DX W/COLLJ SPEC WHEN PFRMD  10/18/2013          Family History   Problem Relation Age of Onset    Stroke Mother     Diabetes Sister     No Known Problems Father       Social History     Tobacco Use    Smoking status: Never Smoker    Smokeless tobacco: Never Used   Substance Use Topics    Alcohol use: No       Prior to Admission medications    Medication Sig Start Date End Date Taking? Authorizing Provider   polyethylene glycol (Miralax) 17 gram packet Take 17 g by mouth as needed for Constipation. Yes Provider, Historical   losartan (COZAAR) 25 mg tablet Take 25 mg by mouth daily. Yes Provider, Historical   atorvastatin (LIPITOR) 80 mg tablet Take 1 Tab by mouth nightly. 10/19/20  Yes Fox Chaudhry MD   amLODIPine (NORVASC) 10 mg tablet TAKE 1 TABLET BY MOUTH  DAILY 4/10/19  Yes Panfilo Cantu NP   metoprolol tartrate (LOPRESSOR) 25 mg tablet Take 0.5 Tabs by mouth every twelve (12) hours. 3/13/19  Yes Melissa Shearer MD   ferrous sulfate (IRON PO) Take 65 mg by mouth daily. Yes Provider, Historical   cholecalciferol (VITAMIN D3) 400 unit tab tablet Take 5,000 Units by mouth daily. Yes Provider, Historical   aspirin delayed-release 81 mg tablet Take 81 mg by mouth daily.    Yes Provider, Historical   timolol (TIMOPTIC-XE) 0.5 % ophthalmic gel-forming Administer 1 Drop to right eye daily. Yes Provider, Historical   latanoprost (XALATAN) 0.005 % ophthalmic solution Administer 1 Drop to both eyes nightly. Yes Provider, Historical   fluticasone (FLONASE ALLERGY RELIEF) 50 mcg/actuation nasal spray 1 [place] by Both Nostrils route daily. Yes Provider, Historical   acetaminophen (TYLENOL EXTRA STRENGTH) 500 mg tablet Take 1,000 mg by mouth every six (6) hours as needed for Pain. Yes Provider, Historical   fexofenadine (ALLEGRA) 180 mg tablet Take 180 mg by mouth nightly. Yes Other, MD Papo   ALPRAZolam (XANAX) 0.25 mg tablet Take 0.25 mg by mouth daily as needed for Anxiety. Yes Other, MD Papo   trolamine salicylate (Aspercreme) 10 % lotion Apply  to affected area as needed. Provider, Historical   ascorbic acid, vitamin C, (Vitamin C) 500 mg tablet Take  by mouth. Provider, Historical   docusate sodium (STOOL SOFTENER PO) Take  by mouth as needed. Provider, Historical   allopurinol (ZYLOPRIM) 100 mg tablet Take 100 mg by mouth daily. Other, MD Papo     Allergies   Allergen Reactions    Aspirin Nausea and Vomiting     Pt reports that she takes 81 mg ASA daily    Lisinopril Cough        Review of Systems:  Denies CP/SOB    Objective:     Physical Exam:   Visit Vitals  BP (!) 165/68 (BP 1 Location: Right arm, BP Patient Position: At rest)   Pulse 99   Temp 98.7 °F (37.1 °C)   Resp 15   Ht 5' 4\" (1.626 m)   Wt 66.2 kg (145 lb 15.1 oz)   SpO2 100%   BMI 25.05 kg/m²     General:  Alert, cooperative, no distress, appears stated age. Head:  Normocephalic, without obvious abnormality, atraumatic. Neck: Supple, symmetrical, trachea midline, no adenopathy, thyroid: no enlargement/tenderness/nodules, no carotid bruit and no JVD. Lungs:   Clear to auscultation bilaterally. Heart:  Regular rate and rhythm, S1, S2 normal, no murmur, click, rub or gallop.        Abdomen:   Soft, non-tender. Bowel sounds normal. No masses,  No organomegaly. Extremities: Extremities normal, atraumatic, no cyanosis or edema. Pulses: 2+ and symmetric upper extremities. Neurologic: Normal strength, sensation throughout.        Assessment:         Plan:     Left CEA    Signed By: Toribio Lopez MD     November 2, 2020

## 2020-11-02 NOTE — ANESTHESIA PREPROCEDURE EVALUATION
Anesthetic History   No history of anesthetic complications            Review of Systems / Medical History  Patient summary reviewed, nursing notes reviewed and pertinent labs reviewed    Pulmonary  Within defined limits                 Neuro/Psych         TIA     Cardiovascular    Hypertension: well controlled          Past MI, CAD, PAD and hyperlipidemia    Exercise tolerance: <4 METS  Comments: TTE (5/21/18):   Mild MR, mild AS, mild to moderate AI, EF=55-60%   GI/Hepatic/Renal         Renal disease: CRI      Comments: H/O Diverticulosis/Diverticulitis Endo/Other  Within defined limits           Other Findings   Comments: Gout           Physical Exam    Airway  Mallampati: II  TM Distance: > 6 cm  Neck ROM: normal range of motion   Mouth opening: Normal     Cardiovascular  Regular rate and rhythm,  S1 and S2 normal,  no murmur, click, rub, or gallop             Dental    Dentition: Full upper dentures and Lower partial plate     Pulmonary  Breath sounds clear to auscultation               Abdominal  GI exam deferred       Other Findings            Anesthetic Plan    ASA: 3  Anesthesia type: general    Monitoring Plan: Arterial line      Induction: Intravenous  Anesthetic plan and risks discussed with: Patient

## 2020-11-02 NOTE — PERIOP NOTES
Handoff Report from Operating Room to PACU    Report received from KAYDEN Duque RN and Devika Foy CRNA regarding Tiana Brooks. Surgeon(s):  Shaneka La MD  And Procedure(s) (LRB):  LEFT CAROTID ENDARTERECTOMY AND INCISION BIOPSY OF CERVICAL LYMPH NODE (Left)  confirmed   with allergies, drains and dressings discussed. Anesthesia type, drugs, patient history, complications, estimated blood loss, vital signs, intake and output, and last pain medication, lines, reversal medications and temperature were reviewed.

## 2020-11-03 VITALS
DIASTOLIC BLOOD PRESSURE: 54 MMHG | HEIGHT: 64 IN | TEMPERATURE: 97.7 F | WEIGHT: 145.94 LBS | RESPIRATION RATE: 15 BRPM | SYSTOLIC BLOOD PRESSURE: 145 MMHG | HEART RATE: 101 BPM | OXYGEN SATURATION: 100 % | BODY MASS INDEX: 24.92 KG/M2

## 2020-11-03 LAB
ANION GAP SERPL CALC-SCNC: 7 MMOL/L (ref 5–15)
BUN SERPL-MCNC: 20 MG/DL (ref 6–20)
BUN/CREAT SERPL: 19 (ref 12–20)
CALCIUM SERPL-MCNC: 9 MG/DL (ref 8.5–10.1)
CHLORIDE SERPL-SCNC: 106 MMOL/L (ref 97–108)
CO2 SERPL-SCNC: 24 MMOL/L (ref 21–32)
CREAT SERPL-MCNC: 1.08 MG/DL (ref 0.55–1.02)
ERYTHROCYTE [DISTWIDTH] IN BLOOD BY AUTOMATED COUNT: 14.2 % (ref 11.5–14.5)
GLUCOSE SERPL-MCNC: 136 MG/DL (ref 65–100)
HCT VFR BLD AUTO: 28 % (ref 35–47)
HGB BLD-MCNC: 9.5 G/DL (ref 11.5–16)
MCH RBC QN AUTO: 31.1 PG (ref 26–34)
MCHC RBC AUTO-ENTMCNC: 33.9 G/DL (ref 30–36.5)
MCV RBC AUTO: 91.8 FL (ref 80–99)
NRBC # BLD: 0 K/UL (ref 0–0.01)
NRBC BLD-RTO: 0 PER 100 WBC
PLATELET # BLD AUTO: 275 K/UL (ref 150–400)
PMV BLD AUTO: 8.8 FL (ref 8.9–12.9)
POTASSIUM SERPL-SCNC: 4.3 MMOL/L (ref 3.5–5.1)
RBC # BLD AUTO: 3.05 M/UL (ref 3.8–5.2)
SODIUM SERPL-SCNC: 137 MMOL/L (ref 136–145)
WBC # BLD AUTO: 7.9 K/UL (ref 3.6–11)

## 2020-11-03 PROCEDURE — 97161 PT EVAL LOW COMPLEX 20 MIN: CPT | Performed by: PHYSICAL THERAPIST

## 2020-11-03 PROCEDURE — 97530 THERAPEUTIC ACTIVITIES: CPT | Performed by: PHYSICAL THERAPIST

## 2020-11-03 PROCEDURE — 74011250637 HC RX REV CODE- 250/637: Performed by: SURGERY

## 2020-11-03 PROCEDURE — 80048 BASIC METABOLIC PNL TOTAL CA: CPT

## 2020-11-03 PROCEDURE — 97535 SELF CARE MNGMENT TRAINING: CPT

## 2020-11-03 PROCEDURE — 97165 OT EVAL LOW COMPLEX 30 MIN: CPT

## 2020-11-03 PROCEDURE — 2709999900 HC NON-CHARGEABLE SUPPLY

## 2020-11-03 PROCEDURE — 36415 COLL VENOUS BLD VENIPUNCTURE: CPT

## 2020-11-03 PROCEDURE — 97116 GAIT TRAINING THERAPY: CPT | Performed by: PHYSICAL THERAPIST

## 2020-11-03 PROCEDURE — 85027 COMPLETE CBC AUTOMATED: CPT

## 2020-11-03 RX ORDER — NALOXONE HYDROCHLORIDE 4 MG/.1ML
SPRAY NASAL
Qty: 1 EACH | Refills: 0 | Status: ON HOLD | OUTPATIENT
Start: 2020-11-03

## 2020-11-03 RX ORDER — OXYCODONE HYDROCHLORIDE 5 MG/1
5 TABLET ORAL
Qty: 12 TAB | Refills: 0 | Status: SHIPPED | OUTPATIENT
Start: 2020-11-03 | End: 2020-11-06

## 2020-11-03 RX ADMIN — ALLOPURINOL 100 MG: 100 TABLET ORAL at 09:17

## 2020-11-03 RX ADMIN — METOPROLOL TARTRATE 12.5 MG: 25 TABLET, FILM COATED ORAL at 09:17

## 2020-11-03 RX ADMIN — AMLODIPINE BESYLATE 10 MG: 5 TABLET ORAL at 09:17

## 2020-11-03 RX ADMIN — ASPIRIN 81 MG: 81 TABLET, COATED ORAL at 09:17

## 2020-11-03 RX ADMIN — LOSARTAN POTASSIUM 25 MG: 25 TABLET, FILM COATED ORAL at 09:17

## 2020-11-03 RX ADMIN — FERROUS SULFATE TAB 325 MG (65 MG ELEMENTAL FE) 325 MG: 325 (65 FE) TAB at 09:17

## 2020-11-03 NOTE — DISCHARGE SUMMARY
Vascular Surgery Discharge Summary     Patient ID:  Antony Hodge  [id number]  43 y.o.  8/15/1932    Admitting Provider: Eric Kirkland MD  Discharging Provider: Eric Kirkland MD    Admit Date: 11/2/2020    Discharge Date: 11/3/2020    Discharge Diagnoses:    Severe symptomatic left carotid stenosis   · S/p left CEA   Enlarged cervical lymph node  · Bx pending   Severe right ICA stenosis   · 80%  · With plan for procedural intervention once recovered from this procedure  · Continue Lipitor and ASA   First degree AV-block  · Chronic   Hypertension  · Stable  Hyperlipidemia  · .4 10/2020  ASHD  Anemia  · Stable   Recently diagnosed prediabetes  · HA1c 5.8 10/2020    Procedure(s):  LEFT CAROTID ENDARTERECTOMY AND INCISION BIOPSY OF CERVICAL LYMPH NODE  11/2/2020    Hospital Course:   Antony Hodge is a 80 y.o.  female with a pmhx significant for HTN, HLD, and ASHD. She was recently admitted to the hospital with TIA. after presenting w/ a general weakness and abnl speech. She is now admitted to the hospital status post left CEA for an 80% ICA stenosis. Her post procedure course was uncomplicated. She was noted during the procedure to have an enlarged cervical lymph node which was biopsied. She also has a known right ICA stenosis with plan for procedural intervention in the near future once recovered.      Pertinent Results:   Recent Results (from the past 24 hour(s))   METABOLIC PANEL, BASIC    Collection Time: 11/03/20  4:10 AM   Result Value Ref Range    Sodium 137 136 - 145 mmol/L    Potassium 4.3 3.5 - 5.1 mmol/L    Chloride 106 97 - 108 mmol/L    CO2 24 21 - 32 mmol/L    Anion gap 7 5 - 15 mmol/L    Glucose 136 (H) 65 - 100 mg/dL    BUN 20 6 - 20 MG/DL    Creatinine 1.08 (H) 0.55 - 1.02 MG/DL    BUN/Creatinine ratio 19 12 - 20      GFR est AA 58 (L) >60 ml/min/1.73m2    GFR est non-AA 48 (L) >60 ml/min/1.73m2    Calcium 9.0 8.5 - 10.1 MG/DL   CBC W/O DIFF Collection Time: 11/03/20  4:10 AM   Result Value Ref Range    WBC 7.9 3.6 - 11.0 K/uL    RBC 3.05 (L) 3.80 - 5.20 M/uL    HGB 9.5 (L) 11.5 - 16.0 g/dL    HCT 28.0 (L) 35.0 - 47.0 %    MCV 91.8 80.0 - 99.0 FL    MCH 31.1 26.0 - 34.0 PG    MCHC 33.9 30.0 - 36.5 g/dL    RDW 14.2 11.5 - 14.5 %    PLATELET 780 411 - 045 K/uL    MPV 8.8 (L) 8.9 - 12.9 FL    NRBC 0.0 0  WBC    ABSOLUTE NRBC 0.00 0.00 - 0.01 K/uL       Vital signs:   Patient Vitals for the past 24 hrs:   BP Temp Pulse Resp SpO2 Height Weight   11/03/20 0744 (!) 117/44 97.7 °F (36.5 °C) 86 15 100 %     11/03/20 0400 (!) 128/50 97.7 °F (36.5 °C) 75 13 100 %     11/02/20 2300 (!) 142/51 97.9 °F (36.6 °C) 79 15 100 %     11/02/20 1916 (!) 121/46 97.8 °F (36.6 °C) (!) 108 17 100 %     11/02/20 1840 (!) 105/41         11/02/20 1821 (!) 119/43  90 18 100 %     11/02/20 1757 (!) 127/45 98.3 °F (36.8 °C) 71 14 100 %     11/02/20 1745 (!) 118/40 97.9 °F (36.6 °C) 69 12 99 %     11/02/20 1730 (!) 111/94  68 11 100 %     11/02/20 1715 (!) 132/49  67 12 100 %     11/02/20 1700 (!) 150/50 97.6 °F (36.4 °C) 66 15 100 %     11/02/20 1645 (!) 138/50  65 19 100 %     11/02/20 1635 (!) 128/48  68 16 100 %     11/02/20 1630 (!) 118/51  75 18 100 %     11/02/20 1627 (!) 134/54 97.6 °F (36.4 °C) 88 15 98 %     11/02/20 1625 (!) 131/49  78 14 100 %     11/02/20 1622   80 13 100 %     11/02/20 1620 (!) 134/54 97.6 °F (36.4 °C) 81 13 94 %     11/02/20 1004 (!) 165/68         11/02/20 0954 (!) 177/67 98.7 °F (37.1 °C) 99 15 100 % 5' 4\" (1.626 m) 66.2 kg (145 lb 15.1 oz)       Patient Weight:   Last 3 Recorded Weights in this Encounter    11/02/20 0954   Weight: 66.2 kg (145 lb 15.1 oz)       Consults: None    Patient Condition at Discharge: stable    Disposition: home    Patient Instructions:   Current Discharge Medication List      START taking these medications    Details   oxyCODONE IR (Roxicodone) 5 mg immediate release tablet Take 1 Tab by mouth every six (6) hours as needed for Pain for up to 3 days. Max Daily Amount: 20 mg.  Qty: 12 Tab, Refills: 0    Associated Diagnoses: Bilateral carotid artery stenosis      naloxone (Narcan) 4 mg/actuation nasal spray Use 1 spray intranasally, then discard. Repeat with new spray every 2 min as needed for opioid overdose symptoms, alternating nostrils. Qty: 1 Each, Refills: 0         CONTINUE these medications which have NOT CHANGED    Details   polyethylene glycol (Miralax) 17 gram packet Take 17 g by mouth as needed for Constipation. losartan (COZAAR) 25 mg tablet Take 25 mg by mouth daily. atorvastatin (LIPITOR) 80 mg tablet Take 1 Tab by mouth nightly. Qty: 30 Tab, Refills: 1      amLODIPine (NORVASC) 10 mg tablet TAKE 1 TABLET BY MOUTH  DAILY  Qty: 30 Tab, Refills: 3    Comments: Please consider 90 day supplies to promote better adherence      metoprolol tartrate (LOPRESSOR) 25 mg tablet Take 0.5 Tabs by mouth every twelve (12) hours. Qty: 90 Tab, Refills: 2      ferrous sulfate (IRON PO) Take 65 mg by mouth daily. cholecalciferol (VITAMIN D3) 400 unit tab tablet Take 5,000 Units by mouth daily. aspirin delayed-release 81 mg tablet Take 81 mg by mouth daily. timolol (TIMOPTIC-XE) 0.5 % ophthalmic gel-forming Administer 1 Drop to right eye daily. latanoprost (XALATAN) 0.005 % ophthalmic solution Administer 1 Drop to both eyes nightly. fluticasone (FLONASE ALLERGY RELIEF) 50 mcg/actuation nasal spray 1 [place] by Both Nostrils route daily. acetaminophen (TYLENOL EXTRA STRENGTH) 500 mg tablet Take 1,000 mg by mouth every six (6) hours as needed for Pain. fexofenadine (ALLEGRA) 180 mg tablet Take 180 mg by mouth nightly. ALPRAZolam (XANAX) 0.25 mg tablet Take 0.25 mg by mouth daily as needed for Anxiety. trolamine salicylate (Aspercreme) 10 % lotion Apply  to affected area as needed.       ascorbic acid, vitamin C, (Vitamin C) 500 mg tablet Take  by mouth. docusate sodium (STOOL SOFTENER PO) Take  by mouth as needed. allopurinol (ZYLOPRIM) 100 mg tablet Take 100 mg by mouth daily. Diet: Cardiac & Diabetic Diet    Activity/Wound Care:   Discharge Instructions for Carotid Endarterectomy   A carotid endarterectomy restores normal blood flow through the vessels that carry blood to your brain. These vessels are called the carotid arteries. During the surgery, a surgeon made a small incision in the side of your neck, just below your jaw. The artery was opened and the blockage was cleared. This procedure was done to reduce your risk of a stroke, which can occur when the carotid arteries are severely blocked or narrowed. Home care  Spend your first few days after surgery relaxing at home. It's OK to do quiet activities such as reading or watching TV. Take your medicines exactly as instructed. Dont skip doses. You may drive when you are no longer taking pain medication. It's OK to shower. Don't scrub your incision. Don't do strenuous activity for 7 to 10 days after your surgery. Dont lift anything heavier than 10 pounds for 2 to 3 weeks after your surgery. You may return to work after 2 weeks. Gradually increase your activity. It may take some time for you to return to your normal activities. Check your incision every day for signs of infection (redness, swelling, drainage, or warmth). Dont be alarmed if you have some loss of feeling along your jaw line, the incision line, and earlobe. This is a result of the incision and usually goes away after 6 to 12 months. When to call 911  A stroke is a medical emergency.  Call 911 right away if you have any of these symptoms of stroke:    Weakness, tingling, or loss of feeling on one side of your face or body    Sudden double vision or trouble seeing in one or both eyes    Sudden trouble talking or slurred speech    Sudden, severe headache    F.A.S.T. is an easy way to remember the signs of stroke. When you see these signs, call 911 fast.    F.A.S.T. stands for:    F is for face drooping. One side of the face is drooping or numb. When the person smiles, the smile is uneven. A is for arm weakness. One arm is weak or numb. When the person lifts both arms at the same time, one arm may drift downward. S is for speech difficulty. You may notice slurred speech or trouble speaking. The person can't repeat a simple sentence correctly when asked. T is for time to call 911. If someone shows any of these symptoms, even if they go away, call 911 right away. Make note of the time the symptoms first appeared. When to call your healthcare provider  Call your healthcare provider right away if you have any of the following:    Neck swelling    Redness, pain, swelling, or drainage from your incision    Fever above 100.4°F (38°C), or higher, or as advised    Call the office at [phone] if there are any problems. Long-term changes at home  Eat a healthy, low-fat, low cholesterol, and low calorie diet. Maintain your ideal body weight. After you have recovered from surgery, try to exercise more, especially walking. If you smoke, ask your primary doctor for help quitting.          Follow-up Information     Follow up With Specialties Details Why [facility], [address] [place] [address], NP Family Medicine   [address], Emerita Stahl MD Vascular Surgery  2-3 weeks  [address] [place] [address] 83.  [phone]            Signed:  Abundio Guerrero NP  11/3/2020  9:12 AM

## 2020-11-03 NOTE — PROGRESS NOTES
Bedside shift change report given to Jorge Bhatti (oncoming nurse) by Lakshmi Palma (offgoing nurse). Report included the following information SBAR, Kardex, OR Summary, Procedure Summary, Intake/Output, MAR, Recent Results and Cardiac Rhythm NSR.     1916: Pt ambulated to the bathroom. No complaints of pain, no sob, no n/v. VSS. Will continue to monitor. 2239: Pt reported pain 7/10. Percocet given. 2300: No complaints of pain. 0700: Bedside shift change report given to NATHALIE Lester (oncoming nurse) by Shanta Salazar RN (offgoing nurse). Report included the following information SBAR, Kardex, OR Summary, Procedure Summary, Intake/Output, MAR, Recent Results and Cardiac Rhythm NSR/AFib.

## 2020-11-03 NOTE — PROGRESS NOTES
Problem: Falls - Risk of  Goal: *Absence of Falls  Description: Document Kevazalea Sophie Fall Risk and appropriate interventions in the flowsheet.   Outcome: Progressing Towards Goal  Note: Fall Risk Interventions:  Mobility Interventions: Bed/chair exit alarm, Patient to call before getting OOB         Medication Interventions: Bed/chair exit alarm, Patient to call before getting OOB, Teach patient to arise slowly    Elimination Interventions: Patient to call for help with toileting needs

## 2020-11-03 NOTE — ADDENDUM NOTE
Addendum  created 11/03/20 0756 by Christy Lux MD    Attestation recorded in [facility], [facility] 97 filed

## 2020-11-03 NOTE — PROGRESS NOTES
OCCUPATIONAL THERAPY EVALUATION/DISCHARGE  Patient: Neela Meyer (23 y.o. female)  Date: 11/3/2020  Primary Diagnosis: Carotid stenosis, symptomatic w/o infarct, left [I65.22]  Procedure(s) (LRB):  LEFT CAROTID ENDARTERECTOMY AND INCISION BIOPSY OF CERVICAL LYMPH NODE (Left) 1 Day Post-Op   Precautions:        ASSESSMENT  Based on the objective data described below, the patient presents with swelling and mild pain in B feet slightly impairing functional mobility and generally decreased UE ROM and strength. The pt was independent in all ADLs/IADL prior to admission for a L carotid endarterectomy and biopsy of cervical lymph node. The pt remains independent w/ doffing and donning socks, and states she feels like she is at her baseline w/o any difficulty. The pt was SBA for bathroom mobility w/o RW and required no physical assistance. Post walking down the hallway the pt's HR increased to 150s but decreased with a seated rest break. The pt was instructed to use her [facility] as needed for balance as well as wearing sturdy shoes in the home. The pt lives w/ family who is willing to assist her as needed when she returns home for ADL/IADLs. Current Level of Function (ADLs/self-care): Independent in ADLs, supervision bathing and grooming depending on standing balance. Functional Outcome Measure: The patient scored 85/100 on the Barthel Index outcome measure which is indicative of mild impairment. Other factors to consider for discharge: Pt has swelling and mild pain in B feet which slightly impair functional mobility. May benefit from using [facility] as needed for additional balance. PLAN :    Recommendation for discharge: (in order for the patient to meet his/her long term goals)  No skilled occupational therapy/ follow up rehabilitation needs identified at this time.     This discharge recommendation:  Has been made in collaboration with the attending provider and/or case management    IF patient discharges home will need the following DME: cane: straight and walker: rolling       SUBJECTIVE:   Patient stated It does hurt my feet.     OBJECTIVE DATA SUMMARY:   HISTORY:   Past Medical History:   Diagnosis Date    CAD (coronary artery disease)     Diverticulitis     Glaucoma     Gout     HTN (hypertension) 5/18/2018    Hx of diverticulitis of colon 5/18/2018    Hyperlipidemia     Hypertension     Other ill-defined conditions(799.89)     high cholesterol    S/P coronary artery stent placement 05/25/2018    Stroke ([address] 10/2020    TIA     Past Surgical History:   Procedure Laterality Date    CARDIAC SURG PROCEDURE UNLIST  05/2018    x2 cardiac stent    HX HYSTERECTOMY      NH COLONOSCOPY FLX DX W/COLLJ SPEC WHEN PFRMD  10/18/2013            Prior Level of Function/Environment/Context: Pt was independent with all ADL/IADL. Expanded or extensive additional review of patient history:   Home Situation  Home Environment: Private residence  # Steps to Enter: 0  Wheelchair Ramp: Yes  One/Two Story Residence: One story  Living Alone: No  Support Systems: Family member(s)  Patient Expects to be Discharged to[de-identified] Private residence  Current DME Used/Available at Home: Walker, rolling, Commode, bedside, Cane, straight  Tub or Shower Type: Tub/Shower combination    Hand dominance: Right    EXAMINATION OF PERFORMANCE DEFICITS:  Cognitive/Behavioral Status:        Perception: Appears intact  Perseveration: No perseveration noted  Safety/Judgement: Awareness of environment      Hearing: Auditory  Auditory Impairment: Hard of hearing, bilateral  Hearing Aids/Status: Does not own       Range of Motion:  AROM: Generally decreased, functional(LUE>RUE at baseline)                         Strength:  Strength: Generally decreased, functional(at baseline)                Coordination:  Coordination: Within functional limits  Fine Motor Skills-Upper: Left Intact; Right Intact    Gross Motor Skills-Upper: Left Intact; Right Intact    Tone & Sensation:  Tone: Normal  Sensation: Intact                      Balance:  Sitting: Intact  Standing: Impaired  Standing - Static: Good  Standing - Dynamic : Fair    Functional Mobility and Transfers for ADLs:  Bed Mobility:  Rolling: Independent  Supine to Sit: Stand-by assistance    Transfers:  Sit to Stand: Stand-by assistance  Stand to Sit: Stand-by assistance  Bathroom Mobility: Stand-by assistance  Toilet Transfer : Supervision    ADL Assessment:  Feeding: Independent    Oral Facial Hygiene/Grooming: Supervision    Bathing: Supervision    Upper Body Dressing: Independent    Lower Body Dressing: Independent    Toileting: Independent                ADL Intervention and task modifications:       Grooming  Position Performed: Standing  Washing Hands: Stand-by assistance      Toileting  Bladder Hygiene: Independent  Clothing Management: Stand-by assistance    Cognitive Retraining  Safety/Judgement: Awareness of environment      Functional Measure:  Barthel Index:    Bathin  Bladder: 10  Bowels: 10  Groomin  Dressing: 10  Feeding: 10  Mobility: 10  Stairs: 5  Toilet Use: 10  Transfer (Bed to Chair and Back): 10  Total: 85/100        The Barthel ADL Index: Guidelines  1. The index should be used as a record of what a patient does, not as a record of what a patient could do. 2. The main aim is to establish degree of independence from any help, physical or verbal, however minor and for whatever reason. 3. The need for supervision renders the patient not independent. 4. A patient's performance should be established using the best available evidence. Asking the patient, friends/relatives and nurses are the usual sources, but direct observation and common sense are also important. However direct testing is not needed. 5. Usually the patient's performance over the preceding 24-48 hours is important, but occasionally longer periods will be relevant.   6. Middle categories imply that the patient supplies over 50 per cent of the effort. 7. Use of aids to be independent is allowed. Edman Pulse., Barthel, D.W. (0469). Functional evaluation: the Barthel Index. [address] [facility] (14)2. TAMERA Tyson Orvil Songster., Sheridan Holbrook., [place], [address] (1999). Measuring the change indisability after inpatient rehabilitation; comparison of the responsiveness of the Barthel Index and Functional [place] Measure. Journal of Neurology, Neurosurgery, and Psychiatry, 66(4), 265-508. Melodie Flower, N.J.A, OBEY Parham, & Enoch Valdez M.A. (2004.) Assessment of post-stroke quality of life in cost-effectiveness studies: The usefulness of the Barthel Index and the EuroQoL-5D. Quality of Life Research, 15, 817-67       Occupational Therapy Evaluation Charge Determination   History Examination Decision-Making   LOW Complexity : Brief history review  LOW Complexity : 1-3 performance deficits relating to physical, cognitive , or psychosocial skils that result in activity limitations and / or participation restrictions  LOW Complexity : No comorbidities that affect functional and no verbal or physical assistance needed to complete eval tasks       Based on the above components, the patient evaluation is determined to be of the following complexity level: LOW   Pain Rating:  Mild pain in B feet    Activity Tolerance:   Good    After treatment patient left in no apparent distress:    Sitting in chair, Call bell within reach and Caregiver / family present    COMMUNICATION/EDUCATION:   The patients plan of care was discussed with: Physical therapist and Registered nurse. Thank you for this referral.  Shahla Strickland  Time Calculation: 29 mins   Regarding student involvement in patient care:  A student participated in this treatment session. Per CMS Medicare statements and AOTA guidelines I certify that the following was true:  1. I was present and directly observed the entire session.   2. I made all skilled judgments and clinical decisions regarding care. 3. I am the practitioner responsible for assessment, treatment, and documentation.

## 2020-11-03 NOTE — PROGRESS NOTES
0700- Report given to Dayna Quigley, RN and Naila Toribio, RN by off going nurse. 1030- Pt's HR increased to 120-150 while working with PT/OT. Telemetry strip still looks like sinus rhythm. Twin Miller NP notified. Pt ok for DC.     0945- REAGAN drain removed without difficulty. Site stable. Dressing applied. 1145- Discharge instructions, prescriptions, and follow up appointments reviewed with pt. Opportunity for questions and clarification provided. PIV and telemetry removed. Pt taken via wheelchair to exit for DC.

## 2020-11-03 NOTE — PROGRESS NOTES
PHYSICAL THERAPY EVALUATION/DISCHARGE  Patient: Neela Meyer (41 y.o. female)  Date: 11/3/2020  Primary Diagnosis: Carotid stenosis, symptomatic w/o infarct, left [I65.22]  Procedure(s) (LRB):  LEFT CAROTID ENDARTERECTOMY AND INCISION BIOPSY OF CERVICAL LYMPH NODE (Left) 1 Day Post-Op       ASSESSMENT  Based on the objective data described below, the patient presents with c/o pain in B feet which she relates to gout. This foot pain impairs ambulation demonstrating antalgic gait and mild path deviations but no overt LOB noted. Patient able to complete bed mobility independently and requires only SBA for transfers and ambulation. Patient able to ambulate 220 feet in halls today and noted to have HR increase to 155.- nursing notified  Patient reports independence at baseline and lives with her daughter. Patient is below her functional baseline due to foot pain. Patient states she deals with this pain at home occasionally and feels safe to return to home without further services. .    Functional Outcome Measure: The patient scored 85/100 on the Barthel index outcome measure which is indicative of 15% functional impairment. Other factors to consider for discharge: good family support     Further skilled acute physical therapy is not indicated at this time. PLAN :  Recommendation for discharge: (in order for the patient to meet his/her long term goals)  No skilled physical therapy/ follow up rehabilitation needs identified at this time. This discharge recommendation:  Has not yet been discussed the attending provider and/or case management    IF patient discharges home will need the following DME: patient owns DME required for discharge       SUBJECTIVE:   Patient stated \"My feet hurt because of my gout.     OBJECTIVE DATA SUMMARY:   HISTORY:    Past Medical History:   Diagnosis Date    CAD (coronary artery disease)     Diverticulitis     Glaucoma     Gout     HTN (hypertension) 5/18/2018    Hx of diverticulitis of colon 5/18/2018    Hyperlipidemia     Hypertension     Other ill-defined conditions(799.89)     high cholesterol    S/P coronary artery stent placement 05/25/2018    Stroke ([facility] [address] 10/2020    TIA     Past Surgical History:   Procedure Laterality Date    CARDIAC SURG PROCEDURE UNLIST  05/2018    x2 cardiac stent    HX HYSTERECTOMY      DE COLONOSCOPY FLX DX W/COLLJ SPEC WHEN PFRMD  10/18/2013            Prior level of function: complete independence      Home Situation  Home Environment: Private residence  # Steps to Enter: 0  Wheelchair Ramp: Yes  One/Two Story Residence: One story  Living Alone: No  Support Systems: Family member(s)  Patient Expects to be Discharged to[de-identified] Private residence  Current DME Used/Available at Home: Walker, rolling, Commode, bedside, Cane, straight  Tub or Shower Type: Tub/Shower combination    EXAMINATION/PRESENTATION/DECISION MAKING:   Critical Behavior:  Neurologic State: Alert  Orientation Level: Oriented X4  Cognition: Follows commands  Safety/Judgement: Awareness of environment  Hearing: Auditory  Auditory Impairment: Hard of hearing, bilateral  Hearing Aids/Status: Does not own    Range Of Motion:  AROM: Generally decreased, functional(LUE>RUE at baseline)                       Strength:    Strength: Generally decreased, functional(at baseline)                    Tone & Sensation:   Tone: Normal              Sensation: Intact               Coordination:  Coordination: Within functional limits  Functional Mobility:  Bed Mobility:  Rolling: Independent  Supine to Sit: Stand-by assistance     Scooting: Stand-by assistance  Transfers:  Sit to Stand: Stand-by assistance  Stand to Sit: Stand-by assistance        Bed to Chair: Stand-by assistance              Balance:   Sitting: Intact  Standing: Impaired  Standing - Static: Good; Unsupported  Standing - Dynamic : Fair;Unsupported  Ambulation/Gait Training:  Distance (ft): 220 Feet (ft)  Assistive Device: Gait belt  Ambulation - Level of Assistance: Stand-by assistance        Gait Abnormalities: Antalgic;Decreased step clearance;Trunk sway increased; Path deviations        Base of Support: Widened     Speed/Rhonda: Pace decreased (<100 feet/min); Slow  Step Length: Left shortened;Right shortened      gait is slow and mildly unsteady; antalgic but no overt LOB noted         Functional Measure:  Barthel Index:    Bathin  Bladder: 10  Bowels: 10  Groomin  Dressing: 10  Feeding: 10  Mobility: 10  Stairs: 5  Toilet Use: 10  Transfer (Bed to Chair and Back): 10  Total: 85/100       The Barthel ADL Index: Guidelines  1. The index should be used as a record of what a patient does, not as a record of what a patient could do. 2. The main aim is to establish degree of independence from any help, physical or verbal, however minor and for whatever reason. 3. The need for supervision renders the patient not independent. 4. A patient's performance should be established using the best available evidence. Asking the patient, friends/relatives and nurses are the usual sources, but direct observation and common sense are also important. However direct testing is not needed. 5. Usually the patient's performance over the preceding 24-48 hours is important, but occasionally longer periods will be relevant. 6. Middle categories imply that the patient supplies over 50 per cent of the effort. 7. Use of aids to be independent is allowed. Hai Burger., Barthel, D.W. (1591). Functional evaluation: the Barthel Index. [facility] (14)2. TAMERA Romero, Sanjuanita Aragon, Zac Wong, [address] [place] [address] (). Measuring the change indisability after inpatient rehabilitation; comparison of the responsiveness of the Barthel Index and Functional [place] Measure. Journal of Neurology, Neurosurgery, and Psychiatry, 66(4), 158-523.   Chad Blanchard, N.J.A, AKI Parham.NATALIE, & Sully Farley M.A. (2004.) Assessment of post-stroke quality of life in cost-effectiveness studies: The usefulness of the Barthel Index and the EuroQoL-5D. Quality of Life Research, 13, 894-00           Activity Tolerance:   HR increased to 155 during ambulation      After treatment patient left in no apparent distress:   Sitting in chair, Call bell within reach and Caregiver / family present    COMMUNICATION/EDUCATION:   The patients plan of care was discussed with: Occupational therapist and Registered nurse. Fall prevention education was provided and the patient/caregiver indicated understanding., Patient/family have participated as able in goal setting and plan of care. and Patient/family agree to work toward stated goals and plan of care.     Thank you for this referral.  Sarah Chiang, PT   Time Calculation: 29 mins

## 2020-11-03 NOTE — DISCHARGE INSTRUCTIONS
Discharge Instructions for Carotid Endarterectomy   A carotid endarterectomy restores normal blood flow through the vessels that carry blood to your brain. These vessels are called the carotid arteries. During the surgery, a surgeon made a small incision in the side of your neck, just below your jaw. The artery was opened and the blockage was cleared. This procedure was done to reduce your risk of a stroke, which can occur when the carotid arteries are severely blocked or narrowed. Home care  Spend your first few days after surgery relaxing at home. It's OK to do quiet activities such as reading or watching TV. Take your medicines exactly as instructed. Dont skip doses. You may drive when you are no longer taking pain medication. It's OK to shower. Don't scrub your incision. Don't do strenuous activity for 7 to 10 days after your surgery. Dont lift anything heavier than 10 pounds for 2 to 3 weeks after your surgery. You may return to work after 2 weeks. Gradually increase your activity. It may take some time for you to return to your normal activities. Check your incision every day for signs of infection (redness, swelling, drainage, or warmth). Dont be alarmed if you have some loss of feeling along your jaw line, the incision line, and earlobe. This is a result of the incision and usually goes away after 6 to 12 months. When to call 911  A stroke is a medical emergency. Call 911 right away if you have any of these symptoms of stroke:    Weakness, tingling, or loss of feeling on one side of your face or body    Sudden double vision or trouble seeing in one or both eyes    Sudden trouble talking or slurred speech    Sudden, severe headache    F.A.S.T. is an easy way to remember the signs of stroke. When you see these signs, call 911 fast.    F.A.S.T. stands for:    F is for face drooping. One side of the face is drooping or numb. When the person smiles, the smile is uneven.     A is for arm weakness. One arm is weak or numb. When the person lifts both arms at the same time, one arm may drift downward. S is for speech difficulty. You may notice slurred speech or trouble speaking. The person can't repeat a simple sentence correctly when asked. T is for time to call 911. If someone shows any of these symptoms, even if they go away, call 911 right away. Make note of the time the symptoms first appeared. When to call your healthcare provider  Call your healthcare provider right away if you have any of the following:    Neck swelling    Redness, pain, swelling, or drainage from your incision    Fever above 100.4°F (38°C), or higher, or as advised    Call the office at [phone] if there are any problems. Long-term changes at home  Eat a healthy, low-fat, low cholesterol, and low calorie diet. Maintain your ideal body weight. After you have recovered from surgery, try to exercise more, especially walking. If you smoke, ask your primary doctor for help quitting.

## 2020-11-17 NOTE — OP NOTES
[address]  OPERATIVE REPORT    Name:  Jose Bustillo  MR#:  [id number]  :  08/15/1932  ACCOUNT #:  [de-identified]  DATE OF SERVICE:  2020    PREOPERATIVE DIAGNOSIS:  Asymptomatic severe left carotid stenosis. POSTOPERATIVE DIAGNOSIS:  Asymptomatic severe left carotid stenosis. PROCEDURE PERFORMED:  1. Left carotid endarterectomy. 2.  Excisional biopsy of left cervical lymph nodes. SURGEON:  Maureen Quintana MD    ASSISTANT:  None. ANESTHESIA:  General.    COMPLICATIONS:  None. SPECIMENS REMOVED:  Left cervical lymph nodes. IMPLANTS:  Vascutek carotid patch. ESTIMATED BLOOD LOSS:  500 mL. DRAINS:  #10 Panfilo-Ospina. INDICATIONS:  The patient is an 70-year-old female with a greater than 80% stenosis of the left internal carotid artery. She presents for left carotid endarterectomy for stroke risk reduction. PROCEDURE:  After informed consent was obtained, the patient was given preoperative intravenous antibiotics within 1 hour of the incision. She was taken to the operating room and after induction of adequate general anesthesia, the left neck was prepped and draped as a sterile field. An incision was made along the anterior border of the sternomastoid muscle and the platysma was divided with electrocautery. Dissection was carried down to the carotid sheath and the common facial vein was identified and divided between silk ties. There were multiple enlarged bulky lymph nodes in the field of dissection, which actually interfered with dissection of the carotid artery and therefore these were mobilized and excised and their blood supply and lymphatic drainage was ligated with silk ties. These were sent for permanent specimen. The sternomastoid muscle and jugular vein were retracted laterally. The distal common carotid artery was encircled with a vessel loop.   The vagus nerve was identified in its usual posterolateral position and protected from injury. Dissection was carried cephalad and the carotid bifurcation was dissected free. There was obvious bulky calcified plaque and the carotid bifurcation extending into both the external and internal carotid. The internal carotid was dissected free beyond any significant disease and encircled with a vessel loop. The hypoglossal nerve was identified and was protected from injury. The origin of the external carotid was encircled with a vessel loop and the patient was systemically heparinized. The internal carotid was occluded followed by the external carotid and then the common carotid. A longitudinal arteriotomy was made extending from the distal common carotid artery through the bifurcation and through severe stenosis in the proximal internal carotid, where there was densely calcified plaque. There was good backbleeding from the internal carotid and a #10 Rincon shunt was placed without difficulty. An endarterectomy plane was developed in the distal common carotid and continued circumferentially. The plaque was transected proximal to any significant disease. The endarterectomy was continued cephalad and an eversion endarterectomy was performed on the external carotid. The plaque was then elevated through the proximal internal carotid beyond any significant disease, where it feathered off to a good endpoint. All loose debris was removed from the lumen. The proximal and distal endpoints were irrigated forcefully with heparinized saline and were secured. The arteriotomy was closed with a Vascutek Dacron patch using running 5-0 Prolene suture. Prior to completing the patch closure, the shunt was removed and the final sutures were placed. Flushing maneuvers had been performed prior to completing the patch closure. Flow was restored first to the external carotid and then to the internal carotid.   A few repair sutures using 6-0 Prolene figure-of-eight sutures were necessary for some bleeding points along the suture line. After this was done, the suture line was hemostatic. There was normal low resistance flow in the internal carotid when interrogated with a continuous wave Doppler. There was also flow in the external carotid. The neck wound was irrigated copiously with antibiotic irrigation. A routine #10 Panfilo-Ospina drain was placed through a separate stab incision at the base of the neck and secured with a 3-0 nylon suture. The platysma was closed with running 3-0 Vicryl suture and the skin with 4-0 Monocryl subcuticular suture. Dry dressings were applied and the patient was extubated and transferred to the PACU in stable condition. All counts were correct.         Annika Ott MD      [id number]  D:  11/16/2020 23:17  T:  11/17/2020 5:03  JOB #:  [id number]

## 2020-12-03 ENCOUNTER — HOSPITAL ENCOUNTER (OUTPATIENT)
Dept: PREADMISSION TESTING | Age: 85
Discharge: HOME | End: 2020-12-03
Attending: SURGERY
Payer: MEDICARE

## 2020-12-03 VITALS
WEIGHT: 153 LBS | BODY MASS INDEX: 28.89 KG/M2 | OXYGEN SATURATION: 100 % | TEMPERATURE: 98 F | DIASTOLIC BLOOD PRESSURE: 43 MMHG | SYSTOLIC BLOOD PRESSURE: 145 MMHG | HEART RATE: 71 BPM | HEIGHT: 61 IN | RESPIRATION RATE: 16 BRPM

## 2020-12-03 LAB
ABO + RH BLD: NORMAL
APPEARANCE UR: CLEAR
BACTERIA URNS QL MICRO: ABNORMAL /HPF
BILIRUB UR QL: NEGATIVE
BLOOD GROUP ANTIBODIES SERPL: NORMAL
COLOR UR: ABNORMAL
EPITH CASTS URNS QL MICRO: ABNORMAL /LPF
ERYTHROCYTE [DISTWIDTH] IN BLOOD BY AUTOMATED COUNT: 13.8 % (ref 11.5–14.5)
GLUCOSE UR STRIP.AUTO-MCNC: NEGATIVE MG/DL
HCT VFR BLD AUTO: 33.3 % (ref 35–47)
HGB BLD-MCNC: 10.9 G/DL (ref 11.5–16)
HGB UR QL STRIP: NEGATIVE
KETONES UR QL STRIP.AUTO: NEGATIVE MG/DL
LEUKOCYTE ESTERASE UR QL STRIP.AUTO: ABNORMAL
MCH RBC QN AUTO: 30.4 PG (ref 26–34)
MCHC RBC AUTO-ENTMCNC: 32.7 G/DL (ref 30–36.5)
MCV RBC AUTO: 92.8 FL (ref 80–99)
NITRITE UR QL STRIP.AUTO: NEGATIVE
NRBC # BLD: 0 K/UL (ref 0–0.01)
NRBC BLD-RTO: 0 PER 100 WBC
PH UR STRIP: 5.5 [PH] (ref 5–8)
PLATELET # BLD AUTO: 307 K/UL (ref 150–400)
PMV BLD AUTO: 8.7 FL (ref 8.9–12.9)
PROT UR STRIP-MCNC: NEGATIVE MG/DL
RBC # BLD AUTO: 3.59 M/UL (ref 3.8–5.2)
RBC #/AREA URNS HPF: ABNORMAL /HPF (ref 0–5)
SP GR UR REFRACTOMETRY: 1.02 (ref 1–1.03)
SPECIMEN EXP DATE BLD: NORMAL
UA: UC IF INDICATED,UAUC: ABNORMAL
UROBILINOGEN UR QL STRIP.AUTO: 0.2 EU/DL (ref 0.2–1)
WBC # BLD AUTO: 6.3 K/UL (ref 3.6–11)
WBC URNS QL MICRO: ABNORMAL /HPF (ref 0–4)

## 2020-12-03 PROCEDURE — 36415 COLL VENOUS BLD VENIPUNCTURE: CPT

## 2020-12-03 PROCEDURE — 85027 COMPLETE CBC AUTOMATED: CPT

## 2020-12-03 PROCEDURE — 81001 URINALYSIS AUTO W/SCOPE: CPT

## 2020-12-03 PROCEDURE — 86900 BLOOD TYPING SEROLOGIC ABO: CPT

## 2020-12-03 RX ORDER — CHOLECALCIFEROL TAB 125 MCG (5000 UNIT) 125 MCG
5000 TAB ORAL DAILY
Status: ON HOLD | COMMUNITY

## 2020-12-03 RX ORDER — SODIUM CHLORIDE, SODIUM LACTATE, POTASSIUM CHLORIDE, CALCIUM CHLORIDE 600; 310; 30; 20 MG/100ML; MG/100ML; MG/100ML; MG/100ML
25 INJECTION, SOLUTION INTRAVENOUS CONTINUOUS
Status: CANCELLED | OUTPATIENT
Start: 2020-12-10

## 2020-12-03 NOTE — PERIOP NOTES
[facility]  Preoperative Instructions    Surgery Date 12/10/2020          Time of Tyler Diaz  Contact # [phone]    1. On the day of your surgery, please report to the Surgical Services Registration Desk and sign in at your designated time. The Surgery Center is located to the right of the Emergency Room. 2. You must have someone with you to drive you home. You should not drive a car for 24 hours following surgery. Please make arrangements for a friend or family member to stay with you for the first 24 hours after your surgery. 3. Do not have anything to eat or drink (including water, gum, mints, coffee, juice) after midnight 12/9/2020. ? This may not apply to medications prescribed by your physician. ?(Please note below the special instructions with medications to take the morning of your procedure.)    4. We recommend you do not drink any alcoholic beverages for 24 hours before and after your surgery. 5. Contact your surgeons office for instructions on the following medications: non-steroidal anti-inflammatory drugs (i.e. Advil, Aleve), vitamins, and supplements. (Some surgeons will want you to stop these medications prior to surgery and others may allow you to take them)  **If you are currently taking Plavix, Coumadin, Aspirin and/or other blood-thinning agents, contact your surgeon for instructions. ** Your surgeon will partner with the physician prescribing these medications to determine if it is safe to stop or if you need to continue taking. Please do not stop taking these medications without instructions from your surgeon    6. Wear comfortable clothes. Wear glasses instead of contacts. Do not bring any money or jewelry. Please bring picture ID, insurance card, and any prearranged co-payment or hospital payment. Do not wear make-up, particularly mascara the morning of your surgery. Do not wear nail polish, particularly if you are having foot /hand surgery.   Wear your hair loose or down, no ponytails, buns, mehnaz pins or clips. All body piercings must be removed. Please shower with antibacterial soap for three consecutive days before and on the morning of surgery, but do not apply any lotions, powders or deodorants after the shower on the day of surgery. Please use a fresh towels after each shower. Please sleep in clean clothes and change bed linens the night before surgery. Please do not shave for 48 hours prior to surgery. Shaving of the face is acceptable. 7. You should understand that if you do not follow these instructions your surgery may be cancelled. If your physical condition changes (I.e. fever, cold or flu) please contact your surgeon as soon as possible. 8. It is important that you be on time. If a situation occurs where you may be late, please call [phone] (OR Holding Area). 9. If you have any questions and or problems, please call [phone] (Pre-admission Testing). 10. Your surgery time may be subject to change. You will receive a phone call the evening prior if your time changes. 11.  If having outpatient surgery, you must have someone to drive you here, stay with you during the duration of your stay, and to drive you home at time of discharge. Special Instructions:   Covid test scheduled 12/6/2020 @ 7-10 AM. Please see attached directions/location. Patient advised to self-quarantine after test and up to day of surgery. TAKE ALL MEDICATIONS DAY OF SURGERY EXCEPT: Losartan      I understand a pre-operative phone call will be made to verify my surgery time. In the event that I am not available, I give permission for a message to be left on my answering service and/or with another person?   yes         ___________________       __________   12/3/2020 @ 4577    (Signature of Patient)             (Witness)                (Date and Time)

## 2020-12-03 NOTE — PERIOP NOTES
Incentive Spirometer        Using the incentive spirometer helps expand the small air sacs of your lungs, helps you breathe deeply, and helps improve your lung function. Use your incentive spirometer twice a day (10 breaths each time) prior to surgery. How to Use Your Incentive Spirometer:  1. Hold the incentive spirometer in an upright position. 2. Breathe out as usual.   3. Place the mouthpiece in your mouth and seal your lips tightly around it. 4. Take a deep breath. Breathe in slowly and as deeply as possible. Keep the blue flow rate guide between the arrows. 5. Hold your breath as long as possible. Then exhale slowly and allow the piston to fall to the bottom of the column. 6. Rest for a few seconds and repeat steps one through five at least 10 times. PAT Tidal Volume_____1500_______  x____2____________  Date_____10/29//2020_____  Patient states has IS from last PAT visit and would not like another IS. BRING THE INCENTIVE SPIROMETER WITH YOU TO THE HOSPITAL ON THE DAY OF YOUR SURGERY. Opportunity given to ask and answer questions as well as to observe return demonstration.     Patient signature_____________________________          Witness____________________________

## 2020-12-06 ENCOUNTER — HOSPITAL ENCOUNTER (OUTPATIENT)
Dept: PREADMISSION TESTING | Age: 85
Discharge: HOME | End: 2020-12-06
Payer: MEDICARE

## 2020-12-06 PROCEDURE — 87635 SARS-COV-2 COVID-19 AMP PRB: CPT

## 2020-12-09 RX ORDER — MORPHINE SULFATE 10 MG/ML
2 INJECTION, SOLUTION INTRAMUSCULAR; INTRAVENOUS
Status: CANCELLED | OUTPATIENT
Start: 2020-12-09

## 2020-12-09 RX ORDER — SODIUM CHLORIDE 0.9 % (FLUSH) 0.9 %
5-40 SYRINGE (ML) INJECTION AS NEEDED
Status: CANCELLED | OUTPATIENT
Start: 2020-12-09

## 2020-12-09 RX ORDER — DIPHENHYDRAMINE HYDROCHLORIDE 50 MG/ML
12.5 INJECTION, SOLUTION INTRAMUSCULAR; INTRAVENOUS
Status: CANCELLED | OUTPATIENT
Start: 2020-12-09 | End: 2020-12-10

## 2020-12-09 RX ORDER — ONDANSETRON 2 MG/ML
4 INJECTION INTRAMUSCULAR; INTRAVENOUS AS NEEDED
Status: CANCELLED | OUTPATIENT
Start: 2020-12-09

## 2020-12-09 RX ORDER — SODIUM CHLORIDE, SODIUM LACTATE, POTASSIUM CHLORIDE, CALCIUM CHLORIDE 600; 310; 30; 20 MG/100ML; MG/100ML; MG/100ML; MG/100ML
25 INJECTION, SOLUTION INTRAVENOUS CONTINUOUS
Status: CANCELLED | OUTPATIENT
Start: 2020-12-09

## 2020-12-09 RX ORDER — FENTANYL CITRATE 50 UG/ML
25 INJECTION, SOLUTION INTRAMUSCULAR; INTRAVENOUS
Status: CANCELLED | OUTPATIENT
Start: 2020-12-09

## 2020-12-09 RX ORDER — SODIUM CHLORIDE 0.9 % (FLUSH) 0.9 %
5-40 SYRINGE (ML) INJECTION EVERY 8 HOURS
Status: CANCELLED | OUTPATIENT
Start: 2020-12-09

## 2020-12-10 ENCOUNTER — HOSPITAL ENCOUNTER (OUTPATIENT)
Age: 85
Setting detail: OUTPATIENT SURGERY
Discharge: HOME | End: 2020-12-10
Attending: SURGERY | Admitting: SURGERY

## 2020-12-10 RX ORDER — SODIUM CHLORIDE 0.9 % (FLUSH) 0.9 %
5-40 SYRINGE (ML) INJECTION AS NEEDED
Status: DISCONTINUED | OUTPATIENT
Start: 2020-12-10 | End: 2020-12-10 | Stop reason: HOSPADM

## 2020-12-10 RX ORDER — SODIUM CHLORIDE, SODIUM LACTATE, POTASSIUM CHLORIDE, CALCIUM CHLORIDE 600; 310; 30; 20 MG/100ML; MG/100ML; MG/100ML; MG/100ML
25 INJECTION, SOLUTION INTRAVENOUS CONTINUOUS
Status: DISCONTINUED | OUTPATIENT
Start: 2020-12-10 | End: 2020-12-10 | Stop reason: HOSPADM

## 2020-12-10 RX ORDER — LIDOCAINE HYDROCHLORIDE 10 MG/ML
0.1 INJECTION, SOLUTION EPIDURAL; INFILTRATION; INTRACAUDAL; PERINEURAL AS NEEDED
Status: DISCONTINUED | OUTPATIENT
Start: 2020-12-10 | End: 2020-12-10 | Stop reason: HOSPADM

## 2020-12-10 RX ORDER — SODIUM CHLORIDE 0.9 % (FLUSH) 0.9 %
5-40 SYRINGE (ML) INJECTION EVERY 8 HOURS
Status: DISCONTINUED | OUTPATIENT
Start: 2020-12-10 | End: 2020-12-10 | Stop reason: HOSPADM

## 2020-12-10 NOTE — PERIOP NOTES
Patient procedure cancelled today due to not having pre-approval from [employer].  Discharged safely to waiting room with family

## 2020-12-11 NOTE — PERIOP NOTES
[facility]  Preoperative Instructions        Surgery Date 12/16/20          Time of Arrival 0545    1. On the day of your surgery, please report to the Surgical Services Registration Desk and sign in at your designated time. The Surgery Center is located to the right of the Emergency Room. 2. You must have someone with you to drive you home. You should not drive a car for 24 hours following surgery. Please make arrangements for a friend or family member to stay with you for the first 24 hours after your surgery. 3. Do not have anything to eat or drink (including water, gum, mints, coffee, juice) after midnight ?12/15/20? Maurice Santos ? This may not apply to medications prescribed by your physician. ?(Please note below the special instructions with medications to take the morning of your procedure.)    4. We recommend you do not drink any alcoholic beverages for 24 hours before and after your surgery. 5. Contact your surgeons office for instructions on the following medications: non-steroidal anti-inflammatory drugs (i.e. Advil, Aleve), vitamins, and supplements. (Some surgeons will want you to stop these medications prior to surgery and others may allow you to take them)  **If you are currently taking Plavix, Coumadin, Aspirin and/or other blood-thinning agents, contact your surgeon for instructions. ** Your surgeon will partner with the physician prescribing these medications to determine if it is safe to stop or if you need to continue taking. Please do not stop taking these medications without instructions from your surgeon    6. Wear comfortable clothes. Wear glasses instead of contacts. Do not bring any money or jewelry. Please bring picture ID, insurance card, and any prearranged co-payment or hospital payment. Do not wear make-up, particularly mascara the morning of your surgery. Do not wear nail polish, particularly if you are having foot /hand surgery.   Wear your hair loose or down, no ponytails, buns, mehnaz pins or clips. All body piercings must be removed. Please shower with antibacterial soap for three consecutive days before and on the morning of surgery, but do not apply any lotions, powders or deodorants after the shower on the day of surgery. Please use a fresh towels after each shower. Please sleep in clean clothes and change bed linens the night before surgery. Please do not shave for 48 hours prior to surgery. Shaving of the face is acceptable. 7. You should understand that if you do not follow these instructions your surgery may be cancelled. If your physical condition changes (I.e. fever, cold or flu) please contact your surgeon as soon as possible. 8. It is important that you be on time. If a situation occurs where you may be late, please call [phone] (OR Holding Area). 9. If you have any questions and or problems, please call [phone] (Pre-admission Testing). 10. Your surgery time may be subject to change. You will receive a phone call the evening prior if your time changes. 11.  If having outpatient surgery, you must have someone to drive you here, stay with you during the duration of your stay, and to drive you home at time of discharge. Special Instructions:     TAKE ALL MEDICATIONS DAY OF SURGERY EXCEPT:  losartan    I understand a pre-operative phone call will be made to verify my surgery time. In the event that I am not available, I give permission for a message to be left on my answering service and/or with another person?   Yes [phone]         ___________________      __________   _________    (Signature of Patient)             (Witness)                (Date and Time)

## 2020-12-11 NOTE — PERIOP NOTES
Spoke with patient, no changes in PAT assessment or medications since seen in PAT 12/3/20. New covid scheduled and PAT for new type and screen scheduled. Patient without questions at this time.

## 2020-12-12 ENCOUNTER — HOSPITAL ENCOUNTER (OUTPATIENT)
Dept: PREADMISSION TESTING | Age: 85
Discharge: NURSING, REHAB OR LONG-TERM CARE | End: 2020-12-12
Payer: MEDICARE

## 2020-12-12 PROCEDURE — 87635 SARS-COV-2 COVID-19 AMP PRB: CPT

## 2020-12-15 ENCOUNTER — HOSPITAL ENCOUNTER (OUTPATIENT)
Dept: PREADMISSION TESTING | Age: 85
Discharge: HOME | DRG: 039 | End: 2020-12-15
Admitting: SURGERY
Payer: MEDICARE

## 2020-12-15 ENCOUNTER — ANESTHESIA EVENT (OUTPATIENT)
Dept: SURGERY | Age: 85
DRG: 039 | End: 2020-12-15
Payer: MEDICARE

## 2020-12-15 LAB
ABO + RH BLD: NORMAL
BLOOD GROUP ANTIBODIES SERPL: NORMAL
SPECIMEN EXP DATE BLD: NORMAL

## 2020-12-15 PROCEDURE — 36415 COLL VENOUS BLD VENIPUNCTURE: CPT

## 2020-12-15 PROCEDURE — 86900 BLOOD TYPING SEROLOGIC ABO: CPT

## 2020-12-15 RX ORDER — LIDOCAINE HYDROCHLORIDE 10 MG/ML
0.1 INJECTION, SOLUTION EPIDURAL; INFILTRATION; INTRACAUDAL; PERINEURAL AS NEEDED
Status: CANCELLED | OUTPATIENT
Start: 2020-12-15

## 2020-12-15 RX ORDER — SODIUM CHLORIDE 0.9 % (FLUSH) 0.9 %
5-40 SYRINGE (ML) INJECTION EVERY 8 HOURS
Status: CANCELLED | OUTPATIENT
Start: 2020-12-15

## 2020-12-15 RX ORDER — SODIUM CHLORIDE 0.9 % (FLUSH) 0.9 %
5-40 SYRINGE (ML) INJECTION AS NEEDED
Status: CANCELLED | OUTPATIENT
Start: 2020-12-15

## 2020-12-15 NOTE — ANESTHESIA PREPROCEDURE EVALUATION
Anesthetic History   No history of anesthetic complications            Review of Systems / Medical History  Patient summary reviewed, nursing notes reviewed and pertinent labs reviewed    Pulmonary  Within defined limits                 Neuro/Psych       CVA  TIA    Comments: Glaucoma Cardiovascular    Hypertension: well controlled          Past MI, CAD, PAD, cardiac stents and hyperlipidemia    Exercise tolerance: <4 METS  Comments: Bilateral Carotid Stenosis S/P Left CEA (11/2/20)    ECG (9/21/20): Sinus  Rhythm    First degree A-V block   Diffuse nonspecific T-abnormality. TTE (5/21/18):   Mild MR, mild AS, mild to moderate AI, EF=55-60%   GI/Hepatic/Renal         Renal disease: CRI      Comments: H/O Diverticulosis/Diverticulitis Endo/Other        Arthritis and anemia     Other Findings   Comments: Gout           Physical Exam    Airway  Mallampati: II  TM Distance: > 6 cm  Neck ROM: normal range of motion   Mouth opening: Normal     Cardiovascular  Regular rate and rhythm,  S1 and S2 normal,  no murmur, click, rub, or gallop             Dental    Dentition: Full upper dentures and Lower partial plate     Pulmonary  Breath sounds clear to auscultation               Abdominal  GI exam deferred       Other Findings            Anesthetic Plan    ASA: 3  Anesthesia type: general    Monitoring Plan: Arterial line and BIS      Induction: Intravenous  Anesthetic plan and risks discussed with: Patient      Previous conventional grade 1 view here

## 2020-12-16 ENCOUNTER — ANESTHESIA (OUTPATIENT)
Dept: SURGERY | Age: 85
DRG: 039 | End: 2020-12-16
Payer: MEDICARE

## 2020-12-16 ENCOUNTER — HOSPITAL ENCOUNTER (INPATIENT)
Age: 85
LOS: 1 days | Discharge: HOME | DRG: 039 | End: 2020-12-17
Attending: SURGERY | Admitting: SURGERY
Payer: MEDICARE

## 2020-12-16 DIAGNOSIS — I65.21 CAROTID STENOSIS, ASYMPTOMATIC, RIGHT: Primary | ICD-10-CM

## 2020-12-16 PROCEDURE — 77030013079 HC BLNKT BAIR HGGR 3M -A: Performed by: ANESTHESIOLOGY

## 2020-12-16 PROCEDURE — 77030019908 HC STETH ESOPH SIMS -A: Performed by: ANESTHESIOLOGY

## 2020-12-16 PROCEDURE — 74011250636 HC RX REV CODE- 250/636: Performed by: SURGERY

## 2020-12-16 PROCEDURE — 74011000250 HC RX REV CODE- 250: Performed by: NURSE ANESTHETIST, CERTIFIED REGISTERED

## 2020-12-16 PROCEDURE — 03UK0KZ SUPPLEMENT RIGHT INTERNAL CAROTID ARTERY WITH NONAUTOLOGOUS TISSUE SUBSTITUTE, OPEN APPROACH: ICD-10-PCS | Performed by: SURGERY

## 2020-12-16 PROCEDURE — 74011000250 HC RX REV CODE- 250: Performed by: SURGERY

## 2020-12-16 PROCEDURE — 97161 PT EVAL LOW COMPLEX 20 MIN: CPT

## 2020-12-16 PROCEDURE — 77030014008 HC SPNG HEMSTAT J&J -C: Performed by: SURGERY

## 2020-12-16 PROCEDURE — 77030002916 HC SUT ETHLN J&J -A: Performed by: SURGERY

## 2020-12-16 PROCEDURE — 76210000016 HC OR PH I REC 1 TO 1.5 HR: Performed by: SURGERY

## 2020-12-16 PROCEDURE — 77030010938 HC CLP LIG TELE -A: Performed by: SURGERY

## 2020-12-16 PROCEDURE — 74011250636 HC RX REV CODE- 250/636: Performed by: ANESTHESIOLOGY

## 2020-12-16 PROCEDURE — 77030031139 HC SUT VCRL2 J&J -A: Performed by: SURGERY

## 2020-12-16 PROCEDURE — 77030013567 HC DRN WND RESERV BARD -A: Performed by: SURGERY

## 2020-12-16 PROCEDURE — 77030002987 HC SUT PROL J&J -B: Performed by: SURGERY

## 2020-12-16 PROCEDURE — 74011250636 HC RX REV CODE- 250/636: Performed by: NURSE ANESTHETIST, CERTIFIED REGISTERED

## 2020-12-16 PROCEDURE — 77030012406 HC DRN WND PENRS BARD -A: Performed by: SURGERY

## 2020-12-16 PROCEDURE — 76010000172 HC OR TIME 2.5 TO 3 HR INTENSV-TIER 1: Performed by: SURGERY

## 2020-12-16 PROCEDURE — 77030008684 HC TU ET CUF COVD -B: Performed by: ANESTHESIOLOGY

## 2020-12-16 PROCEDURE — 77030010507 HC ADH SKN DERMBND J&J -B: Performed by: SURGERY

## 2020-12-16 PROCEDURE — 77030002933 HC SUT MCRYL J&J -A: Performed by: SURGERY

## 2020-12-16 PROCEDURE — 74011250637 HC RX REV CODE- 250/637: Performed by: NURSE PRACTITIONER

## 2020-12-16 PROCEDURE — 76060000036 HC ANESTHESIA 2.5 TO 3 HR: Performed by: SURGERY

## 2020-12-16 PROCEDURE — 74011250637 HC RX REV CODE- 250/637: Performed by: SURGERY

## 2020-12-16 PROCEDURE — 03CK0ZZ EXTIRPATION OF MATTER FROM RIGHT INTERNAL CAROTID ARTERY, OPEN APPROACH: ICD-10-PCS | Performed by: SURGERY

## 2020-12-16 PROCEDURE — 77030026438 HC STYL ET INTUB CARD -A: Performed by: ANESTHESIOLOGY

## 2020-12-16 PROCEDURE — 97116 GAIT TRAINING THERAPY: CPT

## 2020-12-16 PROCEDURE — 77030002986 HC SUT PROL J&J -A: Performed by: SURGERY

## 2020-12-16 PROCEDURE — 77030002924 HC SUT GORTX WLGO -B: Performed by: SURGERY

## 2020-12-16 PROCEDURE — 2709999900 HC NON-CHARGEABLE SUPPLY: Performed by: SURGERY

## 2020-12-16 PROCEDURE — 65660000000 HC RM CCU STEPDOWN

## 2020-12-16 PROCEDURE — 77030006689 HC BLD OPHTH BVR BD -A: Performed by: SURGERY

## 2020-12-16 PROCEDURE — C1768 GRAFT, VASCULAR: HCPCS | Performed by: SURGERY

## 2020-12-16 DEVICE — PATCH VASC W0.8XL8CM PERIPH BOV PERICARD N PVC N DEHP CRSS: Type: IMPLANTABLE DEVICE | Site: CAROTID | Status: FUNCTIONAL

## 2020-12-16 RX ORDER — SUCCINYLCHOLINE CHLORIDE 20 MG/ML
INJECTION INTRAMUSCULAR; INTRAVENOUS AS NEEDED
Status: DISCONTINUED | OUTPATIENT
Start: 2020-12-16 | End: 2020-12-16 | Stop reason: HOSPADM

## 2020-12-16 RX ORDER — ONDANSETRON 2 MG/ML
INJECTION INTRAMUSCULAR; INTRAVENOUS AS NEEDED
Status: DISCONTINUED | OUTPATIENT
Start: 2020-12-16 | End: 2020-12-16 | Stop reason: HOSPADM

## 2020-12-16 RX ORDER — SODIUM CHLORIDE 9 MG/ML
50 INJECTION, SOLUTION INTRAVENOUS CONTINUOUS
Status: DISCONTINUED | OUTPATIENT
Start: 2020-12-16 | End: 2020-12-17 | Stop reason: HOSPADM

## 2020-12-16 RX ORDER — TIMOLOL MALEATE 6.8 MG/ML
1 SOLUTION/ DROPS OPHTHALMIC DAILY
Status: DISCONTINUED | OUTPATIENT
Start: 2020-12-16 | End: 2020-12-17 | Stop reason: HOSPADM

## 2020-12-16 RX ORDER — EPHEDRINE SULFATE/0.9% NACL/PF 50 MG/5 ML
SYRINGE (ML) INTRAVENOUS AS NEEDED
Status: DISCONTINUED | OUTPATIENT
Start: 2020-12-16 | End: 2020-12-16 | Stop reason: HOSPADM

## 2020-12-16 RX ORDER — FACIAL-BODY WIPES
10 EACH TOPICAL DAILY PRN
Status: DISCONTINUED | OUTPATIENT
Start: 2020-12-16 | End: 2020-12-17 | Stop reason: HOSPADM

## 2020-12-16 RX ORDER — LIDOCAINE HYDROCHLORIDE 20 MG/ML
INJECTION, SOLUTION EPIDURAL; INFILTRATION; INTRACAUDAL; PERINEURAL AS NEEDED
Status: DISCONTINUED | OUTPATIENT
Start: 2020-12-16 | End: 2020-12-16 | Stop reason: HOSPADM

## 2020-12-16 RX ORDER — OXYCODONE AND ACETAMINOPHEN 5; 325 MG/1; MG/1
2 TABLET ORAL
Status: DISCONTINUED | OUTPATIENT
Start: 2020-12-16 | End: 2020-12-17 | Stop reason: HOSPADM

## 2020-12-16 RX ORDER — ALLOPURINOL 100 MG/1
100 TABLET ORAL DAILY
Status: DISCONTINUED | OUTPATIENT
Start: 2020-12-16 | End: 2020-12-17 | Stop reason: HOSPADM

## 2020-12-16 RX ORDER — OXYCODONE AND ACETAMINOPHEN 5; 325 MG/1; MG/1
1 TABLET ORAL
Status: DISCONTINUED | OUTPATIENT
Start: 2020-12-16 | End: 2020-12-17 | Stop reason: HOSPADM

## 2020-12-16 RX ORDER — ASPIRIN 81 MG/1
81 TABLET ORAL DAILY
Status: DISCONTINUED | OUTPATIENT
Start: 2020-12-16 | End: 2020-12-17 | Stop reason: HOSPADM

## 2020-12-16 RX ORDER — SODIUM CHLORIDE 0.9 % (FLUSH) 0.9 %
5-40 SYRINGE (ML) INJECTION EVERY 8 HOURS
Status: DISCONTINUED | OUTPATIENT
Start: 2020-12-16 | End: 2020-12-16 | Stop reason: HOSPADM

## 2020-12-16 RX ORDER — METOPROLOL TARTRATE 25 MG/1
12.5 TABLET, FILM COATED ORAL EVERY 12 HOURS
Status: DISCONTINUED | OUTPATIENT
Start: 2020-12-16 | End: 2020-12-17 | Stop reason: HOSPADM

## 2020-12-16 RX ORDER — LOSARTAN POTASSIUM 25 MG/1
25 TABLET ORAL DAILY
Status: DISCONTINUED | OUTPATIENT
Start: 2020-12-16 | End: 2020-12-17 | Stop reason: HOSPADM

## 2020-12-16 RX ORDER — SODIUM CHLORIDE, SODIUM LACTATE, POTASSIUM CHLORIDE, CALCIUM CHLORIDE 600; 310; 30; 20 MG/100ML; MG/100ML; MG/100ML; MG/100ML
25 INJECTION, SOLUTION INTRAVENOUS CONTINUOUS
Status: DISCONTINUED | OUTPATIENT
Start: 2020-12-16 | End: 2020-12-16 | Stop reason: HOSPADM

## 2020-12-16 RX ORDER — SODIUM CHLORIDE 0.9 % (FLUSH) 0.9 %
5-40 SYRINGE (ML) INJECTION AS NEEDED
Status: DISCONTINUED | OUTPATIENT
Start: 2020-12-16 | End: 2020-12-16 | Stop reason: HOSPADM

## 2020-12-16 RX ORDER — FENTANYL CITRATE 50 UG/ML
25 INJECTION, SOLUTION INTRAMUSCULAR; INTRAVENOUS
Status: DISCONTINUED | OUTPATIENT
Start: 2020-12-16 | End: 2020-12-16 | Stop reason: HOSPADM

## 2020-12-16 RX ORDER — ADHESIVE BANDAGE
30 BANDAGE TOPICAL DAILY PRN
Status: DISCONTINUED | OUTPATIENT
Start: 2020-12-16 | End: 2020-12-17 | Stop reason: HOSPADM

## 2020-12-16 RX ORDER — ACETAMINOPHEN 325 MG/1
650 TABLET ORAL
Status: DISCONTINUED | OUTPATIENT
Start: 2020-12-16 | End: 2020-12-17 | Stop reason: HOSPADM

## 2020-12-16 RX ORDER — TIMOLOL MALEATE 5 MG/ML
1 SOLUTION/ DROPS OPHTHALMIC DAILY
Status: DISCONTINUED | OUTPATIENT
Start: 2020-12-16 | End: 2020-12-16

## 2020-12-16 RX ORDER — POLYETHYLENE GLYCOL 3350 17 G/17G
17 POWDER, FOR SOLUTION ORAL AS NEEDED
Status: DISCONTINUED | OUTPATIENT
Start: 2020-12-16 | End: 2020-12-17 | Stop reason: HOSPADM

## 2020-12-16 RX ORDER — PROTAMINE SULFATE 10 MG/ML
INJECTION, SOLUTION INTRAVENOUS AS NEEDED
Status: DISCONTINUED | OUTPATIENT
Start: 2020-12-16 | End: 2020-12-16 | Stop reason: HOSPADM

## 2020-12-16 RX ORDER — GLYCOPYRROLATE 0.2 MG/ML
INJECTION INTRAMUSCULAR; INTRAVENOUS AS NEEDED
Status: DISCONTINUED | OUTPATIENT
Start: 2020-12-16 | End: 2020-12-16 | Stop reason: HOSPADM

## 2020-12-16 RX ORDER — PROPOFOL 10 MG/ML
INJECTION, EMULSION INTRAVENOUS AS NEEDED
Status: DISCONTINUED | OUTPATIENT
Start: 2020-12-16 | End: 2020-12-16 | Stop reason: HOSPADM

## 2020-12-16 RX ORDER — DEXAMETHASONE SODIUM PHOSPHATE 4 MG/ML
INJECTION, SOLUTION INTRA-ARTICULAR; INTRALESIONAL; INTRAMUSCULAR; INTRAVENOUS; SOFT TISSUE AS NEEDED
Status: DISCONTINUED | OUTPATIENT
Start: 2020-12-16 | End: 2020-12-16 | Stop reason: HOSPADM

## 2020-12-16 RX ORDER — ONDANSETRON 2 MG/ML
4 INJECTION INTRAMUSCULAR; INTRAVENOUS
Status: DISCONTINUED | OUTPATIENT
Start: 2020-12-16 | End: 2020-12-17 | Stop reason: HOSPADM

## 2020-12-16 RX ORDER — PHENYLEPHRINE HCL IN 0.9% NACL 0.4MG/10ML
SYRINGE (ML) INTRAVENOUS AS NEEDED
Status: DISCONTINUED | OUTPATIENT
Start: 2020-12-16 | End: 2020-12-16 | Stop reason: HOSPADM

## 2020-12-16 RX ORDER — TIMOLOL MALEATE 5 MG/ML
1 SOLUTION OPHTHALMIC DAILY
Status: DISCONTINUED | OUTPATIENT
Start: 2020-12-16 | End: 2020-12-16

## 2020-12-16 RX ORDER — ROCURONIUM BROMIDE 10 MG/ML
INJECTION, SOLUTION INTRAVENOUS AS NEEDED
Status: DISCONTINUED | OUTPATIENT
Start: 2020-12-16 | End: 2020-12-16 | Stop reason: HOSPADM

## 2020-12-16 RX ORDER — HYDROMORPHONE HYDROCHLORIDE 2 MG/ML
0.2 INJECTION, SOLUTION INTRAMUSCULAR; INTRAVENOUS; SUBCUTANEOUS
Status: DISCONTINUED | OUTPATIENT
Start: 2020-12-16 | End: 2020-12-16 | Stop reason: HOSPADM

## 2020-12-16 RX ORDER — MORPHINE SULFATE 2 MG/ML
2 INJECTION, SOLUTION INTRAMUSCULAR; INTRAVENOUS
Status: DISCONTINUED | OUTPATIENT
Start: 2020-12-16 | End: 2020-12-17 | Stop reason: HOSPADM

## 2020-12-16 RX ORDER — LATANOPROST 50 UG/ML
1 SOLUTION/ DROPS OPHTHALMIC
Status: DISCONTINUED | OUTPATIENT
Start: 2020-12-16 | End: 2020-12-17 | Stop reason: HOSPADM

## 2020-12-16 RX ORDER — SODIUM CHLORIDE, SODIUM LACTATE, POTASSIUM CHLORIDE, CALCIUM CHLORIDE 600; 310; 30; 20 MG/100ML; MG/100ML; MG/100ML; MG/100ML
25 INJECTION, SOLUTION INTRAVENOUS CONTINUOUS
Status: DISCONTINUED | OUTPATIENT
Start: 2020-12-16 | End: 2020-12-17 | Stop reason: HOSPADM

## 2020-12-16 RX ORDER — AMLODIPINE BESYLATE 5 MG/1
10 TABLET ORAL DAILY
Status: DISCONTINUED | OUTPATIENT
Start: 2020-12-16 | End: 2020-12-17 | Stop reason: HOSPADM

## 2020-12-16 RX ORDER — HEPARIN SODIUM 1000 [USP'U]/ML
INJECTION, SOLUTION INTRAVENOUS; SUBCUTANEOUS
Status: DISCONTINUED | OUTPATIENT
Start: 2020-12-16 | End: 2020-12-16

## 2020-12-16 RX ORDER — FENTANYL CITRATE 50 UG/ML
INJECTION, SOLUTION INTRAMUSCULAR; INTRAVENOUS AS NEEDED
Status: DISCONTINUED | OUTPATIENT
Start: 2020-12-16 | End: 2020-12-16 | Stop reason: HOSPADM

## 2020-12-16 RX ORDER — MIDAZOLAM HYDROCHLORIDE 1 MG/ML
INJECTION, SOLUTION INTRAMUSCULAR; INTRAVENOUS AS NEEDED
Status: DISCONTINUED | OUTPATIENT
Start: 2020-12-16 | End: 2020-12-16 | Stop reason: HOSPADM

## 2020-12-16 RX ORDER — HEPARIN SODIUM 1000 [USP'U]/ML
INJECTION, SOLUTION INTRAVENOUS; SUBCUTANEOUS AS NEEDED
Status: DISCONTINUED | OUTPATIENT
Start: 2020-12-16 | End: 2020-12-16 | Stop reason: HOSPADM

## 2020-12-16 RX ORDER — DIPHENHYDRAMINE HYDROCHLORIDE 50 MG/ML
12.5 INJECTION, SOLUTION INTRAMUSCULAR; INTRAVENOUS AS NEEDED
Status: DISCONTINUED | OUTPATIENT
Start: 2020-12-16 | End: 2020-12-16 | Stop reason: HOSPADM

## 2020-12-16 RX ORDER — ATORVASTATIN CALCIUM 40 MG/1
80 TABLET, FILM COATED ORAL
Status: DISCONTINUED | OUTPATIENT
Start: 2020-12-16 | End: 2020-12-17 | Stop reason: HOSPADM

## 2020-12-16 RX ADMIN — MIDAZOLAM HYDROCHLORIDE 1 MG: 1 INJECTION, SOLUTION INTRAMUSCULAR; INTRAVENOUS at 07:10

## 2020-12-16 RX ADMIN — ASPIRIN 81 MG: 81 TABLET, COATED ORAL at 13:26

## 2020-12-16 RX ADMIN — Medication 80 MCG: at 09:39

## 2020-12-16 RX ADMIN — Medication 1 LOZENGE: at 22:09

## 2020-12-16 RX ADMIN — GLYCOPYRROLATE 0.2 MG: 0.2 INJECTION, SOLUTION INTRAMUSCULAR; INTRAVENOUS at 08:32

## 2020-12-16 RX ADMIN — SUCCINYLCHOLINE CHLORIDE 120 MG: 20 INJECTION, SOLUTION INTRAMUSCULAR; INTRAVENOUS at 07:38

## 2020-12-16 RX ADMIN — Medication 200 MCG: at 07:43

## 2020-12-16 RX ADMIN — LIDOCAINE HYDROCHLORIDE 60 MG: 20 INJECTION, SOLUTION EPIDURAL; INFILTRATION; INTRACAUDAL; PERINEURAL at 07:37

## 2020-12-16 RX ADMIN — SODIUM CHLORIDE, POTASSIUM CHLORIDE, SODIUM LACTATE AND CALCIUM CHLORIDE: 600; 310; 30; 20 INJECTION, SOLUTION INTRAVENOUS at 07:02

## 2020-12-16 RX ADMIN — SODIUM CHLORIDE 50 ML/HR: 9 INJECTION, SOLUTION INTRAVENOUS at 10:46

## 2020-12-16 RX ADMIN — FENTANYL CITRATE 100 MCG: 50 INJECTION, SOLUTION INTRAMUSCULAR; INTRAVENOUS at 08:06

## 2020-12-16 RX ADMIN — Medication 1 AMPULE: at 21:00

## 2020-12-16 RX ADMIN — PROTAMINE SULFATE 25 MG: 10 INJECTION, SOLUTION INTRAVENOUS at 09:32

## 2020-12-16 RX ADMIN — ONDANSETRON HYDROCHLORIDE 4 MG: 2 INJECTION, SOLUTION INTRAMUSCULAR; INTRAVENOUS at 09:33

## 2020-12-16 RX ADMIN — PROPOFOL 20 MG: 10 INJECTION, EMULSION INTRAVENOUS at 09:25

## 2020-12-16 RX ADMIN — WATER 2 G: 1 INJECTION INTRAMUSCULAR; INTRAVENOUS; SUBCUTANEOUS at 07:58

## 2020-12-16 RX ADMIN — PROPOFOL 10 MG: 10 INJECTION, EMULSION INTRAVENOUS at 09:23

## 2020-12-16 RX ADMIN — SODIUM CHLORIDE 80 MCG/MIN: 900 INJECTION, SOLUTION INTRAVENOUS at 07:53

## 2020-12-16 RX ADMIN — HEPARIN SODIUM 6000 UNITS: 1000 INJECTION, SOLUTION INTRAVENOUS; SUBCUTANEOUS at 08:26

## 2020-12-16 RX ADMIN — PROPOFOL 20 MG: 10 INJECTION, EMULSION INTRAVENOUS at 07:45

## 2020-12-16 RX ADMIN — Medication 10 MG: at 08:00

## 2020-12-16 RX ADMIN — Medication 80 MCG: at 09:46

## 2020-12-16 RX ADMIN — FENTANYL CITRATE 25 MCG: 50 INJECTION, SOLUTION INTRAMUSCULAR; INTRAVENOUS at 11:33

## 2020-12-16 RX ADMIN — PROPOFOL 100 MG: 10 INJECTION, EMULSION INTRAVENOUS at 07:37

## 2020-12-16 RX ADMIN — SODIUM CHLORIDE, POTASSIUM CHLORIDE, SODIUM LACTATE AND CALCIUM CHLORIDE 25 ML/HR: 600; 310; 30; 20 INJECTION, SOLUTION INTRAVENOUS at 07:28

## 2020-12-16 RX ADMIN — OXYCODONE HYDROCHLORIDE AND ACETAMINOPHEN 1 TABLET: 5; 325 TABLET ORAL at 22:07

## 2020-12-16 RX ADMIN — Medication 20 MG: at 07:02

## 2020-12-16 RX ADMIN — ACETAMINOPHEN 650 MG: 325 TABLET ORAL at 18:19

## 2020-12-16 RX ADMIN — FENTANYL CITRATE 50 MCG: 50 INJECTION, SOLUTION INTRAMUSCULAR; INTRAVENOUS at 07:37

## 2020-12-16 RX ADMIN — METOPROLOL TARTRATE 12.5 MG: 25 TABLET, FILM COATED ORAL at 22:07

## 2020-12-16 RX ADMIN — Medication 200 MCG: at 08:03

## 2020-12-16 RX ADMIN — Medication 3 AMPULE: at 07:28

## 2020-12-16 RX ADMIN — Medication 40 MCG: at 08:00

## 2020-12-16 RX ADMIN — ROCURONIUM BROMIDE 10 MG: 10 INJECTION INTRAVENOUS at 07:38

## 2020-12-16 RX ADMIN — DEXAMETHASONE SODIUM PHOSPHATE 4 MG: 4 INJECTION, SOLUTION INTRAMUSCULAR; INTRAVENOUS at 07:59

## 2020-12-16 RX ADMIN — ROCURONIUM BROMIDE 40 MG: 10 INJECTION INTRAVENOUS at 07:46

## 2020-12-16 RX ADMIN — Medication 20 MG: at 08:03

## 2020-12-16 RX ADMIN — ATORVASTATIN CALCIUM 80 MG: 40 TABLET, FILM COATED ORAL at 22:06

## 2020-12-16 RX ADMIN — SODIUM CHLORIDE 5 MG/HR: 900 INJECTION, SOLUTION INTRAVENOUS at 10:25

## 2020-12-16 RX ADMIN — ALLOPURINOL 100 MG: 100 TABLET ORAL at 13:27

## 2020-12-16 RX ADMIN — FENTANYL CITRATE 50 MCG: 50 INJECTION, SOLUTION INTRAMUSCULAR; INTRAVENOUS at 07:10

## 2020-12-16 RX ADMIN — AMLODIPINE BESYLATE 10 MG: 5 TABLET ORAL at 13:27

## 2020-12-16 RX ADMIN — LATANOPROST 1 DROP: 50 SOLUTION OPHTHALMIC at 22:07

## 2020-12-16 RX ADMIN — LOSARTAN POTASSIUM 25 MG: 25 TABLET, FILM COATED ORAL at 13:27

## 2020-12-16 NOTE — PERIOP NOTES
1013-Handoff Report from Operating Room to PACU    Report received from DASHAWN Arriola and Sharla Valverde CRNA regarding [place]. Surgeon(s):  Shari Shipman MD  And Procedure(s) (LRB):  RIGHT CAROTID ARTERY ENDARTERECTOMY (Right)  confirmed   with allergies, drains and dressings discussed. Anesthesia type, drugs, patient history, complications, estimated blood loss, vital signs, intake and output, and last pain medication, lines, reversal medications and temperature were reviewed. [address]- Granddaughter updated. 12- Granddaughter updated. Ugo Caballero Daughter Loraine Townsend updated. 1358- TRANSFER - OUT REPORT:    Verbal report given to [address]  RN(name) on [place]  being transferred to [facility]) for routine post - op       Report consisted of patients Situation, Background, Assessment and   Recommendations(SBAR). Information from the following report(s) SBAR, Kardex, OR Summary, Procedure Summary, Intake/Output, MAR and Recent Results was reviewed with the receiving nurse. Opportunity for questions and clarification was provided.       Patient transported with:   Monitor  Registered Nurse  Tech

## 2020-12-16 NOTE — PROGRESS NOTES
TRANSFER - IN REPORT:    Verbal report received from Alexys Silverman (name) on Homero Kruger  being received from PACU (unit) for routine progression of care      Report consisted of patients Situation, Background, Assessment and   Recommendations(SBAR). Information from the following report(s) SBAR, Kardex, Intake/Output, Recent Results and Quality Measures was reviewed with the receiving nurse. Opportunity for questions and clarification was provided. Assessment completed upon patients arrival to unit and care assumed. End of Shift Note    Bedside shift change report given to Charlotte Davis (oncoming nurse) by Pawan Ram RN (offgoing nurse). Report included the following information SBAR, Kardex, Intake/Output, Recent Results and Quality Measures    Shift worked:  day     Shift summary and any significant changes:     NA     Concerns for physician to address:  NA     Zone phone for oncoming shift:          Activity:  Activity Level: Up with Assistance  Number times ambulated in hallways past shift: 1  Number of times OOB to chair past shift: 3    Cardiac:   Cardiac Monitoring: Yes      Cardiac Rhythm: Normal sinus rhythm    Access:   Current line(s): PIV     Genitourinary:   Urinary status: voiding    Respiratory:   O2 Device: Room air  Chronic home O2 use?: NO  Incentive spirometer at bedside: NO     GI:     Current diet:  DIET CARDIAC Regular  Passing flatus: YES  Tolerating current diet: YES       Pain Management:   Patient states pain is manageable on current regimen: YES    Skin:  Stevenson Score: 22  Interventions: increase time out of bed    Patient Safety:  Fall Score:  Total Score: 2  Interventions: pt to call before getting OOB       Length of Stay:  Expected LOS: 1d 7h  Actual LOS: 0      Pawan Ram RN

## 2020-12-16 NOTE — PROGRESS NOTES
Problem: Mobility Impaired (Adult and Pediatric)  Goal: *Acute Goals and Plan of Care (Insert Text)  Description: FUNCTIONAL STATUS PRIOR TO ADMISSION: Patient was independent and active without use of DME.    HOME SUPPORT PRIOR TO ADMISSION: The patient lived with her daughter but did not require assist.    Physical Therapy Goals  Initiated 12/16/2020  1. Patient will move from supine to sit and sit to supine  in bed with independence within 7 day(s). 2.  Patient will transfer from bed to chair and chair to bed with independence using the least restrictive device within 7 day(s). 3.  Patient will perform sit to stand with independence within 7 day(s). 4.  Patient will ambulate with independence for 200 feet with the least restrictive device within 7 day(s). Outcome: Progressing Towards Goal    PHYSICAL THERAPY EVALUATION  Patient: Syeda Mora (83 y.o. female)  Date: 12/16/2020  Primary Diagnosis: Carotid stenosis, asymptomatic, right [I65.21]  Procedure(s) (LRB):  RIGHT CAROTID ARTERY ENDARTERECTOMY (Right) Day of Surgery   Precautions:        ASSESSMENT  Based on the objective data described below, the patient presents with standing balance deficits but otherwise good functional status following admission  for a right CEA. Vitals stable with activity and patient agreeable to mobility. Gait training x160' with , 2 path deviations, and a tendency to reach for external support. She reports owning a SPC but does not use DME at baseline. Encouraged her to be OOB for meals with nursing assistance. Anticipate good progress towards goals and likely no discharge needs. Will follow x1-2 visits to ensure baseline functional status. Other factors to consider for discharge: lives with her daughter     Patient will benefit from skilled therapy intervention to address the above noted impairments.        PLAN :  Recommendations and Planned Interventions: bed mobility training, transfer training, gait training, and therapeutic exercises      Frequency/Duration: Patient will be followed by physical therapy:  3 times a week to address goals. Recommendation for discharge: (in order for the patient to meet his/her long term goals)  No skilled physical therapy/ follow up rehabilitation needs identified at this time. Pending progress        IF patient discharges home will need the following DME: None         SUBJECTIVE:   Patient stated I feel a little wobbly.     OBJECTIVE DATA SUMMARY:   HISTORY:    Past Medical History:   Diagnosis Date    CAD (coronary artery disease)     Diverticulitis     Glaucoma     Gout     HTN (hypertension) 5/18/2018    Hx of diverticulitis of colon 5/18/2018    Hyperlipidemia     Hypertension     Other ill-defined conditions(799.89)     high cholesterol    S/P coronary artery stent placement 05/25/2018    Stroke ([address] 10/2020    TIA     Past Surgical History:   Procedure Laterality Date    CARDIAC SURG PROCEDURE UNLIST  05/2018    x2 cardiac stent    HX CAROTID ENDARTERECTOMY Left 11/02/2020    HX HYSTERECTOMY      LA COLONOSCOPY FLX DX W/COLLJ SPEC WHEN PFRMD  10/18/2013            Personal factors and/or comorbidities impacting plan of care:     Home Situation  Home Environment: Private residence  # Steps to Enter: 5  Wheelchair Ramp: Yes  One/Two Story Residence: One story  Living Alone: No  Support Systems: Child(jazlyn)  Patient Expects to be Discharged to[de-identified] Private residence  Current DME Used/Available at Home: Jonita Moreno, straight, Shower chair  Tub or Shower Type: Tub/Shower combination    EXAMINATION/PRESENTATION/DECISION MAKING:   Critical Behavior:  Neurologic State: Drowsy  Orientation Level: Oriented X4  Cognition: Appropriate decision making, Appropriate for age attention/concentration, Appropriate safety awareness     Hearing:   Auditory  Auditory Impairment: Hard of hearing, bilateral  Skin:    Edema:   Range Of Motion:  AROM: Generally decreased, functional Strength:    Strength: Generally decreased, functional                    Tone & Sensation:   Tone: Normal              Sensation: Intact               Coordination:  Coordination: Within functional limits  Vision:      Functional Mobility:  Bed Mobility:  Rolling: Supervision  Supine to Sit: Minimum assistance        Transfers:  Sit to Stand: Contact guard assistance  Stand to Sit: Contact guard assistance                       Balance:   Sitting: Intact  Standing: Impaired  Standing - Static: Fair  Standing - Dynamic : Fair  Ambulation/Gait Training:  Distance (ft): 160 Feet (ft)  Assistive Device: Gait belt(intermittent HHA)  Ambulation - Level of Assistance: Contact guard assistance     Gait Description (WDL): Exceptions to WDL  Gait Abnormalities: Decreased step clearance;Trunk sway increased; Path deviations        Base of Support: Widened                      Physical Therapy Evaluation Charge Determination   History Examination Presentation Decision-Making   MEDIUM  Complexity : 1-2 comorbidities / personal factors will impact the outcome/ POC  LOW Complexity : 1-2 Standardized tests and measures addressing body structure, function, activity limitation and / or participation in recreation  LOW Complexity : Stable, uncomplicated  LOW Complexity : FOTO score of       Based on the above components, the patient evaluation is determined to be of the following complexity level: LOW     Pain Rating:      Activity Tolerance:   Good    After treatment patient left in no apparent distress:   Supine in bed and Call bell within reach    COMMUNICATION/EDUCATION:   The patients plan of care was discussed with: Registered nurse. Fall prevention education was provided and the patient/caregiver indicated understanding., Patient/family have participated as able in goal setting and plan of care. , and Patient/family agree to work toward stated goals and plan of care.     Thank you for this referral.  Matt Ramachandran, PT, DPT   Time Calculation: 24 mins

## 2020-12-16 NOTE — ANESTHESIA POSTPROCEDURE EVALUATION
Procedure(s):  RIGHT CAROTID ARTERY ENDARTERECTOMY. general    Anesthesia Post Evaluation        Patient location during evaluation: PACU  Note status: Adequate. Level of consciousness: responsive to verbal stimuli and sleepy but conscious  Pain management: satisfactory to patient  Airway patency: patent  Anesthetic complications: no  Cardiovascular status: acceptable  Respiratory status: acceptable  Hydration status: acceptable  Comments: +Post-Anesthesia Evaluation and Assessment    Patient: Caity Quiñones MRN: [id number]  SSN: [id number]   YOB: 1932  Age: 80 y.o. Sex: female      Cardiovascular Function/Vital Signs    BP (!) 146/51   Pulse 67   Temp 36.5 °C (97.7 °F)   Resp 10   Wt 67.6 kg (149 lb 0.5 oz)   SpO2 100%   BMI 28.63 kg/m²     Patient is status post Procedure(s):  RIGHT CAROTID ARTERY ENDARTERECTOMY. Nausea/Vomiting: Controlled. Postoperative hydration reviewed and adequate. Pain:  Pain Scale 1: Numeric (0 - 10) (12/16/20 1015)  Pain Intensity 1: 0 (12/16/20 1015)   Managed. Neurological Status:   Neuro (WDL): Exceptions to WDL (12/16/20 1015)   At baseline. Mental Status and Level of Consciousness: Arousable. Pulmonary Status:   O2 Device: Nasal cannula (12/16/20 1115)   Adequate oxygenation and airway patent. Complications related to anesthesia: None    Post-anesthesia assessment completed. No concerns. Signed By: Barrie Orourke MD    12/16/2020  Post anesthesia nausea and vomiting:  controlled      INITIAL Post-op Vital signs:   Vitals Value Taken Time   /51 12/16/20 1115   Temp 36.5 °C (97.7 °F) 12/16/20 1015   Pulse 67 12/16/20 1125   Resp 10 12/16/20 1125   SpO2 100 % 12/16/20 1125   Vitals shown include unvalidated device data.

## 2020-12-16 NOTE — BRIEF OP NOTE
Brief Postoperative Note    Patient: Alex Willoughby  YOB: 1932  MRN: [id number]    Date of Procedure: 12/16/2020     Pre-Op Diagnosis: CAROTID STENOSIS    Post-Op Diagnosis: Same as preoperative diagnosis. Procedure(s):  RIGHT CAROTID ARTERY ENDARTERECTOMY    Surgeon(s):  Marlin Habermann, MD    Surgical Assistant: Surg Asst-1: Ruby Mayen RN    Anesthesia: General     Estimated Blood Loss (mL): less than 530     Complications: None    Specimens: * No specimens in log *     Implants:   Implant Name Type Inv. Item Serial No.  Lot No. LRB No. Used Action   PATCH VASC W0.8XL8CM PERIPH BOV PERICARD N PVC N DEHP CRSS - SN/A  PATCH VASC W0.8XL8CM PERIPH BOV PERICARD N PVC N DEHP CRSS N/A [employer] [id number] Right 1 Implanted       Drains:   Panfilo-Ospina Drain 12/16/20 Right Neck (Active)       [REMOVED] Panfilo-Ospina Drain 11/02/20 Left Neck (Removed)       Findings: Severe ICA stenosis. Good result.     Electronically Signed by Myah Ashley MD on 12/16/2020 at 10:01 AM

## 2020-12-17 VITALS
DIASTOLIC BLOOD PRESSURE: 60 MMHG | RESPIRATION RATE: 12 BRPM | HEART RATE: 83 BPM | SYSTOLIC BLOOD PRESSURE: 157 MMHG | TEMPERATURE: 98.2 F | BODY MASS INDEX: 28.63 KG/M2 | OXYGEN SATURATION: 99 % | WEIGHT: 149.03 LBS

## 2020-12-17 PROCEDURE — 97165 OT EVAL LOW COMPLEX 30 MIN: CPT | Performed by: OCCUPATIONAL THERAPIST

## 2020-12-17 PROCEDURE — 2709999900 HC NON-CHARGEABLE SUPPLY

## 2020-12-17 PROCEDURE — 74011250637 HC RX REV CODE- 250/637: Performed by: SURGERY

## 2020-12-17 RX ORDER — OXYCODONE AND ACETAMINOPHEN 5; 325 MG/1; MG/1
1 TABLET ORAL
Qty: 12 TAB | Refills: 0 | Status: SHIPPED | OUTPATIENT
Start: 2020-12-17 | End: 2020-12-22

## 2020-12-17 RX ADMIN — AMLODIPINE BESYLATE 10 MG: 5 TABLET ORAL at 09:02

## 2020-12-17 RX ADMIN — ASPIRIN 81 MG: 81 TABLET, COATED ORAL at 09:02

## 2020-12-17 RX ADMIN — METOPROLOL TARTRATE 12.5 MG: 25 TABLET, FILM COATED ORAL at 09:02

## 2020-12-17 RX ADMIN — LOSARTAN POTASSIUM 25 MG: 25 TABLET, FILM COATED ORAL at 09:02

## 2020-12-17 RX ADMIN — Medication 1 AMPULE: at 09:02

## 2020-12-17 RX ADMIN — ALLOPURINOL 100 MG: 100 TABLET ORAL at 09:02

## 2020-12-17 NOTE — PROGRESS NOTES
Patient is ready for discharge from care manager standpoint      Patient is S/P right carotid endarterectomy on 12/16/20    LLOYD MOSQUEDA  Home  -  daughter Dmitriy Nuñez to stay with patient    Transportation will be provided by granddaughter, Navjot Jacobs    Preferred Rx is [facility] in [place]    2nd IM letter was given, reviewed and signed by patient. Signed copy of 2nd IM letter was placed on chart. FU appt  with Dr. Tanvir Castillo is listed on AVS; Patient stated that she will schedule appt with NP Tamia Sweet; [facility] called [phone] and telephone recording stated that office is closed.      J[address]  Ext [phone]

## 2020-12-17 NOTE — DISCHARGE INSTRUCTIONS
Discharge Instructions for Carotid Endarterectomy   A carotid endarterectomy restores normal blood flow through the vessels that carry blood to your brain. These vessels are called the carotid arteries. During the surgery, a surgeon made a small incision in the side of your neck, just below your jaw. The artery was opened and the blockage was cleared. This procedure was done to reduce your risk of a stroke, which can occur when the carotid arteries are severely blocked or narrowed. Home care  Spend your first few days after surgery relaxing at home. It's OK to do quiet activities such as reading or watching TV. Take your medicines exactly as instructed. Dont skip doses. You may drive when you are no longer taking pain medication. It's OK to shower. Don't scrub your incision. Don't do strenuous activity for 7 to 10 days after your surgery. Dont lift anything heavier than 10 pounds for 2 to 3 weeks after your surgery. You may return to work after 2 weeks. Gradually increase your activity. It may take some time for you to return to your normal activities. Check your incision every day for signs of infection (redness, swelling, drainage, or warmth). Dont be alarmed if you have some loss of feeling along your jaw line, the incision line, and earlobe. This is a result of the incision and usually goes away after 6 to 12 months. When to call 911  A stroke is a medical emergency. Call 911 right away if you have any of these symptoms of stroke:    Weakness, tingling, or loss of feeling on one side of your face or body    Sudden double vision or trouble seeing in one or both eyes    Sudden trouble talking or slurred speech    Sudden, severe headache    F.A.S.T. is an easy way to remember the signs of stroke. When you see these signs, call 911 fast.    F.A.S.T. stands for:    F is for face drooping. One side of the face is drooping or numb. When the person smiles, the smile is uneven.     A is for arm weakness. One arm is weak or numb. When the person lifts both arms at the same time, one arm may drift downward. S is for speech difficulty. You may notice slurred speech or trouble speaking. The person can't repeat a simple sentence correctly when asked. T is for time to call 911. If someone shows any of these symptoms, even if they go away, call 911 right away. Make note of the time the symptoms first appeared. When to call your healthcare provider  Call your healthcare provider right away if you have any of the following:    Neck swelling    Redness, pain, swelling, or drainage from your incision    Fever above 100.4°F (38°C), or higher, or as advised    Call the office at [phone] if there are any problems. Long-term changes at home  Eat a healthy, low-fat, low cholesterol, and low calorie diet. Maintain your ideal body weight. After you have recovered from surgery, try to exercise more, especially walking. If you smoke, ask your primary doctor for help quitting.

## 2020-12-17 NOTE — DISCHARGE SUMMARY
Vascular Surgery Discharge Summary     Patient ID:  Jordi Mancia  [id number]  10 y.o.  8/15/1932    Admitting Provider: Aileen Wong MD  Discharging Provider: Aileen Wong MD    Admit Date: 12/16/2020    Discharge Date: 12/17/2020    Discharge Diagnoses:   Asymptomatic Right Carotid Stenosis - POA  Hypertension - POA   Coronary Artery Disease- POA  Hyperlipidemia - POA  Hx of TIA - POA      Procedure(s):  RIGHT CAROTID ARTERY ENDARTERECTOMY  12/16/20     Hospital Course:   Val Oviedo is an 81yo  female with a PMHx of HTN, HLD, CAD, TIA, who was admitted following R CEA on 12/16/20. Her postop course was uncomplicated and her comorbidities were stable. Pertinent Results:   No results found for this or any previous visit (from the past 24 hour(s)).     Vital signs:   Patient Vitals for the past 24 hrs:   BP Temp Pulse Resp SpO2   12/17/20 0730 (!) 122/41 -- 83 16 93 %   12/17/20 0322 (!) 138/47 98.1 °F (36.7 °C) 82 14 98 %   12/17/20 0024 (!) 136/54 97.9 °F (36.6 °C) 96 12 98 %   12/16/20 1950 132/80 98.2 °F (36.8 °C) (!) 102 14 98 %   12/16/20 1532 (!) 143/64 -- -- -- --   12/16/20 1528 (!) 152/59 -- -- -- --   12/16/20 1438 133/60 -- 85 13 98 %   12/16/20 1425 (!) 167/70 -- (!) 103 13 99 %   12/16/20 1400 (!) 166/62 97.5 °F (36.4 °C) 78 13 99 %   12/16/20 1330 (!) 167/61 -- 92 19 99 %   12/16/20 1300 (!) 151/58 -- 75 10 98 %   12/16/20 1230 (!) 148/58 -- 73 13 96 %   12/16/20 1215 (!) 144/52 -- 66 11 100 %   12/16/20 1200 (!) 138/51 -- 64 11 100 %   12/16/20 1145 (!) 136/52 -- 64 10 100 %   12/16/20 1130 (!) 146/55 -- 70 10 100 %   12/16/20 1115 (!) 146/51 -- 67 10 100 %   12/16/20 1100 (!) 132/47 -- 66 10 100 %   12/16/20 1045 (!) 117/46 -- 70 11 100 %   12/16/20 1040 (!) 112/45 -- 69 10 100 %   12/16/20 1035 (!) 117/48 -- 71 10 100 %   12/16/20 1030 (!) 151/47 -- 74 10 100 %   12/16/20 1025 (!) 180/61 -- 76 10 100 %   12/16/20 1020 (!) 122/48 -- 78 14 100 % 12/16/20 1015 (!) 109/55 97.7 °F (36.5 °C) 80 8 100 %   12/16/20 1013 (!) 120/52 -- -- -- --       Patient Weight:   Last 3 Recorded Weights in this Encounter    12/16/20 0629   Weight: 67.6 kg (149 lb 0.5 oz)       Consults: None    Patient Condition at Discharge: stable    Disposition: home    Patient Instructions:   Current Discharge Medication List      START taking these medications    Details   benzocaine-menthoL (CHLORASEPTIC MAX) 15-10 mg lozg lozenge Take 1 Lozenge by mouth every two (2) hours as needed for Sore throat. Patient may take home with her  Qty: 1 Each, Refills: 0      oxyCODONE-acetaminophen (PERCOCET) 5-325 mg per tablet Take 1 Tab by mouth every four (4) hours as needed for Pain for up to 5 days. Max Daily Amount: 6 Tabs. Qty: 12 Tab, Refills: 0    Associated Diagnoses: Carotid stenosis, asymptomatic, right         CONTINUE these medications which have NOT CHANGED    Details   cholecalciferol (Vitamin D3) (5000 Units/125 mcg) tab tablet Take 5,000 Units by mouth daily. polyethylene glycol (Miralax) 17 gram packet Take 17 g by mouth as needed for Constipation. losartan (COZAAR) 25 mg tablet Take 25 mg by mouth daily. atorvastatin (LIPITOR) 80 mg tablet Take 1 Tab by mouth nightly. Qty: 30 Tab, Refills: 1      amLODIPine (NORVASC) 10 mg tablet TAKE 1 TABLET BY MOUTH  DAILY  Qty: 30 Tab, Refills: 3    Comments: Please consider 90 day supplies to promote better adherence      metoprolol tartrate (LOPRESSOR) 25 mg tablet Take 0.5 Tabs by mouth every twelve (12) hours. Qty: 90 Tab, Refills: 2      docusate sodium (STOOL SOFTENER PO) Take  by mouth as needed. ferrous sulfate (IRON PO) Take 65 mg by mouth daily. aspirin delayed-release 81 mg tablet Take 81 mg by mouth daily. timolol (TIMOPTIC-XE) 0.5 % ophthalmic gel-forming Administer 1 Drop to right eye daily. latanoprost (XALATAN) 0.005 % ophthalmic solution Administer 1 Drop to both eyes nightly. Indications: wide-angle glaucoma      fluticasone (FLONASE ALLERGY RELIEF) 50 mcg/actuation nasal spray 1 [place] by Both Nostrils route daily. acetaminophen (TYLENOL EXTRA STRENGTH) 500 mg tablet Take 1,000 mg by mouth every six (6) hours as needed for Pain. fexofenadine (ALLEGRA) 180 mg tablet Take 180 mg by mouth nightly. allopurinol (ZYLOPRIM) 100 mg tablet Take 100 mg by mouth daily. ALPRAZolam (XANAX) 0.25 mg tablet Take 0.25 mg by mouth daily as needed for Anxiety. naloxone (Narcan) 4 mg/actuation nasal spray Use 1 spray intranasally, then discard. Repeat with new spray every 2 min as needed for opioid overdose symptoms, alternating nostrils. Qty: 1 Each, Refills: 0      trolamine salicylate (Aspercreme) 10 % lotion Apply  to affected area as needed. Diet: Resume previous diet    Activity/Wound Care:     Discharge Instructions for Carotid Endarterectomy   A carotid endarterectomy restores normal blood flow through the vessels that carry blood to your brain. These vessels are called the carotid arteries. During the surgery, a surgeon made a small incision in the side of your neck, just below your jaw. The artery was opened and the blockage was cleared. This procedure was done to reduce your risk of a stroke, which can occur when the carotid arteries are severely blocked or narrowed. Home care  Spend your first few days after surgery relaxing at home. It's OK to do quiet activities such as reading or watching TV. Take your medicines exactly as instructed. Dont skip doses. You may drive when you are no longer taking pain medication. It's OK to shower. Don't scrub your incision. Don't do strenuous activity for 7 to 10 days after your surgery. Dont lift anything heavier than 10 pounds for 2 to 3 weeks after your surgery. You may return to work after 2 weeks. Gradually increase your activity.  It may take some time for you to return to your normal activities. Check your incision every day for signs of infection (redness, swelling, drainage, or warmth). Dont be alarmed if you have some loss of feeling along your jaw line, the incision line, and earlobe. This is a result of the incision and usually goes away after 6 to 12 months. When to call 911  A stroke is a medical emergency. Call 911 right away if you have any of these symptoms of stroke:    Weakness, tingling, or loss of feeling on one side of your face or body    Sudden double vision or trouble seeing in one or both eyes    Sudden trouble talking or slurred speech    Sudden, severe headache    F.A.S.T. is an easy way to remember the signs of stroke. When you see these signs, call 911 fast.    F.A.S.T. stands for:    F is for face drooping. One side of the face is drooping or numb. When the person smiles, the smile is uneven. A is for arm weakness. One arm is weak or numb. When the person lifts both arms at the same time, one arm may drift downward. S is for speech difficulty. You may notice slurred speech or trouble speaking. The person can't repeat a simple sentence correctly when asked. T is for time to call 911. If someone shows any of these symptoms, even if they go away, call 911 right away. Make note of the time the symptoms first appeared. When to call your healthcare provider  Call your healthcare provider right away if you have any of the following:    Neck swelling    Redness, pain, swelling, or drainage from your incision    Fever above 100.4°F (38°C), or higher, or as advised    Call the office at [phone] if there are any problems. Long-term changes at home  Eat a healthy, low-fat, low cholesterol, and low calorie diet. Maintain your ideal body weight. After you have recovered from surgery, try to exercise more, especially walking. If you smoke, ask your primary doctor for help quitting.        Follow-up Information     Follow up With Specialties Details Why Contact Info    Rambo Herring MD Vascular Surgery In 2 weeks  [address]  [phone]            Signed:  Beau Bailey NP  12/17/2020  9:18 AM

## 2020-12-17 NOTE — PROGRESS NOTES
OCCUPATIONAL THERAPY EVALUATION/DISCHARGE  Patient: Alejandro Ricci (46 y.o. female)  Date: 12/17/2020  Primary Diagnosis: Carotid stenosis, asymptomatic, right [I65.21]  Procedure(s) (LRB):  RIGHT CAROTID ARTERY ENDARTERECTOMY (Right) 1 Day Post-Op   Precautions:    none    ASSESSMENT  Based on the objective data described below, the patient presents with stable vitals and session was limited due to IV running and no IV pole. Pt was able to perform dynamic standing tasks and turns and transfers to and from [facility] without assist devices. One minor LOB backing up to bed but pt was able to self correct. Pt does have SPC and would benefit from using this device temporarily. Pt was able to perform donning and doffing of socks without assist and mock toileting. She agrees that no OT services are indicated at this time. She reports that she will have assist at home if needed and noted pt is discharged today. Current Level of Function Impacting Discharge (ADLs/self-care): modified independent to distant supervision for all mobility and ADLS, would benefit from using her [facility]    Functional Outcome Measure: The patient scored 80/100 on the barthe outcome measure which is indicative of minimal deficits with mobility and ADLs. Other factors to consider for discharge: none     Patient will benefit from skilled therapy intervention to address the above noted impairments. PLAN :      Recommendation for discharge: (in order for the patient to meet his/her long term goals)  No skilled occupational therapy/ follow up rehabilitation needs identified at this time. This discharge recommendation:  Has been made in collaboration with the attending provider and/or case management    IF patient discharges home will need the following DME: none       SUBJECTIVE:   Patient stated I feel a lot better. I was having headaches all the time before. That is gone now.     OBJECTIVE DATA SUMMARY:   HISTORY: Past Medical History:   Diagnosis Date    CAD (coronary artery disease)     Diverticulitis     Glaucoma     Gout     HTN (hypertension) 5/18/2018    Hx of diverticulitis of colon 5/18/2018    Hyperlipidemia     Hypertension     Other ill-defined conditions(799.89)     high cholesterol    S/P coronary artery stent placement 05/25/2018    Stroke ([facility] [address] 10/2020    TIA     Past Surgical History:   Procedure Laterality Date    CARDIAC SURG PROCEDURE UNLIST  05/2018    x2 cardiac stent    HX CAROTID ENDARTERECTOMY Left 11/02/2020    HX HYSTERECTOMY      MT COLONOSCOPY FLX DX W/COLLJ SPEC WHEN PFRMD  10/18/2013            Expanded or extensive additional review of patient history:     Home Situation  Home Environment: Private residence  # Steps to Enter: 5  Wheelchair Ramp: Yes  One/Two Story Residence: One story  Living Alone: No  Support Systems: Child(jazlyn)  Patient Expects to be Discharged to[de-identified] Private residence  Current DME Used/Available at Home: Cane, straight, Shower chair  Tub or Shower Type: Tub/Shower combination    Hand dominance: Right    EXAMINATION OF PERFORMANCE DEFICITS:  Cognitive/Behavioral Status:  Neurologic State: Alert  Orientation Level: Oriented X4  Cognition: Appropriate decision making; Appropriate safety awareness; Appropriate for age attention/concentration  Perception: Appears intact  Perseveration: No perseveration noted  Safety/Judgement: Awareness of environment; Fall prevention;Home safety      Hearing: Auditory  Auditory Impairment: Hard of hearing, bilateral    Vision/Perceptual:                           Acuity: Within Defined Limits         Range of Motion:    AROM: Within functional limits                         Strength:    Strength: Generally decreased, functional                Coordination:  Coordination: Within functional limits  Fine Motor Skills-Upper: Left Intact; Right Intact    Gross Motor Skills-Upper: Left Intact; Right Intact    Tone & Sensation:    Tone: Normal  Sensation: Intact                      Balance:  Sitting: Intact  Standing: Intact  Standing - Static: Good  Standing - Dynamic : Fair(one LOB able to self correct)    Functional Mobility and Transfers for ADLs:  Bed Mobility:  Rolling: Independent  Supine to Sit: Independent  Sit to Supine: Independent  Scooting: Independent    Transfers:  Sit to Stand: Modified independent  Stand to Sit: Modified independent  Bed to Chair: Supervision  Bathroom Mobility: (supervision)  Toilet Transfer : Supervision    ADL Assessment:  Feeding: Independent    Oral Facial Hygiene/Grooming: Independent    Bathing: Supervision    Upper Body Dressing: Independent    Lower Body Dressing: Supervision    Toileting: Supervision                ADL Intervention and task modifications:  See assessment      Educated to not let water beat over incision  Cognitive Retraining  Safety/Judgement: Awareness of environment; Fall prevention;Home safety      Functional Measure:  Barthel Index:    Bathin  Bladder: 10  Bowels: 10  Groomin  Dressing: 10  Feeding: 10  Mobility: 10  Stairs: 5  Toilet Use: 10  Transfer (Bed to Chair and Back): 10  Total: 80/100        The Barthel ADL Index: Guidelines  1. The index should be used as a record of what a patient does, not as a record of what a patient could do. 2. The main aim is to establish degree of independence from any help, physical or verbal, however minor and for whatever reason. 3. The need for supervision renders the patient not independent. 4. A patient's performance should be established using the best available evidence. Asking the patient, friends/relatives and nurses are the usual sources, but direct observation and common sense are also important. However direct testing is not needed. 5. Usually the patient's performance over the preceding 24-48 hours is important, but occasionally longer periods will be relevant.   6. Middle categories imply that the patient supplies over 50 per cent of the effort. 7. Use of aids to be independent is allowed. Delphine Comer., Barthel, D.W. (4594). Functional evaluation: the Barthel Index. [address] [facility] (14)2. Yesica TAMERA Morel Fredia Fossa., Elver Prieto., Adia Worthington, [address] [facility] (). Measuring the change indisability after inpatient rehabilitation; comparison of the responsiveness of the Barthel Index and Functional [place] Measure. Journal of Neurology, Neurosurgery, and Psychiatry, 66(4), 624-732. Kory Nath, N.J.A, OBEY Parham, & Pastora Fontenot M.A. (2004.) Assessment of post-stroke quality of life in cost-effectiveness studies: The usefulness of the Barthel Index and the EuroQoL-5D. Quality of Life Research, 15, 563-73         Occupational Therapy Evaluation Charge Determination   History Examination Decision-Making   LOW Complexity : Brief history review  LOW Complexity : 1-3 performance deficits relating to physical, cognitive , or psychosocial skils that result in activity limitations and / or participation restrictions  LOW Complexity : No comorbidities that affect functional and no verbal or physical assistance needed to complete eval tasks       Based on the above components, the patient evaluation is determined to be of the following complexity level: LOW   Pain Ratin/10 neck incision    Activity Tolerance:   Good    After treatment patient left in no apparent distress:    Supine in bed, Call bell within reach and Caregiver / family present    COMMUNICATION/EDUCATION:   The patients plan of care was discussed with: Registered nurse and patient. Home safety education was provided and the patient/caregiver indicated understanding., Patient/family have participated as able in goal setting and plan of care. and Patient/family agree to work toward stated goals and plan of care.     Thank you for this referral.  ELICEO Luo/L  Time Calculation: 8 mins

## 2020-12-17 NOTE — PROGRESS NOTES
Bedside and Verbal shift change report given to [address] (oncoming nurse) by Ezra Gallardo (offgoing nurse). Report included the following information SBAR, Kardex, Intake/Output, MAR, Recent Results and Quality Measures.

## 2021-01-05 NOTE — H&P
History and Physical    Subjective:     Mayra Ferguson is a 80 y.o. female who has a severe asymptomatic Rt ICA stenosis. She has recovered from a Lt CEA about 6 wks ago. Past Medical History:   Diagnosis Date    CAD (coronary artery disease)     Diverticulitis     Glaucoma     Gout     HTN (hypertension) 5/18/2018    Hx of diverticulitis of colon 5/18/2018    Hyperlipidemia     Hypertension     Other ill-defined conditions(799.89)     high cholesterol    S/P coronary artery stent placement 05/25/2018    Stroke ([address] 10/2020    TIA      Past Surgical History:   Procedure Laterality Date    HX CAROTID ENDARTERECTOMY Left 11/02/2020    HX HYSTERECTOMY      ID CARDIAC SURG PROCEDURE UNLIST  05/2018    x2 cardiac stent    ID COLONOSCOPY FLX DX W/COLLJ SPEC WHEN PFRMD  10/18/2013          Family History   Problem Relation Age of Onset    Stroke Mother     Diabetes Sister     No Known Problems Father       Social History     Tobacco Use    Smoking status: Never Smoker    Smokeless tobacco: Never Used   Substance Use Topics    Alcohol use: No       Prior to Admission medications    Medication Sig Start Date End Date Taking? Authorizing Provider   benzocaine-menthoL (CHLORASEPTIC MAX) 15-10 mg lozg lozenge Take 1 Lozenge by mouth every two (2) hours as needed for Sore throat. Patient may take home with her 12/17/20  Yes Macy Gao NP   cholecalciferol (Vitamin D3) (5000 Units/125 mcg) tab tablet Take 5,000 Units by mouth daily. Yes Provider, Historical   polyethylene glycol (Miralax) 17 gram packet Take 17 g by mouth as needed for Constipation. Yes Provider, Historical   losartan (COZAAR) 25 mg tablet Take 25 mg by mouth daily. Yes Provider, Historical   atorvastatin (LIPITOR) 80 mg tablet Take 1 Tab by mouth nightly.  10/19/20  Yes Dylan Castanon MD   amLODIPine (NORVASC) 10 mg tablet TAKE 1 TABLET BY MOUTH  DAILY 4/10/19  Yes Sonia Ortega NP   metoprolol tartrate (LOPRESSOR) 25 mg tablet Take 0.5 Tabs by mouth every twelve (12) hours. 3/13/19  Yes Tai Alex MD   docusate sodium (STOOL SOFTENER PO) Take  by mouth as needed. Yes Provider, Historical   ferrous sulfate (IRON PO) Take 65 mg by mouth daily. Yes Provider, Historical   aspirin delayed-release 81 mg tablet Take 81 mg by mouth daily. Yes Provider, Historical   timolol (TIMOPTIC-XE) 0.5 % ophthalmic gel-forming Administer 1 Drop to right eye daily. Yes Provider, Historical   latanoprost (XALATAN) 0.005 % ophthalmic solution Administer 1 Drop to both eyes nightly. Indications: wide-angle glaucoma   Yes Provider, Historical   fluticasone (FLONASE ALLERGY RELIEF) 50 mcg/actuation nasal spray 1 [place] by Both Nostrils route daily. Yes Provider, Historical   acetaminophen (TYLENOL EXTRA STRENGTH) 500 mg tablet Take 1,000 mg by mouth every six (6) hours as needed for Pain. Yes Provider, Historical   fexofenadine (ALLEGRA) 180 mg tablet Take 180 mg by mouth nightly. Yes Other, MD Papo   allopurinol (ZYLOPRIM) 100 mg tablet Take 100 mg by mouth daily. Yes Other, MD Papo   ALPRAZolam (XANAX) 0.25 mg tablet Take 0.25 mg by mouth daily as needed for Anxiety. Yes Other, MD Papo   naloxone (Narcan) 4 mg/actuation nasal spray Use 1 spray intranasally, then discard. Repeat with new spray every 2 min as needed for opioid overdose symptoms, alternating nostrils. 11/3/20   Rhea Sieve P, NP   trolamine salicylate (Aspercreme) 10 % lotion Apply  to affected area as needed.     Provider, Historical     Allergies   Allergen Reactions    Aspirin Nausea and Vomiting     Pt reports that she takes 81 mg ASA daily    Lisinopril Cough        Review of Systems:  Denies CP/SOB    Objective:     Physical Exam:   Visit Vitals  BP (!) 157/60 (BP 1 Location: Left arm, BP Patient Position: At rest)   Pulse 83   Temp 98.2 °F (36.8 °C)   Resp 12   Wt 67.6 kg (149 lb 0.5 oz)   SpO2 99%   BMI 28.63 kg/m²     General:  Alert, cooperative, no distress, appears stated age. Head:  Normocephalic, without obvious abnormality, atraumatic. Neck: Supple, symmetrical, trachea midline, no adenopathy, thyroid: no enlargement/tenderness/nodules, no carotid bruit and no JVD. Lungs:   Clear to auscultation bilaterally. Heart:  Regular rate and rhythm, S1, S2 normal, no murmur, click, rub or gallop. Abdomen:   Soft, non-tender. Bowel sounds normal. No masses,  No organomegaly. Extremities: Extremities normal, atraumatic, no cyanosis or edema. Pulses: 2+ and symmetric all extremities. Neurologic: Normal strength, sensation throughout.        Assessment:     Active Problems:    Carotid stenosis, asymptomatic, right (12/16/2020)        Plan:     Right CEA    Signed By: Duncan Rosales MD     January 5, 2021

## 2021-01-06 NOTE — OP NOTES
[address]  OPERATIVE REPORT    Name:  Lisa Power  MR#:  [id number]  :  08/15/1932  ACCOUNT #:  [de-identified]  DATE OF SERVICE:  2020    PREOPERATIVE DIAGNOSIS:  Asymptomatic severe right carotid stenosis. POSTOPERATIVE DIAGNOSIS:  Asymptomatic severe right carotid stenosis. PROCEDURE PERFORMED:  Right carotid endarterectomy. SURGEON:  Wang Lema MD    ASSISTANT:  None. ANESTHESIA:  General.    COMPLICATIONS:  None. SPECIMENS REMOVED:  None. IMPLANTS:  Bovine pericardial patch. ESTIMATED BLOOD LOSS:  100 mL. DRAINS:  #10 Panfilo-Ospina. INDICATIONS:  The patient is an 51-year-old female with a greater than 80% symptomatic right carotid stenosis. She presents for right carotid endarterectomy. PROCEDURE:  After informed consent was obtained, the patient was given preoperative intravenous antibiotics within 1 hour of the incision. She was taken to the operating room and after induction of adequate general anesthesia, the right neck was prepped and draped as a sterile field. An incision was made along the anterior border of the sternomastoid muscle and then the platysma was divided with electrocautery. Dissection was carried down to the carotid sheath and the common facial vein was identified and divided between 2-0 silk ties. The internal jugular vein and sternomastoid muscle were retracted laterally. The distal common carotid artery was dissected free and encircled proximally with a vessel loop. The vagus nerve was identified in its usual posterolateral position. Dissection was carried cephalad and the carotid bifurcation was exposed. The proximal to mid internal carotid artery was dissected free. There was obvious bulky atherosclerotic plaque in the bifurcation and proximal internal carotid. The hypoglossal nerve was identified and protected from injury.   The internal carotid artery was encircled with a vessel loop beyond any visible disease. The origin of the external carotid was encircled with a vessel loop. The patient was systemically heparinized and then the internal carotid was occluded followed by the external and then the common. A longitudinal arteriotomy was made from the distal common carotid artery through densely calcified plaque in the carotid bifurcation and through severe stenosis due to calcified plaque in the proximal internal carotid. There was good backbleeding from the internal carotid beyond the disease and a #10 [place] shunt was placed without difficulty. An endarterectomy plane was developed and continued circumferentially in the distal common carotid where the plaque was transected proximal to any significant disease. The endarterectomy was continued cephalad and an eversion endarterectomy was performed on the external carotid. The plaque was elevated through the proximal internal carotid, where it feathered off to a good endpoint beyond any significant disease. The lumen was irrigated with heparinized saline and all loose debris was removed. The proximal and distal endpoints were secured. The arteriotomy was closed with a bovine pericardial patch using running 5-0 Prolene suture. Prior to completing the patch closure, the lumen was irrigated copiously with heparinized saline. The shunt was removed and flushing maneuvers were performed. The lumen was again irrigated and the patch closure was completed. Flow was restored first to the external carotid and then to the internal carotid. There was good hemostasis along the suture line of the patch after placing three separate 6-0 Prolene repair sutures at small points of bleeding. There was normal low resistance flow in the internal carotid and normal flow in the external carotid when interrogated with continuous wave Doppler. The neck wound was irrigated and was hemostatic.   A routine #10 Panfilo-Ospina drain was placed through a separate stab incision at the base of the neck and secured with a 3-0 nylon suture. The platysma was closed with running 3-0 Vicryl suture and the skin was closed with 4-0 Monocryl subcuticular suture. A dry dressing was applied. The patient was extubated and transferred to the PACU in stable condition. All counts were correct.         Fabiano Wray MD      [id number]  D:  01/05/2021 15:19  T:  01/05/2021 22:50  JOB #:  [id number]

## 2021-02-21 ENCOUNTER — APPOINTMENT (OUTPATIENT)
Dept: GENERAL RADIOLOGY | Age: 86
End: 2021-02-21
Attending: PHYSICIAN ASSISTANT
Payer: MEDICARE

## 2021-02-21 ENCOUNTER — HOSPITAL ENCOUNTER (EMERGENCY)
Age: 86
Discharge: HOME | End: 2021-02-21
Attending: EMERGENCY MEDICINE
Payer: MEDICARE

## 2021-02-21 VITALS
TEMPERATURE: 98.2 F | WEIGHT: 148.81 LBS | RESPIRATION RATE: 14 BRPM | HEIGHT: 64 IN | BODY MASS INDEX: 25.41 KG/M2 | SYSTOLIC BLOOD PRESSURE: 152 MMHG | DIASTOLIC BLOOD PRESSURE: 58 MMHG | HEART RATE: 90 BPM | OXYGEN SATURATION: 99 %

## 2021-02-21 DIAGNOSIS — M25.511 PAIN OF BOTH SHOULDER JOINTS: Primary | ICD-10-CM

## 2021-02-21 DIAGNOSIS — M25.512 PAIN OF BOTH SHOULDER JOINTS: Primary | ICD-10-CM

## 2021-02-21 LAB
ALBUMIN SERPL-MCNC: 3.9 G/DL (ref 3.5–5)
ALBUMIN/GLOB SERPL: 0.9 {RATIO} (ref 1.1–2.2)
ALP SERPL-CCNC: 126 U/L (ref 45–117)
ALT SERPL-CCNC: 28 U/L (ref 12–78)
ANION GAP SERPL CALC-SCNC: 7 MMOL/L (ref 5–15)
AST SERPL-CCNC: 25 U/L (ref 15–37)
ATRIAL RATE: 88 BPM
BASOPHILS # BLD: 0 K/UL (ref 0–0.1)
BASOPHILS NFR BLD: 0 % (ref 0–1)
BILIRUB SERPL-MCNC: 0.3 MG/DL (ref 0.2–1)
BUN SERPL-MCNC: 22 MG/DL (ref 6–20)
BUN/CREAT SERPL: 17 (ref 12–20)
CALCIUM SERPL-MCNC: 10.3 MG/DL (ref 8.5–10.1)
CALCULATED P AXIS, ECG09: 86 DEGREES
CALCULATED R AXIS, ECG10: -27 DEGREES
CALCULATED T AXIS, ECG11: 4 DEGREES
CHLORIDE SERPL-SCNC: 105 MMOL/L (ref 97–108)
CO2 SERPL-SCNC: 26 MMOL/L (ref 21–32)
CREAT SERPL-MCNC: 1.31 MG/DL (ref 0.55–1.02)
DIAGNOSIS, 93000: NORMAL
DIFFERENTIAL METHOD BLD: ABNORMAL
EOSINOPHIL # BLD: 0.2 K/UL (ref 0–0.4)
EOSINOPHIL NFR BLD: 2 % (ref 0–7)
ERYTHROCYTE [DISTWIDTH] IN BLOOD BY AUTOMATED COUNT: 13.9 % (ref 11.5–14.5)
GLOBULIN SER CALC-MCNC: 4.2 G/DL (ref 2–4)
GLUCOSE SERPL-MCNC: 112 MG/DL (ref 65–100)
HCT VFR BLD AUTO: 35.2 % (ref 35–47)
HGB BLD-MCNC: 11.8 G/DL (ref 11.5–16)
IMM GRANULOCYTES # BLD AUTO: 0 K/UL (ref 0–0.04)
IMM GRANULOCYTES NFR BLD AUTO: 0 % (ref 0–0.5)
LYMPHOCYTES # BLD: 2.1 K/UL (ref 0.8–3.5)
LYMPHOCYTES NFR BLD: 29 % (ref 12–49)
MCH RBC QN AUTO: 29.9 PG (ref 26–34)
MCHC RBC AUTO-ENTMCNC: 33.5 G/DL (ref 30–36.5)
MCV RBC AUTO: 89.1 FL (ref 80–99)
MONOCYTES # BLD: 0.5 K/UL (ref 0–1)
MONOCYTES NFR BLD: 8 % (ref 5–13)
NEUTS SEG # BLD: 4.3 K/UL (ref 1.8–8)
NEUTS SEG NFR BLD: 61 % (ref 32–75)
NRBC # BLD: 0 K/UL (ref 0–0.01)
NRBC BLD-RTO: 0 PER 100 WBC
P-R INTERVAL, ECG05: 212 MS
PLATELET # BLD AUTO: 280 K/UL (ref 150–400)
PMV BLD AUTO: 8.7 FL (ref 8.9–12.9)
POTASSIUM SERPL-SCNC: 4 MMOL/L (ref 3.5–5.1)
PROT SERPL-MCNC: 8.1 G/DL (ref 6.4–8.2)
Q-T INTERVAL, ECG07: 372 MS
QRS DURATION, ECG06: 78 MS
QTC CALCULATION (BEZET), ECG08: 450 MS
RBC # BLD AUTO: 3.95 M/UL (ref 3.8–5.2)
SODIUM SERPL-SCNC: 138 MMOL/L (ref 136–145)
TROPONIN I SERPL-MCNC: <0.05 NG/ML
VENTRICULAR RATE, ECG03: 88 BPM
WBC # BLD AUTO: 7.1 K/UL (ref 3.6–11)

## 2021-02-21 PROCEDURE — 80053 COMPREHEN METABOLIC PANEL: CPT

## 2021-02-21 PROCEDURE — 99284 EMERGENCY DEPT VISIT MOD MDM: CPT

## 2021-02-21 PROCEDURE — 85025 COMPLETE CBC W/AUTO DIFF WBC: CPT

## 2021-02-21 PROCEDURE — 93005 ELECTROCARDIOGRAM TRACING: CPT

## 2021-02-21 PROCEDURE — 84484 ASSAY OF TROPONIN QUANT: CPT

## 2021-02-21 PROCEDURE — 71045 X-RAY EXAM CHEST 1 VIEW: CPT

## 2021-02-21 PROCEDURE — 36415 COLL VENOUS BLD VENIPUNCTURE: CPT

## 2021-02-21 RX ORDER — ASPIRIN 325 MG
325 TABLET ORAL
Status: DISCONTINUED | OUTPATIENT
Start: 2021-02-21 | End: 2021-02-21 | Stop reason: HOSPADM

## 2021-02-21 NOTE — ED PROVIDER NOTES
EMERGENCY DEPARTMENT HISTORY AND PHYSICAL EXAM      Date: 2/21/2021  Patient Name: Homero Kruger    History of Presenting Illness     Chief Complaint   Patient presents with    Shoulder Problem     pt reports pain in both shoulders. notes she was seen by PCP for same last week. History Provided By: Patient, granddaughter at bedside    HPI: Homero Kruger, 80 y.o. non-smoking female with PMHx of HTN, HLD, CAD, NSTEMI 2018, s/p carotid endarterectomy 11/2020 and 12/2020, presents to the ED with cc of b/l shoulder pain x several weeks. Pain is R>L, occasionally radiating to the back of her neck. Pain seems to come and go without apparent trigger. She specifically denies chest pain, shortness of breath, vomiting. She denies nausea, however she does report that her GERD has been acting up. She is not anticoagulated. Takes daily baby aspirin. Patient reports seeing her PCP for the symptoms 5 days ago and was told everything was fine. There are no other complaints, changes, or physical findings at this time. PCP: DANIELE Barros    No current facility-administered medications on file prior to encounter. Current Outpatient Medications on File Prior to Encounter   Medication Sig Dispense Refill    benzocaine-menthoL (CHLORASEPTIC MAX) 15-10 mg lozg lozenge Take 1 Lozenge by mouth every two (2) hours as needed for Sore throat. Patient may take home with her 1 Each 0    cholecalciferol (Vitamin D3) (5000 Units/125 mcg) tab tablet Take 5,000 Units by mouth daily.  naloxone (Narcan) 4 mg/actuation nasal spray Use 1 spray intranasally, then discard. Repeat with new spray every 2 min as needed for opioid overdose symptoms, alternating nostrils. 1 Each 0    polyethylene glycol (Miralax) 17 gram packet Take 17 g by mouth as needed for Constipation.  trolamine salicylate (Aspercreme) 10 % lotion Apply  to affected area as needed.       losartan (COZAAR) 25 mg tablet Take 25 mg by mouth daily.  atorvastatin (LIPITOR) 80 mg tablet Take 1 Tab by mouth nightly. 30 Tab 1    amLODIPine (NORVASC) 10 mg tablet TAKE 1 TABLET BY MOUTH  DAILY 30 Tab 3    metoprolol tartrate (LOPRESSOR) 25 mg tablet Take 0.5 Tabs by mouth every twelve (12) hours. 90 Tab 2    docusate sodium (STOOL SOFTENER PO) Take  by mouth as needed.  ferrous sulfate (IRON PO) Take 65 mg by mouth daily.  aspirin delayed-release 81 mg tablet Take 81 mg by mouth daily.  timolol (TIMOPTIC-XE) 0.5 % ophthalmic gel-forming Administer 1 Drop to right eye daily.  latanoprost (XALATAN) 0.005 % ophthalmic solution Administer 1 Drop to both eyes nightly. Indications: wide-angle glaucoma      fluticasone (FLONASE ALLERGY RELIEF) 50 mcg/actuation nasal spray 1 [place] by Both Nostrils route daily.  acetaminophen (TYLENOL EXTRA STRENGTH) 500 mg tablet Take 1,000 mg by mouth every six (6) hours as needed for Pain.  fexofenadine (ALLEGRA) 180 mg tablet Take 180 mg by mouth nightly.  allopurinol (ZYLOPRIM) 100 mg tablet Take 100 mg by mouth daily.  ALPRAZolam (XANAX) 0.25 mg tablet Take 0.25 mg by mouth daily as needed for Anxiety.          Past History     Past Medical History:  Past Medical History:   Diagnosis Date    CAD (coronary artery disease)     Diverticulitis     Glaucoma     Gout     HTN (hypertension) 5/18/2018    Hx of diverticulitis of colon 5/18/2018    Hyperlipidemia     Hypertension     Other ill-defined conditions(799.89)     high cholesterol    S/P coronary artery stent placement 05/25/2018    Stroke ([facility] [address] 10/2020    TIA       Past Surgical History:  Past Surgical History:   Procedure Laterality Date    HX CAROTID ENDARTERECTOMY Left 11/02/2020    HX HYSTERECTOMY      [facility] CARDIAC SURG PROCEDURE UNLIST  05/2018    x2 cardiac stent    [facility] COLONOSCOPY FLX DX W/COLLJ SPEC WHEN PFRMD  10/18/2013            Family History:  Family History   Problem Relation Age of Onset    Stroke Mother     Diabetes Sister     No Known Problems Father        Social History:  Social History     Tobacco Use    Smoking status: Never Smoker    Smokeless tobacco: Never Used   Substance Use Topics    Alcohol use: No    Drug use: No       Allergies: Allergies   Allergen Reactions    Lisinopril Cough         Review of Systems   Review of Systems   Constitutional: Negative for chills, diaphoresis and fever. HENT: Negative for congestion. Eyes: Negative for redness. Respiratory: Negative for shortness of breath. Cardiovascular: Negative for chest pain. Gastrointestinal: Negative for vomiting. Genitourinary: Negative for dysuria. Musculoskeletal: Positive for arthralgias and neck pain. Skin: Negative for rash. Allergic/Immunologic: Negative for immunocompromised state. Neurological: Negative for dizziness. Hematological: Does not bruise/bleed easily. Psychiatric/Behavioral: Negative for agitation. Physical Exam   Physical Exam  Vitals signs and nursing note reviewed. Constitutional:       General: She is not in acute distress. Appearance: Normal appearance. She is well-developed. She is not toxic-appearing. HENT:      Head: Normocephalic and atraumatic. Nose: Nose normal.      Mouth/Throat:      Mouth: Mucous membranes are moist.   Eyes:      General: Lids are normal.      Extraocular Movements: Extraocular movements intact. Conjunctiva/sclera: Conjunctivae normal.   Neck:      Musculoskeletal: Normal range of motion and neck supple. Cardiovascular:      Rate and Rhythm: Normal rate and regular rhythm. Pulses:           Radial pulses are 2+ on the right side and 2+ on the left side. Pulmonary:      Effort: Pulmonary effort is normal.      Breath sounds: Normal breath sounds. Abdominal:      Palpations: Abdomen is soft. Tenderness: There is no abdominal tenderness. There is no guarding. Musculoskeletal: Normal range of motion.    Skin: General: Skin is warm and dry. Neurological:      General: No focal deficit present. Mental Status: She is alert and oriented to person, place, and time. Psychiatric:         Mood and Affect: Mood normal.         Behavior: Behavior normal. Behavior is cooperative. Diagnostic Study Results     Labs -     Recent Results (from the past 12 hour(s))   EKG, 12 LEAD, INITIAL    Collection Time: 02/21/21  6:08 PM   Result Value Ref Range    Ventricular Rate 88 BPM    Atrial Rate 88 BPM    P-R Interval 212 ms    QRS Duration 78 ms    Q-T Interval 372 ms    QTC Calculation (Bezet) 450 ms    Calculated P Axis 86 degrees    Calculated R Axis -27 degrees    Calculated T Axis 4 degrees    Diagnosis       Sinus rhythm with 1st degree AV block with occasional premature ventricular   complexes  Minimal voltage criteria for LVH, may be normal variant  Nonspecific T wave abnormality  When compared with ECG of 19-MAY-2018 04:31,  premature ventricular complexes are now present     TROPONIN I    Collection Time: 02/21/21  6:27 PM   Result Value Ref Range    Troponin-I, Qt. <0.05 <0.05 ng/mL   CBC WITH AUTOMATED DIFF    Collection Time: 02/21/21  6:27 PM   Result Value Ref Range    WBC 7.1 3.6 - 11.0 K/uL    RBC 3.95 3.80 - 5.20 M/uL    HGB 11.8 11.5 - 16.0 g/dL    HCT 35.2 35.0 - 47.0 %    MCV 89.1 80.0 - 99.0 FL    MCH 29.9 26.0 - 34.0 PG    MCHC 33.5 30.0 - 36.5 g/dL    RDW 13.9 11.5 - 14.5 %    PLATELET 716 816 - 962 K/uL    MPV 8.7 (L) 8.9 - 12.9 FL    NRBC 0.0 0  WBC    ABSOLUTE NRBC 0.00 0.00 - 0.01 K/uL    NEUTROPHILS 61 32 - 75 %    LYMPHOCYTES 29 12 - 49 %    MONOCYTES 8 5 - 13 %    EOSINOPHILS 2 0 - 7 %    BASOPHILS 0 0 - 1 %    IMMATURE GRANULOCYTES 0 0.0 - 0.5 %    ABS. NEUTROPHILS 4.3 1.8 - 8.0 K/UL    ABS. LYMPHOCYTES 2.1 0.8 - 3.5 K/UL    ABS. MONOCYTES 0.5 0.0 - 1.0 K/UL    ABS. EOSINOPHILS 0.2 0.0 - 0.4 K/UL    ABS. BASOPHILS 0.0 0.0 - 0.1 K/UL    ABS. IMM.  GRANS. 0.0 0.00 - 0.04 K/UL    DF AUTOMATED     METABOLIC PANEL, COMPREHENSIVE    Collection Time: 02/21/21  6:27 PM   Result Value Ref Range    Sodium 138 136 - 145 mmol/L    Potassium 4.0 3.5 - 5.1 mmol/L    Chloride 105 97 - 108 mmol/L    CO2 26 21 - 32 mmol/L    Anion gap 7 5 - 15 mmol/L    Glucose 112 (H) 65 - 100 mg/dL    BUN 22 (H) 6 - 20 MG/DL    Creatinine 1.31 (H) 0.55 - 1.02 MG/DL    BUN/Creatinine ratio 17 12 - 20      GFR est AA 46 (L) >60 ml/min/1.73m2    GFR est non-AA 38 (L) >60 ml/min/1.73m2    Calcium 10.3 (H) 8.5 - 10.1 MG/DL    Bilirubin, total 0.3 0.2 - 1.0 MG/DL    ALT (SGPT) 28 12 - 78 U/L    AST (SGOT) 25 15 - 37 U/L    Alk. phosphatase 126 (H) 45 - 117 U/L    Protein, total 8.1 6.4 - 8.2 g/dL    Albumin 3.9 3.5 - 5.0 g/dL    Globulin 4.2 (H) 2.0 - 4.0 g/dL    A-G Ratio 0.9 (L) 1.1 - 2.2         Radiologic Studies -   XR CHEST PORT   Final Result   No acute cardiopulmonary disease radiographically. .  . CT Results  (Last 48 hours)    None        CXR Results  (Last 48 hours)               02/21/21 1835  XR CHEST PORT Final result    Impression:  No acute cardiopulmonary disease radiographically. .  . Narrative:  INDICATION:  shoulder pain        EXAM: Chest single view. COMPARISON: 5/18/2018. FINDINGS: A single frontal view of the chest at 1826 hours shows clear lungs. The heart, mediastinum and pulmonary vasculature are stable . The bony thorax   is unremarkable for age. .                   Medical Decision Making   I am the first provider for this patient. I reviewed the vital signs, available nursing notes, past medical history, past surgical history, family history and social history. Vital Signs-Reviewed the patient's vital signs.   Patient Vitals for the past 12 hrs:   Temp Pulse Resp BP SpO2   02/21/21 1800  90 14 (!) 152/58 99 %   02/21/21 1704 98.2 °F (36.8 °C) (!) 106 18 (!) 193/102 96 %       Records Reviewed: Nursing Notes, Old Medical Records, Previous Radiology Studies and Previous Laboratory Studies    Provider Notes (Medical Decision Making):   Patient worked up given concern for atypical presentation of cardiac chest pain given her history.  Troponin fortunately negative. Patient is well-appearing and in no acute distress.  Reviewed all findings with patient and granddaughter.  They are agreeable to discharge home.  Follow-up with PCP.  ED return precautions given.    ED Course:   Initial assessment performed. The patients presenting problems have been discussed, and they are in agreement with the care plan formulated and outlined with them.  I have encouraged them to ask questions as they arise throughout their visit.         Critical Care Time: None    Disposition:  Discharge    PLAN:  1.   Discharge Medication List as of 2/21/2021  7:06 PM        2.   Follow-up Information     Follow up With Specialties Details Why Contact Info    Araceli Taylor, KHUSHBU[facility] Nurse Practitioner Call  For follow up [id number] \[facility] [place] [facility]\"" [id number] [phone]          Return to ED if worse     Diagnosis     Clinical Impression:   1. Pain of both shoulder joints          Please note that this dictation was completed with [employer], the computer voice recognition software. Quite often unanticipated grammatical, syntax, homophones, and other interpretive errors are inadvertently transcribed by the computer software. Please disregards these errors. Please excuse any errors that have escaped final proofreading.

## 2021-02-22 NOTE — ED NOTES
I have reviewed discharge instructions with the patient. The patient verbalized understanding. Iv taken out.  No complications

## 2021-03-01 ENCOUNTER — APPOINTMENT (OUTPATIENT)
Dept: CT IMAGING | Age: 86
End: 2021-03-01
Attending: EMERGENCY MEDICINE
Payer: MEDICARE

## 2021-03-01 ENCOUNTER — HOSPITAL ENCOUNTER (EMERGENCY)
Age: 86
Discharge: HOME | End: 2021-03-01
Attending: EMERGENCY MEDICINE
Payer: MEDICARE

## 2021-03-01 VITALS
DIASTOLIC BLOOD PRESSURE: 76 MMHG | OXYGEN SATURATION: 99 % | SYSTOLIC BLOOD PRESSURE: 192 MMHG | HEART RATE: 82 BPM | TEMPERATURE: 98.4 F | BODY MASS INDEX: 26.29 KG/M2 | HEIGHT: 63 IN | WEIGHT: 148.37 LBS | RESPIRATION RATE: 16 BRPM

## 2021-03-01 DIAGNOSIS — G44.209 ACUTE NON INTRACTABLE TENSION-TYPE HEADACHE: Primary | ICD-10-CM

## 2021-03-01 PROCEDURE — 99283 EMERGENCY DEPT VISIT LOW MDM: CPT

## 2021-03-01 PROCEDURE — 70450 CT HEAD/BRAIN W/O DYE: CPT

## 2021-03-01 RX ORDER — METHOCARBAMOL 500 MG/1
500 TABLET, FILM COATED ORAL
Qty: 20 TAB | Refills: 0 | Status: SHIPPED | OUTPATIENT
Start: 2021-03-01 | End: 2021-03-22

## 2021-03-01 NOTE — ED PROVIDER NOTES
EMERGENCY DEPARTMENT HISTORY AND PHYSICAL EXAM      Date: 3/1/2021  Patient Name: Marcy Randall    History of Presenting Illness     Chief Complaint   Patient presents with    Headache     intermittent since having carotid artery surgery in december. This episode on saturday. no relief with tylenol. anterior and posterior pain       History Provided By: Patient    HPI: Mracy Randall, 80 y.o. female presents to the ED with cc of headache. Patient states she has had intermittent headaches since she had carotid artery surgery in December. She had a severe headache 2 days ago which has resolved. She denies any trauma, fever or posterior neck pain. She denies numbness in her extremities or weakness. She has no headache at this time. She took her blood pressure medicine today and 2 Tylenol. She denies dysuria, chest pain, cough or shortness of breath. There are no other complaints, changes, or physical findings at this time. PCP: DANIELE Zamudio    No current facility-administered medications on file prior to encounter. Current Outpatient Medications on File Prior to Encounter   Medication Sig Dispense Refill    benzocaine-menthoL (CHLORASEPTIC MAX) 15-10 mg lozg lozenge Take 1 Lozenge by mouth every two (2) hours as needed for Sore throat. Patient may take home with her 1 Each 0    cholecalciferol (Vitamin D3) (5000 Units/125 mcg) tab tablet Take 5,000 Units by mouth daily.  naloxone (Narcan) 4 mg/actuation nasal spray Use 1 spray intranasally, then discard. Repeat with new spray every 2 min as needed for opioid overdose symptoms, alternating nostrils. 1 Each 0    polyethylene glycol (Miralax) 17 gram packet Take 17 g by mouth as needed for Constipation.  trolamine salicylate (Aspercreme) 10 % lotion Apply  to affected area as needed.  losartan (COZAAR) 25 mg tablet Take 25 mg by mouth daily.  atorvastatin (LIPITOR) 80 mg tablet Take 1 Tab by mouth nightly. 30 Tab 1    amLODIPine (NORVASC) 10 mg tablet TAKE 1 TABLET BY MOUTH  DAILY 30 Tab 3    metoprolol tartrate (LOPRESSOR) 25 mg tablet Take 0.5 Tabs by mouth every twelve (12) hours. 90 Tab 2    docusate sodium (STOOL SOFTENER PO) Take  by mouth as needed.  ferrous sulfate (IRON PO) Take 65 mg by mouth daily.  aspirin delayed-release 81 mg tablet Take 81 mg by mouth daily.  timolol (TIMOPTIC-XE) 0.5 % ophthalmic gel-forming Administer 1 Drop to right eye daily.  latanoprost (XALATAN) 0.005 % ophthalmic solution Administer 1 Drop to both eyes nightly. Indications: wide-angle glaucoma      fluticasone (FLONASE ALLERGY RELIEF) 50 mcg/actuation nasal spray 1 [place] by Both Nostrils route daily.  acetaminophen (TYLENOL EXTRA STRENGTH) 500 mg tablet Take 1,000 mg by mouth every six (6) hours as needed for Pain.  fexofenadine (ALLEGRA) 180 mg tablet Take 180 mg by mouth nightly.  allopurinol (ZYLOPRIM) 100 mg tablet Take 100 mg by mouth daily.  ALPRAZolam (XANAX) 0.25 mg tablet Take 0.25 mg by mouth daily as needed for Anxiety.          Past History     Past Medical History:  Past Medical History:   Diagnosis Date    CAD (coronary artery disease)     Diverticulitis     Glaucoma     Gout     HTN (hypertension) 5/18/2018    Hx of diverticulitis of colon 5/18/2018    Hyperlipidemia     Hypertension     Other ill-defined conditions(799.89)     high cholesterol    S/P coronary artery stent placement 05/25/2018    Stroke ([address] 10/2020    TIA       Past Surgical History:  Past Surgical History:   Procedure Laterality Date    HX CAROTID ENDARTERECTOMY Left 11/02/2020    HX HYSTERECTOMY      IN CARDIAC SURG PROCEDURE UNLIST  05/2018    x2 cardiac stent    IN COLONOSCOPY FLX DX W/COLLJ SPEC WHEN PFRMD  10/18/2013            Family History:  Family History   Problem Relation Age of Onset    Stroke Mother     Diabetes Sister     No Known Problems Father        Social History:  Social History     Tobacco Use    Smoking status: Never Smoker    Smokeless tobacco: Never Used   Substance Use Topics    Alcohol use: No    Drug use: No       Allergies: Allergies   Allergen Reactions    Lisinopril Cough         Review of Systems   Review of Systems   Constitutional: Negative for fever. HENT: Negative for congestion. Eyes: Negative. Respiratory: Negative for shortness of breath. Cardiovascular: Negative for chest pain. Gastrointestinal: Negative for abdominal pain. Endocrine: Negative for heat intolerance. Genitourinary: Negative. Musculoskeletal: Negative for back pain. Skin: Negative for rash. Allergic/Immunologic: Negative for immunocompromised state. Neurological: Positive for headaches. Hematological: Does not bruise/bleed easily. Psychiatric/Behavioral: Negative. All other systems reviewed and are negative. Physical Exam   Physical Exam  Vitals signs and nursing note reviewed. Constitutional:       General: She is not in acute distress. Appearance: She is well-developed. HENT:      Head: Normocephalic. Neck:      Musculoskeletal: Normal range of motion and neck supple. Cardiovascular:      Rate and Rhythm: Normal rate and regular rhythm. Pulses: Normal pulses. Heart sounds: Normal heart sounds. Pulmonary:      Effort: Pulmonary effort is normal.      Breath sounds: Normal breath sounds. Abdominal:      General: Bowel sounds are normal.      Palpations: Abdomen is soft. Tenderness: There is no abdominal tenderness. Musculoskeletal: Normal range of motion. Skin:     General: Skin is warm and dry. Neurological:      General: No focal deficit present. Mental Status: She is alert and oriented to person, place, and time.    Psychiatric:         Mood and Affect: Mood normal.         Behavior: Behavior normal.         Diagnostic Study Results     Labs -   No results found for this or any previous visit (from the past 12 hour(s)). Radiologic Studies -   CT HEAD WO CONT   Final Result   No acute intracranial process. CT Results  (Last 48 hours)               03/01/21 1145  CT HEAD WO CONT Final result    Impression:  No acute intracranial process. Narrative:  CLINICAL HISTORY: headache   INDICATION: headache   COMPARISON: 10/19/2020. CT dose reduction was achieved through use of a standardized protocol tailored   for this examination and automatic exposure control for dose modulation. TECHNIQUE: Serial axial images with a collimation of 5 mm were obtained from the   skull base through the vertex     FINDINGS:    There is sulcal and ventricular prominence. Confluent periventricular and   scattered foci of hypodensity in the cerebral white matter. There is no evidence   of an acute infarction, hemorrhage, or mass-effect. There is no evidence of   midline shift or hydrocephalus. Posterior fossa structures are unremarkable. No   extra-axial collections are seen. Mastoid air cells are well pneumatized and clear. Basal ganglia calcifications are confluent. Unchanged. CXR Results  (Last 48 hours)    None          Medical Decision Making   I am the first provider for this patient. I reviewed the vital signs, available nursing notes, past medical history, past surgical history, family history and social history. Vital Signs-Reviewed the patient's vital signs. Patient Vitals for the past 12 hrs:   Temp Pulse Resp BP SpO2   03/01/21 1103 98.4 °F (36.9 °C) 82 16 (!) 192/76 99 %           Records Reviewed: Nursing Notes and Old Medical Records    Provider Notes (Medical Decision Making): Intracranial hemorrhage, malignant hypertension, tension headache    ED Course:   Initial assessment performed. The patients presenting problems have been discussed, and they are in agreement with the care plan formulated and outlined with them.   I have encouraged them to ask questions as they arise throughout their visit. Progress note:    Patient is feeling better. Her results were reviewed. She is advised to follow-up and return to ER if worse               Critical Care Time:   0    Disposition:  home    DISCHARGE PLAN:  1. Discharge Medication List as of 3/1/2021 12:28 PM      START taking these medications    Details   methocarbamoL (Robaxin) 500 mg tablet Take 1 Tab by mouth four (4) times daily as needed for Pain., Normal, Disp-20 Tab, R-0         CONTINUE these medications which have NOT CHANGED    Details   benzocaine-menthoL (CHLORASEPTIC MAX) 15-10 mg lozg lozenge Take 1 Lozenge by mouth every two (2) hours as needed for Sore throat. Patient may take home with her, No Print, Disp-1 Each, R-0      cholecalciferol (Vitamin D3) (5000 Units/125 mcg) tab tablet Take 5,000 Units by mouth daily. , Historical Med      naloxone (Narcan) 4 mg/actuation nasal spray Use 1 spray intranasally, then discard. Repeat with new spray every 2 min as needed for opioid overdose symptoms, alternating nostrils. , Normal, Disp-1 Each,R-0      polyethylene glycol (Miralax) 17 gram packet Take 17 g by mouth as needed for Constipation. , Historical Med      trolamine salicylate (Aspercreme) 10 % lotion Apply  to affected area as needed., Historical Med      losartan (COZAAR) 25 mg tablet Take 25 mg by mouth daily. , Historical Med      atorvastatin (LIPITOR) 80 mg tablet Take 1 Tab by mouth nightly., Normal, Disp-30 Tab,R-1      amLODIPine (NORVASC) 10 mg tablet TAKE 1 TABLET BY MOUTH  DAILY, NormalPlease consider 90 day supplies to promote better adherenceDisp-30 Tab, R-3      metoprolol tartrate (LOPRESSOR) 25 mg tablet Take 0.5 Tabs by mouth every twelve (12) hours. , Normal, Disp-90 Tab, R-2      docusate sodium (STOOL SOFTENER PO) Take  by mouth as needed., Historical Med      ferrous sulfate (IRON PO) Take 65 mg by mouth daily. , Historical Med      aspirin delayed-release 81 mg tablet Take 81 mg by mouth daily. , Historical Med      timolol (TIMOPTIC-XE) 0.5 % ophthalmic gel-forming Administer 1 Drop to right eye daily. , Historical Med      latanoprost (XALATAN) 0.005 % ophthalmic solution Administer 1 Drop to both eyes nightly. Indications: wide-angle glaucoma, Historical Med      fluticasone (FLONASE ALLERGY RELIEF) 50 mcg/actuation nasal spray 1 [place] by Both Nostrils route daily. , Historical Med      acetaminophen (TYLENOL EXTRA STRENGTH) 500 mg tablet Take 1,000 mg by mouth every six (6) hours as needed for Pain., Historical Med      fexofenadine (ALLEGRA) 180 mg tablet Take 180 mg by mouth nightly., Historical Med      allopurinol (ZYLOPRIM) 100 mg tablet Take 100 mg by mouth daily. , Historical Med      ALPRAZolam (XANAX) 0.25 mg tablet Take 0.25 mg by mouth daily as needed for Anxiety. , Historical Med           2. Follow-up Information     Follow up With Specialties Details Why Contact Info    Efrain Taylor FNP Nurse Practitioner  As needed [address] [facility]       [address] [facility] EMERGENCY DEPT Emergency Medicine  If symptoms worsen [address]  [address] [facility]  [phone]        3. Return to ED if worse     Diagnosis     Clinical Impression:   1. Acute non intractable tension-type headache        Attestations:    Rhonda Marcelo MD    Please note that this dictation was completed with [employer], the computer voice recognition software. Quite often unanticipated grammatical, syntax, homophones, and other interpretive errors are inadvertently transcribed by the computer software. Please disregard these errors. Please excuse any errors that have escaped final proofreading. Thank you.

## 2021-03-22 ENCOUNTER — OFFICE VISIT (OUTPATIENT)
Dept: CARDIOLOGY CLINIC | Age: 86
End: 2021-03-22
Payer: MEDICARE

## 2021-03-22 VITALS
DIASTOLIC BLOOD PRESSURE: 74 MMHG | OXYGEN SATURATION: 99 % | RESPIRATION RATE: 18 BRPM | BODY MASS INDEX: 26.24 KG/M2 | WEIGHT: 148.1 LBS | SYSTOLIC BLOOD PRESSURE: 134 MMHG | HEART RATE: 64 BPM | HEIGHT: 63 IN

## 2021-03-22 DIAGNOSIS — E78.2 MIXED HYPERLIPIDEMIA: ICD-10-CM

## 2021-03-22 DIAGNOSIS — I25.10 CORONARY ARTERY DISEASE INVOLVING NATIVE CORONARY ARTERY OF NATIVE HEART WITHOUT ANGINA PECTORIS: Primary | ICD-10-CM

## 2021-03-22 DIAGNOSIS — Z95.5 S/P CORONARY ARTERY STENT PLACEMENT: ICD-10-CM

## 2021-03-22 DIAGNOSIS — I10 ESSENTIAL HYPERTENSION: ICD-10-CM

## 2021-03-22 PROCEDURE — 1090F PRES/ABSN URINE INCON ASSESS: CPT | Performed by: INTERNAL MEDICINE

## 2021-03-22 PROCEDURE — 99214 OFFICE O/P EST MOD 30 MIN: CPT | Performed by: INTERNAL MEDICINE

## 2021-03-22 PROCEDURE — G8427 DOCREV CUR MEDS BY ELIG CLIN: HCPCS | Performed by: INTERNAL MEDICINE

## 2021-03-22 PROCEDURE — G8419 CALC BMI OUT NRM PARAM NOF/U: HCPCS | Performed by: INTERNAL MEDICINE

## 2021-03-22 PROCEDURE — 1101F PT FALLS ASSESS-DOCD LE1/YR: CPT | Performed by: INTERNAL MEDICINE

## 2021-03-22 PROCEDURE — 93000 ELECTROCARDIOGRAM COMPLETE: CPT | Performed by: INTERNAL MEDICINE

## 2021-03-22 PROCEDURE — G8536 NO DOC ELDER MAL SCRN: HCPCS | Performed by: INTERNAL MEDICINE

## 2021-03-22 PROCEDURE — G8510 SCR DEP NEG, NO PLAN REQD: HCPCS | Performed by: INTERNAL MEDICINE

## 2021-03-22 RX ORDER — EZETIMIBE 10 MG/1
10 TABLET ORAL DAILY
Qty: 90 TAB | Refills: 1 | Status: SHIPPED | OUTPATIENT
Start: 2021-03-22 | End: 2021-09-10

## 2021-03-22 NOTE — PROGRESS NOTES
[address], [facility] [address] [facility]  [phone]     Subjective:      Anna Mcbride is a 80 y.o. female is here for routine f/u. She has a PMHx of CAD, HTN, HLD and gout. Last seen by us 9/2020. Since last OV, Underwent RIGHT CAROTID ARTERY ENDARTERECTOMY  12/16/20     Today, she reports intermittent headaches since  carotid artery surgery in December. Went to ED in 3/1/2021 and was recs to call DR Mayo's office. No cardiac complaints. Denies headache today. The patient denies chest pain/ shortness of breath, orthopnea, PND, LE edema, palpitations, syncope, or presyncope.        Patient Active Problem List    Diagnosis Date Noted    Carotid stenosis, asymptomatic, right 12/16/2020    Carotid stenosis, symptomatic w/o infarct, left 11/02/2020    Bilateral carotid artery stenosis 10/19/2020    TIA (transient ischemic attack) 10/17/2020    High cholesterol 09/21/2020    Essential hypertension 12/28/2018    Coronary artery disease involving native coronary artery of native heart without angina pectoris 06/12/2018    S/P coronary artery stent placement 05/25/2018    HTN (hypertension) 05/18/2018    Hx of diverticulitis of colon 05/18/2018    Gout       Jose Miguel Taylor, DANIELE  Past Medical History:   Diagnosis Date    CAD (coronary artery disease)     Diverticulitis     Glaucoma     Gout     HTN (hypertension) 5/18/2018    Hx of diverticulitis of colon 5/18/2018    Hyperlipidemia     Hypertension     Other ill-defined conditions(799.89)     high cholesterol    S/P coronary artery stent placement 05/25/2018    Stroke ([facility] [address] 10/2020    TIA      Past Surgical History:   Procedure Laterality Date    HX CAROTID ENDARTERECTOMY Left 11/02/2020    HX HYSTERECTOMY      [place] CARDIAC SURG PROCEDURE UNLIST  05/2018    x2 cardiac stent    [place] COLONOSCOPY FLX DX W/COLLJ SPEC WHEN PFRMD  10/18/2013          Allergies   Allergen Reactions    Lisinopril Cough      Family History   Problem Relation Age of Onset   • Stroke Mother    • Diabetes Sister    • No Known Problems Father       Social History     Socioeconomic History   • Marital status: SINGLE     Spouse name: Not on file   • Number of children: Not on file   • Years of education: Not on file   • Highest education level: Not on file   Occupational History   • Not on file   Social Needs   • Financial resource strain: Not on file   • Food insecurity     Worry: Not on file     Inability: Not on file   • Transportation needs     Medical: Not on file     Non-medical: Not on file   Tobacco Use   • Smoking status: Never Smoker   • Smokeless tobacco: Never Used   Substance and Sexual Activity   • Alcohol use: No   • Drug use: No   • Sexual activity: Not Currently   Lifestyle   • Physical activity     Days per week: Not on file     Minutes per session: Not on file   • Stress: Not on file   Relationships   • Social connections     Talks on phone: Not on file     Gets together: Not on file     Attends Christian service: Not on file     Active member of club or organization: Not on file     Attends meetings of clubs or organizations: Not on file     Relationship status: Not on file   • Intimate partner violence     Fear of current or ex partner: Not on file     Emotionally abused: Not on file     Physically abused: Not on file     Forced sexual activity: Not on file   Other Topics Concern   • Not on file   Social History Narrative   • Not on file      Current Outpatient Medications   Medication Sig   • methocarbamoL (Robaxin) 500 mg tablet Take 1 Tab by mouth four (4) times daily as needed for Pain.   • benzocaine-menthoL (CHLORASEPTIC MAX) 15-10 mg lozg lozenge Take 1 Lozenge by mouth every two (2) hours as needed for Sore throat. Patient may take home with her   • cholecalciferol (Vitamin D3) (5000 Units/125 mcg) tab tablet Take 5,000 Units by mouth daily.   • naloxone (Narcan) 4 mg/actuation nasal spray Use 1 spray intranasally, then  discard. Repeat with new spray every 2 min as needed for opioid overdose symptoms, alternating nostrils.  polyethylene glycol (Miralax) 17 gram packet Take 17 g by mouth as needed for Constipation.  trolamine salicylate (Aspercreme) 10 % lotion Apply  to affected area as needed.  losartan (COZAAR) 25 mg tablet Take 25 mg by mouth daily.  atorvastatin (LIPITOR) 80 mg tablet Take 1 Tab by mouth nightly.  amLODIPine (NORVASC) 10 mg tablet TAKE 1 TABLET BY MOUTH  DAILY    metoprolol tartrate (LOPRESSOR) 25 mg tablet Take 0.5 Tabs by mouth every twelve (12) hours.  docusate sodium (STOOL SOFTENER PO) Take  by mouth as needed.  ferrous sulfate (IRON PO) Take 65 mg by mouth daily.  aspirin delayed-release 81 mg tablet Take 81 mg by mouth daily.  timolol (TIMOPTIC-XE) 0.5 % ophthalmic gel-forming Administer 1 Drop to right eye daily.  latanoprost (XALATAN) 0.005 % ophthalmic solution Administer 1 Drop to both eyes nightly. Indications: wide-angle glaucoma    fluticasone (FLONASE ALLERGY RELIEF) 50 mcg/actuation nasal spray 1 [place] by Both Nostrils route daily.  acetaminophen (TYLENOL EXTRA STRENGTH) 500 mg tablet Take 1,000 mg by mouth every six (6) hours as needed for Pain.  fexofenadine (ALLEGRA) 180 mg tablet Take 180 mg by mouth nightly.  allopurinol (ZYLOPRIM) 100 mg tablet Take 100 mg by mouth daily.  ALPRAZolam (XANAX) 0.25 mg tablet Take 0.25 mg by mouth daily as needed for Anxiety. No current facility-administered medications for this visit. Review of Symptoms:  11 systems reviewed, negative other than as stated in the HPI    Physical ExamPhysical Exam:    There were no vitals filed for this visit. There is no height or weight on file to calculate BMI. General PE  Gen:  NAD  Mental Status - Alert. General Appearance - Not in acute distress.    HEENT:  PERRL, no carotid bruits or JVD  Chest and Lung Exam   Inspection: Accessory muscles - No use of accessory muscles in breathing. Auscultation:   Breath sounds: - Normal.   Cardiovascular   Inspection: Jugular vein - Bilateral - Inspection Normal.   Palpation/Percussion:   Apical Impulse: - Normal.   Auscultation: Rhythm - Regular. Heart Sounds - S1 WNL and S2 WNL. No S3 or S4. Murmurs & Other Heart Sounds: Auscultation of the heart reveals -2/6 MIRANDA  Peripheral Vascular   Upper Extremity: Inspection - Bilateral - No Cyanotic nailbeds or Digital clubbing. Lower Extremity:   Palpation: Edema - Bilateral - No edema. Abdomen:   Soft, non-tender, bowel sounds are active.   Neuro: A&O times 3, CN and motor grossly WNL    Labs:   Lab Results   Component Value Date/Time    Cholesterol, total 249 (H) 10/18/2020 12:23 AM    Cholesterol, total 142 09/22/2020 10:50 AM    Cholesterol, total 185 10/07/2019 11:09 AM    Cholesterol, total 154 12/04/2018 10:08 AM    Cholesterol, total 126 05/19/2018 05:26 AM    HDL Cholesterol 61 10/18/2020 12:23 AM    HDL Cholesterol 59 09/22/2020 10:50 AM    HDL Cholesterol 66 10/07/2019 11:09 AM    HDL Cholesterol 57 12/04/2018 10:08 AM    HDL Cholesterol 57 05/19/2018 05:26 AM    LDL, calculated 146.4 (H) 10/18/2020 12:23 AM    LDL, calculated 66 09/22/2020 10:50 AM    LDL, calculated 93 10/07/2019 11:09 AM    LDL, calculated 77 12/04/2018 10:08 AM    LDL, calculated 56.2 05/19/2018 05:26 AM    Triglyceride 208 (H) 10/18/2020 12:23 AM    Triglyceride 92 09/22/2020 10:50 AM    Triglyceride 131 10/07/2019 11:09 AM    Triglyceride 101 12/04/2018 10:08 AM    Triglyceride 64 05/19/2018 05:26 AM    CHOL/HDL Ratio 4.1 10/18/2020 12:23 AM    CHOL/HDL Ratio 2.2 05/19/2018 05:26 AM     Lab Results   Component Value Date/Time     10/18/2020 10:19 AM     Lab Results   Component Value Date/Time    Sodium 138 02/21/2021 06:27 PM    Potassium 4.0 02/21/2021 06:27 PM    Chloride 105 02/21/2021 06:27 PM    CO2 26 02/21/2021 06:27 PM    Anion gap 7 02/21/2021 06:27 PM    Glucose 112 (H) 02/21/2021 06:27 PM BUN 22 (H) 02/21/2021 06:27 PM    Creatinine 1.31 (H) 02/21/2021 06:27 PM    BUN/Creatinine ratio 17 02/21/2021 06:27 PM    GFR est AA 46 (L) 02/21/2021 06:27 PM    GFR est non-AA 38 (L) 02/21/2021 06:27 PM    Calcium 10.3 (H) 02/21/2021 06:27 PM    Bilirubin, total 0.3 02/21/2021 06:27 PM    Alk. phosphatase 126 (H) 02/21/2021 06:27 PM    Protein, total 8.1 02/21/2021 06:27 PM    Albumin 3.9 02/21/2021 06:27 PM    Globulin 4.2 (H) 02/21/2021 06:27 PM    A-G Ratio 0.9 (L) 02/21/2021 06:27 PM    ALT (SGPT) 28 02/21/2021 06:27 PM       EKG:  NSR 1AVB     Assessment:     Assessment:      1. Coronary artery disease involving native coronary artery of native heart without angina pectoris    2. S/P coronary artery stent placement    3. Essential hypertension    4. Mixed hyperlipidemia        No orders of the defined types were placed in this encounter. Plan:     Coronary artery disease involving native coronary artery of native heart without angina pectoris  Hx of ANA MARÍA to LAD and LCx in 5/2018  Without anginal or anginal equivalent symptoms  Continue amlodipine, metoprolol, ASA and statin therapy     Essential hypertension  Off lisinopril due to ACEi cough. Continue present anti-hypertensives; encouraged low sodium diet. HLD  10/2020  On atorva 80 mg ; Add Zetia , check labs in 3 mos  10/19 LDL 93   Continue statin therapy and low fat, low cholesterol diet     Carotid bruit  S/p RIGHT CAROTID ARTERY ENDARTERECTOMY  12/16/20   Carotid duplex done 4/2019 with < 50% stenosis of DAVID, minimal stenosis of LICA  Continue statin therapy  Followed by Dr Buckley Median current care and f/u in 6 months. Yu Cheek NP       Patient seen and examined by me with nurse practitioner. I personally performed all components of the history, physical, and medical decision making and agree with the assessment and plan as noted.     Verónica Wade MD

## 2021-03-22 NOTE — PROGRESS NOTES
1. Have you been to the ER, urgent care clinic since your last visit? Hospitalized since your last visit? Yes, 3/1/21, ER, Headache    2. Have you seen or consulted any other health care providers outside of the [address] [place] [address] since your last visit? Include any pap smears or colon screening.  No         Chief Complaint   Patient presents with    Coronary Artery Disease     C/O Headache

## 2021-03-25 ENCOUNTER — TRANSCRIBE ORDER (OUTPATIENT)
Dept: SCHEDULING | Age: 86
End: 2021-03-25

## 2021-03-25 DIAGNOSIS — G57.62 LESION OF LEFT PLANTAR NERVE: Primary | ICD-10-CM

## 2021-03-25 DIAGNOSIS — G57.61 LESION OF RIGHT PLANTAR NERVE: ICD-10-CM

## 2021-04-02 ENCOUNTER — HOSPITAL ENCOUNTER (OUTPATIENT)
Dept: ULTRASOUND IMAGING | Age: 86
Discharge: HOME | End: 2021-04-02
Attending: PODIATRIST
Payer: MEDICARE

## 2021-04-02 DIAGNOSIS — G57.61 LESION OF RIGHT PLANTAR NERVE: ICD-10-CM

## 2021-04-02 DIAGNOSIS — G57.62 LESION OF LEFT PLANTAR NERVE: ICD-10-CM

## 2021-04-02 PROCEDURE — 76881 US COMPL JOINT R-T W/IMG: CPT

## 2021-07-01 ENCOUNTER — TELEPHONE (OUTPATIENT)
Dept: CARDIOLOGY CLINIC | Age: 86
End: 2021-07-01

## 2021-07-01 LAB
ALBUMIN SERPL-MCNC: 4.4 G/DL (ref 3.6–4.6)
ALBUMIN/GLOB SERPL: 1.5 {RATIO} (ref 1.2–2.2)
ALP SERPL-CCNC: 131 IU/L (ref 48–121)
ALT SERPL-CCNC: 18 IU/L (ref 0–32)
AST SERPL-CCNC: 32 IU/L (ref 0–40)
BILIRUB SERPL-MCNC: 0.5 MG/DL (ref 0–1.2)
BUN SERPL-MCNC: 30 MG/DL (ref 8–27)
BUN/CREAT SERPL: 21 (ref 12–28)
CALCIUM SERPL-MCNC: 10 MG/DL (ref 8.7–10.3)
CHLORIDE SERPL-SCNC: 97 MMOL/L (ref 96–106)
CHOLEST SERPL-MCNC: 120 MG/DL (ref 100–199)
CK SERPL-CCNC: 351 U/L (ref 26–161)
CO2 SERPL-SCNC: 25 MMOL/L (ref 20–29)
CREAT SERPL-MCNC: 1.43 MG/DL (ref 0.57–1)
GLOBULIN SER CALC-MCNC: 2.9 G/DL (ref 1.5–4.5)
GLUCOSE SERPL-MCNC: 109 MG/DL (ref 65–99)
HDLC SERPL-MCNC: 54 MG/DL
IMP & REVIEW OF LAB RESULTS: NORMAL
INTERPRETATION: NORMAL
LDLC SERPL CALC-MCNC: 51 MG/DL (ref 0–99)
POTASSIUM SERPL-SCNC: 4.4 MMOL/L (ref 3.5–5.2)
PROT SERPL-MCNC: 7.3 G/DL (ref 6–8.5)
SODIUM SERPL-SCNC: 136 MMOL/L (ref 134–144)
TRIGL SERPL-MCNC: 74 MG/DL (ref 0–149)
VLDLC SERPL CALC-MCNC: 15 MG/DL (ref 5–40)

## 2021-07-01 NOTE — PROGRESS NOTES
Please let pt know her kidney function is getting more strained. Be sure to increase her water intake. Avoid use of OTC meds like Ibuprofen. Liver function is showing a stable alk phos which is one measurement of liver, the other numbers are fine.  Cholesterol much better with LDL 51 now at goal.

## 2021-07-01 NOTE — TELEPHONE ENCOUNTER
----- Message from Bettye Kirkland NP sent at 7/1/2021  8:55 AM EDT -----  Please let pt know her kidney function is getting more strained. Be sure to increase her water intake. Avoid use of OTC meds like Ibuprofen. Liver function is showing a stable alk phos which is one measurement of liver, the other numbers are fine. Cholesterol much better with LDL 51 now at goal.         Called pt but mail box is full. Could not leave a message. Will try later. Called pt 2nd time, mail box is full. Tried other number and person advised she would have her call me tomorrow.

## 2021-07-08 NOTE — TELEPHONE ENCOUNTER
Returned call but pt's number listed below , the mail box is full. Called pt on cell # listed, the person that answered advised she was not there but she did give her my message yesterday. Advised her pt did call but the number I have for , the voice mail box is full. Advised if she sees her again to let her know I call her .

## 2021-07-08 NOTE — TELEPHONE ENCOUNTER
Message  Received: 1 week ago  Marianna Glaser NP sent to Duncan Guevara LPN  Please let pt know her kidney function is getting more strained. Be sure to increase her water intake. Avoid use of OTC meds like Ibuprofen. Liver function is showing a stable alk phos which is one measurement of liver, the other numbers are fine. Cholesterol much better with LDL 51 now at goal.           Called pt,verified pt with two pt identifiers, advised pt her kidney fxn is getting more strained. Advised to increase her water, avoid use of ibuprofen, any NSAIDS as this can cause more strain on kidney's. Advised liver fxn is stable and other numbers are good, fine. Advised cholesterol is at goal, her LDL is 51. Pt verbalized understanding.

## 2021-08-03 PROBLEM — I10 HTN (HYPERTENSION): Status: RESOLVED | Noted: 2018-05-18 | Resolved: 2021-08-03

## 2021-09-10 RX ORDER — EZETIMIBE 10 MG/1
TABLET ORAL
Qty: 90 TABLET | Refills: 1 | Status: SHIPPED | OUTPATIENT
Start: 2021-09-10 | End: 2021-12-08

## 2021-10-04 ENCOUNTER — OFFICE VISIT (OUTPATIENT)
Dept: CARDIOLOGY CLINIC | Age: 86
End: 2021-10-04
Payer: MEDICARE

## 2021-10-04 VITALS
OXYGEN SATURATION: 98 % | WEIGHT: 155.7 LBS | DIASTOLIC BLOOD PRESSURE: 60 MMHG | RESPIRATION RATE: 18 BRPM | HEIGHT: 63 IN | HEART RATE: 69 BPM | SYSTOLIC BLOOD PRESSURE: 138 MMHG | BODY MASS INDEX: 27.59 KG/M2

## 2021-10-04 DIAGNOSIS — I35.1 NONRHEUMATIC AORTIC VALVE INSUFFICIENCY: ICD-10-CM

## 2021-10-04 DIAGNOSIS — I10 ESSENTIAL HYPERTENSION: ICD-10-CM

## 2021-10-04 DIAGNOSIS — I65.23 BILATERAL CAROTID ARTERY STENOSIS: ICD-10-CM

## 2021-10-04 DIAGNOSIS — I25.10 CORONARY ARTERY DISEASE INVOLVING NATIVE CORONARY ARTERY OF NATIVE HEART WITHOUT ANGINA PECTORIS: Primary | ICD-10-CM

## 2021-10-04 DIAGNOSIS — I34.0 NONRHEUMATIC MITRAL VALVE REGURGITATION: ICD-10-CM

## 2021-10-04 DIAGNOSIS — E78.2 MIXED HYPERLIPIDEMIA: ICD-10-CM

## 2021-10-04 PROCEDURE — 1101F PT FALLS ASSESS-DOCD LE1/YR: CPT | Performed by: NURSE PRACTITIONER

## 2021-10-04 PROCEDURE — 99214 OFFICE O/P EST MOD 30 MIN: CPT | Performed by: NURSE PRACTITIONER

## 2021-10-04 PROCEDURE — G8432 DEP SCR NOT DOC, RNG: HCPCS | Performed by: NURSE PRACTITIONER

## 2021-10-04 PROCEDURE — 1090F PRES/ABSN URINE INCON ASSESS: CPT | Performed by: NURSE PRACTITIONER

## 2021-10-04 PROCEDURE — G8419 CALC BMI OUT NRM PARAM NOF/U: HCPCS | Performed by: NURSE PRACTITIONER

## 2021-10-04 PROCEDURE — 93000 ELECTROCARDIOGRAM COMPLETE: CPT | Performed by: NURSE PRACTITIONER

## 2021-10-04 PROCEDURE — G8536 NO DOC ELDER MAL SCRN: HCPCS | Performed by: NURSE PRACTITIONER

## 2021-10-04 PROCEDURE — G8427 DOCREV CUR MEDS BY ELIG CLIN: HCPCS | Performed by: NURSE PRACTITIONER

## 2021-10-04 NOTE — PROGRESS NOTES
Bebo Conception, FNP-BC    Subjective/HPI:     Catina Mena is a 80 y.o. female is here for routine f/u. She has a PMHx of CAD, carotid artery disease, HTN, HLD and gout. She is doing well. Denies complaints of chest pains, dizziness, orthopnea, PND or edema. Denies palpitation symptoms, shortness of breath. Exercises by walking on a ramp at home, about twice a day. Also walks around her dining room table. Current Outpatient Medications on File Prior to Visit   Medication Sig Dispense Refill    ezetimibe (ZETIA) 10 mg tablet TAKE 1 TABLET BY MOUTH EVERY DAY 90 Tablet 1    cholecalciferol (Vitamin D3) (5000 Units/125 mcg) tab tablet Take 5,000 Units by mouth daily.  naloxone (Narcan) 4 mg/actuation nasal spray Use 1 spray intranasally, then discard. Repeat with new spray every 2 min as needed for opioid overdose symptoms, alternating nostrils. 1 Each 0    polyethylene glycol (Miralax) 17 gram packet Take 17 g by mouth as needed for Constipation.  trolamine salicylate (Aspercreme) 10 % lotion Apply  to affected area as needed.  losartan (COZAAR) 25 mg tablet Take 25 mg by mouth daily.  atorvastatin (LIPITOR) 80 mg tablet Take 1 Tab by mouth nightly. 30 Tab 1    amLODIPine (NORVASC) 10 mg tablet TAKE 1 TABLET BY MOUTH  DAILY 30 Tab 3    metoprolol tartrate (LOPRESSOR) 25 mg tablet Take 0.5 Tabs by mouth every twelve (12) hours. 90 Tab 2    docusate sodium (STOOL SOFTENER PO) Take  by mouth as needed.  ferrous sulfate (IRON PO) Take 65 mg by mouth daily.  aspirin delayed-release 81 mg tablet Take 81 mg by mouth daily.  timolol (TIMOPTIC-XE) 0.5 % ophthalmic gel-forming Administer 1 Drop to right eye daily.  latanoprost (XALATAN) 0.005 % ophthalmic solution Administer 1 Drop to both eyes nightly. Indications: wide-angle glaucoma      fluticasone (FLONASE ALLERGY RELIEF) 50 mcg/actuation nasal spray 1 [place] by Both Nostrils route daily.  acetaminophen (TYLENOL EXTRA STRENGTH) 500 mg tablet Take 1,000 mg by mouth every six (6) hours as needed for Pain.  fexofenadine (ALLEGRA) 180 mg tablet Take 180 mg by mouth nightly.  allopurinol (ZYLOPRIM) 100 mg tablet Take 100 mg by mouth daily.  ALPRAZolam (XANAX) 0.25 mg tablet Take 0.25 mg by mouth daily as needed for Anxiety. No current facility-administered medications on file prior to visit. Review of Symptoms:    Review of Systems   Constitutional: Negative for chills, fever and weight loss. HENT: Negative for nosebleeds. Eyes: Negative for blurred vision and double vision. Respiratory: Negative for cough, shortness of breath and wheezing. Cardiovascular: Negative for chest pain, palpitations, orthopnea, leg swelling and PND. Skin: Negative for rash. Neurological: Negative for dizziness and loss of consciousness. Physical Exam:      General: Well developed, in no acute distress, cooperative and alert  Heart:  reg rate and rhythm; normal S1/S2; no murmurs, no gallops or rubs. Respiratory: Clear bilaterally x 4, no wheezing or rales  Extremities:  Normal cap refill, no cyanosis, atraumatic. No edema. Vascular: 2+ pulses symmetric in all extremities    Vitals:    10/04/21 1431   BP: 138/60   BP 1 Location: Left arm   BP Patient Position: Sitting   BP Cuff Size: Adult   Pulse: 69   Resp: 18   Height: 5' 3\" (1.6 m)   Weight: 155 lb 11.2 oz (70.6 kg)   SpO2: 98%       ECG done today shows sinus rhythm     Assessment:       ICD-10-CM ICD-9-CM    1. Coronary artery disease involving native coronary artery of native heart without angina pectoris  I25.10 414.01 AMB POC EKG ROUTINE W/ 12 LEADS, INTER & REP   2. Bilateral carotid artery stenosis  I65.23 433.10 AMB POC EKG ROUTINE W/ 12 LEADS, INTER & REP     433.30    3. Essential hypertension  I10 401.9 AMB POC EKG ROUTINE W/ 12 LEADS, INTER & REP   4.  Mixed hyperlipidemia  E78.2 272.2 AMB POC EKG ROUTINE W/ 12 LEADS, INTER & REP   5. Nonrheumatic mitral valve regurgitation  I34.0 424.0 AMB POC EKG ROUTINE W/ 12 LEADS, INTER & REP   6. Nonrheumatic aortic valve insufficiency  I35.1 424.1 AMB POC EKG ROUTINE W/ 12 LEADS, INTER & REP        Plan:     1. Coronary artery disease involving native coronary artery of native heart without angina pectoris  Hx of ANA MARÍA to LAD and LCx in 5/2018  Echo done 5/2018 with preserved LVEF 55-60% with mod MR, mild-mod AI  Without anginal or anginal equivalent symptoms  Continue amlodipine, metoprolol, ASA and statin therapy  Repeat echo    2. Bilateral carotid artery stenosis  S/p bilateral CEA in 11/2020 and 12/2020 (left and right, respectively)  Keep f/u with Dr. Ambrose Styles    3. Essential hypertension  BP controlled. Continue anti-hypertensive therapy and low sodium diet    4.  Mixed hyperlipidemia  LDL 51 in 6/2021  Continue Zetia, atorvastatin; encouraged low fat, low cholesterol diet  Lipids managed by PCP    F/u with Dr. Michelle Fiore in 1 year    Silvia Arriaza NP

## 2021-10-04 NOTE — PROGRESS NOTES
Chief Complaint   Patient presents with    Coronary Artery Disease     6 Month F/U; Denies Cardiac Symptoms    Hypertension    Hyperlipidemia     Visit Vitals  /60 (BP 1 Location: Left arm, BP Patient Position: Sitting, BP Cuff Size: Adult)   Pulse 69   Resp 18   Ht 5' 3\" (1.6 m)   Wt 155 lb 11.2 oz (70.6 kg)   SpO2 98%   BMI 27.58 kg/m²     1. Have you been to the ER, urgent care clinic since your last visit? Hospitalized since your last visit? No    2. Have you seen or consulted any other health care providers outside of the [address] [place] [address] since your last visit? Include any pap smears or colon screening.  No

## 2021-10-12 ENCOUNTER — TRANSCRIBE ORDER (OUTPATIENT)
Dept: SCHEDULING | Age: 86
End: 2021-10-12

## 2021-10-12 DIAGNOSIS — M51.36 DEGENERATION OF LUMBAR INTERVERTEBRAL DISC: ICD-10-CM

## 2021-10-12 DIAGNOSIS — M54.31 BILATERAL SCIATICA: ICD-10-CM

## 2021-10-12 DIAGNOSIS — M43.16 SPONDYLOLISTHESIS OF LUMBAR REGION: ICD-10-CM

## 2021-10-12 DIAGNOSIS — M54.50 LOW BACK PAIN: Primary | ICD-10-CM

## 2021-10-12 DIAGNOSIS — M54.32 BILATERAL SCIATICA: ICD-10-CM

## 2021-10-26 ENCOUNTER — ANCILLARY PROCEDURE (OUTPATIENT)
Dept: CARDIOLOGY CLINIC | Age: 86
End: 2021-10-26
Payer: MEDICARE

## 2021-10-26 ENCOUNTER — HOSPITAL ENCOUNTER (OUTPATIENT)
Dept: MRI IMAGING | Age: 86
Discharge: HOME | End: 2021-10-26
Attending: PHYSICAL MEDICINE & REHABILITATION
Payer: MEDICARE

## 2021-10-26 VITALS
BODY MASS INDEX: 27.46 KG/M2 | SYSTOLIC BLOOD PRESSURE: 138 MMHG | DIASTOLIC BLOOD PRESSURE: 60 MMHG | WEIGHT: 155 LBS | HEIGHT: 63 IN

## 2021-10-26 DIAGNOSIS — I34.0 NONRHEUMATIC MITRAL VALVE REGURGITATION: ICD-10-CM

## 2021-10-26 DIAGNOSIS — M51.36 DEGENERATION OF LUMBAR INTERVERTEBRAL DISC: ICD-10-CM

## 2021-10-26 DIAGNOSIS — M54.32 BILATERAL SCIATICA: ICD-10-CM

## 2021-10-26 DIAGNOSIS — M54.31 BILATERAL SCIATICA: ICD-10-CM

## 2021-10-26 DIAGNOSIS — M54.50 LOW BACK PAIN: ICD-10-CM

## 2021-10-26 DIAGNOSIS — I35.1 NONRHEUMATIC AORTIC VALVE INSUFFICIENCY: ICD-10-CM

## 2021-10-26 DIAGNOSIS — M43.16 SPONDYLOLISTHESIS OF LUMBAR REGION: ICD-10-CM

## 2021-10-26 PROCEDURE — 93306 TTE W/DOPPLER COMPLETE: CPT | Performed by: INTERNAL MEDICINE

## 2021-10-26 PROCEDURE — 72148 MRI LUMBAR SPINE W/O DYE: CPT

## 2021-10-28 ENCOUNTER — TELEPHONE (OUTPATIENT)
Dept: CARDIOLOGY CLINIC | Age: 86
End: 2021-10-28

## 2021-10-28 LAB
AV R PG: 74.51 MMHG
ECHO AO ASC DIAM: 3.45 CM
ECHO AO ROOT DIAM: 3.27 CM
ECHO AR MAX VEL PISA: 431.6 CM/S
ECHO AV AREA PEAK VELOCITY: 1.05 CM2
ECHO AV AREA VTI: 1.03 CM2
ECHO AV AREA/BSA PEAK VELOCITY: 0.6 CM2/M2
ECHO AV AREA/BSA VTI: 0.6 CM2/M2
ECHO AV MEAN GRADIENT: 12.76 MMHG
ECHO AV PEAK GRADIENT: 22.73 MMHG
ECHO AV PEAK VELOCITY: 238.39 CM/S
ECHO AV REGURGITANT PHT: 372.78 MS
ECHO AV VTI: 60.04 CM
ECHO EST RA PRESSURE: 3 MMHG
ECHO LA AREA 4C: 20.3 CM2
ECHO LA MAJOR AXIS: 3.14 CM
ECHO LA MINOR AXIS: 1.8 CM
ECHO LA VOL 2C: 58.15 ML (ref 22–52)
ECHO LA VOL 4C: 50.24 ML (ref 22–52)
ECHO LA VOL BP: 65.19 ML (ref 22–52)
ECHO LA VOL/BSA BIPLANE: 37.47 ML/M2 (ref 16–28)
ECHO LA VOLUME INDEX A2C: 33.42 ML/M2 (ref 16–28)
ECHO LA VOLUME INDEX A4C: 28.87 ML/M2 (ref 16–28)
ECHO LV E' LATERAL VELOCITY: 9.32 CM/S
ECHO LV INTERNAL DIMENSION DIASTOLIC: 4.46 CM (ref 3.9–5.3)
ECHO LV INTERNAL DIMENSION SYSTOLIC: 2.11 CM
ECHO LV ISOVOLUMETRIC RELAXATION TIME (IVRT): 85.63 MS
ECHO LV IVSD: 1.02 CM (ref 0.6–0.9)
ECHO LV MASS 2D: 154.2 G (ref 67–162)
ECHO LV MASS INDEX 2D: 88.6 G/M2 (ref 43–95)
ECHO LV POSTERIOR WALL DIASTOLIC: 1.01 CM (ref 0.6–0.9)
ECHO LVOT DIAM: 1.87 CM
ECHO LVOT PEAK GRADIENT: 3.33 MMHG
ECHO LVOT PEAK VELOCITY: 91.19 CM/S
ECHO LVOT SV: 61.9 ML
ECHO LVOT VTI: 22.48 CM
ECHO MV "A" WAVE DURATION: 165.56 MS
ECHO MV A VELOCITY: 86.78 CM/S
ECHO MV E DECELERATION TIME (DT): 217.86 MS
ECHO MV E VELOCITY: 58.57 CM/S
ECHO MV E/A RATIO: 0.67
ECHO MV E/E' LATERAL: 6.28
ECHO RIGHT VENTRICULAR SYSTOLIC PRESSURE (RVSP): 44.21 MMHG
ECHO RV TAPSE: 2.19 CM (ref 1.5–2)
ECHO TV REGURGITANT MAX VELOCITY: 320.97 CM/S
ECHO TV REGURGITANT PEAK GRADIENT: 41.21 MMHG
LA VOL DISK BP: 60.81 ML (ref 22–52)
LVOT MG: 1.95 MMHG

## 2021-10-28 NOTE — PROGRESS NOTES
Please call the patient and inform that echocardiogram is normal, ejection fraction is 60-65%. No concern for heart failure at this time. Aortic and mitral valve leaking mildly, not changed since last check. No changes to meds today. Keep fu with Dr. Armida Jeronimo as scheduled.     Thanks,  [employer]

## 2021-10-28 NOTE — TELEPHONE ENCOUNTER
----- Message from Renelle Bumpers, NP sent at 10/28/2021 12:36 PM EDT -----  Please call the patient and inform that echocardiogram is normal, ejection fraction is 60-65%. No concern for heart failure at this time. Aortic and mitral valve leaking mildly, not changed since last check. No changes to meds today. Keep fu with Dr. Merline Linsey as scheduled. Thanks,  [employer] pt and left message to call me tomorrow.

## 2021-11-01 NOTE — TELEPHONE ENCOUNTER
Pt's mailbox is full. Called cell # but Marlon Leo was not listed on PHI. She advised to call [phone]. I tried that # and no answer, voice mail box is full. Will try tomorrow.

## 2021-11-10 NOTE — TELEPHONE ENCOUNTER
Pt returned call,verified pt with two pt identifiers, advised pt I was calling her with her echo results-see results below-read to pt verbatim. Pt then advised she saw her dr and he advised her statin medication could be causing her muscle aches. She advised she did received injection today and it has helped her legs. I advised pt to see if the injection continues to help her aches because she needs to stay on her cholesterol medication if possible. Advised if her muscle aches come back then to let us know and she can discuss with  at her next visit. Pt verbalized understanding.

## 2021-12-10 ENCOUNTER — HOSPITAL ENCOUNTER (EMERGENCY)
Age: 86
Discharge: HOME | End: 2021-12-10
Attending: EMERGENCY MEDICINE
Payer: MEDICARE

## 2021-12-10 VITALS
SYSTOLIC BLOOD PRESSURE: 169 MMHG | WEIGHT: 154.98 LBS | RESPIRATION RATE: 18 BRPM | BODY MASS INDEX: 27.46 KG/M2 | OXYGEN SATURATION: 100 % | HEIGHT: 63 IN | DIASTOLIC BLOOD PRESSURE: 61 MMHG | TEMPERATURE: 98.3 F | HEART RATE: 91 BPM

## 2021-12-10 DIAGNOSIS — G44.209 TENSION HEADACHE: Primary | ICD-10-CM

## 2021-12-10 DIAGNOSIS — I10 ESSENTIAL HYPERTENSION: ICD-10-CM

## 2021-12-10 PROCEDURE — 99281 EMR DPT VST MAYX REQ PHY/QHP: CPT

## 2021-12-10 PROCEDURE — 96375 TX/PRO/DX INJ NEW DRUG ADDON: CPT

## 2021-12-10 PROCEDURE — 74011250636 HC RX REV CODE- 250/636: Performed by: EMERGENCY MEDICINE

## 2021-12-10 PROCEDURE — 96374 THER/PROPH/DIAG INJ IV PUSH: CPT

## 2021-12-10 PROCEDURE — 96361 HYDRATE IV INFUSION ADD-ON: CPT

## 2021-12-10 PROCEDURE — 74011000250 HC RX REV CODE- 250: Performed by: EMERGENCY MEDICINE

## 2021-12-10 RX ORDER — PROCHLORPERAZINE EDISYLATE 5 MG/ML
10 INJECTION INTRAMUSCULAR; INTRAVENOUS
Status: DISCONTINUED | OUTPATIENT
Start: 2021-12-10 | End: 2021-12-10

## 2021-12-10 RX ORDER — DIPHENHYDRAMINE HYDROCHLORIDE 50 MG/ML
25 INJECTION, SOLUTION INTRAMUSCULAR; INTRAVENOUS
Status: COMPLETED | OUTPATIENT
Start: 2021-12-10 | End: 2021-12-10

## 2021-12-10 RX ORDER — BUTALBITAL, ACETAMINOPHEN AND CAFFEINE 300; 40; 50 MG/1; MG/1; MG/1
1 CAPSULE ORAL
Qty: 20 CAPSULE | Refills: 0 | Status: SHIPPED | OUTPATIENT
Start: 2021-12-10 | End: 2022-10-28

## 2021-12-10 RX ORDER — KETOROLAC TROMETHAMINE 30 MG/ML
15 INJECTION, SOLUTION INTRAMUSCULAR; INTRAVENOUS
Status: COMPLETED | OUTPATIENT
Start: 2021-12-10 | End: 2021-12-10

## 2021-12-10 RX ADMIN — KETOROLAC TROMETHAMINE 15 MG: 30 INJECTION, SOLUTION INTRAMUSCULAR; INTRAVENOUS at 17:37

## 2021-12-10 RX ADMIN — SODIUM CHLORIDE 500 ML: 9 INJECTION, SOLUTION INTRAVENOUS at 17:36

## 2021-12-10 RX ADMIN — PROCHLORPERAZINE EDISYLATE 10 MG: 5 INJECTION INTRAMUSCULAR; INTRAVENOUS at 17:40

## 2021-12-10 RX ADMIN — DIPHENHYDRAMINE HYDROCHLORIDE 25 MG: 50 INJECTION, SOLUTION INTRAMUSCULAR; INTRAVENOUS at 17:38

## 2021-12-10 NOTE — ED PROVIDER NOTES
EMERGENCY DEPARTMENT HISTORY AND PHYSICAL EXAM      Date: 12/10/2021  Patient Name: Sachin Payton  Patient Age and Sex: 80 y.o. female     History of Presenting Illness     Chief Complaint   Patient presents with    Headache     PT coems with headache x 1 week with no relief from tylenol. History Provided By: Patient    HPI: Sachin Payton is an 42-year-old female presenting with headache. Patient states that for the past 1 week has been having a frontal headache that goes into her eyes causing pressure behind the eyes. Not associated with any nausea, vomiting, numbness, weakness, difficulty walking, visual disturbance. Patient states that she has been taking Tylenol as suggested by her primary care doctor with minimal relief. States that she has been getting good sleep, eating well, drinking plenty of water, and denies any new stressors. There are no other complaints, changes, or physical findings at this time. PCP: Aditya Matias NP    No current facility-administered medications on file prior to encounter. Current Outpatient Medications on File Prior to Encounter   Medication Sig Dispense Refill    ezetimibe (ZETIA) 10 mg tablet TAKE 1 TABLET BY MOUTH EVERY DAY 90 Tablet 1    cholecalciferol (Vitamin D3) (5000 Units/125 mcg) tab tablet Take 5,000 Units by mouth daily.  naloxone (Narcan) 4 mg/actuation nasal spray Use 1 spray intranasally, then discard. Repeat with new spray every 2 min as needed for opioid overdose symptoms, alternating nostrils. 1 Each 0    polyethylene glycol (Miralax) 17 gram packet Take 17 g by mouth as needed for Constipation.  trolamine salicylate (Aspercreme) 10 % lotion Apply  to affected area as needed.  losartan (COZAAR) 25 mg tablet Take 25 mg by mouth daily.  atorvastatin (LIPITOR) 80 mg tablet Take 1 Tab by mouth nightly.  30 Tab 1    amLODIPine (NORVASC) 10 mg tablet TAKE 1 TABLET BY MOUTH  DAILY 30 Tab 3    metoprolol tartrate (LOPRESSOR) 25 mg tablet Take 0.5 Tabs by mouth every twelve (12) hours. 90 Tab 2    docusate sodium (STOOL SOFTENER PO) Take  by mouth as needed.  ferrous sulfate (IRON PO) Take 65 mg by mouth daily.  aspirin delayed-release 81 mg tablet Take 81 mg by mouth daily.  timolol (TIMOPTIC-XE) 0.5 % ophthalmic gel-forming Administer 1 Drop to right eye daily.  latanoprost (XALATAN) 0.005 % ophthalmic solution Administer 1 Drop to both eyes nightly. Indications: wide-angle glaucoma      fluticasone (FLONASE ALLERGY RELIEF) 50 mcg/actuation nasal spray 1 [place] by Both Nostrils route daily.  acetaminophen (TYLENOL EXTRA STRENGTH) 500 mg tablet Take 1,000 mg by mouth every six (6) hours as needed for Pain.  fexofenadine (ALLEGRA) 180 mg tablet Take 180 mg by mouth nightly.  allopurinol (ZYLOPRIM) 100 mg tablet Take 100 mg by mouth daily.  ALPRAZolam (XANAX) 0.25 mg tablet Take 0.25 mg by mouth daily as needed for Anxiety.          Past History     Past Medical History:  Past Medical History:   Diagnosis Date    CAD (coronary artery disease)     Diverticulitis     Glaucoma     Gout     HTN (hypertension) 5/18/2018    Hx of diverticulitis of colon 5/18/2018    Hyperlipidemia     Hypertension     Other ill-defined conditions(799.89)     high cholesterol    S/P coronary artery stent placement 05/25/2018    Stroke ([facility] [address] 10/2020    TIA       Past Surgical History:  Past Surgical History:   Procedure Laterality Date    HX CAROTID ENDARTERECTOMY Left 11/02/2020    HX HYSTERECTOMY      [place] CARDIAC SURG PROCEDURE UNLIST  05/2018    x2 cardiac stent    [place] COLONOSCOPY FLX DX W/COLLJ SPEC WHEN PFRMD  10/18/2013            Family History:  Family History   Problem Relation Age of Onset    Stroke Mother     Diabetes Sister     No Known Problems Father        Social History:  Social History     Tobacco Use    Smoking status: Never Smoker    Smokeless tobacco: Never Used   Vaping Use  Vaping Use: Never used   Substance Use Topics    Alcohol use: No    Drug use: No       Allergies: Allergies   Allergen Reactions    Lisinopril Cough         Review of Systems   Review of Systems   Constitutional: Negative for chills and fever. Respiratory: Negative for cough and shortness of breath. Cardiovascular: Negative for chest pain. Gastrointestinal: Negative for abdominal pain, constipation, diarrhea, nausea and vomiting. Genitourinary: Negative for dysuria, frequency and hematuria. Neurological: Positive for headaches. Negative for weakness and numbness. All other systems reviewed and are negative. Physical Exam   Physical Exam  Vitals and nursing note reviewed. Constitutional:       Appearance: She is well-developed. HENT:      Head: Normocephalic and atraumatic. Nose: Nose normal.      Mouth/Throat:      Mouth: Mucous membranes are moist.   Eyes:      Extraocular Movements: Extraocular movements intact. Conjunctiva/sclera: Conjunctivae normal.      Pupils: Pupils are equal, round, and reactive to light. Cardiovascular:      Rate and Rhythm: Normal rate and regular rhythm. Pulmonary:      Effort: Pulmonary effort is normal. No respiratory distress. Breath sounds: Normal breath sounds. Abdominal:      General: There is no distension. Palpations: Abdomen is soft. Tenderness: There is no abdominal tenderness. Musculoskeletal:         General: Normal range of motion. Cervical back: Normal range of motion and neck supple. Skin:     General: Skin is warm and dry. Neurological:      General: No focal deficit present. Mental Status: She is alert and oriented to person, place, and time. Mental status is at baseline. Cranial Nerves: No cranial nerve deficit. Sensory: No sensory deficit. Motor: No weakness.       Coordination: Coordination normal.      Comments: CN 2-12 intact, 5/5 strength in all 4 extremities, Finger to nose intact, negative Romberg, normal gait. No temporal artery tenderness on palpation. Psychiatric:         Mood and Affect: Mood normal.          Diagnostic Study Results     Labs -   No results found for this or any previous visit (from the past 12 hour(s)). Radiologic Studies -   No orders to display     CT Results  (Last 48 hours)    None        CXR Results  (Last 48 hours)    None            Medical Decision Making   I am the first provider for this patient. I reviewed the vital signs, available nursing notes, past medical history, past surgical history, family history and social history. Vital Signs-Reviewed the patient's vital signs. Patient Vitals for the past 12 hrs:   Temp Pulse Resp BP SpO2   12/10/21 1743 -- -- -- (!) 169/61 --   12/10/21 1609 98.3 °F (36.8 °C) 91 18 (!) 169/67 100 %       Records Reviewed: Nursing Notes and Old Medical Records    Provider Notes (Medical Decision Making):   Patient presents with headache. DDx: migraine, cluster HA, tension HA, dehydration/lack of proper hydration, lack of proper sleep, stress. Less likely pseudotumor cerebri, SAH, ICH, cerebral dural venous thrombosis, or meningitis given the course, story and physical exam.  Analgesics and fluids ordered. Counseled on the importance of good hydration and 3 meals a day as well as good sleep hygiene. ED Course:   Initial assessment performed. The patients presenting problems have been discussed, and they are in agreement with the care plan formulated and outlined with them. I have encouraged them to ask questions as they arise throughout their visit. ED Course as of 12/10/21 1814   Fri Dec 10, 2021   1613 Patient's initial blood pressure 169/67 which could be contributing to some of these headaches. We will have her relax, repeat blood pressure and give her medications for analgesics to see if any improvement. [JS]   2410 Patient feeling much better after the medications.   Patient will be discharged home with some Fioricet for the next couple of days and advised to follow-up with primary care doctor about her blood pressures. [JS]      ED Course User Index  [JS] Jake De Leon MD     Critical Care Time:   0    Disposition:  Discharge Note:  The patient has been re-evaluated and is ready for discharge. Reviewed available results with patient. Counseled patient on diagnosis and care plan. Patient has expressed understanding, and all questions have been answered. Patient agrees with plan and agrees to follow up as recommended, or to return to the ED if their symptoms worsen. Discharge instructions have been provided and explained to the patient, along with reasons to return to the ED. PLAN:  Current Discharge Medication List      START taking these medications    Details   butalbital-acetaminophen-caff (Fioricet) -40 mg per capsule Take 1 Capsule by mouth every six (6) hours as needed for Headache. Qty: 20 Capsule, Refills: 0  Start date: 12/10/2021           2. Follow-up Information     Follow up With Specialties Details Why Contact Info    Kavitha Cobb NP Nurse Practitioner  As needed [address]  [phone]          3. Return to ED if worse     Diagnosis     Clinical Impression:   1. Tension headache    2. Essential hypertension        Attestations:    Himanshu Yanez M.D. Please note that this dictation was completed with [employer], the computer voice recognition software. Quite often unanticipated grammatical, syntax, homophones, and other interpretive errors are inadvertently transcribed by the computer software. Please disregard these errors. Please excuse any errors that have escaped final proofreading. Thank you.

## 2021-12-22 ENCOUNTER — APPOINTMENT (OUTPATIENT)
Dept: ULTRASOUND IMAGING | Age: 86
End: 2021-12-22
Attending: EMERGENCY MEDICINE
Payer: MEDICARE

## 2021-12-22 ENCOUNTER — HOSPITAL ENCOUNTER (EMERGENCY)
Age: 86
Discharge: HOME | End: 2021-12-22
Attending: EMERGENCY MEDICINE
Payer: MEDICARE

## 2021-12-22 VITALS
RESPIRATION RATE: 14 BRPM | OXYGEN SATURATION: 99 % | TEMPERATURE: 98.1 F | HEART RATE: 75 BPM | SYSTOLIC BLOOD PRESSURE: 134 MMHG | HEIGHT: 64 IN | WEIGHT: 145 LBS | BODY MASS INDEX: 24.75 KG/M2 | DIASTOLIC BLOOD PRESSURE: 60 MMHG

## 2021-12-22 DIAGNOSIS — M71.20 SYNOVIAL CYST OF POPLITEAL SPACE, UNSPECIFIED LATERALITY: ICD-10-CM

## 2021-12-22 DIAGNOSIS — G62.9 NEUROPATHY: Primary | ICD-10-CM

## 2021-12-22 LAB
ALBUMIN SERPL-MCNC: 3.5 G/DL (ref 3.5–5)
ALBUMIN/GLOB SERPL: 0.9 {RATIO} (ref 1.1–2.2)
ALP SERPL-CCNC: 122 U/L (ref 45–117)
ALT SERPL-CCNC: 28 U/L (ref 12–78)
ANION GAP SERPL CALC-SCNC: 6 MMOL/L (ref 5–15)
AST SERPL-CCNC: 25 U/L (ref 15–37)
BASOPHILS # BLD: 0 K/UL (ref 0–0.1)
BASOPHILS NFR BLD: 1 % (ref 0–1)
BILIRUB SERPL-MCNC: 0.2 MG/DL (ref 0.2–1)
BUN SERPL-MCNC: 29 MG/DL (ref 6–20)
BUN/CREAT SERPL: 20 (ref 12–20)
CALCIUM SERPL-MCNC: 9.3 MG/DL (ref 8.5–10.1)
CHLORIDE SERPL-SCNC: 106 MMOL/L (ref 97–108)
CK SERPL-CCNC: 126 U/L (ref 26–192)
CO2 SERPL-SCNC: 27 MMOL/L (ref 21–32)
CREAT SERPL-MCNC: 1.47 MG/DL (ref 0.55–1.02)
DIFFERENTIAL METHOD BLD: ABNORMAL
EOSINOPHIL # BLD: 0.2 K/UL (ref 0–0.4)
EOSINOPHIL NFR BLD: 5 % (ref 0–7)
ERYTHROCYTE [DISTWIDTH] IN BLOOD BY AUTOMATED COUNT: 13.2 % (ref 11.5–14.5)
GLOBULIN SER CALC-MCNC: 4 G/DL (ref 2–4)
GLUCOSE SERPL-MCNC: 139 MG/DL (ref 65–100)
HCT VFR BLD AUTO: 34.7 % (ref 35–47)
HGB BLD-MCNC: 11.4 G/DL (ref 11.5–16)
IMM GRANULOCYTES # BLD AUTO: 0 K/UL (ref 0–0.04)
IMM GRANULOCYTES NFR BLD AUTO: 0 % (ref 0–0.5)
LYMPHOCYTES # BLD: 1.6 K/UL (ref 0.8–3.5)
LYMPHOCYTES NFR BLD: 32 % (ref 12–49)
MAGNESIUM SERPL-MCNC: 1.9 MG/DL (ref 1.6–2.4)
MCH RBC QN AUTO: 30.6 PG (ref 26–34)
MCHC RBC AUTO-ENTMCNC: 32.9 G/DL (ref 30–36.5)
MCV RBC AUTO: 93.3 FL (ref 80–99)
MONOCYTES # BLD: 0.5 K/UL (ref 0–1)
MONOCYTES NFR BLD: 9 % (ref 5–13)
NEUTS SEG # BLD: 2.7 K/UL (ref 1.8–8)
NEUTS SEG NFR BLD: 53 % (ref 32–75)
NRBC # BLD: 0 K/UL (ref 0–0.01)
NRBC BLD-RTO: 0 PER 100 WBC
PLATELET # BLD AUTO: 271 K/UL (ref 150–400)
PMV BLD AUTO: 8.9 FL (ref 8.9–12.9)
POTASSIUM SERPL-SCNC: 4.3 MMOL/L (ref 3.5–5.1)
PROT SERPL-MCNC: 7.5 G/DL (ref 6.4–8.2)
RBC # BLD AUTO: 3.72 M/UL (ref 3.8–5.2)
SODIUM SERPL-SCNC: 139 MMOL/L (ref 136–145)
WBC # BLD AUTO: 5 K/UL (ref 3.6–11)

## 2021-12-22 PROCEDURE — 82550 ASSAY OF CK (CPK): CPT

## 2021-12-22 PROCEDURE — 85025 COMPLETE CBC W/AUTO DIFF WBC: CPT

## 2021-12-22 PROCEDURE — 83735 ASSAY OF MAGNESIUM: CPT

## 2021-12-22 PROCEDURE — 80053 COMPREHEN METABOLIC PANEL: CPT

## 2021-12-22 PROCEDURE — 93970 EXTREMITY STUDY: CPT

## 2021-12-22 PROCEDURE — 36415 COLL VENOUS BLD VENIPUNCTURE: CPT

## 2021-12-22 PROCEDURE — 99281 EMR DPT VST MAYX REQ PHY/QHP: CPT

## 2021-12-22 NOTE — ED PROVIDER NOTES
EMERGENCY DEPARTMENT HISTORY AND PHYSICAL EXAM      Date: 12/22/2021  Patient Name: Catina Mena    History of Presenting Illness     Chief Complaint   Patient presents with    Leg Pain     feet and legs numb; having cramps up and down both legs. has had this for \"a while\"       History Provided By: Patient    HPI: Catina Mena, 80 y.o. female with PMHx as noted below presents the emergency department with chief complaint of numbness and paresthesias in her feet bilaterally. Patient notes this has been a chronic issue that is progressively worsening. Patient notes that she has been following with Dr. Kwame Harrison with podiatry. She reports they have nerve conduction studies and MRI scheduled and has an appointment with him in 2 weeks. Patient reports that the numbness and pins-and-needles are now making it difficult to walk in her feet. She does also report having some intermittent thigh cramping that is also bilateral.  Symptoms are equal bilaterally. Has noted no swelling in the extremities. Symptoms are otherwise moderate and constant. Pt denies any other alleviating or exacerbating factors. Additionally, pt specifically denies any recent fever, chills, headache, nausea, vomiting, abdominal pain, CP, SOB, lightheadedness, dizziness,  weakness, BLE swelling, heart palpitations, urinary sxs, diarrhea, constipation, melena, hematochezia, cough, or congestion. PCP: DANIELE Gabriel    Current Outpatient Medications   Medication Sig Dispense Refill    butalbital-acetaminophen-caff (Fioricet) -40 mg per capsule Take 1 Capsule by mouth every six (6) hours as needed for Headache. 20 Capsule 0    ezetimibe (ZETIA) 10 mg tablet TAKE 1 TABLET BY MOUTH EVERY DAY 90 Tablet 1    cholecalciferol (Vitamin D3) (5000 Units/125 mcg) tab tablet Take 5,000 Units by mouth daily.  naloxone (Narcan) 4 mg/actuation nasal spray Use 1 spray intranasally, then discard.  Repeat with new spray every 2 min as needed for opioid overdose symptoms, alternating nostrils. 1 Each 0    polyethylene glycol (Miralax) 17 gram packet Take 17 g by mouth as needed for Constipation.  trolamine salicylate (Aspercreme) 10 % lotion Apply  to affected area as needed.  losartan (COZAAR) 25 mg tablet Take 25 mg by mouth daily.  atorvastatin (LIPITOR) 80 mg tablet Take 1 Tab by mouth nightly. 30 Tab 1    amLODIPine (NORVASC) 10 mg tablet TAKE 1 TABLET BY MOUTH  DAILY 30 Tab 3    metoprolol tartrate (LOPRESSOR) 25 mg tablet Take 0.5 Tabs by mouth every twelve (12) hours. 90 Tab 2    docusate sodium (STOOL SOFTENER PO) Take  by mouth as needed.  ferrous sulfate (IRON PO) Take 65 mg by mouth daily.  aspirin delayed-release 81 mg tablet Take 81 mg by mouth daily.  timolol (TIMOPTIC-XE) 0.5 % ophthalmic gel-forming Administer 1 Drop to right eye daily.  latanoprost (XALATAN) 0.005 % ophthalmic solution Administer 1 Drop to both eyes nightly. Indications: wide-angle glaucoma      fluticasone (FLONASE ALLERGY RELIEF) 50 mcg/actuation nasal spray 1 Lilly by Both Nostrils route daily.  acetaminophen (TYLENOL EXTRA STRENGTH) 500 mg tablet Take 1,000 mg by mouth every six (6) hours as needed for Pain.  fexofenadine (ALLEGRA) 180 mg tablet Take 180 mg by mouth nightly.  allopurinol (ZYLOPRIM) 100 mg tablet Take 100 mg by mouth daily.  ALPRAZolam (XANAX) 0.25 mg tablet Take 0.25 mg by mouth daily as needed for Anxiety.          Past History     Past Medical History:  Past Medical History:   Diagnosis Date    CAD (coronary artery disease)     Diverticulitis     Glaucoma     Gout     HTN (hypertension) 5/18/2018    Hx of diverticulitis of colon 5/18/2018    Hyperlipidemia     Hypertension     Other ill-defined conditions(799.89)     high cholesterol    S/P coronary artery stent placement 05/25/2018    Stroke ([facility] [address] 10/2020    TIA       Past Surgical History:  Past Surgical History: Procedure Laterality Date    HX CAROTID ENDARTERECTOMY Left 11/02/2020    HX HYSTERECTOMY      AL CARDIAC SURG PROCEDURE UNLIST  05/2018    x2 cardiac stent    AL COLONOSCOPY FLX DX W/COLLJ SPEC WHEN PFRMD  10/18/2013            Family History:  Family History   Problem Relation Age of Onset    Stroke Mother     Diabetes Sister     No Known Problems Father        Social History:  Social History     Tobacco Use    Smoking status: Never Smoker    Smokeless tobacco: Never Used   Vaping Use    Vaping Use: Never used   Substance Use Topics    Alcohol use: No    Drug use: No       Allergies: Allergies   Allergen Reactions    Lisinopril Cough         Review of Systems   Review of Systems  Constitutional: Negative for fever, chills, and fatigue. HENT: Negative for congestion, sore throat, rhinorrhea, sneezing and neck stiffness   Eyes: Negative for discharge and redness. Respiratory: Negative for  shortness of breath, wheezing   Cardiovascular: Negative for chest pain, palpitations   Gastrointestinal: Negative for nausea, vomiting, abdominal pain, constipation, diarrhea and blood in stool. Genitourinary: Negative for dysuria, hematuria, flank pain, decreased urine volume, discharge,   Musculoskeletal: Negative for myalgias or joint pain . Skin: Negative for rash or lesions . Neurological: Positive numbness. Negative weakness, light-headedness,  and headaches. Physical Exam   Physical Exam    GENERAL: alert and oriented, no acute distress  EYES: PEERL, No injection, discharge or icterus. ENT: Mucous membranes pink and moist.  NECK: Supple  LUNGS: Airway patent. Non-labored respirations. Breath sounds clear with good air entry bilaterally. HEART: Regular rate and rhythm. No peripheral edema  ABDOMEN: Non-distended and non-tender, without guarding or rebound. SKIN:  warm, dry  MSK/EXTREMITIES: Without swelling, tenderness or deformity, symmetric with normal ROM. No warmth or erythema.   She does have palpable DP pulses and brisk cap refill in her toes. NEUROLOGICAL: Alert, oriented. Strength 5/5 bilateral lower extremities, she does have some subjective decrease sensation to light touch over her feet. Diagnostic Study Results     Labs -     Recent Results (from the past 12 hour(s))   CBC WITH AUTOMATED DIFF    Collection Time: 12/22/21 11:52 AM   Result Value Ref Range    WBC 5.0 3.6 - 11.0 K/uL    RBC 3.72 (L) 3.80 - 5.20 M/uL    HGB 11.4 (L) 11.5 - 16.0 g/dL    HCT 34.7 (L) 35.0 - 47.0 %    MCV 93.3 80.0 - 99.0 FL    MCH 30.6 26.0 - 34.0 PG    MCHC 32.9 30.0 - 36.5 g/dL    RDW 13.2 11.5 - 14.5 %    PLATELET 265 042 - 928 K/uL    MPV 8.9 8.9 - 12.9 FL    NRBC 0.0 0  WBC    ABSOLUTE NRBC 0.00 0.00 - 0.01 K/uL    NEUTROPHILS 53 32 - 75 %    LYMPHOCYTES 32 12 - 49 %    MONOCYTES 9 5 - 13 %    EOSINOPHILS 5 0 - 7 %    BASOPHILS 1 0 - 1 %    IMMATURE GRANULOCYTES 0 0.0 - 0.5 %    ABS. NEUTROPHILS 2.7 1.8 - 8.0 K/UL    ABS. LYMPHOCYTES 1.6 0.8 - 3.5 K/UL    ABS. MONOCYTES 0.5 0.0 - 1.0 K/UL    ABS. EOSINOPHILS 0.2 0.0 - 0.4 K/UL    ABS. BASOPHILS 0.0 0.0 - 0.1 K/UL    ABS. IMM. GRANS. 0.0 0.00 - 0.04 K/UL    DF AUTOMATED     METABOLIC PANEL, COMPREHENSIVE    Collection Time: 12/22/21 11:52 AM   Result Value Ref Range    Sodium 139 136 - 145 mmol/L    Potassium 4.3 3.5 - 5.1 mmol/L    Chloride 106 97 - 108 mmol/L    CO2 27 21 - 32 mmol/L    Anion gap 6 5 - 15 mmol/L    Glucose 139 (H) 65 - 100 mg/dL    BUN 29 (H) 6 - 20 MG/DL    Creatinine 1.47 (H) 0.55 - 1.02 MG/DL    BUN/Creatinine ratio 20 12 - 20      GFR est AA 41 (L) >60 ml/min/1.73m2    GFR est non-AA 33 (L) >60 ml/min/1.73m2    Calcium 9.3 8.5 - 10.1 MG/DL    Bilirubin, total 0.2 0.2 - 1.0 MG/DL    ALT (SGPT) 28 12 - 78 U/L    AST (SGOT) 25 15 - 37 U/L    Alk.  phosphatase 122 (H) 45 - 117 U/L    Protein, total 7.5 6.4 - 8.2 g/dL    Albumin 3.5 3.5 - 5.0 g/dL    Globulin 4.0 2.0 - 4.0 g/dL    A-G Ratio 0.9 (L) 1.1 - 2.2     CK Collection Time: 12/22/21 11:52 AM   Result Value Ref Range     26 - 192 U/L   MAGNESIUM    Collection Time: 12/22/21 11:52 AM   Result Value Ref Range    Magnesium 1.9 1.6 - 2.4 mg/dL       Radiologic Studies -   DUPLEX LOWER EXT VENOUS BILAT   Final Result   :  NO DVT OF THE LEGS. CT Results  (Last 48 hours)    None        CXR Results  (Last 48 hours)    None            Medical Decision Making     IEli MD am the first provider for this patient and am the attending of record for this patient encounter. I reviewed the vital signs, available nursing notes, past medical history, past surgical history, family history and social history. Vital Signs-Reviewed the patient's vital signs. Patient Vitals for the past 12 hrs:   Temp Pulse Resp BP SpO2   12/22/21 1145 98.1 °F (36.7 °C) 75 14 134/60 99 %         Records Reviewed: Nursing Notes and Old Medical Records    Provider Notes (Medical Decision Making): On presentation, the patient is well appearing, in no acute distress with normal vital signs. Based on my history and exam the differential diagnosis for this patient includes neuropathy, peripheral vascular disease, vitamin deficiency. No signs of infection on exam.  There is no swelling to suggest DVT. Additionally duplex was negative for signs of DVT. Symptoms are bilateral so stroke is unlikely. Patient has no leg weakness and symptoms are limited to the feet so low suspicion for any acute spinal cord pathology. No warmth erythema concerning for infection. Patient here now she is having some ambulatory issues secondary to the paresthesias. Patient would benefit from having a Rollator at home to assist her. She does have single floor living and a ramp to get into her house as well as help at home to assist with her ADL's so family is comfortable with discharge with a walking aid at home. ED Course:   Initial assessment performed.  The patients presenting problems have been discussed, and they are in agreement with the care plan formulated and outlined with them. I have encouraged them to ask questions as they arise throughout their visit. PROGRESS  Arnold Mills's  results have been reviewed with her. She has been counseled regarding her diagnosis. She verbally conveys understanding and agreement of the signs, symptoms, diagnosis, treatment and prognosis and additionally agrees to follow up as recommended with DANIELE Hassan in 24 - 48 hours. She also agrees with the care-plan and conveys that all of her questions have been answered. I have also put together some discharge instructions for her that include: 1) educational information regarding their diagnosis, 2) how to care for their diagnosis at home, as well a 3) list of reasons why they would want to return to the ED prior to their follow-up appointment, should their condition change. Disposition:  home    PLAN:  1. Discharge Medication List as of 12/22/2021  4:46 PM        2. Follow-up Information     Follow up With Specialties Details Why [address] for rollator placed to this equipment company on today. Please follow up. [address] [facility], an [address]  [address] [facility] [place] [address]  [phone], DANIELE Baires Nurse Practitioner Schedule an appointment as soon as possible for a visit in 2 days  [address]       [facility] EMERGENCY DEPT Emergency Medicine  If symptoms worsen [address]  [address] [facility]  [phone]    Ronel Parrish DO Neurology Schedule an appointment as soon as possible for a visit in 2 days  [address] [facility]  [phone]          Return to ED if worse     Diagnosis     Clinical Impression:   1. Neuropathy    2.  Synovial cyst of popliteal space, unspecified laterality        Please note that this dictation was completed with Dragon, computer voice recognition software. Quite often unanticipated grammatical, syntax, homophones, and other interpretive errors are inadvertently transcribed by the computer software. Please disregard these errors. Additionally, please excuse any errors that have escaped final proofreading.

## 2021-12-22 NOTE — PROGRESS NOTES
CM notified by ED MD that pt will be discharging home today and is recommended to have a rollator. Orders placed, BENTLEY has met at bedside with pt and pt's daughter, Elie Walden, to discuss. They would like orders placed to DME company to see if pt's insurance will cover. CM has placed order to [facility] via [employer] and instructed that they follow up with pt's daughter Elie Walden to coordinate pt obtaining rollator.  also provided a back up option for pt to obtain rollator out of pocket through [employer] who has them in stock for $132.99. Information for [employer] provided to pt's daughter. [facility] is currently processing referral. No further CM needs. AVS updated. Pt is ready for discharge from a Care Management standpoint. RN informed.        Fiona Montero [address], [address]

## 2022-01-27 ENCOUNTER — TRANSCRIBE ORDER (OUTPATIENT)
Dept: SCHEDULING | Age: 87
End: 2022-01-27

## 2022-01-28 ENCOUNTER — TRANSCRIBE ORDER (OUTPATIENT)
Dept: SCHEDULING | Age: 87
End: 2022-01-28

## 2022-01-28 DIAGNOSIS — I10 PRIMARY HYPERTENSION: ICD-10-CM

## 2022-01-28 DIAGNOSIS — N18.31 STAGE 3A CHRONIC KIDNEY DISEASE (HCC): Primary | ICD-10-CM

## 2022-03-04 ENCOUNTER — HOSPITAL ENCOUNTER (OUTPATIENT)
Dept: ULTRASOUND IMAGING | Age: 87
Discharge: HOME | End: 2022-03-04
Attending: INTERNAL MEDICINE
Payer: MEDICARE

## 2022-03-04 DIAGNOSIS — N18.31 STAGE 3A CHRONIC KIDNEY DISEASE (HCC): ICD-10-CM

## 2022-03-04 DIAGNOSIS — I10 PRIMARY HYPERTENSION: ICD-10-CM

## 2022-03-04 PROCEDURE — 76770 US EXAM ABDO BACK WALL COMP: CPT

## 2022-03-18 PROBLEM — E78.2 MIXED HYPERLIPIDEMIA: Status: ACTIVE | Noted: 2021-10-04

## 2022-03-18 PROBLEM — I25.10 CORONARY ARTERY DISEASE INVOLVING NATIVE CORONARY ARTERY OF NATIVE HEART WITHOUT ANGINA PECTORIS: Status: ACTIVE | Noted: 2018-06-12

## 2022-03-18 PROBLEM — Z87.19 HX OF DIVERTICULITIS OF COLON: Status: ACTIVE | Noted: 2018-05-18

## 2022-03-19 PROBLEM — I65.21 CAROTID STENOSIS, ASYMPTOMATIC, RIGHT: Status: ACTIVE | Noted: 2020-12-16

## 2022-03-19 PROBLEM — I65.23 BILATERAL CAROTID ARTERY STENOSIS: Status: ACTIVE | Noted: 2020-10-19

## 2022-03-19 PROBLEM — E78.00 HIGH CHOLESTEROL: Status: ACTIVE | Noted: 2020-09-21

## 2022-03-19 PROBLEM — I65.22 CAROTID STENOSIS, SYMPTOMATIC W/O INFARCT, LEFT: Status: ACTIVE | Noted: 2020-11-02

## 2022-03-19 PROBLEM — G45.9 TIA (TRANSIENT ISCHEMIC ATTACK): Status: ACTIVE | Noted: 2020-10-17

## 2022-03-20 PROBLEM — Z95.5 S/P CORONARY ARTERY STENT PLACEMENT: Status: ACTIVE | Noted: 2018-05-25

## 2022-03-20 PROBLEM — I10 ESSENTIAL HYPERTENSION: Status: ACTIVE | Noted: 2018-12-28

## 2022-04-06 ENCOUNTER — HOSPITAL ENCOUNTER (EMERGENCY)
Age: 87
Discharge: HOME | End: 2022-04-06
Attending: EMERGENCY MEDICINE
Payer: MEDICARE

## 2022-04-06 ENCOUNTER — APPOINTMENT (OUTPATIENT)
Dept: CT IMAGING | Age: 87
End: 2022-04-06
Attending: EMERGENCY MEDICINE
Payer: MEDICARE

## 2022-04-06 VITALS
HEART RATE: 64 BPM | RESPIRATION RATE: 14 BRPM | WEIGHT: 150 LBS | DIASTOLIC BLOOD PRESSURE: 49 MMHG | BODY MASS INDEX: 26.58 KG/M2 | SYSTOLIC BLOOD PRESSURE: 157 MMHG | TEMPERATURE: 98.3 F | HEIGHT: 63 IN | OXYGEN SATURATION: 100 %

## 2022-04-06 DIAGNOSIS — R51.9 ACUTE INTRACTABLE HEADACHE, UNSPECIFIED HEADACHE TYPE: Primary | ICD-10-CM

## 2022-04-06 LAB
ANION GAP SERPL CALC-SCNC: 5 MMOL/L (ref 5–15)
BASOPHILS # BLD: 0 K/UL (ref 0–0.1)
BASOPHILS NFR BLD: 1 % (ref 0–1)
BUN SERPL-MCNC: 18 MG/DL (ref 6–20)
BUN/CREAT SERPL: 16 (ref 12–20)
CALCIUM SERPL-MCNC: 9.3 MG/DL (ref 8.5–10.1)
CHLORIDE SERPL-SCNC: 101 MMOL/L (ref 97–108)
CO2 SERPL-SCNC: 28 MMOL/L (ref 21–32)
CREAT SERPL-MCNC: 1.14 MG/DL (ref 0.55–1.02)
DIFFERENTIAL METHOD BLD: ABNORMAL
EOSINOPHIL # BLD: 0.3 K/UL (ref 0–0.4)
EOSINOPHIL NFR BLD: 5 % (ref 0–7)
ERYTHROCYTE [DISTWIDTH] IN BLOOD BY AUTOMATED COUNT: 12.5 % (ref 11.5–14.5)
GLUCOSE SERPL-MCNC: 115 MG/DL (ref 65–100)
HCT VFR BLD AUTO: 34.8 % (ref 35–47)
HGB BLD-MCNC: 11.4 G/DL (ref 11.5–16)
IMM GRANULOCYTES # BLD AUTO: 0 K/UL (ref 0–0.04)
IMM GRANULOCYTES NFR BLD AUTO: 0 % (ref 0–0.5)
LYMPHOCYTES # BLD: 1.6 K/UL (ref 0.8–3.5)
LYMPHOCYTES NFR BLD: 28 % (ref 12–49)
MCH RBC QN AUTO: 30.6 PG (ref 26–34)
MCHC RBC AUTO-ENTMCNC: 32.8 G/DL (ref 30–36.5)
MCV RBC AUTO: 93.5 FL (ref 80–99)
MONOCYTES # BLD: 0.6 K/UL (ref 0–1)
MONOCYTES NFR BLD: 10 % (ref 5–13)
NEUTS SEG # BLD: 3.2 K/UL (ref 1.8–8)
NEUTS SEG NFR BLD: 56 % (ref 32–75)
NRBC # BLD: 0 K/UL (ref 0–0.01)
NRBC BLD-RTO: 0 PER 100 WBC
PLATELET # BLD AUTO: 286 K/UL (ref 150–400)
PMV BLD AUTO: 8.4 FL (ref 8.9–12.9)
POTASSIUM SERPL-SCNC: 3.6 MMOL/L (ref 3.5–5.1)
RBC # BLD AUTO: 3.72 M/UL (ref 3.8–5.2)
SODIUM SERPL-SCNC: 134 MMOL/L (ref 136–145)
TROPONIN-HIGH SENSITIVITY: 29 NG/L (ref 0–51)
TROPONIN-HIGH SENSITIVITY: 31 NG/L (ref 0–51)
WBC # BLD AUTO: 5.7 K/UL (ref 3.6–11)

## 2022-04-06 PROCEDURE — 96375 TX/PRO/DX INJ NEW DRUG ADDON: CPT

## 2022-04-06 PROCEDURE — 93005 ELECTROCARDIOGRAM TRACING: CPT

## 2022-04-06 PROCEDURE — 85025 COMPLETE CBC W/AUTO DIFF WBC: CPT

## 2022-04-06 PROCEDURE — 99284 EMERGENCY DEPT VISIT MOD MDM: CPT

## 2022-04-06 PROCEDURE — 74011250636 HC RX REV CODE- 250/636: Performed by: EMERGENCY MEDICINE

## 2022-04-06 PROCEDURE — 96374 THER/PROPH/DIAG INJ IV PUSH: CPT

## 2022-04-06 PROCEDURE — 36415 COLL VENOUS BLD VENIPUNCTURE: CPT

## 2022-04-06 PROCEDURE — 84484 ASSAY OF TROPONIN QUANT: CPT

## 2022-04-06 PROCEDURE — 70450 CT HEAD/BRAIN W/O DYE: CPT

## 2022-04-06 PROCEDURE — 80048 BASIC METABOLIC PNL TOTAL CA: CPT

## 2022-04-06 RX ORDER — DROPERIDOL 2.5 MG/ML
0.62 INJECTION, SOLUTION INTRAMUSCULAR; INTRAVENOUS ONCE
Status: COMPLETED | OUTPATIENT
Start: 2022-04-06 | End: 2022-04-06

## 2022-04-06 RX ORDER — BUTALBITAL, ACETAMINOPHEN AND CAFFEINE 300; 40; 50 MG/1; MG/1; MG/1
1 CAPSULE ORAL
Qty: 12 CAPSULE | Refills: 0 | Status: SHIPPED | OUTPATIENT
Start: 2022-04-06 | End: 2022-10-28

## 2022-04-06 RX ORDER — KETOROLAC TROMETHAMINE 30 MG/ML
15 INJECTION, SOLUTION INTRAMUSCULAR; INTRAVENOUS
Status: COMPLETED | OUTPATIENT
Start: 2022-04-06 | End: 2022-04-06

## 2022-04-06 RX ADMIN — DROPERIDOL 0.62 MG: 2.5 INJECTION, SOLUTION INTRAMUSCULAR; INTRAVENOUS at 14:24

## 2022-04-06 RX ADMIN — KETOROLAC TROMETHAMINE 15 MG: 30 INJECTION, SOLUTION INTRAMUSCULAR at 14:24

## 2022-04-06 NOTE — ED PROVIDER NOTES
EMERGENCY DEPARTMENT HISTORY AND PHYSICAL EXAM      Date: 4/6/2022  Patient Name: Frankie Castillo    History of Presenting Illness     Chief Complaint   Patient presents with    Headache     2 to 3 days of headache top of head radiates to the back; no injury. no other symptoms. pt is not on a blood thinner. no vomiting. History Provided By: Patient    HPI: Frankie Castillo, 80 y.o. female presents to the ED with cc of headache. 72-year-old female with a history notable for CAD, hypertension, glaucoma, TIA presents to the emergency department with a chief complaint of headache. Patient reports gradual onset of headache 3 days ago that has been worsening. Patient attributed to sinuses and took a \"sinus medication. \"  Patient denies any relief with this, reports \"some relief\" with Tylenol. Patient reports a headache that is frontal radiating across her forehead and over her head to the back. Patient reports the pain is occasionally worse laying flat and is occasionally associated with \"lightheadedness. \"  Denies any numbness or weakness. Patient denies any fevers or chills. Denies cough, chest pain, shortness of breath, abdominal pain, nausea, vomiting or diarrhea. Denies rhinorrhea. Denies sudden onset of pain. Patient reports she had an episode yesterday where she was speaking on the phone to a friend and then woke up holding the phone and is unsure what happened. No witnesses event. There are no other complaints, changes, or physical findings at this time. PCP: DANIELE Busch    No current facility-administered medications on file prior to encounter. Current Outpatient Medications on File Prior to Encounter   Medication Sig Dispense Refill    butalbital-acetaminophen-caff (Fioricet) -40 mg per capsule Take 1 Capsule by mouth every six (6) hours as needed for Headache.  20 Capsule 0    ezetimibe (ZETIA) 10 mg tablet TAKE 1 TABLET BY MOUTH EVERY DAY 90 Tablet 1    cholecalciferol (Vitamin D3) (5000 Units/125 mcg) tab tablet Take 5,000 Units by mouth daily.  naloxone (Narcan) 4 mg/actuation nasal spray Use 1 spray intranasally, then discard. Repeat with new spray every 2 min as needed for opioid overdose symptoms, alternating nostrils. 1 Each 0    polyethylene glycol (Miralax) 17 gram packet Take 17 g by mouth as needed for Constipation.  trolamine salicylate (Aspercreme) 10 % lotion Apply  to affected area as needed.  losartan (COZAAR) 25 mg tablet Take 25 mg by mouth daily.  atorvastatin (LIPITOR) 80 mg tablet Take 1 Tab by mouth nightly. 30 Tab 1    amLODIPine (NORVASC) 10 mg tablet TAKE 1 TABLET BY MOUTH  DAILY 30 Tab 3    metoprolol tartrate (LOPRESSOR) 25 mg tablet Take 0.5 Tabs by mouth every twelve (12) hours. 90 Tab 2    docusate sodium (STOOL SOFTENER PO) Take  by mouth as needed.  ferrous sulfate (IRON PO) Take 65 mg by mouth daily.  aspirin delayed-release 81 mg tablet Take 81 mg by mouth daily.  timolol (TIMOPTIC-XE) 0.5 % ophthalmic gel-forming Administer 1 Drop to right eye daily.  latanoprost (XALATAN) 0.005 % ophthalmic solution Administer 1 Drop to both eyes nightly. Indications: wide-angle glaucoma      fluticasone (FLONASE ALLERGY RELIEF) 50 mcg/actuation nasal spray 1 [place] by Both Nostrils route daily.  acetaminophen (TYLENOL EXTRA STRENGTH) 500 mg tablet Take 1,000 mg by mouth every six (6) hours as needed for Pain.  fexofenadine (ALLEGRA) 180 mg tablet Take 180 mg by mouth nightly.  allopurinol (ZYLOPRIM) 100 mg tablet Take 100 mg by mouth daily.  ALPRAZolam (XANAX) 0.25 mg tablet Take 0.25 mg by mouth daily as needed for Anxiety.          Past History     Past Medical History:  Past Medical History:   Diagnosis Date    CAD (coronary artery disease)     Diverticulitis     Glaucoma     Gout     HTN (hypertension) 5/18/2018    Hx of diverticulitis of colon 5/18/2018    Hyperlipidemia  Hypertension     Other ill-defined conditions(799.89)     high cholesterol    S/P coronary artery stent placement 05/25/2018    Stroke ([address] 10/2020    TIA       Past Surgical History:  Past Surgical History:   Procedure Laterality Date    HX CAROTID ENDARTERECTOMY Left 11/02/2020    HX HYSTERECTOMY      NM CARDIAC SURG PROCEDURE UNLIST  05/2018    x2 cardiac stent    NM COLONOSCOPY FLX DX W/COLLJ SPEC WHEN PFRMD  10/18/2013            Family History:  Family History   Problem Relation Age of Onset    Stroke Mother     Diabetes Sister     No Known Problems Father        Social History:  Social History     Tobacco Use    Smoking status: Never Smoker    Smokeless tobacco: Never Used   Vaping Use    Vaping Use: Never used   Substance Use Topics    Alcohol use: No    Drug use: No       Allergies: Allergies   Allergen Reactions    Lisinopril Cough         Review of Systems   Review of Systems   Constitutional: Negative for activity change, chills and fever. HENT: Negative for facial swelling and voice change. Eyes: Negative for photophobia, pain and redness. Respiratory: Negative for cough, shortness of breath and wheezing. Cardiovascular: Negative for chest pain and leg swelling. Gastrointestinal: Negative for abdominal pain, diarrhea, nausea and vomiting. Genitourinary: Negative for decreased urine volume. Musculoskeletal: Negative for gait problem. Skin: Negative for pallor and rash. Neurological: Positive for headaches. Negative for tremors, facial asymmetry and weakness. Psychiatric/Behavioral: Negative for agitation and confusion. All other systems reviewed and are negative. Physical Exam   Physical Exam  Vitals and nursing note reviewed. Constitutional:       Comments: 27-year-old female, resting in bed, appears well, no acute distress   HENT:      Head: Normocephalic and atraumatic. Cardiovascular:      Rate and Rhythm: Normal rate and regular rhythm. Heart sounds: No murmur heard. No friction rub. No gallop. Pulmonary:      Effort: Pulmonary effort is normal.      Breath sounds: Normal breath sounds. Abdominal:      Palpations: Abdomen is soft. Tenderness: There is no abdominal tenderness. Musculoskeletal:         General: Normal range of motion. Cervical back: Normal range of motion and neck supple. No rigidity. Skin:     General: Skin is warm. Capillary Refill: Capillary refill takes less than 2 seconds. Neurological:      General: No focal deficit present. Mental Status: She is alert. Mental status is at baseline. Cranial Nerves: No cranial nerve deficit. Sensory: No sensory deficit. Motor: No weakness.    Psychiatric:         Mood and Affect: Mood normal.         Diagnostic Study Results     Labs -     Recent Results (from the past 12 hour(s))   EKG, 12 LEAD, INITIAL    Collection Time: 04/06/22  2:20 PM   Result Value Ref Range    Ventricular Rate 70 BPM    Atrial Rate 70 BPM    P-R Interval 270 ms    QRS Duration 74 ms    Q-T Interval 402 ms    QTC Calculation (Bezet) 434 ms    Calculated P Axis 55 degrees    Calculated R Axis -28 degrees    Calculated T Axis -34 degrees    Diagnosis       Sinus rhythm with 1st degree AV block  Minimal voltage criteria for LVH, may be normal variant  Nonspecific T wave abnormality  When compared with ECG of 21-FEB-2021 18:08,  premature ventricular complexes are no longer present  Nonspecific T wave abnormality, worse in Lateral leads     CBC WITH AUTOMATED DIFF    Collection Time: 04/06/22  2:25 PM   Result Value Ref Range    WBC 5.7 3.6 - 11.0 K/uL    RBC 3.72 (L) 3.80 - 5.20 M/uL    HGB 11.4 (L) 11.5 - 16.0 g/dL    HCT 34.8 (L) 35.0 - 47.0 %    MCV 93.5 80.0 - 99.0 FL    MCH 30.6 26.0 - 34.0 PG    MCHC 32.8 30.0 - 36.5 g/dL    RDW 12.5 11.5 - 14.5 %    PLATELET 478 726 - 313 K/uL    MPV 8.4 (L) 8.9 - 12.9 FL    NRBC 0.0 0  WBC    ABSOLUTE NRBC 0.00 0.00 - 0.01 K/uL NEUTROPHILS 56 32 - 75 %    LYMPHOCYTES 28 12 - 49 %    MONOCYTES 10 5 - 13 %    EOSINOPHILS 5 0 - 7 %    BASOPHILS 1 0 - 1 %    IMMATURE GRANULOCYTES 0 0.0 - 0.5 %    ABS. NEUTROPHILS 3.2 1.8 - 8.0 K/UL    ABS. LYMPHOCYTES 1.6 0.8 - 3.5 K/UL    ABS. MONOCYTES 0.6 0.0 - 1.0 K/UL    ABS. EOSINOPHILS 0.3 0.0 - 0.4 K/UL    ABS. BASOPHILS 0.0 0.0 - 0.1 K/UL    ABS. IMM. GRANS. 0.0 0.00 - 0.04 K/UL    DF AUTOMATED     METABOLIC PANEL, BASIC    Collection Time: 04/06/22  2:25 PM   Result Value Ref Range    Sodium 134 (L) 136 - 145 mmol/L    Potassium 3.6 3.5 - 5.1 mmol/L    Chloride 101 97 - 108 mmol/L    CO2 28 21 - 32 mmol/L    Anion gap 5 5 - 15 mmol/L    Glucose 115 (H) 65 - 100 mg/dL    BUN 18 6 - 20 MG/DL    Creatinine 1.14 (H) 0.55 - 1.02 MG/DL    BUN/Creatinine ratio 16 12 - 20      GFR est AA 54 (L) >60 ml/min/1.73m2    GFR est non-AA 45 (L) >60 ml/min/1.73m2    Calcium 9.3 8.5 - 10.1 MG/DL   TROPONIN-HIGH SENSITIVITY    Collection Time: 04/06/22  2:25 PM   Result Value Ref Range    Troponin-High Sensitivity 31 0 - 51 ng/L   TROPONIN-HIGH SENSITIVITY    Collection Time: 04/06/22  4:33 PM   Result Value Ref Range    Troponin-High Sensitivity 29 0 - 51 ng/L       Radiologic Studies -   CT HEAD WO CONT   Final Result   No acute abnormality. CT Results  (Last 48 hours)               04/06/22 1442  CT HEAD WO CONT Final result    Impression:  No acute abnormality. Narrative:  EXAM: CT HEAD WO CONT       INDICATION: headache, eval for mass lesion       COMPARISON: CT head 3/1/2021. CONTRAST: None. TECHNIQUE: Unenhanced CT of the head was performed using 5 mm images. Brain and   bone windows were generated. Coronal and sagittal reformats. CT dose reduction   was achieved through use of a standardized protocol tailored for this   examination and automatic exposure control for dose modulation. FINDINGS:   Generalized volume loss.  Scattered white matter hypodensities may reflect   chronic microangiopathic change. Chronic bilateral basal ganglia and cerebellar   calcifications. . There is no intracranial hemorrhage, extra-axial collection, or   mass effect. The basilar cisterns are open. No CT evidence of acute infarct. The bone windows demonstrate no acute abnormalities. Soft tissue mineralization   surrounding the dens, likely on the basis of arthropathy. . The visualized   portions of the paranasal sinuses and mastoid air cells are clear. Bilateral   lens replacements. CXR Results  (Last 48 hours)    None          Medical Decision Making   I am the first provider for this patient. I reviewed the vital signs, available nursing notes, past medical history, past surgical history, family history and social history. Vital Signs-Reviewed the patient's vital signs. Patient Vitals for the past 12 hrs:   Temp Pulse Resp BP SpO2   04/06/22 1218 98.3 °F (36.8 °C) 64 14 (!) 157/49 100 %     Records Reviewed: Nursing Notes and Old Medical Records    Provider Notes (Medical Decision Making):     68-year-old female presents to the emergency department with a chief complaint of a headache. Patient appears exceptionally well, younger than stated age, resting in bed in no distress. Patient has no nuchal rigidity, doubt meningitis or encephalitis. Much more suspicious for tension type headache given bandlike radiation across head and to back of head. Doubt subarachnoid hemorrhage given onset of symptoms and chronicity. Will consider sinusitis as well. Doubt glaucoma without ocular symptoms. No temporal artery tenderness. No evidence of zoster. I will check a non-contrast CT to evaluate for mass lesion given age. Patient's episode on the telephone seems very unusual for syncope, low suspicions for seizure, will check basic labs and EKG given his history. ED Course:   Initial assessment performed.  The patients presenting problems have been discussed, and they are in agreement with the care plan formulated and outlined with them. I have encouraged them to ask questions as they arise throughout their visit. ED Course as of 04/06/22 1912 Wed Apr 06, 2022   1428 Preliminary EKG interpreted by me. Shows normal sinus rhythm with a HR of 70. No ST elevations or depressions concerning for ischemia. Normal intervals. [MB]   7254 Labs with stable anemia, BMP with baseline renal function. Troponin nonspecifically elevated at 31. [MB]   6088 Reassessed, patient's headache resolved, CT negative. [MB]   1741 Repeat troponin downtrending, will discharge. [MB]      ED Course User Index  [MB] MD Deja Ramires MD      Disposition:    Discharged    DISCHARGE PLAN:  1. Discharge Medication List as of 4/6/2022  5:42 PM      CONTINUE these medications which have NOT CHANGED    Details   butalbital-acetaminophen-caff (Fioricet) -40 mg per capsule Take 1 Capsule by mouth every six (6) hours as needed for Headache., Normal, Disp-20 Capsule, R-0      ezetimibe (ZETIA) 10 mg tablet TAKE 1 TABLET BY MOUTH EVERY DAY, Normal, Disp-90 Tablet, R-1      cholecalciferol (Vitamin D3) (5000 Units/125 mcg) tab tablet Take 5,000 Units by mouth daily. , Historical Med      naloxone (Narcan) 4 mg/actuation nasal spray Use 1 spray intranasally, then discard. Repeat with new spray every 2 min as needed for opioid overdose symptoms, alternating nostrils. , Normal, Disp-1 Each,R-0      polyethylene glycol (Miralax) 17 gram packet Take 17 g by mouth as needed for Constipation. , Historical Med      trolamine salicylate (Aspercreme) 10 % lotion Apply  to affected area as needed., Historical Med      losartan (COZAAR) 25 mg tablet Take 25 mg by mouth daily. , Historical Med      atorvastatin (LIPITOR) 80 mg tablet Take 1 Tab by mouth nightly., Normal, Disp-30 Tab,R-1      amLODIPine (NORVASC) 10 mg tablet TAKE 1 TABLET BY MOUTH  DAILY, NormalPlease consider 90 day supplies to promote better adherenceDisp-30 Tab, R-3      metoprolol tartrate (LOPRESSOR) 25 mg tablet Take 0.5 Tabs by mouth every twelve (12) hours. , Normal, Disp-90 Tab, R-2      docusate sodium (STOOL SOFTENER PO) Take  by mouth as needed., Historical Med      ferrous sulfate (IRON PO) Take 65 mg by mouth daily. , Historical Med      aspirin delayed-release 81 mg tablet Take 81 mg by mouth daily. , Historical Med      timolol (TIMOPTIC-XE) 0.5 % ophthalmic gel-forming Administer 1 Drop to right eye daily. , Historical Med      latanoprost (XALATAN) 0.005 % ophthalmic solution Administer 1 Drop to both eyes nightly. Indications: wide-angle glaucoma, Historical Med      fluticasone (FLONASE ALLERGY RELIEF) 50 mcg/actuation nasal spray 1 [place] by Both Nostrils route daily. , Historical Med      acetaminophen (TYLENOL EXTRA STRENGTH) 500 mg tablet Take 1,000 mg by mouth every six (6) hours as needed for Pain., Historical Med      fexofenadine (ALLEGRA) 180 mg tablet Take 180 mg by mouth nightly., Historical Med      allopurinol (ZYLOPRIM) 100 mg tablet Take 100 mg by mouth daily. , Historical Med      ALPRAZolam (XANAX) 0.25 mg tablet Take 0.25 mg by mouth daily as needed for Anxiety. , Historical Med           2. Follow-up Information     Follow up With Specialties Details Why Contact Info    Kalie Taylor FNP Nurse Practitioner In 3 days  [place] [phone]      \[facility] [place] [facility]\"" EMERGENCY DEPT Emergency Medicine  If symptoms worsen [address]  [phone]    Richy Simon, DO Neurology In 1 week As needed - headache doctor [address] [facility]  [facility]  [phone]          3. Return to ED if worse     Diagnosis     Clinical Impression:   1.  Acute intractable headache, unspecified headache type        Attestations:    Vivi Mora MD    Please note that this dictation was completed with [employer], the computer voice recognition software. Quite often unanticipated grammatical, syntax, homophones, and other interpretive errors are inadvertently transcribed by the computer software. Please disregard these errors. Please excuse any errors that have escaped final proofreading. Thank you.

## 2022-04-07 LAB
ATRIAL RATE: 70 BPM
CALCULATED P AXIS, ECG09: 55 DEGREES
CALCULATED R AXIS, ECG10: -28 DEGREES
CALCULATED T AXIS, ECG11: -34 DEGREES
DIAGNOSIS, 93000: NORMAL
P-R INTERVAL, ECG05: 270 MS
Q-T INTERVAL, ECG07: 402 MS
QRS DURATION, ECG06: 74 MS
QTC CALCULATION (BEZET), ECG08: 434 MS
VENTRICULAR RATE, ECG03: 70 BPM

## 2022-10-28 ENCOUNTER — HOSPITAL ENCOUNTER (EMERGENCY)
Age: 87
Discharge: HOME | DRG: 305 | End: 2022-10-29
Attending: EMERGENCY MEDICINE
Payer: MEDICARE

## 2022-10-28 ENCOUNTER — APPOINTMENT (OUTPATIENT)
Dept: CT IMAGING | Age: 87
DRG: 305 | End: 2022-10-28
Attending: EMERGENCY MEDICINE
Payer: MEDICARE

## 2022-10-28 DIAGNOSIS — R51.9 ACUTE NONINTRACTABLE HEADACHE, UNSPECIFIED HEADACHE TYPE: ICD-10-CM

## 2022-10-28 DIAGNOSIS — I10 HYPERTENSION, UNSPECIFIED TYPE: Primary | ICD-10-CM

## 2022-10-28 DIAGNOSIS — E87.5 ACUTE HYPERKALEMIA: ICD-10-CM

## 2022-10-28 LAB
ALBUMIN SERPL-MCNC: 3.8 G/DL (ref 3.5–5)
ALBUMIN/GLOB SERPL: 0.9 {RATIO} (ref 1.1–2.2)
ALP SERPL-CCNC: 111 U/L (ref 45–117)
ALT SERPL-CCNC: 58 U/L (ref 12–78)
ANION GAP SERPL CALC-SCNC: 3 MMOL/L (ref 5–15)
APPEARANCE UR: CLEAR
AST SERPL-CCNC: 45 U/L (ref 15–37)
BACTERIA URNS QL MICRO: NEGATIVE /HPF
BASOPHILS # BLD: 0 K/UL (ref 0–0.1)
BASOPHILS NFR BLD: 1 % (ref 0–1)
BILIRUB SERPL-MCNC: 0.5 MG/DL (ref 0.2–1)
BILIRUB UR QL: NEGATIVE
BUN SERPL-MCNC: 23 MG/DL (ref 6–20)
BUN/CREAT SERPL: 17 (ref 12–20)
CALCIUM SERPL-MCNC: 9.9 MG/DL (ref 8.5–10.1)
CHLORIDE SERPL-SCNC: 103 MMOL/L (ref 97–108)
CO2 SERPL-SCNC: 29 MMOL/L (ref 21–32)
COLOR UR: ABNORMAL
CREAT SERPL-MCNC: 1.34 MG/DL (ref 0.55–1.02)
DIFFERENTIAL METHOD BLD: ABNORMAL
EOSINOPHIL # BLD: 0.3 K/UL (ref 0–0.4)
EOSINOPHIL NFR BLD: 3 % (ref 0–7)
EPITH CASTS URNS QL MICRO: ABNORMAL /LPF
ERYTHROCYTE [DISTWIDTH] IN BLOOD BY AUTOMATED COUNT: 13.2 % (ref 11.5–14.5)
GLOBULIN SER CALC-MCNC: 4.3 G/DL (ref 2–4)
GLUCOSE SERPL-MCNC: 113 MG/DL (ref 65–100)
GLUCOSE UR STRIP.AUTO-MCNC: NEGATIVE MG/DL
HCT VFR BLD AUTO: 35.4 % (ref 35–47)
HGB BLD-MCNC: 11.7 G/DL (ref 11.5–16)
HGB UR QL STRIP: NEGATIVE
HYALINE CASTS URNS QL MICRO: ABNORMAL /LPF (ref 0–2)
IMM GRANULOCYTES # BLD AUTO: 0 K/UL (ref 0–0.04)
IMM GRANULOCYTES NFR BLD AUTO: 0 % (ref 0–0.5)
KETONES UR QL STRIP.AUTO: NEGATIVE MG/DL
LEUKOCYTE ESTERASE UR QL STRIP.AUTO: ABNORMAL
LYMPHOCYTES # BLD: 2.6 K/UL (ref 0.8–3.5)
LYMPHOCYTES NFR BLD: 32 % (ref 12–49)
MCH RBC QN AUTO: 30.2 PG (ref 26–34)
MCHC RBC AUTO-ENTMCNC: 33.1 G/DL (ref 30–36.5)
MCV RBC AUTO: 91.2 FL (ref 80–99)
MONOCYTES # BLD: 0.6 K/UL (ref 0–1)
MONOCYTES NFR BLD: 7 % (ref 5–13)
NEUTS SEG # BLD: 4.7 K/UL (ref 1.8–8)
NEUTS SEG NFR BLD: 57 % (ref 32–75)
NITRITE UR QL STRIP.AUTO: NEGATIVE
NRBC # BLD: 0 K/UL (ref 0–0.01)
NRBC BLD-RTO: 0 PER 100 WBC
PH UR STRIP: 5.5 [PH] (ref 5–8)
PLATELET # BLD AUTO: 359 K/UL (ref 150–400)
PMV BLD AUTO: 8.4 FL (ref 8.9–12.9)
POTASSIUM SERPL-SCNC: 5.6 MMOL/L (ref 3.5–5.1)
PROT SERPL-MCNC: 8.1 G/DL (ref 6.4–8.2)
PROT UR STRIP-MCNC: ABNORMAL MG/DL
RBC # BLD AUTO: 3.88 M/UL (ref 3.8–5.2)
RBC #/AREA URNS HPF: ABNORMAL /HPF (ref 0–5)
SODIUM SERPL-SCNC: 135 MMOL/L (ref 136–145)
SP GR UR REFRACTOMETRY: 1.02
UA: UC IF INDICATED,UAUC: ABNORMAL
UROBILINOGEN UR QL STRIP.AUTO: 0.2 EU/DL (ref 0.2–1)
WBC # BLD AUTO: 8.2 K/UL (ref 3.6–11)
WBC URNS QL MICRO: ABNORMAL /HPF (ref 0–4)

## 2022-10-28 PROCEDURE — 70450 CT HEAD/BRAIN W/O DYE: CPT

## 2022-10-28 PROCEDURE — 74011250637 HC RX REV CODE- 250/637: Performed by: EMERGENCY MEDICINE

## 2022-10-28 PROCEDURE — 36415 COLL VENOUS BLD VENIPUNCTURE: CPT

## 2022-10-28 PROCEDURE — 80053 COMPREHEN METABOLIC PANEL: CPT

## 2022-10-28 PROCEDURE — 85025 COMPLETE CBC W/AUTO DIFF WBC: CPT

## 2022-10-28 PROCEDURE — 81001 URINALYSIS AUTO W/SCOPE: CPT

## 2022-10-28 PROCEDURE — 99284 EMERGENCY DEPT VISIT MOD MDM: CPT

## 2022-10-28 RX ORDER — METOPROLOL TARTRATE 25 MG/1
25 TABLET, FILM COATED ORAL
Status: DISCONTINUED | OUTPATIENT
Start: 2022-10-28 | End: 2022-10-29 | Stop reason: HOSPADM

## 2022-10-28 RX ORDER — LABETALOL HYDROCHLORIDE 5 MG/ML
20 INJECTION, SOLUTION INTRAVENOUS
Status: DISCONTINUED | OUTPATIENT
Start: 2022-10-28 | End: 2022-10-29 | Stop reason: HOSPADM

## 2022-10-28 RX ORDER — BUTALBITAL, ACETAMINOPHEN AND CAFFEINE 50; 325; 40 MG/1; MG/1; MG/1
1 TABLET ORAL
Status: COMPLETED | OUTPATIENT
Start: 2022-10-28 | End: 2022-10-28

## 2022-10-28 RX ADMIN — BUTALBITAL, ACETAMINOPHEN, AND CAFFEINE 1 TABLET: 50; 325; 40 TABLET ORAL at 21:03

## 2022-10-29 VITALS
HEART RATE: 97 BPM | WEIGHT: 160 LBS | HEIGHT: 64 IN | BODY MASS INDEX: 27.31 KG/M2 | OXYGEN SATURATION: 100 % | RESPIRATION RATE: 18 BRPM | SYSTOLIC BLOOD PRESSURE: 139 MMHG | TEMPERATURE: 97.9 F | DIASTOLIC BLOOD PRESSURE: 80 MMHG

## 2022-10-29 RX ORDER — LOSARTAN POTASSIUM 50 MG/1
50 TABLET ORAL DAILY
Qty: 30 TABLET | Refills: 0 | Status: SHIPPED | OUTPATIENT
Start: 2022-10-29 | End: 2022-11-28

## 2022-10-29 NOTE — ED PROVIDER NOTES
EMERGENCY DEPARTMENT HISTORY AND PHYSICAL EXAM      Date: 10/28/2022  Patient Name: Delores Carias    History of Presenting Illness     Chief Complaint   Patient presents with    Headache     Headache for 2 to 3 days with elevated bp reading today. 200s at doctor today. At home is was 190s       History Provided By: Patient    HPI: Delores Carias, 80 y.o. female with PMHx as noted below presents the emergency department with chief complaint of headache, elevated blood pressure. Patient reported onset of symptoms 2 to 3 days ago. She reports intermittent, dull diffuse headache that she states is now frontal.  Patient has a headache is minimal, almost absent at this time. Notes that headaches are gradual in onset and moderate. Pain is nonradiating. Patient denies any sudden onset severe/worst headache of life. Also denying any neck pain, visual changes, speech difficulty, focal neurologic deficits. Patient reports she is been compliant with her home blood pressure medication however notes that she stopped taking her duloxetine and gabapentin this week    Pt denies any other alleviating or exacerbating factors. Additionally, pt specifically denies any recent fever, chills,  nausea, vomiting, abdominal pain, CP, SOB, lightheadedness, dizziness, numbness, weakness, BLE swelling, heart palpitations, urinary sxs, diarrhea, constipation, melena, hematochezia, cough, or congestion. PCP: DANIELE Rodriguez    Current Outpatient Medications   Medication Sig Dispense Refill    losartan (COZAAR) 50 mg tablet Take 1 Tablet by mouth daily for 30 days. 30 Tablet 0    ezetimibe (ZETIA) 10 mg tablet TAKE 1 TABLET BY MOUTH EVERY DAY 90 Tablet 1    cholecalciferol (VITAMIN D3) (5000 Units/125 mcg) tab tablet Take 5,000 Units by mouth daily. polyethylene glycol (MIRALAX) 17 gram packet Take 17 g by mouth as needed for Constipation. atorvastatin (LIPITOR) 80 mg tablet Take 1 Tab by mouth nightly.  30 Tab 1 metoprolol tartrate (LOPRESSOR) 25 mg tablet Take 0.5 Tabs by mouth every twelve (12) hours. 90 Tab 2    docusate sodium (STOOL SOFTENER PO) Take  by mouth as needed. ferrous sulfate (IRON PO) Take 65 mg by mouth daily. aspirin delayed-release 81 mg tablet Take 81 mg by mouth daily. timolol (TIMOPTIC-XE) 0.5 % ophthalmic gel-forming Administer 1 Drop to right eye daily. latanoprost (XALATAN) 0.005 % ophthalmic solution Administer 1 Drop to both eyes nightly. Indications: wide-angle glaucoma      fexofenadine (ALLEGRA) 180 mg tablet Take 180 mg by mouth nightly. ALPRAZolam (XANAX) 0.25 mg tablet Take 0.25 mg by mouth daily as needed for Anxiety. naloxone (Narcan) 4 mg/actuation nasal spray Use 1 spray intranasally, then discard. Repeat with new spray every 2 min as needed for opioid overdose symptoms, alternating nostrils. 1 Each 0    trolamine salicylate (Aspercreme) 10 % lotion Apply  to affected area as needed. fluticasone (FLONASE ALLERGY RELIEF) 50 mcg/actuation nasal spray 1 [place] by Both Nostrils route daily. acetaminophen (TYLENOL EXTRA STRENGTH) 500 mg tablet Take 1,000 mg by mouth every six (6) hours as needed for Pain. allopurinol (ZYLOPRIM) 100 mg tablet Take 100 mg by mouth daily.          Past History     Past Medical History:  Past Medical History:   Diagnosis Date    CAD (coronary artery disease)     Diverticulitis     Glaucoma     Gout     HTN (hypertension) 5/18/2018    Hx of diverticulitis of colon 5/18/2018    Hyperlipidemia     Hypertension     Other ill-defined conditions(799.89)     high cholesterol    S/P coronary artery stent placement 05/25/2018    Stroke ([address] 10/2020    TIA       Past Surgical History:  Past Surgical History:   Procedure Laterality Date    HX CAROTID ENDARTERECTOMY Left 11/02/2020    HX HYSTERECTOMY      OK CARDIAC SURG PROCEDURE UNLIST  05/2018    x2 cardiac stent    OK COLONOSCOPY FLX DX W/COLLJ SPEC WHEN PFRMD  10/18/2013 Family History:  Family History   Problem Relation Age of Onset    Stroke Mother     Diabetes Sister     No Known Problems Father        Social History:  Social History     Tobacco Use    Smoking status: Never    Smokeless tobacco: Never   Vaping Use    Vaping Use: Never used   Substance Use Topics    Alcohol use: No    Drug use: No       Allergies: Allergies   Allergen Reactions    Lisinopril Cough         Review of Systems   Review of Systems  Constitutional: Negative for fever, chills, and fatigue. HENT: Negative for congestion, sore throat, rhinorrhea, sneezing and neck stiffness   Eyes: Negative for discharge and redness. Respiratory: Negative for  shortness of breath, wheezing   Cardiovascular: Negative for chest pain, palpitations   Gastrointestinal: Negative for nausea, vomiting, abdominal pain, constipation, diarrhea and blood in stool. Genitourinary: Negative for dysuria, hematuria, flank pain, decreased urine volume, discharge,   Musculoskeletal: Negative for myalgias or joint pain . Skin: Negative for rash or lesions . Neurological: Negative weakness, light-headedness, numbness. Positive headaches. Physical Exam   Physical Exam    GENERAL: alert and oriented, no acute distress  EYES: PEERL, No injection, discharge or icterus. ENT: Mucous membranes pink and moist.  NECK: Supple  LUNGS: Airway patent. Non-labored respirations. Breath sounds clear with good air entry bilaterally. HEART: Regular rate and rhythm. No peripheral edema  ABDOMEN: Non-distended and non-tender, without guarding or rebound.   SKIN:  warm, dry  MSK/EXTREMITIES: Without swelling, tenderness or deformity, symmetric with normal ROM  NEUROLOGICAL:  alert and oriented x 3, CN II-XII grossly intact, strength 5/5 bilateral upper and lower extremities, sensation intact throughout to light touch, no dysmetria or ataxia noted      Diagnostic Study Results     Labs -     Recent Results (from the past 12 hour(s)) CBC WITH AUTOMATED DIFF    Collection Time: 10/28/22  6:02 PM   Result Value Ref Range    WBC 8.2 3.6 - 11.0 K/uL    RBC 3.88 3.80 - 5.20 M/uL    HGB 11.7 11.5 - 16.0 g/dL    HCT 35.4 35.0 - 47.0 %    MCV 91.2 80.0 - 99.0 FL    MCH 30.2 26.0 - 34.0 PG    MCHC 33.1 30.0 - 36.5 g/dL    RDW 13.2 11.5 - 14.5 %    PLATELET 355 635 - 595 K/uL    MPV 8.4 (L) 8.9 - 12.9 FL    NRBC 0.0 0  WBC    ABSOLUTE NRBC 0.00 0.00 - 0.01 K/uL    NEUTROPHILS 57 32 - 75 %    LYMPHOCYTES 32 12 - 49 %    MONOCYTES 7 5 - 13 %    EOSINOPHILS 3 0 - 7 %    BASOPHILS 1 0 - 1 %    IMMATURE GRANULOCYTES 0 0.0 - 0.5 %    ABS. NEUTROPHILS 4.7 1.8 - 8.0 K/UL    ABS. LYMPHOCYTES 2.6 0.8 - 3.5 K/UL    ABS. MONOCYTES 0.6 0.0 - 1.0 K/UL    ABS. EOSINOPHILS 0.3 0.0 - 0.4 K/UL    ABS. BASOPHILS 0.0 0.0 - 0.1 K/UL    ABS. IMM. GRANS. 0.0 0.00 - 0.04 K/UL    DF AUTOMATED     METABOLIC PANEL, COMPREHENSIVE    Collection Time: 10/28/22  6:02 PM   Result Value Ref Range    Sodium 135 (L) 136 - 145 mmol/L    Potassium 5.6 (H) 3.5 - 5.1 mmol/L    Chloride 103 97 - 108 mmol/L    CO2 29 21 - 32 mmol/L    Anion gap 3 (L) 5 - 15 mmol/L    Glucose 113 (H) 65 - 100 mg/dL    BUN 23 (H) 6 - 20 MG/DL    Creatinine 1.34 (H) 0.55 - 1.02 MG/DL    BUN/Creatinine ratio 17 12 - 20      eGFR 38 (L) >60 ml/min/1.73m2    Calcium 9.9 8.5 - 10.1 MG/DL    Bilirubin, total 0.5 0.2 - 1.0 MG/DL    ALT (SGPT) 58 12 - 78 U/L    AST (SGOT) 45 (H) 15 - 37 U/L    Alk.  phosphatase 111 45 - 117 U/L    Protein, total 8.1 6.4 - 8.2 g/dL    Albumin 3.8 3.5 - 5.0 g/dL    Globulin 4.3 (H) 2.0 - 4.0 g/dL    A-G Ratio 0.9 (L) 1.1 - 2.2     URINALYSIS W/ REFLEX CULTURE    Collection Time: 10/28/22 10:22 PM    Specimen: Urine   Result Value Ref Range    Color YELLOW/STRAW      Appearance CLEAR CLEAR      Specific gravity 1.016      pH (UA) 5.5 5.0 - 8.0      Protein TRACE (A) NEG mg/dL    Glucose Negative NEG mg/dL    Ketone Negative NEG mg/dL    Bilirubin Negative NEG      Blood Negative NEG Urobilinogen 0.2 0.2 - 1.0 EU/dL    Nitrites Negative NEG      Leukocyte Esterase SMALL (A) NEG      UA:UC IF INDICATED CULTURE NOT INDICATED BY UA RESULT CNI      WBC 5-10 0 - 4 /hpf    RBC 0-5 0 - 5 /hpf    Epithelial cells MANY (A) FEW /lpf    Bacteria Negative NEG /hpf    Hyaline cast 0-2 0 - 2 /lpf       Radiologic Studies -   CT HEAD WO CONT   Final Result   No acute intracranial abnormality. CT Results  (Last 48 hours)                 10/28/22 2115  CT HEAD WO CONT Final result    Impression:  No acute intracranial abnormality. Narrative:  EXAM: CT HEAD WO CONT       INDICATION: headache       COMPARISON: 4/6/2022 head CT. CONTRAST: None. TECHNIQUE: Unenhanced CT of the head was performed using 5 mm images. Brain and   bone windows were generated. Coronal and sagittal reformats. CT dose reduction   was achieved through use of a standardized protocol tailored for this   examination and automatic exposure control for dose modulation. FINDINGS:   The ventricles and sulci are normal in size, shape and configuration. Patchy   periventricular and deep white matter ill-defined hypodensities, nonspecific and   likely microangiopathic white matter disease. Redemonstrate of dense   calcifications within the bilateral basal ganglia greater than dentate nuclei,   nonspecific and may represent age related mineralization. There is no   intracranial hemorrhage, extra-axial collection, or mass effect. The basilar   cisterns are open. No CT evidence of acute infarct. The bone windows demonstrate no abnormalities. The visualized portions of the   paranasal sinuses and mastoid air cells are clear. CXR Results  (Last 48 hours)      None              Medical Decision Making     ISimone MD am the first provider for this patient and am the attending of record for this patient encounter.     I reviewed the vital signs, available nursing notes, past medical history, past surgical history, family history and social history. Vital Signs-Reviewed the patient's vital signs. Patient Vitals for the past 12 hrs:   Temp Pulse Resp BP SpO2   10/29/22 0013 -- 97 -- -- 100 %   10/28/22 2245 -- -- -- 139/80 --   10/28/22 2226 -- -- -- (!) 170/81 --   10/28/22 2208 -- -- -- (!) 192/74 --   10/28/22 2058 -- -- -- (!) 245/96 --   10/28/22 2051 -- (!) 110 18 (!) 243/101 99 %   10/28/22 1755 97.9 °F (36.6 °C) 81 14 (!) 189/76 98 %       Records Reviewed: Nursing Notes and Old Medical Records    Provider Notes (Medical Decision Making): On presentation, the patient is well appearing, in no acute distress, hypertensive on arrival.  Based on my history and exam the differential diagnosis for this patient includes SSRI withdrawal, hypertensive emergency, intracranial hemorrhage. CT head showed no acute findings. Based on description, mild headache at this time no reason to suspect subarachnoid hemorrhage. No signs of any other acute endorgan damage due to her elevated blood pressure. Patient's headache may be due to SSRI withdrawal as she stopped taking her duloxetine abruptly this week. Patient's headache was treated in the ED with noted improvement in vital signs. Otherwise felt stable for discharge. Labs did note mild hyperkalemia so have changed her losartan without potassium. ED Course:   Initial assessment performed. The patients presenting problems have been discussed, and they are in agreement with the care plan formulated and outlined with them. I have encouraged them to ask questions as they arise throughout their visit. Medications   butalbital-acetaminophen-caffeine (FIORICET, ESGIC) -40 mg per tablet 1 Tablet (1 Tablet Oral Given 10/28/22 2103)         PROGRESS  Jennie Mills's  results have been reviewed with her. She has been counseled regarding her diagnosis.   She verbally conveys understanding and agreement of the signs, symptoms, diagnosis, treatment and prognosis and additionally agrees to follow up as recommended with DANIELE Dyer in 24 - 48 hours. She also agrees with the care-plan and conveys that all of her questions have been answered. I have also put together some discharge instructions for her that include: 1) educational information regarding their diagnosis, 2) how to care for their diagnosis at home, as well a 3) list of reasons why they would want to return to the ED prior to their follow-up appointment, should their condition change. Disposition:  home    PLAN:  1. Discharge Medication List as of 10/29/2022 12:38 AM        START taking these medications    Details   losartan (COZAAR) 50 mg tablet Take 1 Tablet by mouth daily for 30 days. , Normal, Disp-30 Tablet, R-0           CONTINUE these medications which have NOT CHANGED    Details   ezetimibe (ZETIA) 10 mg tablet TAKE 1 TABLET BY MOUTH EVERY DAY, Normal, Disp-90 Tablet, R-1      cholecalciferol (VITAMIN D3) (5000 Units/125 mcg) tab tablet Take 5,000 Units by mouth daily. , Historical Med      polyethylene glycol (MIRALAX) 17 gram packet Take 17 g by mouth as needed for Constipation. , Historical Med      atorvastatin (LIPITOR) 80 mg tablet Take 1 Tab by mouth nightly., Normal, Disp-30 Tab,R-1      metoprolol tartrate (LOPRESSOR) 25 mg tablet Take 0.5 Tabs by mouth every twelve (12) hours. , Normal, Disp-90 Tab, R-2      docusate sodium (STOOL SOFTENER PO) Take  by mouth as needed., Historical Med      ferrous sulfate (IRON PO) Take 65 mg by mouth daily. , Historical Med      aspirin delayed-release 81 mg tablet Take 81 mg by mouth daily. , Historical Med      timolol (TIMOPTIC-XE) 0.5 % ophthalmic gel-forming Administer 1 Drop to right eye daily. , Historical Med      latanoprost (XALATAN) 0.005 % ophthalmic solution Administer 1 Drop to both eyes nightly.  Indications: wide-angle glaucoma, Historical Med      fexofenadine (ALLEGRA) 180 mg tablet Take 180 mg by mouth nightly., Historical Med      ALPRAZolam (XANAX) 0.25 mg tablet Take 0.25 mg by mouth daily as needed for Anxiety. , Historical Med      naloxone (Narcan) 4 mg/actuation nasal spray Use 1 spray intranasally, then discard. Repeat with new spray every 2 min as needed for opioid overdose symptoms, alternating nostrils. , Normal, Disp-1 Each,R-0      trolamine salicylate (Aspercreme) 10 % lotion Apply  to affected area as needed., Historical Med      fluticasone (FLONASE ALLERGY RELIEF) 50 mcg/actuation nasal spray 1 [place] by Both Nostrils route daily. , Historical Med      acetaminophen (TYLENOL EXTRA STRENGTH) 500 mg tablet Take 1,000 mg by mouth every six (6) hours as needed for Pain., Historical Med      allopurinol (ZYLOPRIM) 100 mg tablet Take 100 mg by mouth daily. , Historical Med           STOP taking these medications       amLODIPine (NORVASC) 10 mg tablet Comments:   Reason for Stoppin.   Follow-up Information       Follow up With Specialties Details Why Contact Info    Terence Taylor FNP Nurse Practitioner Schedule an appointment as soon as possible for a visit in 2 days  [place] [id number]  [phone]      [facility] EMERGENCY DEPT Emergency Medicine  If symptoms worsen [address] [place] [address] Drive  [address] [facility]  [phone]          Return to ED if worse     Diagnosis     Clinical Impression:   1. Hypertension, unspecified type    2. Acute nonintractable headache, unspecified headache type    3. Acute hyperkalemia        Please note that this dictation was completed with Dragon, computer voice recognition software. Quite often unanticipated grammatical, syntax, homophones, and other interpretive errors are inadvertently transcribed by the computer software. Please disregard these errors. Additionally, please excuse any errors that have escaped final proofreading.

## 2022-10-29 NOTE — ED TRIAGE NOTES
Pt was taking 75 mg of Pregabalin and it was raised to 150 mg and pt states she was not able to walk so she stopped taking, pt also taking Duloxetine 60 mg 9/27/22 but stated it did not work so she stopped taking it

## 2022-10-30 ENCOUNTER — HOSPITAL ENCOUNTER (EMERGENCY)
Age: 87
Discharge: HOME | DRG: 305 | End: 2022-10-30
Attending: EMERGENCY MEDICINE
Payer: MEDICARE

## 2022-10-30 VITALS
RESPIRATION RATE: 18 BRPM | OXYGEN SATURATION: 98 % | HEIGHT: 64 IN | BODY MASS INDEX: 27.31 KG/M2 | TEMPERATURE: 98.7 F | SYSTOLIC BLOOD PRESSURE: 157 MMHG | WEIGHT: 160 LBS | HEART RATE: 75 BPM | DIASTOLIC BLOOD PRESSURE: 82 MMHG

## 2022-10-30 DIAGNOSIS — R51.9 NONINTRACTABLE HEADACHE, UNSPECIFIED CHRONICITY PATTERN, UNSPECIFIED HEADACHE TYPE: ICD-10-CM

## 2022-10-30 DIAGNOSIS — I10 HYPERTENSION, UNSPECIFIED TYPE: Primary | ICD-10-CM

## 2022-10-30 LAB
ALBUMIN SERPL-MCNC: 3.8 G/DL (ref 3.5–5)
ALBUMIN/GLOB SERPL: 0.9 {RATIO} (ref 1.1–2.2)
ALP SERPL-CCNC: 107 U/L (ref 45–117)
ALT SERPL-CCNC: 42 U/L (ref 12–78)
ANION GAP SERPL CALC-SCNC: 6 MMOL/L (ref 5–15)
AST SERPL-CCNC: 30 U/L (ref 15–37)
BASOPHILS # BLD: 0.1 K/UL (ref 0–0.1)
BASOPHILS NFR BLD: 1 % (ref 0–1)
BILIRUB SERPL-MCNC: 0.7 MG/DL (ref 0.2–1)
BUN SERPL-MCNC: 25 MG/DL (ref 6–20)
BUN/CREAT SERPL: 17 (ref 12–20)
CALCIUM SERPL-MCNC: 9.6 MG/DL (ref 8.5–10.1)
CHLORIDE SERPL-SCNC: 101 MMOL/L (ref 97–108)
CO2 SERPL-SCNC: 26 MMOL/L (ref 21–32)
CREAT SERPL-MCNC: 1.48 MG/DL (ref 0.55–1.02)
DIFFERENTIAL METHOD BLD: ABNORMAL
EOSINOPHIL # BLD: 0.1 K/UL (ref 0–0.4)
EOSINOPHIL NFR BLD: 1 % (ref 0–7)
ERYTHROCYTE [DISTWIDTH] IN BLOOD BY AUTOMATED COUNT: 13.2 % (ref 11.5–14.5)
GLOBULIN SER CALC-MCNC: 4.1 G/DL (ref 2–4)
GLUCOSE SERPL-MCNC: 113 MG/DL (ref 65–100)
HCT VFR BLD AUTO: 36.2 % (ref 35–47)
HGB BLD-MCNC: 12.1 G/DL (ref 11.5–16)
IMM GRANULOCYTES # BLD AUTO: 0 K/UL (ref 0–0.04)
IMM GRANULOCYTES NFR BLD AUTO: 0 % (ref 0–0.5)
LYMPHOCYTES # BLD: 1.8 K/UL (ref 0.8–3.5)
LYMPHOCYTES NFR BLD: 19 % (ref 12–49)
MCH RBC QN AUTO: 30.1 PG (ref 26–34)
MCHC RBC AUTO-ENTMCNC: 33.4 G/DL (ref 30–36.5)
MCV RBC AUTO: 90 FL (ref 80–99)
MONOCYTES # BLD: 0.6 K/UL (ref 0–1)
MONOCYTES NFR BLD: 6 % (ref 5–13)
NEUTS SEG # BLD: 6.8 K/UL (ref 1.8–8)
NEUTS SEG NFR BLD: 73 % (ref 32–75)
NRBC # BLD: 0 K/UL (ref 0–0.01)
NRBC BLD-RTO: 0 PER 100 WBC
PLATELET # BLD AUTO: 313 K/UL (ref 150–400)
PMV BLD AUTO: 8.5 FL (ref 8.9–12.9)
POTASSIUM SERPL-SCNC: 4.8 MMOL/L (ref 3.5–5.1)
PROT SERPL-MCNC: 7.9 G/DL (ref 6.4–8.2)
RBC # BLD AUTO: 4.02 M/UL (ref 3.8–5.2)
SODIUM SERPL-SCNC: 133 MMOL/L (ref 136–145)
WBC # BLD AUTO: 9.3 K/UL (ref 3.6–11)

## 2022-10-30 PROCEDURE — 74011250637 HC RX REV CODE- 250/637: Performed by: EMERGENCY MEDICINE

## 2022-10-30 PROCEDURE — 80053 COMPREHEN METABOLIC PANEL: CPT

## 2022-10-30 PROCEDURE — 74011000250 HC RX REV CODE- 250: Performed by: EMERGENCY MEDICINE

## 2022-10-30 PROCEDURE — 85025 COMPLETE CBC W/AUTO DIFF WBC: CPT

## 2022-10-30 PROCEDURE — 96374 THER/PROPH/DIAG INJ IV PUSH: CPT

## 2022-10-30 PROCEDURE — 99284 EMERGENCY DEPT VISIT MOD MDM: CPT

## 2022-10-30 PROCEDURE — 36415 COLL VENOUS BLD VENIPUNCTURE: CPT

## 2022-10-30 RX ORDER — LOSARTAN POTASSIUM 50 MG/1
50 TABLET ORAL
Status: COMPLETED | OUTPATIENT
Start: 2022-10-30 | End: 2022-10-30

## 2022-10-30 RX ORDER — BUTALBITAL, ACETAMINOPHEN AND CAFFEINE 50; 325; 40 MG/1; MG/1; MG/1
1 TABLET ORAL
Status: COMPLETED | OUTPATIENT
Start: 2022-10-30 | End: 2022-10-30

## 2022-10-30 RX ORDER — LABETALOL HYDROCHLORIDE 5 MG/ML
20 INJECTION, SOLUTION INTRAVENOUS ONCE
Status: COMPLETED | OUTPATIENT
Start: 2022-10-30 | End: 2022-10-30

## 2022-10-30 RX ORDER — BUTALBITAL, ACETAMINOPHEN AND CAFFEINE 300; 40; 50 MG/1; MG/1; MG/1
1 CAPSULE ORAL
Qty: 12 CAPSULE | Refills: 0 | Status: SHIPPED | OUTPATIENT
Start: 2022-10-30

## 2022-10-30 RX ADMIN — LABETALOL HYDROCHLORIDE 20 MG: 5 INJECTION INTRAVENOUS at 16:57

## 2022-10-30 RX ADMIN — LOSARTAN POTASSIUM 50 MG: 50 TABLET, FILM COATED ORAL at 18:41

## 2022-10-30 RX ADMIN — BUTALBITAL, ACETAMINOPHEN, AND CAFFEINE 1 TABLET: 50; 325; 40 TABLET ORAL at 16:58

## 2022-10-30 NOTE — ED NOTES
Assumed care of pt from triage. Pt c/o HTN. Pt also c/o headache and sinus pressure, which she states started before her BP became elevated. Pt was seen Friday for same symptoms and was prescribed losartan. Pt's family has concerns about losartan affecting pt's potassium level. Placed pt on monitor. Family at bedside. Call button within reach. Will continue to monitor.

## 2022-10-30 NOTE — DISCHARGE INSTRUCTIONS
It was a pleasure taking care of you at [facility] Emergency Department today. We know that when you come to [facility], you are entrusting us with your health, comfort, and safety. Our physicians and nurses honor that trust, and we truly appreciate the opportunity to care for you and your loved ones. We also value your feedback. If you receive a survey about your Emergency Department experience today, please fill it out. We care about our patients' feedback, and we listen to what you have to say. Thank you!

## 2022-10-30 NOTE — ED NOTES
Reviewed discharge instructions with pt and pt's family. Pt verbalizes understanding. Opportunity for questions provided. IV removed. Assisted pt to exit via wheelchair.

## 2022-10-31 PROCEDURE — 96374 THER/PROPH/DIAG INJ IV PUSH: CPT

## 2022-10-31 PROCEDURE — 96360 HYDRATION IV INFUSION INIT: CPT

## 2022-10-31 PROCEDURE — 96361 HYDRATE IV INFUSION ADD-ON: CPT

## 2022-10-31 PROCEDURE — 99285 EMERGENCY DEPT VISIT HI MDM: CPT

## 2022-11-01 ENCOUNTER — APPOINTMENT (OUTPATIENT)
Dept: MRI IMAGING | Age: 87
DRG: 305 | End: 2022-11-01
Attending: INTERNAL MEDICINE
Payer: MEDICARE

## 2022-11-01 ENCOUNTER — HOSPITAL ENCOUNTER (INPATIENT)
Age: 87
LOS: 4 days | Discharge: HOME HEALTH | DRG: 305 | End: 2022-11-05
Attending: EMERGENCY MEDICINE | Admitting: INTERNAL MEDICINE
Payer: MEDICARE

## 2022-11-01 ENCOUNTER — APPOINTMENT (OUTPATIENT)
Dept: GENERAL RADIOLOGY | Age: 87
DRG: 305 | End: 2022-11-01
Attending: EMERGENCY MEDICINE
Payer: MEDICARE

## 2022-11-01 ENCOUNTER — APPOINTMENT (OUTPATIENT)
Dept: CT IMAGING | Age: 87
DRG: 305 | End: 2022-11-01
Attending: EMERGENCY MEDICINE
Payer: MEDICARE

## 2022-11-01 DIAGNOSIS — M79.2 NEUROPATHIC PAIN: ICD-10-CM

## 2022-11-01 DIAGNOSIS — I10 HYPERTENSION, UNSPECIFIED TYPE: ICD-10-CM

## 2022-11-01 DIAGNOSIS — Z98.890 H/O CAROTID ENDARTERECTOMY: ICD-10-CM

## 2022-11-01 DIAGNOSIS — R50.9 HIGH FEVER: ICD-10-CM

## 2022-11-01 DIAGNOSIS — A41.9 SEPSIS, DUE TO UNSPECIFIED ORGANISM, UNSPECIFIED WHETHER ACUTE ORGAN DYSFUNCTION PRESENT (HCC): Primary | ICD-10-CM

## 2022-11-01 DIAGNOSIS — H92.11 DRAINAGE FROM EAR, RIGHT: ICD-10-CM

## 2022-11-01 DIAGNOSIS — R51.9 NONINTRACTABLE HEADACHE, UNSPECIFIED CHRONICITY PATTERN, UNSPECIFIED HEADACHE TYPE: ICD-10-CM

## 2022-11-01 DIAGNOSIS — I65.23 BILATERAL CAROTID ARTERY STENOSIS: ICD-10-CM

## 2022-11-01 DIAGNOSIS — I10 POORLY-CONTROLLED HYPERTENSION: ICD-10-CM

## 2022-11-01 DIAGNOSIS — R50.9 FEVER, UNSPECIFIED FEVER CAUSE: ICD-10-CM

## 2022-11-01 DIAGNOSIS — E87.1 HYPONATREMIA: ICD-10-CM

## 2022-11-01 DIAGNOSIS — G03.9 MENINGITIS: ICD-10-CM

## 2022-11-01 DIAGNOSIS — R77.8 ELEVATED TROPONIN: ICD-10-CM

## 2022-11-01 LAB
ALBUMIN SERPL-MCNC: 4 G/DL (ref 3.5–5)
ALBUMIN/GLOB SERPL: 1 {RATIO} (ref 1.1–2.2)
ALP SERPL-CCNC: 111 U/L (ref 45–117)
ALT SERPL-CCNC: 39 U/L (ref 12–78)
ANION GAP SERPL CALC-SCNC: 7 MMOL/L (ref 5–15)
APPEARANCE UR: CLEAR
AST SERPL-CCNC: 33 U/L (ref 15–37)
ATRIAL RATE: 98 BPM
BACTERIA URNS QL MICRO: NEGATIVE /HPF
BASOPHILS # BLD: 0 K/UL (ref 0–0.1)
BASOPHILS NFR BLD: 0 % (ref 0–1)
BILIRUB SERPL-MCNC: 0.8 MG/DL (ref 0.2–1)
BILIRUB UR QL: NEGATIVE
BUN SERPL-MCNC: 27 MG/DL (ref 6–20)
BUN/CREAT SERPL: 19 (ref 12–20)
CALCIUM SERPL-MCNC: 9.9 MG/DL (ref 8.5–10.1)
CALCULATED P AXIS, ECG09: 59 DEGREES
CALCULATED R AXIS, ECG10: -14 DEGREES
CALCULATED T AXIS, ECG11: 55 DEGREES
CHLORIDE SERPL-SCNC: 100 MMOL/L (ref 97–108)
CO2 SERPL-SCNC: 25 MMOL/L (ref 21–32)
COLOR UR: ABNORMAL
COVID-19 RAPID TEST, COVR: NOT DETECTED
CREAT SERPL-MCNC: 1.4 MG/DL (ref 0.55–1.02)
DIAGNOSIS, 93000: NORMAL
DIFFERENTIAL METHOD BLD: NORMAL
EOSINOPHIL # BLD: 0.1 K/UL (ref 0–0.4)
EOSINOPHIL NFR BLD: 1 % (ref 0–7)
EPITH CASTS URNS QL MICRO: ABNORMAL /LPF
ERYTHROCYTE [DISTWIDTH] IN BLOOD BY AUTOMATED COUNT: 13.3 % (ref 11.5–14.5)
FLUAV AG NPH QL IA: NEGATIVE
FLUBV AG NOSE QL IA: NEGATIVE
GLOBULIN SER CALC-MCNC: 4.1 G/DL (ref 2–4)
GLUCOSE SERPL-MCNC: 134 MG/DL (ref 65–100)
GLUCOSE UR STRIP.AUTO-MCNC: NEGATIVE MG/DL
HCT VFR BLD AUTO: 36.7 % (ref 35–47)
HGB BLD-MCNC: 12.5 G/DL (ref 11.5–16)
HGB UR QL STRIP: NEGATIVE
HYALINE CASTS URNS QL MICRO: ABNORMAL /LPF (ref 0–2)
IMM GRANULOCYTES # BLD AUTO: 0 K/UL (ref 0–0.04)
IMM GRANULOCYTES NFR BLD AUTO: 0 % (ref 0–0.5)
KETONES UR QL STRIP.AUTO: NEGATIVE MG/DL
LACTATE BLD-SCNC: 1.87 MMOL/L (ref 0.4–2)
LEUKOCYTE ESTERASE UR QL STRIP.AUTO: ABNORMAL
LYMPHOCYTES # BLD: 1.8 K/UL (ref 0.8–3.5)
LYMPHOCYTES NFR BLD: 21 % (ref 12–49)
MCH RBC QN AUTO: 30.5 PG (ref 26–34)
MCHC RBC AUTO-ENTMCNC: 34.1 G/DL (ref 30–36.5)
MCV RBC AUTO: 89.5 FL (ref 80–99)
MONOCYTES # BLD: 0.7 K/UL (ref 0–1)
MONOCYTES NFR BLD: 8 % (ref 5–13)
NEUTS SEG # BLD: 6.2 K/UL (ref 1.8–8)
NEUTS SEG NFR BLD: 70 % (ref 32–75)
NITRITE UR QL STRIP.AUTO: NEGATIVE
NRBC # BLD: 0 K/UL (ref 0–0.01)
NRBC BLD-RTO: 0 PER 100 WBC
P-R INTERVAL, ECG05: 256 MS
PH UR STRIP: 7 [PH] (ref 5–8)
PLATELET # BLD AUTO: 300 K/UL (ref 150–400)
PMV BLD AUTO: 9 FL (ref 8.9–12.9)
POTASSIUM SERPL-SCNC: 4.4 MMOL/L (ref 3.5–5.1)
PROT SERPL-MCNC: 8.1 G/DL (ref 6.4–8.2)
PROT UR STRIP-MCNC: 30 MG/DL
Q-T INTERVAL, ECG07: 316 MS
QRS DURATION, ECG06: 74 MS
QTC CALCULATION (BEZET), ECG08: 403 MS
RBC # BLD AUTO: 4.1 M/UL (ref 3.8–5.2)
RBC #/AREA URNS HPF: ABNORMAL /HPF (ref 0–5)
SODIUM SERPL-SCNC: 132 MMOL/L (ref 136–145)
SOURCE, COVRS: NORMAL
SP GR UR REFRACTOMETRY: 1.02
TROPONIN-HIGH SENSITIVITY: 219 NG/L (ref 0–51)
TROPONIN-HIGH SENSITIVITY: 229 NG/L (ref 0–51)
UA: UC IF INDICATED,UAUC: ABNORMAL
UROBILINOGEN UR QL STRIP.AUTO: 1 EU/DL (ref 0.2–1)
VENTRICULAR RATE, ECG03: 98 BPM
WBC # BLD AUTO: 8.9 K/UL (ref 3.6–11)
WBC URNS QL MICRO: ABNORMAL /HPF (ref 0–4)

## 2022-11-01 PROCEDURE — 65270000046 HC RM TELEMETRY

## 2022-11-01 PROCEDURE — 74011250636 HC RX REV CODE- 250/636: Performed by: EMERGENCY MEDICINE

## 2022-11-01 PROCEDURE — 85025 COMPLETE CBC W/AUTO DIFF WBC: CPT

## 2022-11-01 PROCEDURE — 80053 COMPREHEN METABOLIC PANEL: CPT

## 2022-11-01 PROCEDURE — 83605 ASSAY OF LACTIC ACID: CPT

## 2022-11-01 PROCEDURE — 70551 MRI BRAIN STEM W/O DYE: CPT

## 2022-11-01 PROCEDURE — 74011250637 HC RX REV CODE- 250/637: Performed by: EMERGENCY MEDICINE

## 2022-11-01 PROCEDURE — 74011000250 HC RX REV CODE- 250: Performed by: EMERGENCY MEDICINE

## 2022-11-01 PROCEDURE — 84484 ASSAY OF TROPONIN QUANT: CPT

## 2022-11-01 PROCEDURE — 74011250636 HC RX REV CODE- 250/636: Performed by: INTERNAL MEDICINE

## 2022-11-01 PROCEDURE — 93005 ELECTROCARDIOGRAM TRACING: CPT

## 2022-11-01 PROCEDURE — 74011000258 HC RX REV CODE- 258: Performed by: EMERGENCY MEDICINE

## 2022-11-01 PROCEDURE — 74011000250 HC RX REV CODE- 250: Performed by: INTERNAL MEDICINE

## 2022-11-01 PROCEDURE — 74011250637 HC RX REV CODE- 250/637: Performed by: INTERNAL MEDICINE

## 2022-11-01 PROCEDURE — 70450 CT HEAD/BRAIN W/O DYE: CPT

## 2022-11-01 PROCEDURE — 77030014132 XR SPINAL PUNC LUMB DX

## 2022-11-01 PROCEDURE — 36415 COLL VENOUS BLD VENIPUNCTURE: CPT

## 2022-11-01 PROCEDURE — 74011000258 HC RX REV CODE- 258: Performed by: INTERNAL MEDICINE

## 2022-11-01 PROCEDURE — 81001 URINALYSIS AUTO W/SCOPE: CPT

## 2022-11-01 PROCEDURE — 87040 BLOOD CULTURE FOR BACTERIA: CPT

## 2022-11-01 PROCEDURE — 87635 SARS-COV-2 COVID-19 AMP PRB: CPT

## 2022-11-01 PROCEDURE — 009U3ZX DRAINAGE OF SPINAL CANAL, PERCUTANEOUS APPROACH, DIAGNOSTIC: ICD-10-PCS | Performed by: RADIOLOGY

## 2022-11-01 PROCEDURE — 87804 INFLUENZA ASSAY W/OPTIC: CPT

## 2022-11-01 PROCEDURE — 71045 X-RAY EXAM CHEST 1 VIEW: CPT

## 2022-11-01 RX ORDER — ALLOPURINOL 100 MG/1
100 TABLET ORAL DAILY
Status: DISCONTINUED | OUTPATIENT
Start: 2022-11-02 | End: 2022-11-05 | Stop reason: HOSPADM

## 2022-11-01 RX ORDER — ACETAMINOPHEN 325 MG/1
650 TABLET ORAL
Status: DISCONTINUED | OUTPATIENT
Start: 2022-11-01 | End: 2022-11-05 | Stop reason: HOSPADM

## 2022-11-01 RX ORDER — SODIUM CHLORIDE 0.9 % (FLUSH) 0.9 %
5-40 SYRINGE (ML) INJECTION EVERY 8 HOURS
Status: DISCONTINUED | OUTPATIENT
Start: 2022-11-01 | End: 2022-11-05 | Stop reason: HOSPADM

## 2022-11-01 RX ORDER — TIMOLOL MALEATE 6.8 MG/ML
1 SOLUTION/ DROPS OPHTHALMIC DAILY
Status: DISCONTINUED | OUTPATIENT
Start: 2022-11-02 | End: 2022-11-02

## 2022-11-01 RX ORDER — LOSARTAN POTASSIUM 100 MG/1
100 TABLET ORAL DAILY
Status: CANCELLED | OUTPATIENT
Start: 2022-11-02

## 2022-11-01 RX ORDER — LIDOCAINE HYDROCHLORIDE 10 MG/ML
20 INJECTION INFILTRATION; PERINEURAL
Status: COMPLETED | OUTPATIENT
Start: 2022-11-01 | End: 2022-11-01

## 2022-11-01 RX ORDER — LABETALOL HYDROCHLORIDE 5 MG/ML
10 INJECTION, SOLUTION INTRAVENOUS
Status: COMPLETED | OUTPATIENT
Start: 2022-11-01 | End: 2022-11-01

## 2022-11-01 RX ORDER — VANCOMYCIN 1.75 GRAM/500 ML IN 0.9 % SODIUM CHLORIDE INTRAVENOUS
1750 ONCE
Status: COMPLETED | OUTPATIENT
Start: 2022-11-01 | End: 2022-11-01

## 2022-11-01 RX ORDER — ASPIRIN 81 MG/1
81 TABLET ORAL DAILY
Status: DISCONTINUED | OUTPATIENT
Start: 2022-11-02 | End: 2022-11-05 | Stop reason: HOSPADM

## 2022-11-01 RX ORDER — BISACODYL 5 MG
5 TABLET, DELAYED RELEASE (ENTERIC COATED) ORAL DAILY PRN
Status: DISCONTINUED | OUTPATIENT
Start: 2022-11-01 | End: 2022-11-05 | Stop reason: HOSPADM

## 2022-11-01 RX ORDER — ACETAMINOPHEN 500 MG
1000 TABLET ORAL ONCE
Status: COMPLETED | OUTPATIENT
Start: 2022-11-01 | End: 2022-11-01

## 2022-11-01 RX ORDER — ONDANSETRON 2 MG/ML
4 INJECTION INTRAMUSCULAR; INTRAVENOUS
Status: DISCONTINUED | OUTPATIENT
Start: 2022-11-01 | End: 2022-11-05 | Stop reason: HOSPADM

## 2022-11-01 RX ORDER — SODIUM CHLORIDE 0.9 % (FLUSH) 0.9 %
5-40 SYRINGE (ML) INJECTION AS NEEDED
Status: DISCONTINUED | OUTPATIENT
Start: 2022-11-01 | End: 2022-11-05 | Stop reason: HOSPADM

## 2022-11-01 RX ORDER — ALPRAZOLAM 0.5 MG/1
0.25 TABLET ORAL
Status: DISCONTINUED | OUTPATIENT
Start: 2022-11-01 | End: 2022-11-05 | Stop reason: HOSPADM

## 2022-11-01 RX ORDER — SODIUM CHLORIDE 0.9 % (FLUSH) 0.9 %
5-10 SYRINGE (ML) INJECTION AS NEEDED
Status: DISCONTINUED | OUTPATIENT
Start: 2022-11-01 | End: 2022-11-01 | Stop reason: SDUPTHER

## 2022-11-01 RX ORDER — PROMETHAZINE HYDROCHLORIDE 25 MG/1
12.5 TABLET ORAL
Status: DISCONTINUED | OUTPATIENT
Start: 2022-11-01 | End: 2022-11-05 | Stop reason: HOSPADM

## 2022-11-01 RX ORDER — LABETALOL HYDROCHLORIDE 5 MG/ML
20 INJECTION, SOLUTION INTRAVENOUS
Status: DISPENSED | OUTPATIENT
Start: 2022-11-01 | End: 2022-11-01

## 2022-11-01 RX ORDER — LORAZEPAM 1 MG/1
1 TABLET ORAL
Status: COMPLETED | OUTPATIENT
Start: 2022-11-01 | End: 2022-11-01

## 2022-11-01 RX ORDER — LIDOCAINE HYDROCHLORIDE 20 MG/ML
5 INJECTION, SOLUTION EPIDURAL; INFILTRATION; INTRACAUDAL; PERINEURAL ONCE
Status: COMPLETED | OUTPATIENT
Start: 2022-11-01 | End: 2022-11-01

## 2022-11-01 RX ORDER — ATORVASTATIN CALCIUM 40 MG/1
80 TABLET, FILM COATED ORAL
Status: DISCONTINUED | OUTPATIENT
Start: 2022-11-01 | End: 2022-11-05 | Stop reason: HOSPADM

## 2022-11-01 RX ORDER — HEPARIN SODIUM 5000 [USP'U]/ML
5000 INJECTION, SOLUTION INTRAVENOUS; SUBCUTANEOUS EVERY 8 HOURS
Status: DISCONTINUED | OUTPATIENT
Start: 2022-11-02 | End: 2022-11-05 | Stop reason: HOSPADM

## 2022-11-01 RX ORDER — POLYETHYLENE GLYCOL 3350 17 G/17G
17 POWDER, FOR SOLUTION ORAL DAILY
Status: DISCONTINUED | OUTPATIENT
Start: 2022-11-01 | End: 2022-11-05 | Stop reason: HOSPADM

## 2022-11-01 RX ORDER — LATANOPROST 50 UG/ML
1 SOLUTION/ DROPS OPHTHALMIC
Status: DISCONTINUED | OUTPATIENT
Start: 2022-11-01 | End: 2022-11-05 | Stop reason: HOSPADM

## 2022-11-01 RX ORDER — LOSARTAN POTASSIUM 50 MG/1
50 TABLET ORAL DAILY
Status: DISCONTINUED | OUTPATIENT
Start: 2022-11-02 | End: 2022-11-02

## 2022-11-01 RX ORDER — HYDRALAZINE HYDROCHLORIDE 20 MG/ML
20 INJECTION INTRAMUSCULAR; INTRAVENOUS
Status: DISCONTINUED | OUTPATIENT
Start: 2022-11-01 | End: 2022-11-05 | Stop reason: HOSPADM

## 2022-11-01 RX ORDER — METOPROLOL TARTRATE 25 MG/1
12.5 TABLET, FILM COATED ORAL EVERY 12 HOURS
Status: DISCONTINUED | OUTPATIENT
Start: 2022-11-01 | End: 2022-11-04

## 2022-11-01 RX ADMIN — ACYCLOVIR 550 MG: 50 INJECTION, SOLUTION INTRAVENOUS at 21:47

## 2022-11-01 RX ADMIN — POLYETHYLENE GLYCOL 3350 17 G: 17 POWDER, FOR SOLUTION ORAL at 11:44

## 2022-11-01 RX ADMIN — LORAZEPAM 1 MG: 1 TABLET ORAL at 10:01

## 2022-11-01 RX ADMIN — AMPICILLIN SODIUM 2 G: 2 INJECTION, POWDER, FOR SOLUTION INTRAVENOUS at 16:41

## 2022-11-01 RX ADMIN — ATORVASTATIN CALCIUM 80 MG: 40 TABLET, FILM COATED ORAL at 21:46

## 2022-11-01 RX ADMIN — SODIUM CHLORIDE 1000 ML: 9 INJECTION, SOLUTION INTRAVENOUS at 03:28

## 2022-11-01 RX ADMIN — SODIUM CHLORIDE, PRESERVATIVE FREE 10 ML: 5 INJECTION INTRAVENOUS at 21:48

## 2022-11-01 RX ADMIN — SODIUM CHLORIDE 178 ML: 9 INJECTION, SOLUTION INTRAVENOUS at 03:28

## 2022-11-01 RX ADMIN — METOPROLOL TARTRATE 12.5 MG: 25 TABLET, FILM COATED ORAL at 21:46

## 2022-11-01 RX ADMIN — CEFTRIAXONE 2 G: 2 INJECTION, POWDER, FOR SOLUTION INTRAMUSCULAR; INTRAVENOUS at 09:49

## 2022-11-01 RX ADMIN — SODIUM CHLORIDE, PRESERVATIVE FREE 10 ML: 5 INJECTION INTRAVENOUS at 14:22

## 2022-11-01 RX ADMIN — LIDOCAINE HYDROCHLORIDE 20 ML: 10 INJECTION, SOLUTION INFILTRATION; PERINEURAL at 09:48

## 2022-11-01 RX ADMIN — CEFTRIAXONE 2 G: 2 INJECTION, POWDER, FOR SOLUTION INTRAMUSCULAR; INTRAVENOUS at 18:14

## 2022-11-01 RX ADMIN — ACYCLOVIR 550 MG: 50 INJECTION, SOLUTION INTRAVENOUS at 09:56

## 2022-11-01 RX ADMIN — AMPICILLIN SODIUM 2 G: 2 INJECTION, POWDER, FOR SOLUTION INTRAVENOUS at 09:52

## 2022-11-01 RX ADMIN — LABETALOL HYDROCHLORIDE 10 MG: 5 INJECTION INTRAVENOUS at 04:52

## 2022-11-01 RX ADMIN — VANCOMYCIN HYDROCHLORIDE 1750 MG: 10 INJECTION, POWDER, LYOPHILIZED, FOR SOLUTION INTRAVENOUS at 11:35

## 2022-11-01 RX ADMIN — LIDOCAINE HYDROCHLORIDE 100 MG: 20 INJECTION, SOLUTION EPIDURAL; INFILTRATION; INTRACAUDAL; PERINEURAL at 06:12

## 2022-11-01 RX ADMIN — SODIUM CHLORIDE, PRESERVATIVE FREE 10 ML: 5 INJECTION INTRAVENOUS at 03:54

## 2022-11-01 RX ADMIN — LATANOPROST 1 DROP: 50 SOLUTION OPHTHALMIC at 21:47

## 2022-11-01 RX ADMIN — LIDOCAINE HYDROCHLORIDE 20 ML: 10 INJECTION, SOLUTION INFILTRATION; PERINEURAL at 07:45

## 2022-11-01 RX ADMIN — ACETAMINOPHEN 1000 MG: 500 TABLET ORAL at 03:27

## 2022-11-01 NOTE — REMOTE MONITORING
Kajal sent to MD regarding 3 hour Sepsis bundle.     Thank you,    KELECHI Smith, [address]  Callback# [phone]

## 2022-11-01 NOTE — H&P
Hospitalist Admission Note    NAME:  Zane Molina   :   8/15/1932   MRN:   [id number]     Date of admit: 2022    PCP: DANIELE Rodarte    Assessment/Plan:     Sepsis POA , Temp 101.2, WBC 8.9, normal lactate  Severe headache POA ? HTN related, ? meningitis  Alert, [place] and mildly confused  Seen in ED 10/28 and 10/30 for headache and elevated BP   No fevers or leukocytosis   Head CT negative  Back with persistent HA, new fever 101.2  Now alert, HA \"better\"  No meningismus  Head CT negative  pCXR with no ASD  UA 0 to 4 WBC, 0 to 5 RBC, negative bacteria  Rapid COVID-19 negative  Paired BC sent  ER attempted LP x 2, not successful  IR attempted LP, also not able to get due to anatomy  Likely will not be able To get CSF   Not entirely sure she has meningitis, but cannot rule it out  Empiric antibiotics   Vancomycin, ceftriaxone, ampicillin   IV acyclovir  Follow up blood cultures  Check brain MRI  Check procalcitonin  Continue antibiotics for now   Will decide further Rx based on blood cultures, fever, procalcitonin  Ask ID to see in AM  Spoke with granddaughter by phone    HTN urgency /73 in ED  Elevated HS troponin 229 POA  CAD s/p stents POA  Hyperlipidemia POA   Resume metoprolol and cozaar  Continue ASA, lipitor  PRN hydralazine  No CP, but troponin elevated  EKG NSR 98, non specific ST-T changes  Check echo  Cardiology consult    Stage 4 chronic kidney disease POA  Baseline creat 1.3 to 1.5  Appears at baseline  Serial labs    History of TA 2020  Right CEA 2020  Continue ASA and lipitor    Overweight  POA Body mass index is 27.46 kg/m². Given the patient's current clinical presentation, I have a high level of concern for decompensation if discharged from the emergency department. My assessment of this patient's clinical condition and my plan of care is as noted above.     DVT prophylaxis with heparin SQ, SCDs    Code status: full code  NOK:    History     CHIEF COMPLAINT: I had a bad headache the past few days    HISTORY OF PRESENT ILLNESS:    80 Y. O. female    Known hypertension  Coronary artery disease status post stents  Hyperlipidemia  TIA 2020  Right carotid endarterectomy 2020    Currently alert, hard of hearing, mildly confused  Oriented to person and place, not year    Recently seen in the emergency room on 10/28 and 10/30 for headache and elevated BP   No fevers or leukocytosis   Head CT negative   BP meds adjusted  Still with persistent headache yesterday   Granddaughter notes headaches coming and going, severe at times    Never fully went away   No fevers at home   No other associated symptoms  Came back to emergency room today  Febrile in ED to 101.2  No sore throat, neck pain, cough, shortness of breath, chest pain  No nausea vomiting, diarrhea, abdominal pain, dysuria  No skin rashes, joint redness or swelling    Emergency room  Febrile to 1-1.2, heart rate 103  No meningismus  Alert, oriented x2  Non focal motor exam  WBC 8.9  Creatinine 1.40, at baseline  HS troponin 219 --> 229, EKG with non specific ST-T changes  Normal PLTs and LFTs  UA 0-4 WBC, 0-5 RBC, negative bacteria  pCXR with no ASD  Head CT negative  ED sent Blood cultures  ED attempted LP x 2, unsuccessful  ED started empiric ceftriaxone, vanco, ampicillin  IR attempted LP this AM, also unable to get CSF due to anatomy  We were called to admit the patient    Past Medical History:   Diagnosis Date    CAD (coronary artery disease)     Diverticulitis     Glaucoma     Gout     HTN (hypertension) 5/18/2018    Hx of diverticulitis of colon 5/18/2018    Hyperlipidemia     Hypertension     Other ill-defined conditions(799.89)     high cholesterol    S/P coronary artery stent placement 05/25/2018    Stroke ([facility] [address] 10/2020    TIA        Past Surgical History:   Procedure Laterality Date    HX CAROTID ENDARTERECTOMY Left 11/02/2020    HX HYSTERECTOMY      MD CARDIAC SURG PROCEDURE UNLIST  05/2018    x2 cardiac stent DC COLONOSCOPY FLX DX W/COLLJ SPEC WHEN PFRMD  10/18/2013            Social History     Tobacco Use    Smoking status: Never    Smokeless tobacco: Never   Substance Use Topics    Alcohol use: No        Family History   Problem Relation Age of Onset    Stroke Mother     Diabetes Sister     No Known Problems Father         Allergies   Allergen Reactions    Lisinopril Cough        Prior to Admission medications    Medication Sig Start Date End Date Taking? Authorizing Provider   butalbital-acetaminophen-caff (FIORICET) -40 mg per capsule Take 1 Capsule by mouth every four (4) hours as needed for Headache. 10/30/22   Hari Vivas MD   losartan (COZAAR) 50 mg tablet Take 1 Tablet by mouth daily for 30 days. 10/29/22 11/28/22  Evonne Gill MD   ezetimibe (ZETIA) 10 mg tablet TAKE 1 TABLET BY MOUTH EVERY DAY 12/8/21   Almaz Booth NP   cholecalciferol (VITAMIN D3) (5000 Units/125 mcg) tab tablet Take 5,000 Units by mouth daily. Provider, Historical   naloxone (Narcan) 4 mg/actuation nasal spray Use 1 spray intranasally, then discard. Repeat with new spray every 2 min as needed for opioid overdose symptoms, alternating nostrils. 11/3/20   Ivana GREENFIELD, JEREMIE   polyethylene glycol (MIRALAX) 17 gram packet Take 17 g by mouth as needed for Constipation. Provider, Historical   trolamine salicylate (Aspercreme) 10 % lotion Apply  to affected area as needed. Provider, Historical   atorvastatin (LIPITOR) 80 mg tablet Take 1 Tab by mouth nightly. 10/19/20   Haleigh Perales MD   metoprolol tartrate (LOPRESSOR) 25 mg tablet Take 0.5 Tabs by mouth every twelve (12) hours. 3/13/19   Cullen Gee MD   docusate sodium (STOOL SOFTENER PO) Take  by mouth as needed. Provider, Historical   ferrous sulfate (IRON PO) Take 65 mg by mouth daily. Provider, Historical   aspirin delayed-release 81 mg tablet Take 81 mg by mouth daily.     Provider, Historical   timolol (TIMOPTIC-XE) 0.5 % ophthalmic gel-forming Administer 1 Drop to right eye daily. Provider, Historical   latanoprost (XALATAN) 0.005 % ophthalmic solution Administer 1 Drop to both eyes nightly. Indications: wide-angle glaucoma    Provider, Historical   fluticasone (FLONASE ALLERGY RELIEF) 50 mcg/actuation nasal spray 1 [place] by Both Nostrils route daily. Provider, Historical   acetaminophen (TYLENOL EXTRA STRENGTH) 500 mg tablet Take 1,000 mg by mouth every six (6) hours as needed for Pain. Provider, Historical   fexofenadine (ALLEGRA) 180 mg tablet Take 180 mg by mouth nightly. Papo Kirkpatrick MD   allopurinol (ZYLOPRIM) 100 mg tablet Take 100 mg by mouth daily. Papo Kirkpatrick MD   ALPRAZolam (XANAX) 0.25 mg tablet Take 0.25 mg by mouth daily as needed for Anxiety.     Papo Kirkpatrick MD       Review of symptoms:     POSITIVE= Bold  Negative = not bold  General:  fever, chills, sweats, generalized weakness, weight loss     loss of appetite   Eyes:    blurred vision, eye pain, double vision  ENT:    [place], sore throat, trouble swallowing  Respiratory:   cough, sputum, SOB  Cardiology:   chest pain, orthopnea, PND, edema  Gastrointestinal:  abdominal pain , N/V, diarrhea, constipation, melena or BRBPR  Genitourinary:  Urgency, dysuria, hematuria  Muskuloskeletal :  Joint redness, swelling or acute joint pain, myalgias  Hematology:  easy bruising, nose or gum bleeding  Dermatological: rash, ulceration  Endocrine:   Polyuria or polydipsia, heat or hold intolerance  Neurological:  Headache, focal motor or sensory changes     Speech difficulties, memory loss  Psychological: depression, agitation      Objective:   VITALS:    Patient Vitals for the past 24 hrs:   Temp Pulse Resp BP SpO2   11/01/22 0739 -- 97 19 (!) 149/71 96 %   11/01/22 0737 -- 97 19 (!) 140/67 95 %   11/01/22 0617 -- 100 17 (!) 201/94 --   11/01/22 0600 -- (!) 103 19 (!) 198/105 90 %   11/01/22 0545 -- 99 23 (!) 205/86 96 %   11/01/22 0454 99.6 °F (37.6 °C) -- -- -- --   22 0404 -- -- 16 (!) 202/101 98 %   22 0345 -- -- 18 (!) 209/81 99 %   22 0259 (!) 101.1 °F (38.4 °C) -- -- -- --   10/31/22 2246 99.7 °F (37.6 °C) 98 20 (!) 212/73 100 %     Temp (24hrs), Av.1 °F (37.8 °C), Min:99.6 °F (37.6 °C), Max:101.1 °F (38.4 °C)      O2 Device: None (Room air)    Wt Readings from Last 12 Encounters:   10/31/22 72.6 kg (160 lb)   10/30/22 72.6 kg (160 lb)   10/28/22 72.6 kg (160 lb)   22 68 kg (150 lb)   21 65.8 kg (145 lb)   12/10/21 70.3 kg (154 lb 15.7 oz)   10/26/21 70.3 kg (155 lb)   10/04/21 70.6 kg (155 lb 11.2 oz)   21 67.2 kg (148 lb 1.6 oz)   21 67.3 kg (148 lb 5.9 oz)   21 67.5 kg (148 lb 13 oz)   20 67.6 kg (149 lb 0.5 oz)         PHYSICAL EXAM:     I had a face to face encounter and independently examined this patient on 22  as outlined below:    General:    Alert, cooperative in no distress     HEENT: Normocephalic, atraumatic    PERRL, EOMI  Sclera no icterus    Nasal mucosa without masses or discharge  Hearing intact to voice    Oropharynx without erythema or exudate  No thrush  Pink MM  Neck:  No meningismus, trachea midline, no carotid bruits     Thyroid not enlarged, no nodules or tenderness  Lungs:   Clear to auscultation bilaterally. No wheezing or rales    No accessory muscle use or retractions. Heart:   Regular rate and rhythm,  no murmur or gallop. No LE edema     Dorsal pedis, Posterior tibial and radial pulses 2+ and symmetric  Abdomen:   Soft, non-tender. Not distended. Bowel sounds normal.     No masses, No Hepatosplenomegaly, No Rebound or guarding  Lymph nodes: No cervical or inguinal KRISHNA  Musculoskeletal:  No Joint swelling, erythema, warmth. No Cyanosis or clubbing  Skin:      No rashes  No Ulcers.       Not Jaundiced   No nodules or thickening    Capillary refill normal  Neurologic: Alert and oriented X 3, follows commands     Cranial nerves 2 to 12 intact    Symmetric motor strength bilaterally       LAB DATA REVIEWED:    Recent Results (from the past 12 hour(s))   POC LACTIC ACID    Collection Time: 11/01/22  3:19 AM   Result Value Ref Range    Lactic Acid (POC) 1.87 0.40 - 2.00 mmol/L   COVID-19 RAPID TEST    Collection Time: 11/01/22  3:22 AM   Result Value Ref Range    Specimen source Nasopharyngeal      COVID-19 rapid test Not detected NOTD     INFLUENZA A+B VIRAL AGS    Collection Time: 11/01/22  3:22 AM   Result Value Ref Range    Influenza A Antigen Negative NEG      Influenza B Antigen Negative NEG     CULTURE, BLOOD, PAIRED    Collection Time: 11/01/22  3:22 AM    Specimen: Blood   Result Value Ref Range    Special Requests: NO SPECIAL REQUESTS      Culture result: NO GROWTH AFTER 3 HOURS     CBC WITH AUTOMATED DIFF    Collection Time: 11/01/22  3:22 AM   Result Value Ref Range    WBC 8.9 3.6 - 11.0 K/uL    RBC 4.10 3.80 - 5.20 M/uL    HGB 12.5 11.5 - 16.0 g/dL    HCT 36.7 35.0 - 47.0 %    MCV 89.5 80.0 - 99.0 FL    MCH 30.5 26.0 - 34.0 PG    MCHC 34.1 30.0 - 36.5 g/dL    RDW 13.3 11.5 - 14.5 %    PLATELET 368 310 - 041 K/uL    MPV 9.0 8.9 - 12.9 FL    NRBC 0.0 0  WBC    ABSOLUTE NRBC 0.00 0.00 - 0.01 K/uL    NEUTROPHILS 70 32 - 75 %    LYMPHOCYTES 21 12 - 49 %    MONOCYTES 8 5 - 13 %    EOSINOPHILS 1 0 - 7 %    BASOPHILS 0 0 - 1 %    IMMATURE GRANULOCYTES 0 0.0 - 0.5 %    ABS. NEUTROPHILS 6.2 1.8 - 8.0 K/UL    ABS. LYMPHOCYTES 1.8 0.8 - 3.5 K/UL    ABS. MONOCYTES 0.7 0.0 - 1.0 K/UL    ABS. EOSINOPHILS 0.1 0.0 - 0.4 K/UL    ABS. BASOPHILS 0.0 0.0 - 0.1 K/UL    ABS. IMM.  GRANS. 0.0 0.00 - 0.04 K/UL    DF AUTOMATED     METABOLIC PANEL, COMPREHENSIVE    Collection Time: 11/01/22  3:22 AM   Result Value Ref Range    Sodium 132 (L) 136 - 145 mmol/L    Potassium 4.4 3.5 - 5.1 mmol/L    Chloride 100 97 - 108 mmol/L    CO2 25 21 - 32 mmol/L    Anion gap 7 5 - 15 mmol/L    Glucose 134 (H) 65 - 100 mg/dL    BUN 27 (H) 6 - 20 MG/DL    Creatinine 1.40 (H) 0.55 - 1.02 MG/DL BUN/Creatinine ratio 19 12 - 20      eGFR 36 (L) >60 ml/min/1.73m2    Calcium 9.9 8.5 - 10.1 MG/DL    Bilirubin, total 0.8 0.2 - 1.0 MG/DL    ALT (SGPT) 39 12 - 78 U/L    AST (SGOT) 33 15 - 37 U/L    Alk.  phosphatase 111 45 - 117 U/L    Protein, total 8.1 6.4 - 8.2 g/dL    Albumin 4.0 3.5 - 5.0 g/dL    Globulin 4.1 (H) 2.0 - 4.0 g/dL    A-G Ratio 1.0 (L) 1.1 - 2.2     TROPONIN-HIGH SENSITIVITY    Collection Time: 11/01/22  3:22 AM   Result Value Ref Range    Troponin-High Sensitivity 219 (HH) 0 - 51 ng/L   URINALYSIS W/ REFLEX CULTURE    Collection Time: 11/01/22  5:15 AM    Specimen: Urine   Result Value Ref Range    Color YELLOW/STRAW      Appearance CLEAR CLEAR      Specific gravity 1.018      pH (UA) 7.0 5.0 - 8.0      Protein 30 (A) NEG mg/dL    Glucose Negative NEG mg/dL    Ketone Negative NEG mg/dL    Bilirubin Negative NEG      Blood Negative NEG      Urobilinogen 1.0 0.2 - 1.0 EU/dL    Nitrites Negative NEG      Leukocyte Esterase TRACE (A) NEG      UA:UC IF INDICATED CULTURE NOT INDICATED BY UA RESULT      WBC 0-4 0 - 4 /hpf    RBC 0-5 0 - 5 /hpf    Epithelial cells MANY (A) FEW /lpf    Bacteria Negative NEG /hpf    Hyaline cast 0-2 0 - 2 /lpf   EKG, 12 LEAD, INITIAL    Collection Time: 11/01/22  5:43 AM   Result Value Ref Range    Ventricular Rate 98 BPM    Atrial Rate 98 BPM    P-R Interval 256 ms    QRS Duration 74 ms    Q-T Interval 316 ms    QTC Calculation (Bezet) 403 ms    Calculated P Axis 59 degrees    Calculated R Axis -14 degrees    Calculated T Axis 55 degrees    Diagnosis       Sinus rhythm with 1st degree AV block  Nonspecific ST and T wave abnormality  When compared with ECG of 06-APR-2022 14:20,  No significant change was found     TROPONIN-HIGH SENSITIVITY    Collection Time: 11/01/22  5:57 AM   Result Value Ref Range    Troponin-High Sensitivity 229 (HH) 0 - 51 ng/L       EKG as read by me shows      CT Results  (Last 48 hours)                 11/01/22 0426  CT HEAD WO CONT Final result Impression:  No acute findings. Extensive calcified pannus about the dens without   significant narrowing of the spinal canal at the C1 level. White matter changes   likely reflective of small vessel ischemic disease. Narrative:  EXAM: CT HEAD WO CONT       INDICATION: continued severe headache, sbp >200s       COMPARISON: CT brain 10/28/2022. CONTRAST: None. TECHNIQUE: Unenhanced CT of the head was performed using 5 mm images. Brain and   bone windows were generated. Coronal and sagittal reformats. CT dose reduction   was achieved through use of a standardized protocol tailored for this   examination and automatic exposure control for dose modulation. FINDINGS:   The ventricles and sulci are normal in size, shape and configuration. There is   no significant change in mild white matter hypodensities. There is no   intracranial hemorrhage, extra-axial collection, or mass effect. The basilar   cisterns are open. No CT evidence of acute infarct. The bone windows demonstrate soft tissue mineralization surrounding the dens   which significantly narrows the C1 level canal to 6.3 mm AP dimension and 2.2 cm   mediolateral dimension. no abnormalities. The visualized portions of the   paranasal sinuses and mastoid air cells are clear. CT HEAD WO CONT    Result Date: 11/1/2022  No acute findings. Extensive calcified pannus about the dens without significant narrowing of the spinal canal at the C1 level. White matter changes likely reflective of small vessel ischemic disease. XR CHEST PORT    Result Date: 11/1/2022  No acute process on portable chest.        I saw the patient personally, took a history and did a complete physical exam at the bedside. I performed complex decision making in coming up with a diagnostic and treatment plan for the patient. I reviewed the patient's past medical records, current laboratory and radiology results, and actual Xray films/EKG.  I have also discussed this case with the involved ED physician.     Care Plan discussed with:    Patient, Family, ED Doc    Risk of deterioration:  High    Total Time Coordinating Admission:     minutes    Total Critical Care Time:         Marina Denney MD

## 2022-11-01 NOTE — ED NOTES
This rn at bedside to assist ed provider with lumbar puncture. Procedure consent obtained and pt placed on pt chart.

## 2022-11-01 NOTE — ED NOTES
Patient went to commode to urinate   End up removing her IV lines   Patient is well and stable GCS15  Not in any form of distress   Report given to valentin RN   Will reinsert IV lines then transfer to room 39

## 2022-11-01 NOTE — ED NOTES
TRANSFER - IN REPORT:    Verbal report received from Salol (name) on [place]. Report consisted of patients Situation, Background, Assessment and   Recommendations(SBAR). Information from the following report(s) SBAR and ED Summary was reviewed with the receiving nurse. Opportunity for questions and clarification was provided. 0930 Hospitalist notified of inability to obtain LP, orders received for MRI and prn anxiety meds for claustrophobia     1215 Pt transfer to the CDU delayed, pt pulled out bilateral IV access while ambulating to bedside commode with assistance. One IV inserted, US tech at bedside for additional IV access.

## 2022-11-01 NOTE — PROGRESS NOTES
Pharmacy Antimicrobial Kinetic Dosing    Indication for Antimicrobials: CNS infection    Current Regimen of Each Antimicrobial:  Vancomycin 750 mg IV q12h (Start Date ; Day 1)  Ampicillin 2 g IV q8h (Start Date ; Day 1)  Ceftriaxone 2 g IV q12h (Start Date ; Day 1)  Acyclovir 550 mg IV q12h (Start Date ; Day 1)    Previous Antimicrobial Therapy:      Goal Level: AUC: 400-600 mg/hr/Liter/day    Date Dose & Interval Measured (mcg/mL) Predicted AUC/RAMÓN                       Date & time of next level: TBD    Dosing calculator used: [employer] calculator    Significant Positive Cultures:    blood: NGTD    Conditions for Dosing Consideration: None    Labs:  Recent Labs     11/01/22  0322 10/30/22  1449   CREA 1.40* 1.48*   BUN 27* 25*     Recent Labs     11/01/22  0322 10/30/22  1449   WBC 8.9 9.3     Temp (24hrs), Av.1 °F (37.8 °C), Min:99.6 °F (37.6 °C), Max:101.1 °F (38.4 °C)        Creatinine Clearance (mL/min):   CrCl (Adjusted Body Weight): 26.1 mL/min   If actual weight < IBW: CrCl (Actual Body Weight) 30.6    Impression/Plan:   Give vancomycin 1750 mg loading dose, then 750 mg q24h for projected AUC of 456 and trough of 15.3  Adjusted acyclovir to 550 mg q12h for renal function  Adjusted ampicillin to 2g q8h for renal function  Antimicrobial stop date TBD     Pharmacy will follow daily and adjust medications as appropriate for renal function and/or serum levels.     Thank you,  Keisha Tapia, PHARMD

## 2022-11-01 NOTE — CONSULTS
[facility]  [address]  [phone]  [employer]W. [employer]       CARDIOLOGY CONSULTATION       Date of  Admission: 11/1/2022  2:31 AM     Admission type:Emergency   Primary Care Physician: DANIELE Hester     Attending Provider: Adriana Galindo MD  Cardiology Provider: Marion Ann NOTE THAT WE CONFIRMED WITH THE REFERRING PHYSICIAN PRIOR TO SEEING THE PATIENT THAT THE PATIENT IS BEING REFERRED FOR INITIAL HOSPITAL EVALUATION AND FOR LONG-TERM ONGOING CARDIAC CARE    CC/REASON FOR CONSULT: Troponin Elevation      Subjective:     Taylor Cole is a 80 y.o. female admitted for Accelerated hypertension [I10]  High fever [R50.9]  Meningitis [G03.9]. Patient is a 51-year-old female with a history of hypertension, hyperlipidemia, carotid stenosis, atherosclerotic heart disease PTCA stenting 2018 of the LAD and circumflex, TIA, has been admitted for severe headaches, meningitis rule out, accelerated hypertension and sepsis. Cardiology has been consulted for an elevated troponin level. Patient presents in ED bed 39 awake alert orientated x3 states her headache has significantly improved since her admission. She reports her headache was frontal and also pointing to the back of her neck there were no exacerbating or alleviating factors. Preceding her hospitalization she denies any episodes of dyspnea on exertion or chest pain. Denies nausea vomiting or diaphoresis.       Patient Active Problem List    Diagnosis Date Noted    Meningitis 11/01/2022    High fever 11/01/2022    Accelerated hypertension 11/01/2022    Mixed hyperlipidemia 10/04/2021    Carotid stenosis, asymptomatic, right 12/16/2020    Carotid stenosis, symptomatic w/o infarct, left 11/02/2020    Bilateral carotid artery stenosis 10/19/2020    TIA (transient ischemic attack) 10/17/2020    High cholesterol 09/21/2020    Essential hypertension 12/28/2018 Coronary artery disease involving native coronary artery of native heart without angina pectoris 06/12/2018    S/P coronary artery stent placement 05/25/2018    Hx of diverticulitis of colon 05/18/2018    Rossi Leahy FNP  Past Medical History:   Diagnosis Date    CAD (coronary artery disease)     Diverticulitis     Glaucoma     Gout     HTN (hypertension) 5/18/2018    Hx of diverticulitis of colon 5/18/2018    Hyperlipidemia     Hypertension     Other ill-defined conditions(799.89)     high cholesterol    S/P coronary artery stent placement 05/25/2018    Stroke ([address] 10/2020    TIA      Social History     Socioeconomic History    Marital status: SINGLE   Tobacco Use    Smoking status: Never    Smokeless tobacco: Never   Vaping Use    Vaping Use: Never used   Substance and Sexual Activity    Alcohol use: No    Drug use: No    Sexual activity: Not Currently     Allergies   Allergen Reactions    Lisinopril Cough      Family History   Problem Relation Age of Onset    Stroke Mother     Diabetes Sister     No Known Problems Father       Current Facility-Administered Medications   Medication Dose Route Frequency    cefTRIAXone (ROCEPHIN) 2 g in 0.9% sodium chloride (MBP/ADV) 50 mL MBP  2 g IntraVENous Q12H    labetaloL (NORMODYNE;TRANDATE) injection 20 mg  20 mg IntraVENous NOW    hydrALAZINE (APRESOLINE) 20 mg/mL injection 20 mg  20 mg IntraVENous Q6H PRN    sodium chloride (NS) flush 5-40 mL  5-40 mL IntraVENous Q8H    sodium chloride (NS) flush 5-40 mL  5-40 mL IntraVENous PRN    acetaminophen (TYLENOL) tablet 650 mg  650 mg Oral Q6H PRN    Or    acetaminophen (TYLENOL) suppository 650 mg  650 mg Rectal Q6H PRN    polyethylene glycol (MIRALAX) packet 17 g  17 g Oral DAILY    bisacodyL (DULCOLAX) tablet 5 mg  5 mg Oral DAILY PRN    promethazine (PHENERGAN) tablet 12.5 mg  12.5 mg Oral Q6H PRN    Or    ondansetron (ZOFRAN) injection 4 mg  4 mg IntraVENous Q6H PRN    [START ON 11/2/2022] heparin (porcine) injection 5,000 Units  5,000 Units SubCUTAneous Q8H    acyclovir (ZOVIRAX) 550 mg in 0.9% sodium chloride 100 mL IVPB  10 mg/kg (Ideal) IntraVENous Q12H    [START ON 11/2/2022] vancomycin (VANCOCIN) 750 mg in 0.9% sodium chloride 250 mL ([id number])  750 mg IntraVENous Q24H    ampicillin (OMNIPEN) 2 g in 0.9% sodium chloride (MBP/ADV) 100 mL MBP  2 g IntraVENous Q8H    [START ON 11/2/2022] allopurinoL (ZYLOPRIM) tablet 100 mg  100 mg Oral DAILY    ALPRAZolam (XANAX) tablet 0.25 mg  0.25 mg Oral DAILY PRN    [START ON 11/2/2022] aspirin delayed-release tablet 81 mg  81 mg Oral DAILY    atorvastatin (LIPITOR) tablet 80 mg  80 mg Oral QHS    latanoprost (XALATAN) 0.005 % ophthalmic solution 1 Drop  1 Drop Both Eyes QHS    [START ON 11/2/2022] losartan (COZAAR) tablet 50 mg  50 mg Oral DAILY    metoprolol tartrate (LOPRESSOR) tablet 12.5 mg  12.5 mg Oral Q12H    [START ON 11/2/2022] timoloL maleate 0.5 % ophthalmic solution 1 Drop  1 Drop Right Eye DAILY     Current Outpatient Medications   Medication Sig    butalbital-acetaminophen-caff (FIORICET) -40 mg per capsule Take 1 Capsule by mouth every four (4) hours as needed for Headache.    losartan (COZAAR) 50 mg tablet Take 1 Tablet by mouth daily for 30 days. ezetimibe (ZETIA) 10 mg tablet TAKE 1 TABLET BY MOUTH EVERY DAY    cholecalciferol (VITAMIN D3) (5000 Units/125 mcg) tab tablet Take 5,000 Units by mouth daily. naloxone (Narcan) 4 mg/actuation nasal spray Use 1 spray intranasally, then discard. Repeat with new spray every 2 min as needed for opioid overdose symptoms, alternating nostrils. polyethylene glycol (MIRALAX) 17 gram packet Take 17 g by mouth as needed for Constipation. trolamine salicylate (Aspercreme) 10 % lotion Apply  to affected area as needed. atorvastatin (LIPITOR) 80 mg tablet Take 1 Tab by mouth nightly. metoprolol tartrate (LOPRESSOR) 25 mg tablet Take 0.5 Tabs by mouth every twelve (12) hours.     docusate sodium (STOOL SOFTENER PO) Take  by mouth as needed. ferrous sulfate (IRON PO) Take 65 mg by mouth daily. aspirin delayed-release 81 mg tablet Take 81 mg by mouth daily. timolol (TIMOPTIC-XE) 0.5 % ophthalmic gel-forming Administer 1 Drop to right eye daily. latanoprost (XALATAN) 0.005 % ophthalmic solution Administer 1 Drop to both eyes nightly. Indications: wide-angle glaucoma    fluticasone (FLONASE ALLERGY RELIEF) 50 mcg/actuation nasal spray 1 [place] by Both Nostrils route daily. acetaminophen (TYLENOL EXTRA STRENGTH) 500 mg tablet Take 1,000 mg by mouth every six (6) hours as needed for Pain. fexofenadine (ALLEGRA) 180 mg tablet Take 180 mg by mouth nightly. allopurinol (ZYLOPRIM) 100 mg tablet Take 100 mg by mouth daily. ALPRAZolam (XANAX) 0.25 mg tablet Take 0.25 mg by mouth daily as needed for Anxiety. Review of Symptoms:     Constitutional: Negative for chills, fever and weight loss or excessive fatigue  HENT: Negative for nosebleeds, difficulty swallowing or tissue swelling   Eyes: Negative for blurred vision, double vision, occular pain  Respiratory: Negative for cough, shortness of breath , wheezing. Gastrointestinal: Negative for abdominal pain, nausea/vomiting, blood in stool. Cardiovascular: Negative for chest pain, palpitations, orthopnea, leg swelling and PND. Skin: Negative for rash, persistent diaphoresis, persistent pruritis  Neurological: Negative for dizziness, loss of consciousness, slurred speech, initially positive for headache however has resolved . Psychiatric: Negative for significant anxiety, memory disturbance, change in mood.       Objective:      Visit Vitals  BP (!) 83/56   Pulse 83   Temp 98 °F (36.7 °C)   Resp 14   Ht 5' 4\" (1.626 m)   Wt 72.6 kg (160 lb)   SpO2 98%   BMI 27.46 kg/m²       Physical Exam     General: Well developed, in no acute distress, cooperative and alert  HEENT: No carotid bruits, noted right carotid endarterectomy scar, no JVD, trach is midline. Neck Supple, EOM intact. Negative for nuchal rigidity  Heart:  Normal S1/S2 negative S3 or S4. Regular, 2/6 systolic murmur, no gallop or rub. Respiratory: Clear bilaterally x 4, no wheezing or rales  Abdomen:   Soft, non-tender, no masses, bowel sounds are active. Extremities:  No edema, normal cap refill, no cyanosis, atraumatic. Neuro: A&Ox3, speech clear, moving all extremities x4  Skin: Skin color is normal. No rashes or lesions. Non diaphoretic  Vascular: 2+ pulses symmetric in all extremities    Data Review:   Recent Labs     11/01/22 0322 10/30/22  1449   WBC 8.9 9.3   HGB 12.5 12.1   HCT 36.7 36.2    313     Recent Labs     11/01/22 0322 10/30/22  1449   * 133*   K 4.4 4.8    101   CO2 25 26   * 113*   BUN 27* 25*   CREA 1.40* 1.48*   CA 9.9 9.6   ALB 4.0 3.8   TBILI 0.8 0.7   ALT 39 42     No results for input(s): CPK, CKMB, TROIQ in the last 72 hours. No lab exists for component: CKQMB, CPKMB, BMPP  No results for input(s): TROIQ, CPK, CKMB in the last 72 hours. Intake/Output Summary (Last 24 hours) at 11/1/2022 1420  Last data filed at 11/1/2022 1226  Gross per 24 hour   Intake 250 ml   Output --   Net 250 ml        Cardiographics    Telemetry:   ECG: NSR with 1st degree HB no acute process   Echocardiogram: 10/2021  LV: Estimated LVEF is 60 - 65%. Normal cavity size, wall thickness and systolic function (ejection fraction normal). Wall motion: normal. Mild (grade 1) left ventricular diastolic dysfunction. LA: Mildly dilated left atrium. AV: Probably trileaflet aortic valve. Moderate aortic valve leaflet calcification present with reduced excursion. Mild to moderate aortic valve stenosis is present. Mild aortic valve regurgitation is present. MV: Mild mitral valve regurgitation is present. TV: Mild tricuspid valve regurgitation is present. PV: Mild pulmonic valve regurgitation is present. PA: Mild pulmonary hypertension.  Pulmonary arterial systolic pressure is 44 mmHg. CXRAY:No acute process on portable chest.       Assessment:       Active Problems:    Meningitis (11/1/2022)      High fever (11/1/2022)      Accelerated hypertension (11/1/2022)         Plan:       ASHD: Hx PCI LAD/Lcx 5/2018. No acute process on EKG, troponin elevation 219, 229 in setting of accelerated HTN and sepsis. Proceed with ECHO. Carotid Artery Stenosis: Followed by [facility] Dr Ronal Bernard ASA/Statin    3. HTN: Resume antihypertensives per hospitalist    4. High Cholesterol: Continue high intensity statin     5. Aortic stenosis: 2/6 systolic murmur, repeat echo pending. Last year mild to moderate aortic stenosis with mild regurgitation. Patient is a 66-year-old female admitted for sepsis, history of atherosclerotic heart disease has minor troponin elevation 219-229 in the setting of accelerated hypertension sepsis. Continue to trend troponin, echocardiogram pending clinically no ischemic work-up needed at this time. We will continue to follow thank you for this consultation. Park Iqbal NP  CC: Alexandra Taylor, [address] [place] [address]. [facility]             Patient seen, examined by me personally. Plan discussed as detailed. Agree with note as outlined by  NP with modifications as noted. My independent physical exam reveals : Physical Exam  Vitals and nursing note reviewed. Cardiovascular:      Rate and Rhythm: Normal rate and regular rhythm. Heart sounds: Murmur heard. Pulmonary:      Breath sounds: Normal breath sounds. Musculoskeletal:      Right lower leg: No edema. Left lower leg: No edema. Skin:     General: Skin is warm. Neurological:      Mental Status: She is alert. Mental status is at baseline. Psychiatric:         Mood and Affect: Mood normal.        No additional findings noted. Agree with plan as outlined above with modifications as noted. Check echo.  No intervention needed at this time from cardiac stand point. Consider stress test if symptomatic.     Manny Craven MD

## 2022-11-01 NOTE — PROGRESS NOTES
Bedside shift change report given to [facility] [address] (oncoming nurse) by Neelam Bui RN (offgoing nurse). Report included the following information SBAR, Kardex, Procedure Summary, Intake/Output, MAR, Recent Results, and Cardiac Rhythm   .

## 2022-11-01 NOTE — ED PROVIDER NOTES
EMERGENCY DEPARTMENT HISTORY AND PHYSICAL EXAM      Date: 10/30/2022  Patient Name: Patricia Maddox    History of Presenting Illness     Chief Complaint   Patient presents with    Headache     Wheelchair via ems with cc of HA, pt was here Friday with same symptoms along with HTN. Pt has not taken any of her meds this AM, denies vision changes    Hypertension       History Provided By: Patient    HPI: Patricia Mdadox, 80 y.o. female with a past medical history significant for medical problems as stated below presents to the ED with cc of complaint of a gradual onset of headache over the last several days. Patient reports she was seen in the ER couple days ago and had basic blood work performed which was normal.  Patient reports she was prescribed a medication to take for headache but she was unable to fill the medications. She reports no fevers or chills, no neck stiffness, no other associated symptoms. She comes back because she is concerned about whether she should start the medication because it has potassium in it. She denies any chest pain, trouble breathing, vomiting, diarrhea or any other associated symptoms. There are no associated symptoms. No other exacerbating or ameliorating factors. PCP: DANIELE Fortune    No current facility-administered medications on file prior to encounter. Current Outpatient Medications on File Prior to Encounter   Medication Sig Dispense Refill    losartan (COZAAR) 50 mg tablet Take 1 Tablet by mouth daily for 30 days. 30 Tablet 0    ezetimibe (ZETIA) 10 mg tablet TAKE 1 TABLET BY MOUTH EVERY DAY 90 Tablet 1    cholecalciferol (VITAMIN D3) (5000 Units/125 mcg) tab tablet Take 5,000 Units by mouth daily.  naloxone (Narcan) 4 mg/actuation nasal spray Use 1 spray intranasally, then discard. Repeat with new spray every 2 min as needed for opioid overdose symptoms, alternating nostrils.  1 Each 0    polyethylene glycol (MIRALAX) 17 gram packet Take 17 g by mouth as needed for Constipation.  trolamine salicylate (Aspercreme) 10 % lotion Apply  to affected area as needed.  atorvastatin (LIPITOR) 80 mg tablet Take 1 Tab by mouth nightly. 30 Tab 1    metoprolol tartrate (LOPRESSOR) 25 mg tablet Take 0.5 Tabs by mouth every twelve (12) hours. 90 Tab 2    docusate sodium (STOOL SOFTENER PO) Take  by mouth as needed.  ferrous sulfate (IRON PO) Take 65 mg by mouth daily.  aspirin delayed-release 81 mg tablet Take 81 mg by mouth daily.  timolol (TIMOPTIC-XE) 0.5 % ophthalmic gel-forming Administer 1 Drop to right eye daily.  latanoprost (XALATAN) 0.005 % ophthalmic solution Administer 1 Drop to both eyes nightly. Indications: wide-angle glaucoma      fluticasone (FLONASE ALLERGY RELIEF) 50 mcg/actuation nasal spray 1 [place] by Both Nostrils route daily.  acetaminophen (TYLENOL EXTRA STRENGTH) 500 mg tablet Take 1,000 mg by mouth every six (6) hours as needed for Pain.  fexofenadine (ALLEGRA) 180 mg tablet Take 180 mg by mouth nightly.  allopurinol (ZYLOPRIM) 100 mg tablet Take 100 mg by mouth daily.  ALPRAZolam (XANAX) 0.25 mg tablet Take 0.25 mg by mouth daily as needed for Anxiety.          Past History     Past Medical History:  Past Medical History:   Diagnosis Date    CAD (coronary artery disease)     Diverticulitis     Glaucoma     Gout     HTN (hypertension) 5/18/2018    Hx of diverticulitis of colon 5/18/2018    Hyperlipidemia     Hypertension     Other ill-defined conditions(799.89)     high cholesterol    S/P coronary artery stent placement 05/25/2018    Stroke ([facility] [address] 10/2020    TIA       Past Surgical History:  Past Surgical History:   Procedure Laterality Date    HX CAROTID ENDARTERECTOMY Left 11/02/2020    HX HYSTERECTOMY      RI CARDIAC SURG PROCEDURE UNLIST  05/2018    x2 cardiac stent    RI COLONOSCOPY FLX DX W/COLLJ SPEC WHEN PFRMD  10/18/2013            Family History:  Family History Problem Relation Age of Onset    Stroke Mother     Diabetes Sister     No Known Problems Father        Social History:  Social History     Tobacco Use    Smoking status: Never    Smokeless tobacco: Never   Vaping Use    Vaping Use: Never used   Substance Use Topics    Alcohol use: No    Drug use: No       Allergies: Allergies   Allergen Reactions    Lisinopril Cough         Review of Systems   Review of Systems   Constitutional:  Negative for chills, diaphoresis, fatigue and fever. HENT:  Negative for ear pain and sore throat. Eyes:  Negative for pain and redness. Respiratory:  Negative for cough and shortness of breath. Cardiovascular:  Negative for chest pain and leg swelling. Gastrointestinal:  Negative for abdominal pain, diarrhea, nausea and vomiting. Endocrine: Negative for cold intolerance and heat intolerance. Genitourinary:  Negative for flank pain and hematuria. Musculoskeletal:  Negative for back pain and neck stiffness. Skin:  Negative for rash and wound. Neurological:  Positive for headaches. Negative for dizziness and syncope. All other systems reviewed and are negative. Physical Exam   Physical Exam  Vitals and nursing note reviewed. Constitutional:       General: She is not in acute distress. Appearance: She is well-developed. She is not ill-appearing. HENT:      Head: Normocephalic and atraumatic. Mouth/Throat:      Pharynx: No oropharyngeal exudate. Eyes:      Conjunctiva/sclera: Conjunctivae normal.      Pupils: Pupils are equal, round, and reactive to light. Cardiovascular:      Rate and Rhythm: Normal rate and regular rhythm. Heart sounds: No murmur heard. Pulmonary:      Effort: Pulmonary effort is normal. No respiratory distress. Breath sounds: Normal breath sounds. No wheezing. Abdominal:      General: Bowel sounds are normal. There is no distension. Palpations: Abdomen is soft. Tenderness:  There is no abdominal tenderness. Musculoskeletal:         General: No deformity. Normal range of motion. Cervical back: Normal range of motion and neck supple. No rigidity or tenderness. Skin:     General: Skin is warm and dry. Findings: No rash. Neurological:      Mental Status: She is alert and oriented to person, place, and time. Cranial Nerves: No cranial nerve deficit. Sensory: No sensory deficit. Motor: No weakness. Coordination: Coordination normal.      Gait: Gait normal.   Psychiatric:         Behavior: Behavior normal.       Diagnostic Study Results     Labs -     Recent Results (from the past 24 hour(s))   POC LACTIC ACID    Collection Time: 11/01/22  3:19 AM   Result Value Ref Range    Lactic Acid (POC) 1.87 0.40 - 2.00 mmol/L   COVID-19 RAPID TEST    Collection Time: 11/01/22  3:22 AM   Result Value Ref Range    Specimen source Nasopharyngeal      COVID-19 rapid test Not detected NOTD     INFLUENZA A+B VIRAL AGS    Collection Time: 11/01/22  3:22 AM   Result Value Ref Range    Influenza A Antigen Negative NEG      Influenza B Antigen Negative NEG     CULTURE, BLOOD, PAIRED    Collection Time: 11/01/22  3:22 AM    Specimen: Blood   Result Value Ref Range    Special Requests: NO SPECIAL REQUESTS      Culture result: NO GROWTH AFTER 3 HOURS     CBC WITH AUTOMATED DIFF    Collection Time: 11/01/22  3:22 AM   Result Value Ref Range    WBC 8.9 3.6 - 11.0 K/uL    RBC 4.10 3.80 - 5.20 M/uL    HGB 12.5 11.5 - 16.0 g/dL    HCT 36.7 35.0 - 47.0 %    MCV 89.5 80.0 - 99.0 FL    MCH 30.5 26.0 - 34.0 PG    MCHC 34.1 30.0 - 36.5 g/dL    RDW 13.3 11.5 - 14.5 %    PLATELET 237 980 - 842 K/uL    MPV 9.0 8.9 - 12.9 FL    NRBC 0.0 0  WBC    ABSOLUTE NRBC 0.00 0.00 - 0.01 K/uL    NEUTROPHILS 70 32 - 75 %    LYMPHOCYTES 21 12 - 49 %    MONOCYTES 8 5 - 13 %    EOSINOPHILS 1 0 - 7 %    BASOPHILS 0 0 - 1 %    IMMATURE GRANULOCYTES 0 0.0 - 0.5 %    ABS. NEUTROPHILS 6.2 1.8 - 8.0 K/UL    ABS.  LYMPHOCYTES 1.8 0.8 - 3.5 K/UL    ABS. MONOCYTES 0.7 0.0 - 1.0 K/UL    ABS. EOSINOPHILS 0.1 0.0 - 0.4 K/UL    ABS. BASOPHILS 0.0 0.0 - 0.1 K/UL    ABS. IMM. GRANS. 0.0 0.00 - 0.04 K/UL    DF AUTOMATED     METABOLIC PANEL, COMPREHENSIVE    Collection Time: 11/01/22  3:22 AM   Result Value Ref Range    Sodium 132 (L) 136 - 145 mmol/L    Potassium 4.4 3.5 - 5.1 mmol/L    Chloride 100 97 - 108 mmol/L    CO2 25 21 - 32 mmol/L    Anion gap 7 5 - 15 mmol/L    Glucose 134 (H) 65 - 100 mg/dL    BUN 27 (H) 6 - 20 MG/DL    Creatinine 1.40 (H) 0.55 - 1.02 MG/DL    BUN/Creatinine ratio 19 12 - 20      eGFR 36 (L) >60 ml/min/1.73m2    Calcium 9.9 8.5 - 10.1 MG/DL    Bilirubin, total 0.8 0.2 - 1.0 MG/DL    ALT (SGPT) 39 12 - 78 U/L    AST (SGOT) 33 15 - 37 U/L    Alk.  phosphatase 111 45 - 117 U/L    Protein, total 8.1 6.4 - 8.2 g/dL    Albumin 4.0 3.5 - 5.0 g/dL    Globulin 4.1 (H) 2.0 - 4.0 g/dL    A-G Ratio 1.0 (L) 1.1 - 2.2     TROPONIN-HIGH SENSITIVITY    Collection Time: 11/01/22  3:22 AM   Result Value Ref Range    Troponin-High Sensitivity 219 (HH) 0 - 51 ng/L   URINALYSIS W/ REFLEX CULTURE    Collection Time: 11/01/22  5:15 AM    Specimen: Urine   Result Value Ref Range    Color YELLOW/STRAW      Appearance CLEAR CLEAR      Specific gravity 1.018      pH (UA) 7.0 5.0 - 8.0      Protein 30 (A) NEG mg/dL    Glucose Negative NEG mg/dL    Ketone Negative NEG mg/dL    Bilirubin Negative NEG      Blood Negative NEG      Urobilinogen 1.0 0.2 - 1.0 EU/dL    Nitrites Negative NEG      Leukocyte Esterase TRACE (A) NEG      UA:UC IF INDICATED CULTURE NOT INDICATED BY UA RESULT      WBC 0-4 0 - 4 /hpf    RBC 0-5 0 - 5 /hpf    Epithelial cells MANY (A) FEW /lpf    Bacteria Negative NEG /hpf    Hyaline cast 0-2 0 - 2 /lpf   EKG, 12 LEAD, INITIAL    Collection Time: 11/01/22  5:43 AM   Result Value Ref Range    Ventricular Rate 98 BPM    Atrial Rate 98 BPM    P-R Interval 256 ms    QRS Duration 74 ms    Q-T Interval 316 ms    QTC Calculation (Bezet) 403 ms    Calculated P Axis 59 degrees    Calculated R Axis -14 degrees    Calculated T Axis 55 degrees    Diagnosis       Sinus rhythm with 1st degree AV block  Nonspecific ST and T wave abnormality  When compared with ECG of 06-APR-2022 14:20,  No significant change was found     TROPONIN-HIGH SENSITIVITY    Collection Time: 11/01/22  5:57 AM   Result Value Ref Range    Troponin-High Sensitivity 229 (HH) 0 - 51 ng/L       Radiologic Studies -   No orders to display     CT Results  (Last 48 hours)                 11/01/22 0426  CT HEAD WO CONT Final result    Impression:  No acute findings. Extensive calcified pannus about the dens without   significant narrowing of the spinal canal at the C1 level. White matter changes   likely reflective of small vessel ischemic disease. Narrative:  EXAM: CT HEAD WO CONT       INDICATION: continued severe headache, sbp >200s       COMPARISON: CT brain 10/28/2022. CONTRAST: None. TECHNIQUE: Unenhanced CT of the head was performed using 5 mm images. Brain and   bone windows were generated. Coronal and sagittal reformats. CT dose reduction   was achieved through use of a standardized protocol tailored for this   examination and automatic exposure control for dose modulation. FINDINGS:   The ventricles and sulci are normal in size, shape and configuration. There is   no significant change in mild white matter hypodensities. There is no   intracranial hemorrhage, extra-axial collection, or mass effect. The basilar   cisterns are open. No CT evidence of acute infarct. The bone windows demonstrate soft tissue mineralization surrounding the dens   which significantly narrows the C1 level canal to 6.3 mm AP dimension and 2.2 cm   mediolateral dimension. no abnormalities. The visualized portions of the   paranasal sinuses and mastoid air cells are clear.                  CXR Results  (Last 48 hours)                 11/01/22 0341  XR CHEST PORT Final result Impression:      No acute process on portable chest.           Narrative:  EXAM:  XR CHEST PORT       INDICATION: Chest pain. COMPARISON: Chest x-ray 2/21/2021. TECHNIQUE: Single portable chest AP view       FINDINGS: The cardiac silhouette is within normal limits. The pulmonary   vasculature is within normal limits. The lungs and pleural spaces are clear. The visualized bones and upper abdomen   are age-appropriate. Medical Decision Making   I am the first provider for this patient. I reviewed the vital signs, available nursing notes, past medical history, past surgical history, family history and social history. Vital Signs-Reviewed the patient's vital signs. No data found. Records Reviewed: Nursing records and medical records reviewed    MDM:  Pt resents with acute headache; afebrile with stable vitals; exam is without focal deficits. DDx: migraine, tension headache, cluster HA, stress, hypertensive urgency, dehydration. HA was gradual onset, pt neuro intact, no nausea/vomiting/focal weakness/sensory change to suggest CVA or ICH. Also pt is not immunocompromised, no fever, no focal weakness to suggest brain abscess. Therefore, no CT head. No neck stiffness, fever, AMS, photophobia to suggest meningitis. Neg kernig and Brudzinski. Therefore no LP    No jaw claudication, visual changes or temporal pain to suggest temporal arteritis, therefore no ESR/CRP    No eye pain, PERRL, no red eye to suggest glaucoma    No risk factors for CO    Will treat pain and reassess. Provider Notes (Medical Decision Making):   Patient is a 60-year-old female presenting with gradual onset of headache, elevated blood pressure after not starting new medications for blood pressure management. Her blood pressures responded in the ER to medications. She feels her headache is much better.   I have contacted her pharmacy to ensure we have the correct blood pressure medicine for her to start. She is going to follow-up very close with her primary care doctor. She has no sudden onset of headache so no concern for subarachnoid hemorrhage. She has no fever or stiff neck. Headache appears to be frontal and she has some nasal congestion as well. Her other tests are otherwise normal and she has no leukocytosis. Strict return precautions given. ED Course:   Initial assessment performed. The patients presenting problems have been discussed, and they are in agreement with the care plan formulated and outlined with them. I have encouraged them to ask questions as they arise throughout their visit. Medications Administered       butalbital-acetaminophen-caffeine (FIORICET, ESGIC) -40 mg per tablet 1 Tablet       Admin Date  10/30/2022 Action  Given Dose  1 Tablet Route  Oral Administered By  Michael Strauss              labetaloL (NORMODYNE;TRANDATE) injection 20 mg       Admin Date  10/30/2022 Action  Given Dose  20 mg Route  IntraVENous Administered By  Michael Strauss              losartan (COZAAR) tablet 50 mg       Admin Date  10/30/2022 Action  Given Dose  50 mg Route  Oral Administered By  Michael Strauss                        Critical Care:  None      Disposition:  10:42 AM  [place]'s  results have been reviewed with her. She has been counseled regarding her diagnosis. She verbally conveys understanding and agreement of the signs, symptoms, diagnosis, treatment and prognosis and additionally agrees to follow up as recommended with DANIELE Valencia in 24 - 48 hours. She also agrees with the care-plan and conveys that all of her questions have been answered.   I have also put together some discharge instructions for her that include: 1) educational information regarding their diagnosis, 2) how to care for their diagnosis at home, as well a 3) list of reasons why they would want to return to the ED prior to their follow-up appointment, should their condition change. DISCHARGE PLAN:  1. Discharge Medication List as of 10/30/2022  6:36 PM        2. Follow-up Information       Follow up With Specialties Details Why Contact Info    Sotero Taylor FNP Nurse Practitioner In 3 days For a follow-up evaluation. [id number]  [phone]      [facility] EMERGENCY DEPT Emergency Medicine In 2 days If symptoms worsen [address] [place] [address]  [phone]          3. Return to ED if worse     Diagnosis     Clinical Impression:   1. Hypertension, unspecified type    2. Nonintractable headache, unspecified chronicity pattern, unspecified headache type        Attestations:    Kayy Narvaez MD    Please note that this dictation was completed with [employer], the computer voice recognition software. Quite often unanticipated grammatical, syntax, homophones, and other interpretive errors are inadvertently transcribed by the computer software. Please disregard these errors. Please excuse any errors that have escaped final proofreading. Thank you.

## 2022-11-01 NOTE — ED PROVIDER NOTES
EMERGENCY DEPARTMENT HISTORY AND PHYSICAL EXAM      Date: 11/1/2022  Patient Name: Janes Chicas    History of Presenting Illness     Chief Complaint   Patient presents with    Headache     Patient brought in by EMS from home for a headache. Patient was seen here yesterday for headache and hypertension. Patient stated that the headache is worse today and she is still hypertensive. Patient does have an appointment to see her PCP tomorrow. Family should be on their way. Denies any dizziness or vision changes. Patient took a medication (unknown name) prescribed by the ED physician yesterday with no relief. Patient has no other complaints. Denies chest pain or dyspnea. History Provided By: Patient    HPI: Janes Chicas, 80 y.o. female with PMHx significant for coronary artery disease s/p stents, diverticulitis, hypertension, hyperlipidemia, presents to the ED with severe having/aching headache that has been present for the past 3 to 4 days. Headache is worst in her frontal area. It is a dull but severe. She denies any vision changes. Denies any dizziness, nausea, vomiting, slurred speech. Denies fevers or chills. Denies neck stiffness but does tell me that her headache radiates to her neck. She reports chronic numbness and tingling in both of her legs but no upper extremity numbness or tingling. Denies any chest pain or shortness of breath. She was initially seen in the ED on October 28. At that time she had head CT which did not show any acute findings. Lab work showed normal white blood cell count. Her blood pressure was markedly elevated. At that time her amlodipine was stopped and she was started on losartan 50 mg daily. Patient presented back to ED on October 30 complaining of severe headache and blood pressure again noted to be markedly elevated. On that visit, she was treated Fioricet and headache improved. She was also given labetalol IV and blood pressure also improved.   She returns today with continued headache and elevated blood pressure despite taking prescribed medications. PCP: DANIELE Florence    No current facility-administered medications on file prior to encounter. Current Outpatient Medications on File Prior to Encounter   Medication Sig Dispense Refill    butalbital-acetaminophen-caff (FIORICET) -40 mg per capsule Take 1 Capsule by mouth every four (4) hours as needed for Headache. 12 Capsule 0    losartan (COZAAR) 50 mg tablet Take 1 Tablet by mouth daily for 30 days. 30 Tablet 0    ezetimibe (ZETIA) 10 mg tablet TAKE 1 TABLET BY MOUTH EVERY DAY 90 Tablet 1    cholecalciferol (VITAMIN D3) (5000 Units/125 mcg) tab tablet Take 5,000 Units by mouth daily. naloxone (Narcan) 4 mg/actuation nasal spray Use 1 spray intranasally, then discard. Repeat with new spray every 2 min as needed for opioid overdose symptoms, alternating nostrils. 1 Each 0    polyethylene glycol (MIRALAX) 17 gram packet Take 17 g by mouth as needed for Constipation. trolamine salicylate (Aspercreme) 10 % lotion Apply  to affected area as needed. atorvastatin (LIPITOR) 80 mg tablet Take 1 Tab by mouth nightly. 30 Tab 1    metoprolol tartrate (LOPRESSOR) 25 mg tablet Take 0.5 Tabs by mouth every twelve (12) hours. 90 Tab 2    docusate sodium (STOOL SOFTENER PO) Take  by mouth as needed. ferrous sulfate (IRON PO) Take 65 mg by mouth daily. aspirin delayed-release 81 mg tablet Take 81 mg by mouth daily. timolol (TIMOPTIC-XE) 0.5 % ophthalmic gel-forming Administer 1 Drop to right eye daily. latanoprost (XALATAN) 0.005 % ophthalmic solution Administer 1 Drop to both eyes nightly. Indications: wide-angle glaucoma      fluticasone (FLONASE ALLERGY RELIEF) 50 mcg/actuation nasal spray 1 Dexter by Both Nostrils route daily. acetaminophen (TYLENOL EXTRA STRENGTH) 500 mg tablet Take 1,000 mg by mouth every six (6) hours as needed for Pain.       fexofenadine (ALLEGRA) 180 mg tablet Take 180 mg by mouth nightly. allopurinol (ZYLOPRIM) 100 mg tablet Take 100 mg by mouth daily. ALPRAZolam (XANAX) 0.25 mg tablet Take 0.25 mg by mouth daily as needed for Anxiety. Past History     Past Medical History:  Past Medical History:   Diagnosis Date    CAD (coronary artery disease)     Diverticulitis     Glaucoma     Gout     HTN (hypertension) 5/18/2018    Hx of diverticulitis of colon 5/18/2018    Hyperlipidemia     Hypertension     Other ill-defined conditions(799.89)     high cholesterol    S/P coronary artery stent placement 05/25/2018    Stroke ([address] 10/2020    TIA       Past Surgical History:  Past Surgical History:   Procedure Laterality Date    HX CAROTID ENDARTERECTOMY Left 11/02/2020    HX HYSTERECTOMY      MI CARDIAC SURG PROCEDURE UNLIST  05/2018    x2 cardiac stent    MI COLONOSCOPY FLX DX W/COLLJ SPEC WHEN PFRMD  10/18/2013            Family History:  Family History   Problem Relation Age of Onset    Stroke Mother     Diabetes Sister     No Known Problems Father        Social History:  Social History     Tobacco Use    Smoking status: Never    Smokeless tobacco: Never   Vaping Use    Vaping Use: Never used   Substance Use Topics    Alcohol use: No    Drug use: No       Allergies: Allergies   Allergen Reactions    Lisinopril Cough         Review of Systems   Review of Systems   Constitutional:  Negative for chills and fever. HENT: Negative. Respiratory:  Negative for cough, chest tightness, shortness of breath and wheezing. Cardiovascular:  Negative for chest pain. Gastrointestinal:  Negative for abdominal pain, diarrhea, nausea and vomiting. Genitourinary: Negative. Musculoskeletal: Negative. Skin:  Negative for rash and wound. Neurological:  Positive for numbness (chronic (unchanged) numbness bilateral lower extremities) and headaches. Negative for seizures, syncope, speech difficulty, weakness and light-headedness. Psychiatric/Behavioral:  Negative for confusion. The patient is nervous/anxious. All other systems reviewed and are negative.       Physical Exam   General appearance -elderly, well appearing, and in no distress  Eyes - pupils equal and reactive, extraocular eye movements intact  ENT - mucous membranes moist, pharynx normal without lesions  Neck - supple, no significant adenopathy; non-tender to palpation, no meningismus  Chest - clear to auscultation, no wheezes, rales or rhonchi; non-tender to palpation  Heart -mildly tachycardic, regular rhythm, S1 and S2 normal, no murmurs noted  Abdomen - soft, nontender, nondistended, no masses or organomegaly  Musculoskeletal - no joint tenderness, deformity or swelling; normal ROM  Extremities - peripheral pulses normal, no pedal edema  Skin -hot to touch, normal coloration and turgor, no rashes  Neurological - alert, oriented x3, normal speech, no focal findings or movement disorder noted    Diagnostic Study Results     Labs -     Recent Results (from the past 12 hour(s))   POC LACTIC ACID    Collection Time: 11/01/22  3:19 AM   Result Value Ref Range    Lactic Acid (POC) 1.87 0.40 - 2.00 mmol/L   COVID-19 RAPID TEST    Collection Time: 11/01/22  3:22 AM   Result Value Ref Range    Specimen source Nasopharyngeal      COVID-19 rapid test Not detected NOTD     INFLUENZA A+B VIRAL AGS    Collection Time: 11/01/22  3:22 AM   Result Value Ref Range    Influenza A Antigen Negative NEG      Influenza B Antigen Negative NEG     CULTURE, BLOOD, PAIRED    Collection Time: 11/01/22  3:22 AM    Specimen: Blood   Result Value Ref Range    Special Requests: NO SPECIAL REQUESTS      Culture result: NO GROWTH AFTER 3 HOURS     CBC WITH AUTOMATED DIFF    Collection Time: 11/01/22  3:22 AM   Result Value Ref Range    WBC 8.9 3.6 - 11.0 K/uL    RBC 4.10 3.80 - 5.20 M/uL    HGB 12.5 11.5 - 16.0 g/dL    HCT 36.7 35.0 - 47.0 %    MCV 89.5 80.0 - 99.0 FL    MCH 30.5 26.0 - 34.0 PG    MCHC 34.1 30.0 - 36.5 g/dL    RDW 13.3 11.5 - 14.5 %    PLATELET 040 052 - 080 K/uL    MPV 9.0 8.9 - 12.9 FL    NRBC 0.0 0  WBC    ABSOLUTE NRBC 0.00 0.00 - 0.01 K/uL    NEUTROPHILS 70 32 - 75 %    LYMPHOCYTES 21 12 - 49 %    MONOCYTES 8 5 - 13 %    EOSINOPHILS 1 0 - 7 %    BASOPHILS 0 0 - 1 %    IMMATURE GRANULOCYTES 0 0.0 - 0.5 %    ABS. NEUTROPHILS 6.2 1.8 - 8.0 K/UL    ABS. LYMPHOCYTES 1.8 0.8 - 3.5 K/UL    ABS. MONOCYTES 0.7 0.0 - 1.0 K/UL    ABS. EOSINOPHILS 0.1 0.0 - 0.4 K/UL    ABS. BASOPHILS 0.0 0.0 - 0.1 K/UL    ABS. IMM. GRANS. 0.0 0.00 - 0.04 K/UL    DF AUTOMATED     METABOLIC PANEL, COMPREHENSIVE    Collection Time: 11/01/22  3:22 AM   Result Value Ref Range    Sodium 132 (L) 136 - 145 mmol/L    Potassium 4.4 3.5 - 5.1 mmol/L    Chloride 100 97 - 108 mmol/L    CO2 25 21 - 32 mmol/L    Anion gap 7 5 - 15 mmol/L    Glucose 134 (H) 65 - 100 mg/dL    BUN 27 (H) 6 - 20 MG/DL    Creatinine 1.40 (H) 0.55 - 1.02 MG/DL    BUN/Creatinine ratio 19 12 - 20      eGFR 36 (L) >60 ml/min/1.73m2    Calcium 9.9 8.5 - 10.1 MG/DL    Bilirubin, total 0.8 0.2 - 1.0 MG/DL    ALT (SGPT) 39 12 - 78 U/L    AST (SGOT) 33 15 - 37 U/L    Alk.  phosphatase 111 45 - 117 U/L    Protein, total 8.1 6.4 - 8.2 g/dL    Albumin 4.0 3.5 - 5.0 g/dL    Globulin 4.1 (H) 2.0 - 4.0 g/dL    A-G Ratio 1.0 (L) 1.1 - 2.2     TROPONIN-HIGH SENSITIVITY    Collection Time: 11/01/22  3:22 AM   Result Value Ref Range    Troponin-High Sensitivity 219 (HH) 0 - 51 ng/L   URINALYSIS W/ REFLEX CULTURE    Collection Time: 11/01/22  5:15 AM    Specimen: Urine   Result Value Ref Range    Color YELLOW/STRAW      Appearance CLEAR CLEAR      Specific gravity 1.018      pH (UA) 7.0 5.0 - 8.0      Protein 30 (A) NEG mg/dL    Glucose Negative NEG mg/dL    Ketone Negative NEG mg/dL    Bilirubin Negative NEG      Blood Negative NEG      Urobilinogen 1.0 0.2 - 1.0 EU/dL    Nitrites Negative NEG      Leukocyte Esterase TRACE (A) NEG      UA:UC IF INDICATED CULTURE NOT INDICATED BY UA RESULT      WBC 0-4 0 - 4 /hpf    RBC 0-5 0 - 5 /hpf    Epithelial cells MANY (A) FEW /lpf    Bacteria Negative NEG /hpf    Hyaline cast 0-2 0 - 2 /lpf   EKG, 12 LEAD, INITIAL    Collection Time: 11/01/22  5:43 AM   Result Value Ref Range    Ventricular Rate 98 BPM    Atrial Rate 98 BPM    P-R Interval 256 ms    QRS Duration 74 ms    Q-T Interval 316 ms    QTC Calculation (Bezet) 403 ms    Calculated P Axis 59 degrees    Calculated R Axis -14 degrees    Calculated T Axis 55 degrees    Diagnosis       Sinus rhythm with 1st degree AV block  Nonspecific ST and T wave abnormality  When compared with ECG of 06-APR-2022 14:20,  No significant change was found     TROPONIN-HIGH SENSITIVITY    Collection Time: 11/01/22  5:57 AM   Result Value Ref Range    Troponin-High Sensitivity 229 (HH) 0 - 51 ng/L       Radiologic Studies -   CT HEAD WO CONT   Final Result   No acute findings. Extensive calcified pannus about the dens without   significant narrowing of the spinal canal at the C1 level. White matter changes   likely reflective of small vessel ischemic disease. XR CHEST PORT   Final Result      No acute process on portable chest.         XR SPINAL PUNC LUMB DX    (Results Pending)     CT Results  (Last 48 hours)                 11/01/22 0426  CT HEAD WO CONT Final result    Impression:  No acute findings. Extensive calcified pannus about the dens without   significant narrowing of the spinal canal at the C1 level. White matter changes   likely reflective of small vessel ischemic disease. Narrative:  EXAM: CT HEAD WO CONT       INDICATION: continued severe headache, sbp >200s       COMPARISON: CT brain 10/28/2022. CONTRAST: None. TECHNIQUE: Unenhanced CT of the head was performed using 5 mm images. Brain and   bone windows were generated. Coronal and sagittal reformats.  CT dose reduction   was achieved through use of a standardized protocol tailored for this   examination and automatic exposure control for dose modulation. FINDINGS:   The ventricles and sulci are normal in size, shape and configuration. There is   no significant change in mild white matter hypodensities. There is no   intracranial hemorrhage, extra-axial collection, or mass effect. The basilar   cisterns are open. No CT evidence of acute infarct. The bone windows demonstrate soft tissue mineralization surrounding the dens   which significantly narrows the C1 level canal to 6.3 mm AP dimension and 2.2 cm   mediolateral dimension. no abnormalities. The visualized portions of the   paranasal sinuses and mastoid air cells are clear. CXR Results  (Last 48 hours)                 11/01/22 0341  XR CHEST PORT Final result    Impression:      No acute process on portable chest.           Narrative:  EXAM:  XR CHEST PORT       INDICATION: Chest pain. COMPARISON: Chest x-ray 2/21/2021. TECHNIQUE: Single portable chest AP view       FINDINGS: The cardiac silhouette is within normal limits. The pulmonary   vasculature is within normal limits. The lungs and pleural spaces are clear. The visualized bones and upper abdomen   are age-appropriate. Medical Decision Making   I am the first provider for this patient. I reviewed the vital signs, available nursing notes, past medical history, past surgical history, family history and social history. Vital Signs-Reviewed the patient's vital signs.   Patient Vitals for the past 12 hrs:   Temp Pulse Resp BP SpO2   11/01/22 0739 -- 97 19 (!) 149/71 96 %   11/01/22 0737 -- 97 19 (!) 140/67 95 %   11/01/22 0617 -- 100 17 (!) 201/94 --   11/01/22 0600 -- (!) 103 19 (!) 198/105 90 %   11/01/22 0545 -- 99 23 (!) 205/86 96 %   11/01/22 0454 99.6 °F (37.6 °C) -- -- -- --   11/01/22 0404 -- -- 16 (!) 202/101 98 %   11/01/22 0345 -- -- 18 (!) 209/81 99 %   11/01/22 0259 (!) 101.1 °F (38.4 °C) -- -- -- --   10/31/22 2246 99.7 °F (37.6 °C) 98 20 (!) 212/73 100 %       EKG: Sinus rhythm with first-degree AV block, 98 bpm, normal axis, normal QRS and QTc intervals, nonspecific ST changes    Records Reviewed: Nursing Notes and Old Medical Records    Provider Notes (Medical Decision Making):   Patient presents to the ED with headache. This is her third visit for severe headache in the past 3 days. Blood pressure noted to be markedly elevated at 212/73. Patient also is febrile today with temp of 101.1 at the time of my exam.  Differential diagnosis includes viral syndrome, headache related to hypertension, UTI, meningitis, intracranial bleed  We will check CBC, CMP, troponin, UA, COVID, flu,  paired cultures. Consider LP given continued headache if no source for fever found on work-up. ED Course:   Initial assessment performed. The patients presenting problems have been discussed, and they are in agreement with the care plan formulated and outlined with them. I have encouraged them to ask questions as they arise throughout their visit. Progress Notes:  ED Course as of 11/01/22 0757   Tue Nov 01, 2022   0308 Personally checked patient's temperature and noted it to be 101.1 orally. We will send labs including paired cultures, lactate. We will check for COVID and flu. Will obtain chest x-ray and UA. [AO]   6680 Labs imaging reviewed. Head CT does not show any acute changes. CBC is unremarkable. CMP shows hyponatremia with sodium of 132. Patient has chronic kidney disease with creatinine of 1.4. Flu and COVID swabs are negative. UA does not show evidence of UTI. Chest x-ray is unremarkable. No definite source of fever found. Given continued severe headache and fever, will obtain LP to rule out meningitis. Procedure note: Attempted lumbar puncture   Indication: Severe headache, 3rd ED visit, now with fever of 101. Rule out meningitis  After consent obtained from patient, patient placed in sitting position.   Area cleaned with Betadine and anesthetized with 5 cc of 2% lidocaine. 20-gauge Quincke spinal needle used to attempt to obtain CSF.  2 attempts made without success. Procedure lasted 20 minutes. I have ordered LP under fluoroscopy guidance. Will treat with antibiotics for possible meningitis. AO]   [id number] Case discussed with hospitalist (Dr. Keaton Brito) via [employer]. He will see and admit patient. [AO]      ED Course User Index  [AO] Nancy Guzman MD       Disposition:  Admit to hospitalist      Diagnosis     Clinical Impression:   1. Sepsis, due to unspecified organism, unspecified whether acute organ dysfunction present ([facility] [address]    2. Nonintractable headache, unspecified chronicity pattern, unspecified headache type    3. Hypertension, unspecified type    4. Hyponatremia    5.  Elevated troponin

## 2022-11-01 NOTE — ED NOTES
Patient is well and stable GCS15  Kept comfortable lying down for 1 hour   IV antibiotic started infusing well   Due medications given

## 2022-11-01 NOTE — ED NOTES
Pt states that she has been here 3 times this week for h/a. When asked about h/a pt describes  The h/a to forehead as not bad (7/10) and then c/o sensation going from bilat ears going down , feels like a river running down it\". Pt staes she is not having any other symptoms, just the h/a. Alert an d oriented.   OSMANY BUNCH

## 2022-11-02 ENCOUNTER — APPOINTMENT (OUTPATIENT)
Dept: NON INVASIVE DIAGNOSTICS | Age: 87
DRG: 305 | End: 2022-11-02
Attending: INTERNAL MEDICINE
Payer: MEDICARE

## 2022-11-02 LAB
ALBUMIN SERPL-MCNC: 3.3 G/DL (ref 3.5–5)
ALBUMIN/GLOB SERPL: 1.1 {RATIO} (ref 1.1–2.2)
ALP SERPL-CCNC: 97 U/L (ref 45–117)
ALT SERPL-CCNC: 33 U/L (ref 12–78)
ANION GAP SERPL CALC-SCNC: 8 MMOL/L (ref 5–15)
AST SERPL-CCNC: 30 U/L (ref 15–37)
BASOPHILS # BLD: 0 K/UL (ref 0–0.1)
BASOPHILS NFR BLD: 1 % (ref 0–1)
BILIRUB SERPL-MCNC: 0.5 MG/DL (ref 0.2–1)
BUN SERPL-MCNC: 14 MG/DL (ref 6–20)
BUN/CREAT SERPL: 13 (ref 12–20)
CALCIUM SERPL-MCNC: 9.2 MG/DL (ref 8.5–10.1)
CHLORIDE SERPL-SCNC: 108 MMOL/L (ref 97–108)
CO2 SERPL-SCNC: 23 MMOL/L (ref 21–32)
CREAT SERPL-MCNC: 1.07 MG/DL (ref 0.55–1.02)
DIFFERENTIAL METHOD BLD: ABNORMAL
EOSINOPHIL # BLD: 0.4 K/UL (ref 0–0.4)
EOSINOPHIL NFR BLD: 6 % (ref 0–7)
ERYTHROCYTE [DISTWIDTH] IN BLOOD BY AUTOMATED COUNT: 13.4 % (ref 11.5–14.5)
GLOBULIN SER CALC-MCNC: 3.1 G/DL (ref 2–4)
GLUCOSE SERPL-MCNC: 100 MG/DL (ref 65–100)
HCT VFR BLD AUTO: 32.6 % (ref 35–47)
HGB BLD-MCNC: 10.6 G/DL (ref 11.5–16)
IMM GRANULOCYTES # BLD AUTO: 0 K/UL (ref 0–0.04)
IMM GRANULOCYTES NFR BLD AUTO: 0 % (ref 0–0.5)
LYMPHOCYTES # BLD: 1.9 K/UL (ref 0.8–3.5)
LYMPHOCYTES NFR BLD: 30 % (ref 12–49)
MCH RBC QN AUTO: 29.9 PG (ref 26–34)
MCHC RBC AUTO-ENTMCNC: 32.5 G/DL (ref 30–36.5)
MCV RBC AUTO: 92.1 FL (ref 80–99)
MONOCYTES # BLD: 0.7 K/UL (ref 0–1)
MONOCYTES NFR BLD: 11 % (ref 5–13)
NEUTS SEG # BLD: 3.3 K/UL (ref 1.8–8)
NEUTS SEG NFR BLD: 52 % (ref 32–75)
NRBC # BLD: 0 K/UL (ref 0–0.01)
NRBC BLD-RTO: 0 PER 100 WBC
PLATELET # BLD AUTO: 246 K/UL (ref 150–400)
PMV BLD AUTO: 8.5 FL (ref 8.9–12.9)
POTASSIUM SERPL-SCNC: 4.2 MMOL/L (ref 3.5–5.1)
PROCALCITONIN SERPL-MCNC: <0.05 NG/ML
PROT SERPL-MCNC: 6.4 G/DL (ref 6.4–8.2)
RBC # BLD AUTO: 3.54 M/UL (ref 3.8–5.2)
SODIUM SERPL-SCNC: 139 MMOL/L (ref 136–145)
TROPONIN-HIGH SENSITIVITY: 108 NG/L (ref 0–51)
WBC # BLD AUTO: 6.3 K/UL (ref 3.6–11)

## 2022-11-02 PROCEDURE — 97535 SELF CARE MNGMENT TRAINING: CPT

## 2022-11-02 PROCEDURE — 85025 COMPLETE CBC W/AUTO DIFF WBC: CPT

## 2022-11-02 PROCEDURE — 65270000046 HC RM TELEMETRY

## 2022-11-02 PROCEDURE — 84484 ASSAY OF TROPONIN QUANT: CPT

## 2022-11-02 PROCEDURE — 97116 GAIT TRAINING THERAPY: CPT

## 2022-11-02 PROCEDURE — 74011000258 HC RX REV CODE- 258: Performed by: INTERNAL MEDICINE

## 2022-11-02 PROCEDURE — 99223 1ST HOSP IP/OBS HIGH 75: CPT | Performed by: INTERNAL MEDICINE

## 2022-11-02 PROCEDURE — 84145 PROCALCITONIN (PCT): CPT

## 2022-11-02 PROCEDURE — 97165 OT EVAL LOW COMPLEX 30 MIN: CPT

## 2022-11-02 PROCEDURE — 74011250637 HC RX REV CODE- 250/637: Performed by: INTERNAL MEDICINE

## 2022-11-02 PROCEDURE — 76937 US GUIDE VASCULAR ACCESS: CPT

## 2022-11-02 PROCEDURE — 36415 COLL VENOUS BLD VENIPUNCTURE: CPT

## 2022-11-02 PROCEDURE — 74011250636 HC RX REV CODE- 250/636: Performed by: INTERNAL MEDICINE

## 2022-11-02 PROCEDURE — 74011000258 HC RX REV CODE- 258: Performed by: EMERGENCY MEDICINE

## 2022-11-02 PROCEDURE — 80053 COMPREHEN METABOLIC PANEL: CPT

## 2022-11-02 PROCEDURE — 74011250637 HC RX REV CODE- 250/637: Performed by: NURSE PRACTITIONER

## 2022-11-02 PROCEDURE — 74011000250 HC RX REV CODE- 250: Performed by: INTERNAL MEDICINE

## 2022-11-02 PROCEDURE — 97161 PT EVAL LOW COMPLEX 20 MIN: CPT

## 2022-11-02 PROCEDURE — 74011250636 HC RX REV CODE- 250/636: Performed by: EMERGENCY MEDICINE

## 2022-11-02 PROCEDURE — 94760 N-INVAS EAR/PLS OXIMETRY 1: CPT

## 2022-11-02 RX ORDER — TIMOLOL MALEATE 5 MG/ML
1 SOLUTION/ DROPS OPHTHALMIC DAILY
Status: DISCONTINUED | OUTPATIENT
Start: 2022-11-02 | End: 2022-11-05 | Stop reason: HOSPADM

## 2022-11-02 RX ORDER — LOSARTAN POTASSIUM 100 MG/1
100 TABLET ORAL DAILY
Status: DISCONTINUED | OUTPATIENT
Start: 2022-11-02 | End: 2022-11-05 | Stop reason: HOSPADM

## 2022-11-02 RX ADMIN — HYDRALAZINE HYDROCHLORIDE 20 MG: 20 INJECTION INTRAMUSCULAR; INTRAVENOUS at 16:09

## 2022-11-02 RX ADMIN — HEPARIN SODIUM 5000 UNITS: 5000 INJECTION INTRAVENOUS; SUBCUTANEOUS at 21:04

## 2022-11-02 RX ADMIN — ATORVASTATIN CALCIUM 80 MG: 40 TABLET, FILM COATED ORAL at 21:05

## 2022-11-02 RX ADMIN — ACYCLOVIR 550 MG: 50 INJECTION, SOLUTION INTRAVENOUS at 15:51

## 2022-11-02 RX ADMIN — HEPARIN SODIUM 5000 UNITS: 5000 INJECTION INTRAVENOUS; SUBCUTANEOUS at 14:53

## 2022-11-02 RX ADMIN — VANCOMYCIN HYDROCHLORIDE 750 MG: 750 INJECTION, POWDER, LYOPHILIZED, FOR SOLUTION INTRAVENOUS at 13:06

## 2022-11-02 RX ADMIN — SODIUM CHLORIDE, PRESERVATIVE FREE 10 ML: 5 INJECTION INTRAVENOUS at 20:49

## 2022-11-02 RX ADMIN — AMPICILLIN SODIUM 2 G: 2 INJECTION, POWDER, FOR SOLUTION INTRAVENOUS at 12:28

## 2022-11-02 RX ADMIN — LOSARTAN POTASSIUM 100 MG: 100 TABLET, FILM COATED ORAL at 12:33

## 2022-11-02 RX ADMIN — AMPICILLIN SODIUM 2 G: 2 INJECTION, POWDER, FOR SOLUTION INTRAVENOUS at 20:50

## 2022-11-02 RX ADMIN — ASPIRIN 81 MG: 81 TABLET, COATED ORAL at 12:28

## 2022-11-02 RX ADMIN — METOPROLOL TARTRATE 12.5 MG: 25 TABLET, FILM COATED ORAL at 21:04

## 2022-11-02 RX ADMIN — AMPICILLIN SODIUM 2 G: 2 INJECTION, POWDER, FOR SOLUTION INTRAVENOUS at 01:56

## 2022-11-02 RX ADMIN — SODIUM CHLORIDE, PRESERVATIVE FREE 10 ML: 5 INJECTION INTRAVENOUS at 13:16

## 2022-11-02 RX ADMIN — HEPARIN SODIUM 5000 UNITS: 5000 INJECTION INTRAVENOUS; SUBCUTANEOUS at 06:04

## 2022-11-02 RX ADMIN — CEFTRIAXONE 2 G: 2 INJECTION, POWDER, FOR SOLUTION INTRAMUSCULAR; INTRAVENOUS at 17:58

## 2022-11-02 RX ADMIN — SODIUM CHLORIDE, PRESERVATIVE FREE 10 ML: 5 INJECTION INTRAVENOUS at 06:05

## 2022-11-02 RX ADMIN — TIMOLOL MALEATE 1 DROP: 5 SOLUTION OPHTHALMIC at 12:33

## 2022-11-02 RX ADMIN — CEFTRIAXONE 2 G: 2 INJECTION, POWDER, FOR SOLUTION INTRAMUSCULAR; INTRAVENOUS at 06:04

## 2022-11-02 RX ADMIN — METOPROLOL TARTRATE 12.5 MG: 25 TABLET, FILM COATED ORAL at 12:29

## 2022-11-02 RX ADMIN — ACETAMINOPHEN 650 MG: 325 TABLET ORAL at 17:57

## 2022-11-02 RX ADMIN — LATANOPROST 1 DROP: 50 SOLUTION OPHTHALMIC at 22:10

## 2022-11-02 RX ADMIN — POLYETHYLENE GLYCOL 3350 17 G: 17 POWDER, FOR SOLUTION ORAL at 12:27

## 2022-11-02 NOTE — PROGRESS NOTES
Pharmacy Antimicrobial Kinetic Dosing    Indication for Antimicrobials: CNS infection    Current Regimen of Each Antimicrobial:  Vancomycin 750 mg IV q24h (Start Date ; Day 2)  Ampicillin 2 g IV q8h (Start Date ; Day 2)  Ceftriaxone 2 g IV q12h (Start Date ; Day 2)  Acyclovir 550 mg IV q12h (Start Date ; Day 2)    Previous Antimicrobial Therapy:      Goal Level: AUC: 400-600 mg/hr/Liter/day    Date Dose & Interval Measured (mcg/mL) Predicted AUC/RAMNÓ                       Date & time of next level: Vanc Tr 11/3, 0330    Dosing calculator used: [employer]    Significant Positive Cultures:    blood: NG. pending    Conditions for Dosing Consideration: None    Labs:  Recent Labs     11/02/22  0557 11/01/22  0322 10/30/22  1449   CREA 1.07* 1.40* 1.48*   BUN 14 27* 25*   PCT <0.05  --   --      Recent Labs     11/02/22  0557 11/01/22  0322 10/30/22  1449   WBC 6.3 8.9 9.3     Temp (24hrs), Av.2 °F (36.8 °C), Min:98 °F (36.7 °C), Max:98.5 °F (36.9 °C)    Creatinine Clearance (mL/min):   CrCl (Adjusted Body Weight): 34.1 mL/min   If actual weight < IBW: CrCl (Actual Body Weight) 40.0    Impression/Plan:   SCr is trending down. Increase Vancomycin to 500 mg IV q12h for an expected AUC/trough 475/17. Adjusted acyclovir to 550 mg q12h, and ampicillin to 2g q8h for renal function  Continue ceftriaxone  BMP daily  Antimicrobial stop date TBD     Pharmacy will follow daily and adjust medications as appropriate for renal function and/or serum levels.     Thank you,  Iftikhar Henry, PHARMD

## 2022-11-02 NOTE — PROGRESS NOTES
Endurance Catheter insertion note     Inserted 20 G, 8 cm long  Endurance Extended Dwell Peripheral Catheter into right upperarm using ultrasound guidance and sterile technique. Positive blood return. Patient tolerated procedure well. Denies questions or concerns at this time. The Endurance catheter is an extended dwell peripheral catheter and may remain in place 29 days. Blood samples can be obtained from this catheter. To obtain labs, a tourniquet may be used, flush with 10ml NS, waste 3ml, draw labs, flush with 20ml NS. Dressings needs to be changed with central line dressing kit using bio patch every 7 days by nurse. Primary nurse        RN notified.         [facility] [facility] Vascular Access Team. PICC Nurse

## 2022-11-02 NOTE — PROGRESS NOTES
Problem: Self Care Deficits Care Plan (Adult)  Goal: *Acute Goals and Plan of Care (Insert Text)  Description: FUNCTIONAL STATUS PRIOR TO ADMISSION:  pt's dtr and granddtr live with her in a one story home. She is normally mod I/independent with all activity - ADLs and amb with rollator per her report; has been recently to 6 weeks of out patient PT for back pain with B neuro foot deficits \"from my back\" Has been moving slowly due to back pain    HOME SUPPORT PRIOR TO ADMISSION: The patient lived with dtr but did not require assist except when out of home        Occupational Therapy Goals  Initiated 11/2/2022  1. Patient will perform bathing with supervision/set-up within 7 day(s). 2.  Patient will perform upper body dressing with modified independence within 7 day(s). 3.  Patient will perform lower body dressing with modified independence within 7 day(s). 4.  Patient will perform toilet transfers with modified independence within 7 day(s). 5.  Patient will perform all aspects of toileting with modified independence within 7 day(s). 6.  Patient will participate in upper extremity therapeutic exercise/activities with supervision/set-up for 5 minutes within 7 day(s). 7.  Patient will utilize energy conservation, fall prevention, back pain prevention techniques during functional activities with verbal cues within 7 day(s).    Outcome: Progressing Towards Goal   OCCUPATIONAL THERAPY EVALUATION  Patient: Shayna Adams (60 y.o. female)  Date: 11/2/2022  Primary Diagnosis: Accelerated hypertension [I10]  High fever [R50.9]  Meningitis [G03.9]       Precautions:   Fall, Bed Alarm, Spinal, Seizure, Contact (chronic back pain with recent PT for back pain/foot pain related to Back deficits; on contact for R/O meningitis)    ASSESSMENT  Based on the objective data described below, the patient presents with mild headache, much improved compared to admission, uncomfortable swooshing sound in B ears and HTN as noted below in supine, sitting and standing. Note high BP on admission. Able to follow commands consistently with increased time, occasional repetition, ([place] B). May need cog screen w/current dx to clarify cognition for discharge planning. Mild deficits in strength noted on L LE in standing, leaning to L slightly. Not able to self correct with VC. Will continue to follow for ongoing assessment of balance deficits. Family did not report notice of any change from her baseline     Current Level of Function Impacting Discharge (ADLs/self-care): set up- mod I UE ADLs, S-CGA LE ADLs and functional mobility limited by general debility, not feeling well. Functional Outcome Measure: The patient scored Total: 60/100 on the Barthel Index outcome measure which is indicative of being minimally independent in basic self-care. Other factors to consider for discharge:   Vitals:    11/02/22 0835 11/02/22 1013 11/02/22 1017 11/02/22 1021   BP: (!) 171/81 (!) 206/68 (!) 190/78 (!) 232/102   BP 1 Location: Left upper arm Left lower arm  Comment: per request of family Left lower arm Left lower arm   BP Patient Position: At rest Supine Sitting Standing   Pulse: 96 81 82 (!) 112   Temp: 98.2 °F (36.8 °C)      Resp: 19      Height:       Weight:       SpO2: 98%            Patient will benefit from skilled therapy intervention to address the above noted impairments. PLAN :  Recommendations and Planned Interventions: self care training, functional mobility training, therapeutic exercise, balance training, therapeutic activities, cognitive retraining, endurance activities, neuromuscular re-education, patient education, home safety training, and family training/education    Frequency/Duration: Patient will be followed by occupational therapy 4 times a week to address goals.     Recommendation for discharge: (in order for the patient to meet his/her long term goals)  Occupational therapy at least 2 days/week in the home AND ensure assist and/or supervision for safety with bathing, IADLs    This discharge recommendation:  A follow-up discussion with the attending provider and/or case management is planned    IF patient discharges home will need the following DME: grab bar for tub       SUBJECTIVE:   Patient stated Its doing it now.  (swooshing sound in B ears, upon return to bed post standing/semi-fowlers position)    OBJECTIVE DATA SUMMARY:   HISTORY:   Past Medical History:   Diagnosis Date    CAD (coronary artery disease)     Diverticulitis     Glaucoma     Gout     HTN (hypertension) 5/18/2018    Hx of diverticulitis of colon 5/18/2018    Hyperlipidemia     Hypertension     Other ill-defined conditions(799.89)     high cholesterol    S/P coronary artery stent placement 05/25/2018    Stroke ([facility] [address] 10/2020    TIA     Past Surgical History:   Procedure Laterality Date    HX CAROTID ENDARTERECTOMY Left 11/02/2020    HX HYSTERECTOMY      IN CARDIAC SURG PROCEDURE UNLIST  05/2018    x2 cardiac stent    IN COLONOSCOPY FLX DX W/COLLJ SPEC WHEN PFRMD  10/18/2013            Expanded or extensive additional review of patient history:     Home Situation  Home Environment: Private residence  # Steps to Enter: 4  Rails to Enter: Yes  Hand Rails : Bilateral  Wheelchair Ramp: Yes  One/Two Story Residence: One story  Living Alone: No  Support Systems: Child(jazlyn), Other Family Member(s) (dtr and granddtr live wtih her; someone their most all of the time)  Patient Expects to be Discharged to[de-identified] Home  Current DME Used/Available at Home: Cane, quad, Walker, rollator  Tub or Shower Type: Tub (no shower available, sits in bottom of tub)    Hand dominance: Right    EXAMINATION OF PERFORMANCE DEFICITS:  Cognitive/Behavioral Status:  Neurologic State: Alert; Appropriate for age  Orientation Level: Oriented X4  Cognition: Follows commands; Impulsive; recommend MoCA screening due to extended headache and high BP  Perception: Appears intact  Perseveration: No perseveration noted  Safety/Judgement: Fall prevention; Awareness of environment    Skin: intact    Edema: + B ankles    Hearing: Auditory  Auditory Impairment: Hard of hearing, bilateral (often does not hear you but nods and smiles)    Vision/Perceptual:                           Acuity: Impaired near vision; Impaired far vision (\"I need new glasses to see TV\" \"I wear only reading glasses now\")    Corrective Lenses: Glasses (wants new glasses)    Range of Motion:  B UE  AROM: Generally decreased, functional  PROM: Within functional limits                      Strength:  B UE  Strength: Generally decreased, functional   Decreased functional endurance             Coordination:  Coordination: Generally decreased, functional  Fine Motor Skills-Upper: Left Intact; Right Intact    Gross Motor Skills-Upper: Left Intact; Right Intact ([address] and [address] decline with fatigue, headache severity/pain)    Tone & Sensation:  Impaired B feet \"from my back they say\"  Tone: Normal  Sensation: Impaired (reports some tingling in feet)                      Balance:  Sitting: Intact  Standing: Impaired; With support  Standing - Static: Good;Constant support (fair without support)  Standing - Dynamic : Constant support; Fair    Functional Mobility and Transfers for ADLs:  Bed Mobility:  Rolling: Independent  Supine to Sit: Supervision  Sit to Supine: Supervision  Scooting: Supervision    Transfers:  Sit to Stand: Additional time; Adaptive equipment;Supervision (cues for safe hand placement/sequencing (x 3))  Stand to Sit: Supervision; Additional time; Adaptive equipment (cues for safe hand placement)  Bed to Chair: Contact guard assistance (inferred by [facility])  Toilet Transfer : Contact guard assistance  Tub Transfer:  (uses tub only PTA)    ADL Assessment:  Feeding: Modified independent    Oral Facial Hygiene/Grooming: Stand-by assistance    Bathing: Contact guard assistance    Type of Bath: Full;Chlorhexidine (CHG)    Upper Body Dressing: Setup    Lower Body Dressing: Stand-by assistance    Toileting: Contact guard assistance                ADL Intervention and task modifications:          Patient instructed and indicated understanding the benefits of maintaining activity tolerance, functional mobility, and independence with self care tasks during acute stay  to ensure safe return home and to baseline. Encouraged patient to increase frequency and duration OOB, be out of bed for all meals, perform daily ADLs (as approved by RN/MD regarding bathing etc), and performing functional mobility to/from bathroom or BSC with use of RW. Trained to NOT get up by herself due to NTB/BP meds etc.    Cognitive Retraining  Safety/Judgement: Fall prevention; Awareness of environment      Functional Measure:    Barthel Index:  Bathin  Bladder: 5  Bowels: 10  Groomin  Dressin  Feeding: 10  Mobility: 10  Stairs: 0  Toilet Use: 5  Transfer (Bed to Chair and Back): 10  Total: 60/100      The Barthel ADL Index: Guidelines  1. The index should be used as a record of what a patient does, not as a record of what a patient could do. 2. The main aim is to establish degree of independence from any help, physical or verbal, however minor and for whatever reason. 3. The need for supervision renders the patient not independent. 4. A patient's performance should be established using the best available evidence. Asking the patient, friends/relatives and nurses are the usual sources, but direct observation and common sense are also important. However direct testing is not needed. 5. Usually the patient's performance over the preceding 24-48 hours is important, but occasionally longer periods will be relevant. 6. Middle categories imply that the patient supplies over 50 per cent of the effort. 7. Use of aids to be independent is allowed.     Score Interpretation (from [address] [address])    Independent   60-79 Minimally independent   40-59 Partially dependent   20-39 Very dependent   <20 Totally dependent     -Maru Michelle, Barthel, D.W. (0285). Functional evaluation: the Barthel Index. [address] ([address] (1997). The Barthel activities of daily living index: self-reporting versus actual performance in the old (> or = 75 years). Journal of [address] [place] [address] 45(7), [facility], BOBBYF, Pebbles Jaramillo., Amey Lesch. (1999). Measuring the change in disability after inpatient rehabilitation; comparison of the responsiveness of the Barthel Index and Functional Ramsey Measure. Journal of Neurology, Neurosurgery, and Psychiatry, 66(4), 087-416. Ralph Abrams, PAT.[email or web address], OBEY Parham, & Clary Moreno MOBINNA. (2004) Assessment of post-stroke quality of life in cost-effectiveness studies: The usefulness of the Barthel Index and the EuroQoL-5D. Quality of Life Research, 15, 884-80     Occupational Therapy Evaluation Charge Determination   History Examination Decision-Making   LOW Complexity : Brief history review  MEDIUM Complexity : 3-5 performance deficits relating to physical, cognitive , or psychosocial skils that result in activity limitations and / or participation restrictions MEDIUM Complexity : Patient may present with comorbidities that affect occupational performnce. Miniml to moderate modification of tasks or assistance (eg, physical or verbal ) with assesment(s) is necessary to enable patient to complete evaluation       Based on the above components, the patient evaluation is determined to be of the following complexity level: LOW   Pain Rating:  Headache better but not quantified.     Activity Tolerance:   Fair, SpO2 stable on RA, requires frequent rest breaks, and elevated BP     After treatment patient left in no apparent distress:    Call bell within reach, Bed / chair alarm activated, Caregiver / family present, Side rails x 3, and semi fowlers position    COMMUNICATION/EDUCATION:   The patients plan of care was discussed with: Physical therapist and Registered nurse. Home safety education was provided and the patient/caregiver indicated understanding., Patient/family have participated as able in goal setting and plan of care. , and Patient/family agree to work toward stated goals and plan of care. This patients plan of care is appropriate for delegation to [facility].     Thank you for this referral.  Kalee Maxwell OTR/L  Time Calculation: 40 mins

## 2022-11-02 NOTE — PROGRESS NOTES
Reason for Admission:  Meningitis                     RUR Score:  13%                   Plan for utilizing home health: Agreeable         PCP: First and Last name:  Estrella Musa MD     Name of Practice:    Are you a current patient: Yes/No:    Approximate date of last visit: 10/28/22   Can you participate in a virtual visit with your PCP:                     Current Advanced Directive/Advance Care Plan: Full Code  CM confirmed patient is a Full Code    Healthcare Decision Maker:   Click here to complete [place] including selection of the Healthcare Decision Maker Relationship (ie \"Primary\")           DaughterMarychuy Melinda, [phone]                  Transition of Care Plan:                    Patient lives at home with her daughter, Janet Escalante. Patient uses a cane and rollator. Patient's daughter assists her with some care. Patient's family is her transport. Patient uses [facility] in [place], [place].   Current: Home with [facility]

## 2022-11-02 NOTE — PROGRESS NOTES
[facility]  [address]  [phone]  [email or web address]         Cardiology Progress Note      11/2/2022 7:43 AM    Admit Date: 11/1/2022    Admit Diagnosis:   Accelerated hypertension [I10]  High fever [R50.9]  Meningitis [G03.9]    Interval History/Subjective:     Fiona Lassiter has been doing well. No acute events overnight.   Has a mild headache this AM.    Visit Vitals  BP (!) 161/72   Pulse 90   Temp 98 °F (36.7 °C)   Resp 18   Ht 5' 4\" (1.626 m)   Wt 72.6 kg (160 lb)   SpO2 100%   Breastfeeding No   BMI 27.46 kg/m²       Current Facility-Administered Medications   Medication Dose Route Frequency    timolol (TIMOPTIC) 0.5 % ophthalmic solution 1 Drop  1 Drop Right Eye DAILY    cefTRIAXone (ROCEPHIN) 2 g in 0.9% sodium chloride (MBP/ADV) 50 mL MBP  2 g IntraVENous Q12H    hydrALAZINE (APRESOLINE) 20 mg/mL injection 20 mg  20 mg IntraVENous Q6H PRN    sodium chloride (NS) flush 5-40 mL  5-40 mL IntraVENous Q8H    sodium chloride (NS) flush 5-40 mL  5-40 mL IntraVENous PRN    acetaminophen (TYLENOL) tablet 650 mg  650 mg Oral Q6H PRN    Or    acetaminophen (TYLENOL) suppository 650 mg  650 mg Rectal Q6H PRN    polyethylene glycol (MIRALAX) packet 17 g  17 g Oral DAILY    bisacodyL (DULCOLAX) tablet 5 mg  5 mg Oral DAILY PRN    promethazine (PHENERGAN) tablet 12.5 mg  12.5 mg Oral Q6H PRN    Or    ondansetron (ZOFRAN) injection 4 mg  4 mg IntraVENous Q6H PRN    heparin (porcine) injection 5,000 Units  5,000 Units SubCUTAneous Q8H    acyclovir (ZOVIRAX) 550 mg in 0.9% sodium chloride 100 mL IVPB  10 mg/kg (Ideal) IntraVENous Q12H    vancomycin (VANCOCIN) 750 mg in 0.9% sodium chloride 250 mL ([id number])  750 mg IntraVENous Q24H    ampicillin (OMNIPEN) 2 g in 0.9% sodium chloride (MBP/ADV) 100 mL MBP  2 g IntraVENous Q8H    allopurinoL (ZYLOPRIM) tablet 100 mg  100 mg Oral DAILY    ALPRAZolam (XANAX) tablet 0.25 mg  0.25 mg Oral DAILY PRN aspirin delayed-release tablet 81 mg  81 mg Oral DAILY    atorvastatin (LIPITOR) tablet 80 mg  80 mg Oral QHS    latanoprost (XALATAN) 0.005 % ophthalmic solution 1 Drop  1 Drop Both Eyes QHS    losartan (COZAAR) tablet 50 mg  50 mg Oral DAILY    metoprolol tartrate (LOPRESSOR) tablet 12.5 mg  12.5 mg Oral Q12H       Objective:      Physical Exam:  General: Well developed, in no acute distress, cooperative and alert  HEENT: No carotid bruits, PEERL, EOM intact. Heart:  reg rate and rhythm; normal S1/S2; no murmurs  Respiratory: Clear bilaterally x 4, no wheezing or rales  Abdomen:   Soft, non-tender, no distention, no masses. + BS. Extremities:  Normal cap refill, no cyanosis, atraumatic. No edema. Neuro: A&Ox3, speech clear  Skin: Skin color is normal. Non diaphoretic  Vascular: 2+ pulses symmetric in all extremities    Data Review:   Recent Labs     11/02/22  0557 11/01/22  0322 10/30/22  1449   WBC 6.3 8.9 9.3   HGB 10.6* 12.5 12.1   HCT 32.6* 36.7 36.2    300 313     Recent Labs     11/02/22  0557 11/01/22 0322 10/30/22  1449    132* 133*   K 4.2 4.4 4.8    100 101   CO2 23 25 26    134* 113*   BUN 14 27* 25*   CREA 1.07* 1.40* 1.48*   CA 9.2 9.9 9.6   ALB 3.3* 4.0 3.8   TBILI 0.5 0.8 0.7   ALT 33 39 42       No results for input(s): TROIQ, CPK, CKMB in the last 72 hours. Intake/Output Summary (Last 24 hours) at 11/2/2022 0743  Last data filed at 11/2/2022 0602  Gross per 24 hour   Intake 600 ml   Output --   Net 600 ml        Telemetry: sinus rhythm. Overnight tele strips reviewed showing 2nd degree Type II block. SR while awake. Echocardiogram: pending    Radiology Results in the last 24 hours:  XR SPINAL PUNC LUMB DX    Result Date: 11/1/2022  Unsuccessful lumbar puncture. The patient has severe canal stenosis throughout the lumbar spine. AIR KERMA: 441.05 mGy. MRI BRAIN WO CONT    Result Date: 11/1/2022  No acute intracranial abnormality.  Chronic microangiopathic white matter disease. Assessment:     Active Problems:    Meningitis (11/1/2022)      High fever (11/1/2022)      Accelerated hypertension (11/1/2022)        Plan:     Troponin elevation  Peak at 229, now trending down  Likely in the setting of accelerated HTN with sepsis  EKG non-ischemic  No CP symptoms  Check echo    2. Mobitz Type II HB  Overnight intermittent Mobitz Type II HB, in SR during waking hours  Continue to monitor; avoid titration of BB therapy  May need to consider OP event monitoring    3. CAD  Hx of ANA MARÍA to LAD/Lcx in 5/2018  Continue medical management    4. Caroid artery stenosis  Followed by Dr. Rachel Rausch ASA/haroldo    5. HTN  Increase losartan 100 mg dialy    6. HLD  Continue statin therapy    7.   Aortic stenosis  Mild-mod AS with mild AR on echo last year  Repeat echo pending    Ryne Velazquez NP

## 2022-11-02 NOTE — PROGRESS NOTES
TRANSFER - OUT REPORT:    Verbal report given to Obdulio Galo (name) on Karo Shelton  being transferred to Neuroscience Telemetry (unit) for change in patient condition(needs neurological expertise and care)     Report consisted of patients Situation, Background, Assessment and   Recommendations(SBAR). Information from the following report(s) SBAR, Kardex, and MAR was reviewed with the receiving nurse. Lines:   Extended Dwell Peripheral IV (Active)   Criteria for Appropriate Use Limited/no vessel suitable for conventional peripheral access 11/02/22 1543   Site Assessment Clean, dry, & intact 11/02/22 1543   Phlebitis Assessment 0 11/02/22 1543   Infiltration Assessment 0 11/02/22 1543   External Catheter Length (cm) 0 centimeters 11/02/22 1543   Line Status Blood return noted 11/02/22 1543   Alcohol Cap Used Yes 11/02/22 1543   Date of Last Dressing Change 11/02/22 11/02/22 1543   Dressing Type Disk with Chlorhexadine gluconate (CHG); Transparent 11/02/22 1543   Dressing Status Clean, dry, & intact 11/02/22 1543   Dressing Intervention New dressing 11/02/22 1543        Opportunity for questions and clarification was provided.       Patient transported with:   Registered Nurse

## 2022-11-02 NOTE — PROGRESS NOTES
Problem: Mobility Impaired (Adult and Pediatric)  Goal: *Acute Goals and Plan of Care (Insert Text)  Description: FUNCTIONAL STATUS PRIOR TO ADMISSION: pt's dtr and granddtr live with her in a one story home. She is normally mod I/independent with all activity - ADLs and amb with rollator per her report    [address] [facility]: The patient lived with dtr but did not require assist except when out of home    Physical Therapy Goals  Initiated 11/2/2022  1. Patient will move from supine to sit and sit to supine , scoot up and down, and roll side to side in bed with modified independence within 7 day(s). 2.  Patient will transfer from bed to chair and chair to bed with modified independence using the least restrictive device within 7 day(s). 3.  Patient will perform sit to stand with modified independence within 7 day(s). 4.  Patient will ambulate with modified independence for 150 feet with the least restrictive device within 7 day(s). Outcome: Not Met   PHYSICAL THERAPY EVALUATION  Patient: Rufino Baldwin (9 y.o. female)  Date: 11/2/2022  Primary Diagnosis: Accelerated hypertension [I10]  High fever [R50.9]  Meningitis [G03.9]       Precautions: fall      ASSESSMENT  Based on the objective data described below, the patient presents with near baseline function and gait s/p adm with HA and hypertension. She reports HA is currently better and no c/o. She is able to come to the edge of the bed with S. She transfers to and from stand with SBA. She transfers to and from [facility] with CGA. She amb with rolling walker with CGA with no c/o. She uses a very slowed pace but states she does not walk fast at home either. She is likely very close to her baseline and does have A of family. She would like HHPT at d/c and may be benefitted by their education for safety and pacing and increasing overall strength to prevent falls.      Current Level of Function Impacting Discharge (mobility/balance): Overall CGA to S    Functional Outcome Measure: The patient scored 65/100 on the barthel outcome measure which is indicative of 35% functional impairment. Other factors to consider for discharge: good family support     Patient will benefit from skilled therapy intervention to address the above noted impairments. PLAN :  Recommendations and Planned Interventions: bed mobility training, transfer training, gait training, therapeutic exercises, patient and family training/education, and therapeutic activities      Frequency/Duration: Patient will be followed by physical therapy:  3 times a week to address goals. Recommendation for discharge: (in order for the patient to meet his/her long term goals)  Physical therapy at least 2 days/week in the home , family assist as needed    This discharge recommendation:  A follow-up discussion with the attending provider and/or case management is planned    IF patient discharges home will need the following DME: shower chair         SUBJECTIVE:   Patient stated It doesn't hurt right now.     OBJECTIVE DATA SUMMARY:   HISTORY:    Past Medical History:   Diagnosis Date    CAD (coronary artery disease)     Diverticulitis     Glaucoma     Gout     HTN (hypertension) 5/18/2018    Hx of diverticulitis of colon 5/18/2018    Hyperlipidemia     Hypertension     Other ill-defined conditions(799.89)     high cholesterol    S/P coronary artery stent placement 05/25/2018    Stroke ([facility] [address] 10/2020    TIA     Past Surgical History:   Procedure Laterality Date    HX CAROTID ENDARTERECTOMY Left 11/02/2020    HX HYSTERECTOMY      CO CARDIAC SURG PROCEDURE UNLIST  05/2018    x2 cardiac stent    CO COLONOSCOPY FLX DX W/COLLJ SPEC WHEN PFRMD  10/18/2013            Personal factors and/or comorbidities impacting plan of care:     Home Situation  Home Environment: Private residence  # Steps to Enter: 4  Rails to Enter: Yes  Hand Rails : Bilateral  Wheelchair Ramp: Yes  One/Two Story Residence: [facility] story  Living Alone: No  Support Systems: Child(jazlyn), Other Family Member(s) (dtr and granddtr live wtih her; someone their most all of the time)  Patient Expects to be Discharged to[de-identified] Home  Current DME Used/Available at Home: Ulices Mittal, quad, Walker, rollator  Tub or Shower Type: Tub/Shower combination    EXAMINATION/PRESENTATION/DECISION MAKING:   Critical Behavior:  Neurologic State: Alert  Orientation Level: Oriented X4  Cognition: Follows commands     Hearing: Auditory  Auditory Impairment: None    Range Of Motion:  AROM: Generally decreased, functional           PROM: Generally decreased, functional           Strength:    Strength: Generally decreased, functional                    Tone & Sensation:   Tone: Normal              Sensation: Impaired (reports some tingling in feet)               Coordination:  Coordination: Generally decreased, functional       Functional Mobility:  Bed Mobility:  Rolling: Independent  Supine to Sit: Supervision  Sit to Supine: Supervision  Scooting: Supervision  Transfers:  Sit to Stand: Stand-by assistance  Stand to Sit: Stand-by assistance        Bed to Chair: Contact guard assistance              Balance:   Sitting: Intact  Standing: Impaired  Standing - Static: Good;Constant support; Other (comment) (with walker)  Standing - Dynamic : Constant support;Good; Other (comment) (with walker)  Ambulation/Gait Training:  Distance (ft): 75 Feet (ft)  Assistive Device: Gait belt;Walker, rolling  Ambulation - Level of Assistance: Contact guard assistance        Gait Abnormalities: Decreased step clearance; Other (flexed posture)        Base of Support: Widened     Speed/Rhonda: Slow;Pace decreased (<100 feet/min)  Step Length: Right shortened;Left shortened          Functional Measure:  Barthel Index:    Bathin  Bladder: 5  Bowels: 10  Groomin  Dressin  Feeding: 10  Mobility: 10  Stairs: 0  Toilet Use: 5  Transfer (Bed to Chair and Back): 10  Total: 65/100       The Barthel ADL Index: Guidelines  1. The index should be used as a record of what a patient does, not as a record of what a patient could do. 2. The main aim is to establish degree of independence from any help, physical or verbal, however minor and for whatever reason. 3. The need for supervision renders the patient not independent. 4. A patient's performance should be established using the best available evidence. Asking the patient, friends/relatives and nurses are the usual sources, but direct observation and common sense are also important. However direct testing is not needed. 5. Usually the patient's performance over the preceding 24-48 hours is important, but occasionally longer periods will be relevant. 6. Middle categories imply that the patient supplies over 50 per cent of the effort. 7. Use of aids to be independent is allowed. Score Interpretation (from [address] [address])    Independent   60-79 Minimally independent   40-59 Partially dependent   20-39 Very dependent   <20 Totally dependent     -Ganesh Michelle., Barthel, D.W. (1965). Functional evaluation: the Barthel Index. [address] [facility] ([address] (1997). The Barthel activities of daily living index: self-reporting versus actual performance in the old (> or = 75 years). Journal of [address] [place] [address] 45(7), [facility], .LenoraM.F, Alia Skinner., Nancy Griffin. (1999). Measuring the change in disability after inpatient rehabilitation; comparison of the responsiveness of the Barthel Index and Functional [place] Measure. Journal of Neurology, Neurosurgery, and Psychiatry, 66(4), 561-631. CASS Camacho.JAIRO, OBEY Parham, & Ignacio Marsh MLenoraA. (2004) Assessment of post-stroke quality of life in cost-effectiveness studies: The usefulness of the Barthel Index and the EuroQoL-5D.  Quality of Life Research, 15, 860-78           Physical Therapy Evaluation Charge Determination   History Examination Presentation Decision-Making   HIGH Complexity :3+ comorbidities / personal factors will impact the outcome/ POC  HIGH Complexity : 4+ Standardized tests and measures addressing body structure, function, activity limitation and / or participation in recreation  LOW Complexity : Stable, uncomplicated  LOW Complexity : FOTO score of       Based on the above components, the patient evaluation is determined to be of the following complexity level: LOW     Pain Rating:  No c/o    Activity Tolerance:   Fair    After treatment patient left in no apparent distress:   Supine in bed, Call bell within reach, Bed / chair alarm activated, and Side rails x 3    COMMUNICATION/EDUCATION:   The patients plan of care was discussed with: Registered nurse and OT     Fall prevention education was provided and the patient/caregiver indicated understanding., Patient/family have participated as able in goal setting and plan of care. , and Patient/family agree to work toward stated goals and plan of care.     Thank you for this referral.  Anastacio Durham, PT   Time Calculation: 16 mins

## 2022-11-02 NOTE — PROGRESS NOTES
Hospitalist Progress Note    Subjective:   Daily Progress Note: 11/2/2022 1:20 PM    Hospital Course:  Patient was admitted with headaches. Was seen three times this week at hospital for similar. Past medical history of HTN, CAD status post stents, Hyperlipidemia, TIA 2020, right carotid endarterectomy 2020. Subjective: Patient observed resting in bed with eyes opened. Complains of headache. Denies complaints of chest pain, shortness of breath, palpitations or dizziness. No acute distress noted.     Current Facility-Administered Medications   Medication Dose Route Frequency    timolol (TIMOPTIC) 0.5 % ophthalmic solution 1 Drop  1 Drop Right Eye DAILY    losartan (COZAAR) tablet 100 mg  100 mg Oral DAILY    cefTRIAXone (ROCEPHIN) 2 g in 0.9% sodium chloride (MBP/ADV) 50 mL MBP  2 g IntraVENous Q12H    hydrALAZINE (APRESOLINE) 20 mg/mL injection 20 mg  20 mg IntraVENous Q6H PRN    sodium chloride (NS) flush 5-40 mL  5-40 mL IntraVENous Q8H    sodium chloride (NS) flush 5-40 mL  5-40 mL IntraVENous PRN    acetaminophen (TYLENOL) tablet 650 mg  650 mg Oral Q6H PRN    Or    acetaminophen (TYLENOL) suppository 650 mg  650 mg Rectal Q6H PRN    polyethylene glycol (MIRALAX) packet 17 g  17 g Oral DAILY    bisacodyL (DULCOLAX) tablet 5 mg  5 mg Oral DAILY PRN    promethazine (PHENERGAN) tablet 12.5 mg  12.5 mg Oral Q6H PRN    Or    ondansetron (ZOFRAN) injection 4 mg  4 mg IntraVENous Q6H PRN    heparin (porcine) injection 5,000 Units  5,000 Units SubCUTAneous Q8H    acyclovir (ZOVIRAX) 550 mg in 0.9% sodium chloride 100 mL IVPB  10 mg/kg (Ideal) IntraVENous Q12H    vancomycin (VANCOCIN) 750 mg in 0.9% sodium chloride 250 mL ([id number])  750 mg IntraVENous Q24H    ampicillin (OMNIPEN) 2 g in 0.9% sodium chloride (MBP/ADV) 100 mL MBP  2 g IntraVENous Q8H    allopurinoL (ZYLOPRIM) tablet 100 mg  100 mg Oral DAILY    ALPRAZolam (XANAX) tablet 0.25 mg  0.25 mg Oral DAILY PRN    aspirin delayed-release tablet 81 mg  81 mg Oral DAILY    atorvastatin (LIPITOR) tablet 80 mg  80 mg Oral QHS    latanoprost (XALATAN) 0.005 % ophthalmic solution 1 Drop  1 Drop Both Eyes QHS    metoprolol tartrate (LOPRESSOR) tablet 12.5 mg  12.5 mg Oral Q12H        Review of Systems:    Review of Systems   Constitutional: Negative. HENT: Negative. Eyes: Negative. Respiratory: Negative. Cardiovascular: Negative. Gastrointestinal: Negative. Genitourinary: Negative. Musculoskeletal: Negative. Skin: Negative. Neurological:  Positive for headaches. Endo/Heme/Allergies: Negative. Psychiatric/Behavioral: Negative. Objective:     Visit Vitals  BP (!) 166/86 (BP 1 Location: Left upper arm, BP Patient Position: At rest)   Pulse 85   Temp 98.1 °F (36.7 °C)   Resp 12   Ht 5' 4\" (1.626 m)   Wt 72.6 kg (160 lb)   SpO2 100%   Breastfeeding No   BMI 27.46 kg/m²      O2 Device: None (Room air)    Temp (24hrs), Av.2 °F (36.8 °C), Min:98 °F (36.7 °C), Max:98.5 °F (36.9 °C)      No intake/output data recorded. 10/31 1901 -  0700  In: 600 [I.V.:600]  Out: -     PHYSICAL EXAM:    Physical Exam  Constitutional:       Appearance: Normal appearance. HENT:      Head: Normocephalic and atraumatic. Right Ear: External ear normal.      Left Ear: External ear normal.      Nose: Nose normal.      Mouth/Throat:      Mouth: Mucous membranes are moist.   Eyes:      Pupils: Pupils are equal, round, and reactive to light. Cardiovascular:      Rate and Rhythm: Normal rate and regular rhythm. Pulses: Normal pulses. Heart sounds: Normal heart sounds. Comments: Telemetry: sinus rhythm  Pulmonary:      Effort: Pulmonary effort is normal.      Breath sounds: Normal breath sounds. Abdominal:      General: Bowel sounds are normal.   Musculoskeletal:         General: Normal range of motion. Cervical back: Normal range of motion and neck supple. Skin:     General: Skin is warm and dry.       Capillary Refill: Capillary refill takes 2 to 3 seconds. Neurological:      Mental Status: She is alert and oriented to person, place, and time. Psychiatric:         Mood and Affect: Mood normal.       Data Review    Recent Results (from the past 24 hour(s))   METABOLIC PANEL, COMPREHENSIVE    Collection Time: 11/02/22  5:57 AM   Result Value Ref Range    Sodium 139 136 - 145 mmol/L    Potassium 4.2 3.5 - 5.1 mmol/L    Chloride 108 97 - 108 mmol/L    CO2 23 21 - 32 mmol/L    Anion gap 8 5 - 15 mmol/L    Glucose 100 65 - 100 mg/dL    BUN 14 6 - 20 MG/DL    Creatinine 1.07 (H) 0.55 - 1.02 MG/DL    BUN/Creatinine ratio 13 12 - 20      eGFR 49 (L) >60 ml/min/1.73m2    Calcium 9.2 8.5 - 10.1 MG/DL    Bilirubin, total 0.5 0.2 - 1.0 MG/DL    ALT (SGPT) 33 12 - 78 U/L    AST (SGOT) 30 15 - 37 U/L    Alk. phosphatase 97 45 - 117 U/L    Protein, total 6.4 6.4 - 8.2 g/dL    Albumin 3.3 (L) 3.5 - 5.0 g/dL    Globulin 3.1 2.0 - 4.0 g/dL    A-G Ratio 1.1 1.1 - 2.2     CBC WITH AUTOMATED DIFF    Collection Time: 11/02/22  5:57 AM   Result Value Ref Range    WBC 6.3 3.6 - 11.0 K/uL    RBC 3.54 (L) 3.80 - 5.20 M/uL    HGB 10.6 (L) 11.5 - 16.0 g/dL    HCT 32.6 (L) 35.0 - 47.0 %    MCV 92.1 80.0 - 99.0 FL    MCH 29.9 26.0 - 34.0 PG    MCHC 32.5 30.0 - 36.5 g/dL    RDW 13.4 11.5 - 14.5 %    PLATELET 628 362 - 630 K/uL    MPV 8.5 (L) 8.9 - 12.9 FL    NRBC 0.0 0  WBC    ABSOLUTE NRBC 0.00 0.00 - 0.01 K/uL    NEUTROPHILS 52 32 - 75 %    LYMPHOCYTES 30 12 - 49 %    MONOCYTES 11 5 - 13 %    EOSINOPHILS 6 0 - 7 %    BASOPHILS 1 0 - 1 %    IMMATURE GRANULOCYTES 0 0.0 - 0.5 %    ABS. NEUTROPHILS 3.3 1.8 - 8.0 K/UL    ABS. LYMPHOCYTES 1.9 0.8 - 3.5 K/UL    ABS. MONOCYTES 0.7 0.0 - 1.0 K/UL    ABS. EOSINOPHILS 0.4 0.0 - 0.4 K/UL    ABS. BASOPHILS 0.0 0.0 - 0.1 K/UL    ABS. IMM.  GRANS. 0.0 0.00 - 0.04 K/UL    DF AUTOMATED     TROPONIN-HIGH SENSITIVITY    Collection Time: 11/02/22  5:57 AM   Result Value Ref Range    Troponin-High Sensitivity 108 (H) 0 - 51 ng/L PROCALCITONIN    Collection Time: 11/02/22  5:57 AM   Result Value Ref Range    Procalcitonin <0.05 ng/mL       MRI BRAIN WO CONT   Final Result   No acute intracranial abnormality. Chronic microangiopathic white matter disease. XR SPINAL PUNC LUMB DX   Final Result      Unsuccessful lumbar puncture. The patient has severe canal stenosis throughout   the lumbar spine. AIR KERMA: 441.05 mGy. CT HEAD WO CONT   Final Result   No acute findings. Extensive calcified pannus about the dens without   significant narrowing of the spinal canal at the C1 level. White matter changes   likely reflective of small vessel ischemic disease. XR CHEST PORT   Final Result      No acute process on portable chest.             Active Problems:    Meningitis (11/1/2022)      High fever (11/1/2022)      Accelerated hypertension (11/1/2022)        Assessment/Plan:   Sepsis/Severe Headache    - etiology possibly related to HTN vs. meningitis. - CT shows no acute findings. Extensive calcified pannus about the dens without significant narrowing of the spinal canal at the C1 level. White matter changes likely reflective of small vessel ischemic disease.    - MRI of brain shows no acute intracranial abnormality. Chronic microangiopathic white matter disease.     - afebrile/no leukocytosis     - chest xray shows no acute process     - rapid covid negative     - preliminary blood cultures show no growth at 1 day. - unsuccessful LP x 2 secondary to anatomy      - procalcitonin level <0.05     - continue antibiotics     - neurochecks     - ID and neurology consulted    2. HTN urgency/Elevated troponin      - history of CAD status post stents      - history of hyperlipidemia      - continue cozaar, BB, and prn hydralazine      - troponin 108, likely in setting of accelerated HTN and sepsis       - continue aspirin and statin      - echocardiogram pending      - cardiology following    3.  Stage 4 CKD- POA      - baseline creatinine 1.3 to 1.5      - Bun normal/Creat 1.07      - will monitor labwork    4. History of TA 2020 and Right CEA 2020      - continue aspirin and lipitor    PT/OT    Discharge disposition: pending hospital course. Being evaluated by cardiology, ID, and neurology.        DVT Prophylaxis: SCD  Code Status: Full  Full Code  POA: Agustín Belllers [phone]    Care Plan discussed with: patient, family, nursing, CM  _______________________________________________________________    Shelley Brunner, NP

## 2022-11-02 NOTE — PROGRESS NOTES
Occupational Therapy   Chart reviewed; OT eval completed; full note to follow; note HTN: PA, RN notified.  Millie Falk OTR/L    Vitals:    11/02/22 0835 11/02/22 1013 11/02/22 1017 11/02/22 1021   BP: (!) 171/81 (!) 206/68 (!) 190/78 (!) 232/102   BP 1 Location: Left upper arm Left lower arm  Comment: per request of family Left lower arm Left lower arm   BP Patient Position: At rest Supine Sitting Standing   Pulse: 96 81 82 (!) 112   Temp: 98.2 °F (36.8 °C)      Resp: 19      Height:       Weight:       SpO2: 98%

## 2022-11-03 ENCOUNTER — APPOINTMENT (OUTPATIENT)
Dept: NON INVASIVE DIAGNOSTICS | Age: 87
DRG: 305 | End: 2022-11-03
Attending: INTERNAL MEDICINE
Payer: MEDICARE

## 2022-11-03 LAB
ANION GAP SERPL CALC-SCNC: 10 MMOL/L (ref 5–15)
BASOPHILS # BLD: 0 K/UL (ref 0–0.1)
BASOPHILS NFR BLD: 0 % (ref 0–1)
BUN SERPL-MCNC: 18 MG/DL (ref 6–20)
BUN/CREAT SERPL: 9 (ref 12–20)
CALCIUM SERPL-MCNC: 9.3 MG/DL (ref 8.5–10.1)
CHLORIDE SERPL-SCNC: 105 MMOL/L (ref 97–108)
CO2 SERPL-SCNC: 23 MMOL/L (ref 21–32)
CREAT SERPL-MCNC: 2.07 MG/DL (ref 0.55–1.02)
DATE LAST DOSE: ABNORMAL
DIFFERENTIAL METHOD BLD: ABNORMAL
ECHO AR MAX VEL PISA: 4.4 M/S
ECHO AV AREA PEAK VELOCITY: 1.2 CM2
ECHO AV AREA VTI: 1.5 CM2
ECHO AV AREA/BSA PEAK VELOCITY: 0.7 CM2/M2
ECHO AV AREA/BSA VTI: 0.8 CM2/M2
ECHO AV MEAN GRADIENT: 7 MMHG
ECHO AV MEAN VELOCITY: 1.3 M/S
ECHO AV PEAK GRADIENT: 15 MMHG
ECHO AV PEAK VELOCITY: 1.9 M/S
ECHO AV REGURGITANT PHT: 388.3 MILLISECOND
ECHO AV VELOCITY RATIO: 0.47
ECHO AV VTI: 33.1 CM
ECHO EST RA PRESSURE: 10 MMHG
ECHO LA DIAMETER INDEX: 1.57 CM/M2
ECHO LA DIAMETER: 2.8 CM
ECHO LA VOL 4C: 29 ML (ref 22–52)
ECHO LA VOLUME INDEX A4C: 16 ML/M2 (ref 16–34)
ECHO LV E' LATERAL VELOCITY: 7 CM/S
ECHO LV E' SEPTAL VELOCITY: 6 CM/S
ECHO LV FRACTIONAL SHORTENING: 24 % (ref 28–44)
ECHO LV INTERNAL DIMENSION DIASTOLE INDEX: 2.08 CM/M2
ECHO LV INTERNAL DIMENSION DIASTOLIC: 3.7 CM (ref 3.9–5.3)
ECHO LV INTERNAL DIMENSION SYSTOLIC INDEX: 1.57 CM/M2
ECHO LV INTERNAL DIMENSION SYSTOLIC: 2.8 CM
ECHO LV IVSD: 1.1 CM (ref 0.6–0.9)
ECHO LV MASS 2D: 112.5 G (ref 67–162)
ECHO LV MASS INDEX 2D: 63.2 G/M2 (ref 43–95)
ECHO LV POSTERIOR WALL DIASTOLIC: 0.9 CM (ref 0.6–0.9)
ECHO LV RELATIVE WALL THICKNESS RATIO: 0.49
ECHO LVOT AREA: 2.5 CM2
ECHO LVOT AV VTI INDEX: 0.58
ECHO LVOT DIAM: 1.8 CM
ECHO LVOT MEAN GRADIENT: 2 MMHG
ECHO LVOT PEAK GRADIENT: 3 MMHG
ECHO LVOT PEAK VELOCITY: 0.9 M/S
ECHO LVOT STROKE VOLUME INDEX: 27.3 ML/M2
ECHO LVOT SV: 48.6 ML
ECHO LVOT VTI: 19.1 CM
ECHO MV A VELOCITY: 0.91 M/S
ECHO MV AREA VTI: 1.6 CM2
ECHO MV E VELOCITY: 0.24 M/S
ECHO MV E/A RATIO: 0.26
ECHO MV E/E' LATERAL: 3.43
ECHO MV E/E' RATIO (AVERAGED): 3.71
ECHO MV E/E' SEPTAL: 4
ECHO MV LVOT VTI INDEX: 1.58
ECHO MV MAX VELOCITY: 1 M/S
ECHO MV MEAN GRADIENT: 2 MMHG
ECHO MV MEAN VELOCITY: 0.6 M/S
ECHO MV PEAK GRADIENT: 4 MMHG
ECHO MV VTI: 30.2 CM
ECHO PV MAX VELOCITY: 1.1 M/S
ECHO PV PEAK GRADIENT: 5 MMHG
ECHO RIGHT VENTRICULAR SYSTOLIC PRESSURE (RVSP): 43 MMHG
ECHO RV FREE WALL PEAK S': 12 CM/S
ECHO RV TAPSE: 1.4 CM (ref 1.7–?)
ECHO TV REGURGITANT MAX VELOCITY: 2.86 M/S
ECHO TV REGURGITANT PEAK GRADIENT: 33 MMHG
EOSINOPHIL # BLD: 0.3 K/UL (ref 0–0.4)
EOSINOPHIL NFR BLD: 3 % (ref 0–7)
ERYTHROCYTE [DISTWIDTH] IN BLOOD BY AUTOMATED COUNT: 13.4 % (ref 11.5–14.5)
GLUCOSE SERPL-MCNC: 125 MG/DL (ref 65–100)
HCT VFR BLD AUTO: 32.4 % (ref 35–47)
HGB BLD-MCNC: 10.6 G/DL (ref 11.5–16)
IMM GRANULOCYTES # BLD AUTO: 0 K/UL (ref 0–0.04)
IMM GRANULOCYTES NFR BLD AUTO: 0 % (ref 0–0.5)
LYMPHOCYTES # BLD: 1.9 K/UL (ref 0.8–3.5)
LYMPHOCYTES NFR BLD: 20 % (ref 12–49)
MCH RBC QN AUTO: 29.7 PG (ref 26–34)
MCHC RBC AUTO-ENTMCNC: 32.7 G/DL (ref 30–36.5)
MCV RBC AUTO: 90.8 FL (ref 80–99)
MONOCYTES # BLD: 0.9 K/UL (ref 0–1)
MONOCYTES NFR BLD: 10 % (ref 5–13)
NEUTS SEG # BLD: 6.2 K/UL (ref 1.8–8)
NEUTS SEG NFR BLD: 67 % (ref 32–75)
NRBC # BLD: 0 K/UL (ref 0–0.01)
NRBC BLD-RTO: 0 PER 100 WBC
PLATELET # BLD AUTO: 284 K/UL (ref 150–400)
PMV BLD AUTO: 9.3 FL (ref 8.9–12.9)
POTASSIUM SERPL-SCNC: 4 MMOL/L (ref 3.5–5.1)
RBC # BLD AUTO: 3.57 M/UL (ref 3.8–5.2)
REPORTED DOSE,DOSE: ABNORMAL UNITS
REPORTED DOSE/TIME,TMG: 1300
SODIUM SERPL-SCNC: 138 MMOL/L (ref 136–145)
VANCOMYCIN TROUGH SERPL-MCNC: 15.2 UG/ML (ref 5–10)
WBC # BLD AUTO: 9.3 K/UL (ref 3.6–11)

## 2022-11-03 PROCEDURE — 80048 BASIC METABOLIC PNL TOTAL CA: CPT

## 2022-11-03 PROCEDURE — 93306 TTE W/DOPPLER COMPLETE: CPT

## 2022-11-03 PROCEDURE — 74011250636 HC RX REV CODE- 250/636: Performed by: INTERNAL MEDICINE

## 2022-11-03 PROCEDURE — 74011250637 HC RX REV CODE- 250/637: Performed by: NURSE PRACTITIONER

## 2022-11-03 PROCEDURE — 74011000258 HC RX REV CODE- 258: Performed by: EMERGENCY MEDICINE

## 2022-11-03 PROCEDURE — 80202 ASSAY OF VANCOMYCIN: CPT

## 2022-11-03 PROCEDURE — 74011000258 HC RX REV CODE- 258: Performed by: INTERNAL MEDICINE

## 2022-11-03 PROCEDURE — 74011250636 HC RX REV CODE- 250/636: Performed by: EMERGENCY MEDICINE

## 2022-11-03 PROCEDURE — 74011000250 HC RX REV CODE- 250: Performed by: INTERNAL MEDICINE

## 2022-11-03 PROCEDURE — 65270000046 HC RM TELEMETRY

## 2022-11-03 PROCEDURE — 74011250637 HC RX REV CODE- 250/637: Performed by: INTERNAL MEDICINE

## 2022-11-03 PROCEDURE — 94760 N-INVAS EAR/PLS OXIMETRY 1: CPT

## 2022-11-03 PROCEDURE — 85025 COMPLETE CBC W/AUTO DIFF WBC: CPT

## 2022-11-03 PROCEDURE — 99223 1ST HOSP IP/OBS HIGH 75: CPT | Performed by: PSYCHIATRY & NEUROLOGY

## 2022-11-03 PROCEDURE — 36415 COLL VENOUS BLD VENIPUNCTURE: CPT

## 2022-11-03 RX ORDER — GABAPENTIN 100 MG/1
200 CAPSULE ORAL 3 TIMES DAILY
Status: DISCONTINUED | OUTPATIENT
Start: 2022-11-03 | End: 2022-11-05 | Stop reason: HOSPADM

## 2022-11-03 RX ADMIN — LATANOPROST 1 DROP: 50 SOLUTION OPHTHALMIC at 21:04

## 2022-11-03 RX ADMIN — SODIUM CHLORIDE, PRESERVATIVE FREE 10 ML: 5 INJECTION INTRAVENOUS at 18:16

## 2022-11-03 RX ADMIN — ASPIRIN 81 MG: 81 TABLET, COATED ORAL at 09:09

## 2022-11-03 RX ADMIN — HEPARIN SODIUM 5000 UNITS: 5000 INJECTION INTRAVENOUS; SUBCUTANEOUS at 06:54

## 2022-11-03 RX ADMIN — VANCOMYCIN HYDROCHLORIDE 500 MG: 500 INJECTION, POWDER, LYOPHILIZED, FOR SOLUTION INTRAVENOUS at 06:54

## 2022-11-03 RX ADMIN — HEPARIN SODIUM 5000 UNITS: 5000 INJECTION INTRAVENOUS; SUBCUTANEOUS at 21:00

## 2022-11-03 RX ADMIN — LOSARTAN POTASSIUM 100 MG: 100 TABLET, FILM COATED ORAL at 09:09

## 2022-11-03 RX ADMIN — GABAPENTIN 200 MG: 100 CAPSULE ORAL at 18:05

## 2022-11-03 RX ADMIN — CEFTRIAXONE 2 G: 2 INJECTION, POWDER, FOR SOLUTION INTRAMUSCULAR; INTRAVENOUS at 18:06

## 2022-11-03 RX ADMIN — GABAPENTIN 200 MG: 100 CAPSULE ORAL at 21:00

## 2022-11-03 RX ADMIN — ALLOPURINOL 100 MG: 100 TABLET ORAL at 09:09

## 2022-11-03 RX ADMIN — POLYETHYLENE GLYCOL 3350 17 G: 17 POWDER, FOR SOLUTION ORAL at 09:09

## 2022-11-03 RX ADMIN — CEFTRIAXONE 2 G: 2 INJECTION, POWDER, FOR SOLUTION INTRAMUSCULAR; INTRAVENOUS at 09:09

## 2022-11-03 RX ADMIN — HEPARIN SODIUM 5000 UNITS: 5000 INJECTION INTRAVENOUS; SUBCUTANEOUS at 13:26

## 2022-11-03 RX ADMIN — METOPROLOL TARTRATE 12.5 MG: 25 TABLET, FILM COATED ORAL at 09:09

## 2022-11-03 RX ADMIN — ACYCLOVIR 550 MG: 50 INJECTION, SOLUTION INTRAVENOUS at 05:48

## 2022-11-03 RX ADMIN — AMPICILLIN SODIUM 2 G: 2 INJECTION, POWDER, FOR SOLUTION INTRAVENOUS at 03:38

## 2022-11-03 RX ADMIN — TIMOLOL MALEATE 1 DROP: 5 SOLUTION OPHTHALMIC at 09:20

## 2022-11-03 RX ADMIN — AMPICILLIN SODIUM 2 G: 2 INJECTION, POWDER, FOR SOLUTION INTRAVENOUS at 13:26

## 2022-11-03 RX ADMIN — METOPROLOL TARTRATE 12.5 MG: 25 TABLET, FILM COATED ORAL at 21:00

## 2022-11-03 RX ADMIN — SODIUM CHLORIDE, PRESERVATIVE FREE 10 ML: 5 INJECTION INTRAVENOUS at 06:57

## 2022-11-03 RX ADMIN — AMPICILLIN SODIUM 2 G: 2 INJECTION, POWDER, FOR SOLUTION INTRAVENOUS at 20:57

## 2022-11-03 RX ADMIN — ATORVASTATIN CALCIUM 80 MG: 40 TABLET, FILM COATED ORAL at 21:00

## 2022-11-03 RX ADMIN — SODIUM CHLORIDE, PRESERVATIVE FREE 10 ML: 5 INJECTION INTRAVENOUS at 20:58

## 2022-11-03 NOTE — PROGRESS NOTES
End of Shift Note     Bedside shift change report given  to Arabella Wright, RN (oncoming nurse) by , RN (offgoing nurse). Report included the following information SBAR, Kardex, Intake/Output, and MAR     Shift worked:  7a-7p   Shift summary and any significant changes:      Call bell and phone within reach of patient, able to make needs known. Family stayed daytime. Bed alarm on zone two. Patient went down for echography exam.   Patient was able to get up with one person assistance and used the bed side commode. Patient is on droplet precautions, meningitis is suspected      Concerns for physician to address:  none   Zone phone for oncoming shift:   [phone]      Patient Information  Harsha Castle  80 y.o.  11/1/2022  2:31 AM by Sam Yan MD. Harsha Castle was admitted from Home     Problem List       Patient Active Problem List     Diagnosis Date Noted    Meningitis 11/01/2022    High fever 11/01/2022    Accelerated hypertension 11/01/2022    Mixed hyperlipidemia 10/04/2021    Carotid stenosis, asymptomatic, right 12/16/2020    Carotid stenosis, symptomatic w/o infarct, left 11/02/2020    Bilateral carotid artery stenosis 10/19/2020    TIA (transient ischemic attack) 10/17/2020    High cholesterol 09/21/2020    Essential hypertension 12/28/2018    Coronary artery disease involving native coronary artery of native heart without angina pectoris 06/12/2018    S/P coronary artery stent placement 05/25/2018    Hx of diverticulitis of colon 05/18/2018    Gout             Past Medical History:   Diagnosis Date    CAD (coronary artery disease)      Diverticulitis      Glaucoma      Gout      HTN (hypertension) 5/18/2018    Hx of diverticulitis of colon 5/18/2018    Hyperlipidemia      Hypertension      Other ill-defined conditions(799.89)       high cholesterol    S/P coronary artery stent placement 05/25/2018    Stroke ([address] 10/2020     TIA            Activity:  Activity Level:  Up with Assistance  Number times ambulated in hallways past shift:   Number of times OOB to chair past shift: 2     Cardiac:   Cardiac Monitoring: Yes      Cardiac Rhythm: Sinus Rhythm     Access:   Current line(s): PIV      Genitourinary:   Urinary status: incontinent     Respiratory:   O2 Device: None (Room air)  Chronic home O2 use?: NO  Incentive spirometer at bedside: NO     GI:  Last Bowel Movement Date: 11/03/22  Current diet:  ADULT DIET Regular; Low Fat/Low Chol/High Fiber/DANNA  Passing flatus: YES  Tolerating current diet: YES     Pain Management:   Patient states pain is manageable on current regimen: YES     Skin:  Stevenson Score: 20  Interventions: increase time out of bed    Patient Safety:  Fall Score:  Total Score: 3  Interventions: bed/chair alarm, gripper socks, and pt to call before getting OOB  High Fall Risk: Yes  @Rollbelt  @dexterity to release roll belt  Yes/No ( must document dexterity  here by stating Yes or No here, otherwise this is a restraint and must follow restraint documentation policy.)     DVT prophylaxis:  DVT prophylaxis Med- Yes  DVT prophylaxis SCD or GABO-       Wounds: (If Applicable)  Wounds- No  Location      Active Consults:  IP CONSULT TO CARDIOLOGY  IP CONSULT TO NEUROLOGY  IP CONSULT TO INFECTIOUS DISEASES     Length of Stay:  Expected LOS: 3d 12h  Actual LOS: 2  Discharge Plan:  Possible discharge to Home health on Saturday    Susy Hester RN

## 2022-11-03 NOTE — PROGRESS NOTES
End of Shift Note    Bedside shift change report given to Elizabeth RN (oncoming nurse) by Nabor Lynne RN (offgoing nurse). Report included the following information SBAR, Kardex, Intake/Output, and MAR    Shift worked:  night   Shift summary and any significant changes:     Uneventful night except dysfunction of IV access, which delayed med. But almost back on track. AM nurse to infuse one pending abx. From 0894 hours. Call bell and phone within reach of patient, able to make needs known. Family stayed overnight. CHG bathed. Bed alarm on zone two. AM labs done. Concerns for physician to address:  none   Zone phone for oncoming shift:        Patient Information  Benoit Salinas  [address] [place] G y.o.  11/1/2022  2:31 AM by Joselo Kirby MD. Benoit Salinas was admitted from Home    Problem List  Patient Active Problem List    Diagnosis Date Noted    Meningitis 11/01/2022    High fever 11/01/2022    Accelerated hypertension 11/01/2022    Mixed hyperlipidemia 10/04/2021    Carotid stenosis, asymptomatic, right 12/16/2020    Carotid stenosis, symptomatic w/o infarct, left 11/02/2020    Bilateral carotid artery stenosis 10/19/2020    TIA (transient ischemic attack) 10/17/2020    High cholesterol 09/21/2020    Essential hypertension 12/28/2018    Coronary artery disease involving native coronary artery of native heart without angina pectoris 06/12/2018    S/P coronary artery stent placement 05/25/2018    Hx of diverticulitis of colon 05/18/2018    Gout      Past Medical History:   Diagnosis Date    CAD (coronary artery disease)     Diverticulitis     Glaucoma     Gout     HTN (hypertension) 5/18/2018    Hx of diverticulitis of colon 5/18/2018    Hyperlipidemia     Hypertension     Other ill-defined conditions(799.89)     high cholesterol    S/P coronary artery stent placement 05/25/2018    Stroke ([address] 10/2020    TIA         Activity:  Activity Level:  Up with Assistance  Number times ambulated in hallways past shift:   Number of times OOB to chair past shift: 2    Cardiac:   Cardiac Monitoring: Yes      Cardiac Rhythm: Sinus Rhythm    Access:   Current line(s): PIV     Genitourinary:   Urinary status: incontinent    Respiratory:   O2 Device: None (Room air)  Chronic home O2 use?: NO  Incentive spirometer at bedside: NO       GI:  Last Bowel Movement Date: 11/03/22  Current diet:  ADULT DIET Regular; Low Fat/Low Chol/High Fiber/DANNA  Passing flatus: YES  Tolerating current diet: YES       Pain Management:   Patient states pain is manageable on current regimen: YES    Skin:  Stevenson Score: 20  Interventions: increase time out of bed    Patient Safety:  Fall Score:  Total Score: 3  Interventions: bed/chair alarm, gripper socks, and pt to call before getting OOB  High Fall Risk: Yes  @Rollbelt  @dexterity to release roll belt  Yes/No ( must document dexterity  here by stating Yes or No here, otherwise this is a restraint and must follow restraint documentation policy.)    DVT prophylaxis:  DVT prophylaxis Med- Yes  DVT prophylaxis SCD or GABO-      Wounds: (If Applicable)  Wounds- No  Location     Active Consults:  IP CONSULT TO CARDIOLOGY  IP CONSULT TO NEUROLOGY  IP CONSULT TO INFECTIOUS DISEASES    Length of Stay:  Expected LOS: 3d 12h  Actual LOS: 2  Discharge Plan:        Gregory Theodore RN

## 2022-11-03 NOTE — PROGRESS NOTES
Problem: Risk for Spread of Infection  Goal: Prevent transmission of infectious organism to others  Description: Prevent the transmission of infectious organisms to other patients, staff members, and visitors. Outcome: Progressing Towards Goal     Problem: Patient Education:  Go to Education Activity  Goal: Patient/Family Education  Outcome: Progressing Towards Goal     Problem: Falls - Risk of  Goal: *Absence of Falls  Description: Document Nada Fall Risk and appropriate interventions in the flowsheet.   Outcome: Progressing Towards Goal  Note: Fall Risk Interventions:  Mobility Interventions: Bed/chair exit alarm         Medication Interventions: Bed/chair exit alarm    Elimination Interventions: Bed/chair exit alarm              Problem: Patient Education: Go to Patient Education Activity  Goal: Patient/Family Education  Outcome: Progressing Towards Goal     Problem: Patient Education: Go to Patient Education Activity  Goal: Patient/Family Education  Outcome: Progressing Towards Goal     Problem: Patient Education: Go to Patient Education Activity  Goal: Patient/Family Education  Outcome: Progressing Towards Goal

## 2022-11-03 NOTE — PROGRESS NOTES
Pharmacy Antimicrobial Kinetic Dosing    Indication for Antimicrobials: CNS infection    Current Regimen of Each Antimicrobial:  Vancomycin changed to dosing per level (Start Date ; Day 3)  Ampicillin 2 g IV q8h (Start Date ; Day 3)  Ceftriaxone 2 g IV q12h (Start Date ; Day 3)  Acyclovir 550 mg IV changed to q24h (Start Date ; Day 3)    Previous Antimicrobial Therapy:      Goal Level: AUC: 400-600 mg/hr/Liter/day    Date Dose & Interval Measured (mcg/mL) Predicted AUC/RAMÓN   11/3 750 mg q24h 15.2 --                 Date & time of next level: Vanc random 400    Dosing calculator used: Palm calculator    Significant Positive Cultures:    blood: NG. pending    Conditions for Dosing Consideration: None    Labs:  Recent Labs     22   CREA 2.07* 1.07* 1.40*   BUN 18 14 27*   PCT  --  <0.05  --      Recent Labs     2257 22   WBC 9.3 6.3 8.9     Temp (24hrs), Av.4 °F (36.9 °C), Min:97.8 °F (36.6 °C), Max:98.9 °F (37.2 °C)    Creatinine Clearance (mL/min):   CrCl (Adjusted Body Weight): 17.6 mL/min   If actual weight < IBW: CrCl (Actual Body Weight) 20.7    Impression/Plan:   ID on board  SCr bounced up today. Vancomycin trough is therapeutic but SCr bounded upward this AM.  Hold Vancomycin and recheck random level tomorrow AM  Adjusted acyclovir to 550 mg q24h per renal protocol  Continue with the current dosages of ampicillin and ceftriaxone  BMP daily  Antimicrobial stop date TBD pending lumbar puncture decision     Pharmacy will follow daily and adjust medications as appropriate for renal function and/or serum levels.     Thank you,  Junior Hopper, PHARMD

## 2022-11-03 NOTE — PROGRESS NOTES
[facility]  [address]  [phone]  www. [employer]         Cardiology Progress Note      11/3/2022 7:43 AM    Admit Date: 11/1/2022    Admit Diagnosis:   Accelerated hypertension [I10]  High fever [R50.9]  Meningitis [G03.9]    Interval History/Subjective:     Norma Peoples denies complaints. Visit Vitals  BP (!) 153/84   Pulse 93   Temp 98 °F (36.7 °C)   Resp 18   Ht 5' 4\" (1.626 m)   Wt 72.6 kg (160 lb)   SpO2 98%   Breastfeeding No   BMI 27.46 kg/m²       Current Facility-Administered Medications   Medication Dose Route Frequency    [START ON 11/4/2022] Vancomycin random level: 11/4, 0400.  thanks   Other ONCE    Vancomycin dosing per renal function   Other Rx Dosing/Monitoring    [START ON 11/4/2022] acyclovir (ZOVIRAX) 550 mg in 0.9% sodium chloride 100 mL IVPB  10 mg/kg (Ideal) IntraVENous Q24H    timolol (TIMOPTIC) 0.5 % ophthalmic solution 1 Drop  1 Drop Right Eye DAILY    losartan (COZAAR) tablet 100 mg  100 mg Oral DAILY    cefTRIAXone (ROCEPHIN) 2 g in 0.9% sodium chloride (MBP/ADV) 50 mL MBP  2 g IntraVENous Q12H    hydrALAZINE (APRESOLINE) 20 mg/mL injection 20 mg  20 mg IntraVENous Q6H PRN    sodium chloride (NS) flush 5-40 mL  5-40 mL IntraVENous Q8H    sodium chloride (NS) flush 5-40 mL  5-40 mL IntraVENous PRN    acetaminophen (TYLENOL) tablet 650 mg  650 mg Oral Q6H PRN    Or    acetaminophen (TYLENOL) suppository 650 mg  650 mg Rectal Q6H PRN    polyethylene glycol (MIRALAX) packet 17 g  17 g Oral DAILY    bisacodyL (DULCOLAX) tablet 5 mg  5 mg Oral DAILY PRN    promethazine (PHENERGAN) tablet 12.5 mg  12.5 mg Oral Q6H PRN    Or    ondansetron (ZOFRAN) injection 4 mg  4 mg IntraVENous Q6H PRN    heparin (porcine) injection 5,000 Units  5,000 Units SubCUTAneous Q8H    ampicillin (OMNIPEN) 2 g in 0.9% sodium chloride (MBP/ADV) 100 mL MBP  2 g IntraVENous Q8H    allopurinoL (ZYLOPRIM) tablet 100 mg  100 mg Oral DAILY    ALPRAZolam (XANAX) tablet 0.25 mg  0.25 mg Oral DAILY PRN    aspirin delayed-release tablet 81 mg  81 mg Oral DAILY    atorvastatin (LIPITOR) tablet 80 mg  80 mg Oral QHS    latanoprost (XALATAN) 0.005 % ophthalmic solution 1 Drop  1 Drop Both Eyes QHS    metoprolol tartrate (LOPRESSOR) tablet 12.5 mg  12.5 mg Oral Q12H       Objective:      Physical Exam:  General: Well developed, in no acute distress, cooperative and alert  HEENT: No carotid bruits, PEERL, EOM intact. Heart:  reg rate and rhythm; normal S1/S2; no murmurs  Respiratory: Clear bilaterally x 4, no wheezing or rales  Abdomen:   Soft, non-tender, no distention, no masses. + BS. Extremities:  Normal cap refill, no cyanosis, atraumatic. No edema. Neuro: A&Ox3, speech clear  Skin: Skin color is normal. Non diaphoretic  Vascular: 2+ pulses symmetric in all extremities    Data Review:   Recent Labs     11/03/22 0327 11/02/22 0557 11/01/22 0322   WBC 9.3 6.3 8.9   HGB 10.6* 10.6* 12.5   HCT 32.4* 32.6* 36.7    246 300     Recent Labs     11/03/22 0327 11/02/22 0557 11/01/22 0322    139 132*   K 4.0 4.2 4.4    108 100   CO2 23 23 25   * 100 134*   BUN 18 14 27*   CREA 2.07* 1.07* 1.40*   CA 9.3 9.2 9.9   ALB  --  3.3* 4.0   TBILI  --  0.5 0.8   ALT  --  33 39       No results for input(s): TROIQ, CPK, CKMB in the last 72 hours. No intake or output data in the 24 hours ending 11/03/22 1147       Telemetry: sinus rhythm. Echocardiogram: pending    Radiology Results in the last 24 hours:  No results found. Assessment:     Active Problems:    Meningitis (11/1/2022)      High fever (11/1/2022)      Accelerated hypertension (11/1/2022)      Plan:     Troponin elevation  Peak at 229, now trending down  Likely in the setting of accelerated HTN with sepsis  EKG non-ischemic  No CP symptoms  Echo pending    2. Mobitz Type II HB  No further events overnight.   Continue to monitor; avoid titration of BB therapy  May need to consider OP event monitoring    3. CAD  Hx of ANA MARÍA to LAD/Lcx in 5/2018  Continue medical management    4. Caroid artery stenosis  Followed by Dr. Jessica Gillis ASA/haroldo    5. HTN  Continue BP meds    6. HLD  Continue statin therapy    7.   Aortic stenosis  Mild-mod AS with mild AR on echo last year  Repeat echo pending    Zoë Tamayo NP

## 2022-11-03 NOTE — CONSULTS
Infectious Disease Consult    Date of Consultation:  11/2/22  Reason for Consultation: Meningitis  Referring Physician: Dr Marline Bonilla  Date of Admission: 11/1/2022    Impression    Persistent headache in setting of  Poorly controlled hypertension  Concern for meningitis  No convincing symptoms  No neck pain/neck stiffness  No upper respiratory symptoms      Poorly controlled hypertension  BP as high as 208/68  Management per primary team    Pt complains of R/ear drainage  Single episode of fever 101.1  ? Otitis media    H/o B/L carotid artery stenosis  L/ carotid end arterectomy 2020. Plan  Acute meningitis very unlikely on clinical history and exam  Patient does report fluid in right ear ? acute otitis media  May treat as otitis media, ENT evaluation as outpatient  If still convinced of meningitis- recommend lumbar puncture  Please contact ID with additional questions, concerns. Ke French a  80 y.o. female with PMHx significant for coronary artery disease s/p stents, diverticulitis, hypertension, hyperlipidemia who presented to the ED with severe having/aching headache that had been present for the past 3 to 4 days. No history of vision changes. No dizziness, nausea, vomiting, slurred speech. No fevers or chills. Denies neck stiffness but does tell me that her headache radiates to her neck. She reports chronic numbness and tingling in both of her legs but no upper extremity numbness or tingling. Denies any chest pain or shortness of breath. Patient was initially seen in the ED on October 28. At that time she had head CT which did not show any acute findings. Lab work showed normal white blood cell count. But blood pressure was markedly elevated. At that time her amlodipine was stopped and she was started on losartan 50 mg daily. Patient presented back to ED on October 30 complaining of  headache and blood pressure again noted to be markedly elevated.   On that visit, she was treated Fioricet and headache improved. She was also given labetalol IV and blood pressure also improved. She returned on  with continued headache and elevated blood pressure despite taking prescribed medications. Single temperature 101.1 noted in ED. ID service has been consulted for possible meningitis  Patient seen today. She is afebrile on empiric medications. Denies headache at this time, no neck pain/neck stiffness. Patient does report drainage from right ear. No sore throat or runny nose. WBC normal, procalcitonin <0.05, patient remains afebrile, RVP plus COVID PCR negative      Active Hospital Problems    Diagnosis Date Noted    Meningitis 2022    High fever 2022    Accelerated hypertension 2022         Past Medical History:   Diagnosis Date    CAD (coronary artery disease)     Diverticulitis     Glaucoma     Gout     HTN (hypertension) 2018    Hx of diverticulitis of colon 2018    Hyperlipidemia     Hypertension     Other ill-defined conditions(799.89)     high cholesterol    S/P coronary artery stent placement 2018    Stroke ([address] 10/2020    TIA       Past Surgical History:   Procedure Laterality Date    HX CAROTID ENDARTERECTOMY Left 2020    HX HYSTERECTOMY      [facility] C[facility] PROCEDURE UNLIST  05/2018    x2 cardiac stent    [facility] COLONOSCOPY FLX DX W/COLLJ SPEC WHEN PFRMD  10/18/2013            Allergies   Allergen Reactions    Lisinopril Cough       Social Connections: Not on file       Family Status   Relation Name Status    Mother      Sister  (Not Specified)    Father         Medication Documentation Review Audit    **Prior to Admission medications have not yet been reviewed for this encounter**           Review of Systems - Negative except those mentioned in      PHYSICAL EXAM:  General:          Awake, cooperative, no acute distress    EENT:              EOMI. Anicteric sclerae.  MMM  Resp:               CTA bilaterally, no wheezing or rales. No accessory muscle use  CV:                  Regular  rhythm,  No edema  GI:                   Soft, Non distended, Non tender. +Bowel sounds  Neurologic:      Alert and oriented X 3, normal speech,   Psych:             Good insight. Not anxious nor agitated  Skin:                No rashes. No jaundice.   Extremities: no edema    Francisca Schultz MD [address]

## 2022-11-03 NOTE — CONSULTS
Date of Consultation:  November 3, 2022    Referring Physician: Varun Kay NP     Reason for Consultation:  Meningitis? Chief Complaint   Patient presents with    Headache     Patient brought in by EMS from home for a headache. Patient was seen here yesterday for headache and hypertension. Patient stated that the headache is worse today and she is still hypertensive. Patient does have an appointment to see her PCP tomorrow. Family should be on their way. Denies any dizziness or vision changes. Patient took a medication (unknown name) prescribed by the ED physician yesterday with no relief. Patient has no other complaints. Denies chest pain or dyspnea. History of Present Illness:   Janes Chicas is a 80 y.o. female with a history of hypertension, hyperlipidemia, CAD and gout who is currently admitted to the hospital after she had multiple emergency room visits for a severe headache. Patient reports that approximately 1 week ago she started having a very severe headache in the bifrontal region. During her ER evaluation she was noted to have elevated blood pressure during each of those visits. During the last visit which prompted this admission she was also noted to have a fever. A lumbar puncture was attempted by both the ER and IR however it was unable to be completed. Due to concerns for possible meningitis she was started on treatment with broad-spectrum antibiotic as well as acyclovir. She does report some ear pain however patient did not have any neck stiffness or pain. She denied any photophobia phonophobia or focal neurological deficits. MRI of the brain was completed that showed no evidence of inflammation or diffusion restriction.     Past Medical History:   Diagnosis Date    CAD (coronary artery disease)     Diverticulitis     Glaucoma     Gout     HTN (hypertension) 5/18/2018    Hx of diverticulitis of colon 5/18/2018    Hyperlipidemia     Hypertension     Other ill-defined conditions(799.89)     high cholesterol    S/P coronary artery stent placement 05/25/2018    Stroke ([address] 10/2020    TIA        Past Surgical History:   Procedure Laterality Date    HX CAROTID ENDARTERECTOMY Left 11/02/2020    HX HYSTERECTOMY      IA CARDIAC SURG PROCEDURE UNLIST  05/2018    x2 cardiac stent    IA COLONOSCOPY FLX DX W/COLLJ SPEC WHEN PFRMD  10/18/2013             Family History   Problem Relation Age of Onset    Stroke Mother     Diabetes Sister     No Known Problems Father         Social History     Tobacco Use    Smoking status: Never    Smokeless tobacco: Never   Substance Use Topics    Alcohol use: No        Allergies   Allergen Reactions    Lisinopril Cough        Prior to Admission medications    Medication Sig Start Date End Date Taking? Authorizing Provider   butalbital-acetaminophen-caff (FIORICET) -40 mg per capsule Take 1 Capsule by mouth every four (4) hours as needed for Headache. 10/30/22   Leo Madsen MD   losartan (COZAAR) 50 mg tablet Take 1 Tablet by mouth daily for 30 days. 10/29/22 11/28/22  Lily Hilario MD   ezetimibe (ZETIA) 10 mg tablet TAKE 1 TABLET BY MOUTH EVERY DAY 12/8/21   Kris Booth NP   cholecalciferol (VITAMIN D3) (5000 Units/125 mcg) tab tablet Take 5,000 Units by mouth daily. Provider, Historical   naloxone (Narcan) 4 mg/actuation nasal spray Use 1 spray intranasally, then discard. Repeat with new spray every 2 min as needed for opioid overdose symptoms, alternating nostrils. 11/3/20   Nanine Holiday P, NP   polyethylene glycol (MIRALAX) 17 gram packet Take 17 g by mouth as needed for Constipation. Provider, Historical   trolamine salicylate (Aspercreme) 10 % lotion Apply  to affected area as needed. Provider, Historical   atorvastatin (LIPITOR) 80 mg tablet Take 1 Tab by mouth nightly. 10/19/20   Quiana Roque MD   metoprolol tartrate (LOPRESSOR) 25 mg tablet Take 0.5 Tabs by mouth every twelve (12) hours.  3/13/19 Ashley Arroyo MD   docusate sodium (STOOL SOFTENER PO) Take  by mouth as needed. Provider, Historical   ferrous sulfate (IRON PO) Take 65 mg by mouth daily. Provider, Historical   aspirin delayed-release 81 mg tablet Take 81 mg by mouth daily. Provider, Historical   timolol (TIMOPTIC-XE) 0.5 % ophthalmic gel-forming Administer 1 Drop to right eye daily. Provider, Historical   latanoprost (XALATAN) 0.005 % ophthalmic solution Administer 1 Drop to both eyes nightly. Indications: wide-angle glaucoma    Provider, Historical   fluticasone (FLONASE ALLERGY RELIEF) 50 mcg/actuation nasal spray 1 [place] by Both Nostrils route daily. Provider, Historical   acetaminophen (TYLENOL EXTRA STRENGTH) 500 mg tablet Take 1,000 mg by mouth every six (6) hours as needed for Pain. Provider, Historical   fexofenadine (ALLEGRA) 180 mg tablet Take 180 mg by mouth nightly. Papo Kirkpatrick MD   allopurinol (ZYLOPRIM) 100 mg tablet Take 100 mg by mouth daily. Papo Kirkpatrick MD   ALPRAZolam (XANAX) 0.25 mg tablet Take 0.25 mg by mouth daily as needed for Anxiety.     Papo Kirkpatrick MD       Review of Systems:    General, constitutional: negative  Eyes, vision: negative  Ears, nose, throat: negative  Cardiovascular, heart: negative  Respiratory: negative  Gastrointestinal: negative  Genitourinary: negative  Musculoskeletal: negative  Skin and integumentary: negative  Psychiatric: negative  Endocrine: negative  Neurological: negative, except for HPI  Hematologic/lymphatic: negative  Allergy/immunology: negative    PHYSICAL EXAMINATION:   Visit Vitals  BP (!) 153/84   Pulse 93   Temp 98 °F (36.7 °C)   Resp 18   Ht 5' 4\" (1.626 m)   Wt 160 lb 0.9 oz (72.6 kg)   SpO2 98%   Breastfeeding No   BMI 27.47 kg/m²       Physical Exam:  General:  no acute distress  Neck: no carotid bruits  Lungs: clear to auscultation  Heart:  no murmurs, regular rate and rhythm   Lower extremity: no edema    Neurological exam:  Mental Status: Awake, alert, oriented to person, place however stated the year was 2023. She did get the correct month of November. Attention and Concentration: able to state the days of the week backwards   Speech and Language: No dysarthria. Able to name, repeat and follow commands   Fund of knowledge was preserved    Cranial nerves: II-XII:  Pupils equal and reactive, visual fields intact by confrontation   Extraocular movements intact, no evidence of nystagmus or ptosis   Facial sensation intact   Facial movements symmetric   Hearing intact to soft rub bilaterally   Shoulder shrug symmetric and strong   Tongue protrusion full and midline without fasciculation or atrophy    Motor:   Normal tone and Bulk   Drift: No evidence of pronator drift     Strength testing:   deltoid triceps biceps Wrist ext. Wrist flex. intrinsics   Right 5 5 5 5 5 5   Left 5 5 5 5 5 5      Hip flex. Hip ext. Knee ext. Knee flex Dorsi flex Plantar flex   Right  5 5 5 5 5 5   Left  5 5 5 5 5 5       Sensory:  Intact to light touch and pinprick. There is no extinction. Reflexes:   Biceps Triceps  Brachiorad Patellar Achilles Plantar Hoffmans   Right  2 2 2 1 - Down Neg   Left  2 2 2 1 - Down Neg      Cerebellar testing: Finger-nose-finger was intact. Gait was deferred due to concerns over patient safety.     Data:     Lab Results   Component Value Date/Time    Hemoglobin A1c 5.8 (H) 10/18/2020 12:03 AM    Sodium 138 11/03/2022 03:27 AM    Potassium 4.0 11/03/2022 03:27 AM    Chloride 105 11/03/2022 03:27 AM    Glucose 125 (H) 11/03/2022 03:27 AM    BUN 18 11/03/2022 03:27 AM    Creatinine 2.07 (H) 11/03/2022 03:27 AM    Calcium 9.3 11/03/2022 03:27 AM    WBC 9.3 11/03/2022 03:27 AM    HCT 32.4 (L) 11/03/2022 03:27 AM    HGB 10.6 (L) 11/03/2022 03:27 AM    PLATELET 527 13/05/0531 03:27 AM       Imaging:    Results from Hospital Encounter encounter on 11/01/22    MRI BRAIN WO CONT    Narrative  EXAM: MRI BRAIN WO CONT    INDICATION: 23year-old female with headache. COMPARISON: 11/1/2022 head CT and 10/19/2020 MRI. CONTRAST: None. TECHNIQUE:  Multiplanar multisequence acquisition without contrast of the brain. FINDINGS:  The ventricles sizes and configuration are within normal limits. Patchy  periventricular and deep white matter T2/FLAIR hyperintensity, nonspecific and  likely microangiopathic ischemic changes. There is no acute infarct, hemorrhage,  extra-axial fluid collection, or mass effect. There is no cerebellar tonsillar  herniation. Expected arterial flow-voids are present. Moderate diffuse mucosal thickening of the paranasal sinuses with near complete  resolution of the left sphenoid and bilateral anterior ethmoid sinuses. The  mastoids are free of fluid bilaterally. Status post bilateral lense replacement. The orbits are otherwise unremarkable. No significant osseous or scalp lesions  are identified. Impression  No acute intracranial abnormality. Chronic microangiopathic white matter disease. Results from [place] encounter on 11/01/22    CT HEAD WO CONT    Narrative  EXAM: CT HEAD WO CONT    INDICATION: continued severe headache, sbp >200s    COMPARISON: CT brain 10/28/2022. CONTRAST: None. TECHNIQUE: Unenhanced CT of the head was performed using 5 mm images. Brain and  bone windows were generated. Coronal and sagittal reformats. CT dose reduction  was achieved through use of a standardized protocol tailored for this  examination and automatic exposure control for dose modulation. FINDINGS:  The ventricles and sulci are normal in size, shape and configuration. There is  no significant change in mild white matter hypodensities. There is no  intracranial hemorrhage, extra-axial collection, or mass effect. The basilar  cisterns are open. No CT evidence of acute infarct.     The bone windows demonstrate soft tissue mineralization surrounding the dens  which significantly narrows the C1 level canal to 6.3 mm AP dimension and 2.2 cm  mediolateral dimension. no abnormalities. The visualized portions of the  paranasal sinuses and mastoid air cells are clear. Impression  No acute findings. Extensive calcified pannus about the dens without  significant narrowing of the spinal canal at the C1 level. White matter changes  likely reflective of small vessel ischemic disease. IMPRESSION/RECOMMENDATIONS:  Azael William is a 80 y.o. female with a history of hypertension, hyperlipidemia, CAD and gout who is currently admitted to the hospital after she had multiple emergency room visits for a severe headache which was associated with severe hypertension. There was evidence of 1 fever which raises suspicion for possible meningitis. Her neurological exam does not have any findings concerning for meningitis. Prior to this however she did not complain of symptoms which raises suspicion of meningitis. Unfortunately without a lumbar puncture will be difficult to determine if there is evidence of meningitis or not. -We will defer to the primary team and ID to determine if she should be continued on a course of antibiotics for presumed meningitis. Again this appears to be less likely as she did not have any symptoms concerning for meningitis nor did she have any confusion.  -No further neurological work-up is needed at this time as patient does appear to be at her baseline.  -She will need her blood pressure monitored more closely by her primary care doctor to ensure that it is within goal of 140/90. -If she has recurrent headaches, we can trial Fioricet for the pain. Thank you very much for this consultation we will sign off. Please call with any additional questions. She does not need to follow-up in neurology clinic.     Freddie Baca MD

## 2022-11-03 NOTE — PROGRESS NOTES
Hospitalist Progress Note    NAME: Azael William   :  8/15/1932   MRN:  [id number]       Assessment / Plan:    Sepsis with severe headache, POA  - Met criteria for sepsis on admission  - MRI brain with no acute intracranial abnormality reported. The chronic microangiopathic white matter disease  - Has been afebrile with no leukocytosis  - No meningeal signs on physical exam  - Rapid COVID-19 negative  - Chest x-ray with no acute processes  - Was started on meningitis coverage  - ID was consulted, discussed with ID unlikely she has meningitis which I agree with ID  - Unsuccessful LP x2 secondary to lumbar stenosis  - Procalcitonin 0.05    Hypertensive urgency  - Blood pressure is more controlled right now  - Continue blood pressure medication including Cozaar, beta-blocker, as needed hydralazine and adjust as needed    Coronary artery disease  - Status post PCI  - Chest pain-free  - Mild troponin elevation admission  - Cardiology was consulted, input is appreciated  - Chest pain-free    CKD stage IV  - Creatinine baseline  - Continue follow BMP    History of TIA and right CEA   - Continue aspirin and Lipitor    Lumbar stenosis  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 Estimated discharge date:   Barriers: Clinical improvement    Code status: Full  Prophylaxis: Hep SQ  Recommended Disposition: Home w/Family     Subjective:     Patient was seen and examined. No acute events overnight. Discussed with RN overnight events. All patient's questions were answered.     \"Feeling all right but I have numbness in the legs\"      Review of Systems:  Symptom Y/N Comments  Symptom Y/N Comments   Fever/Chills n   Chest Pain n    Poor Appetite    Edema     Cough n   Abdominal Pain n    Sputum    Joint Pain     SOB/RAMSEY n   Pruritis/Rash     Nausea/vomit n   Tolerating PT/OT Diarrhea    Tolerating Diet y    Constipation    Other       Could NOT obtain due to:          Objective:     VITALS:   Last 24hrs VS reviewed since prior progress note. Most recent are:  Patient Vitals for the past 24 hrs:   Temp Pulse Resp BP SpO2   11/03/22 1419 -- -- -- (!) 153/84 --   11/03/22 0909 -- 93 -- (!) 153/84 --   11/03/22 0403 98 °F (36.7 °C) 88 18 (!) 146/68 98 %   11/03/22 0000 98.9 °F (37.2 °C) 93 18 (!) 141/60 96 %   11/02/22 2013 98.6 °F (37 °C) 94 18 (!) 150/65 98 %   11/02/22 1637 97.8 °F (36.6 °C) 95 16 130/63 95 %   11/02/22 1606 -- -- -- (!) 184/66 --   11/02/22 1602 -- -- -- (!) 197/51 --   11/02/22 1550 98.8 °F (37.1 °C) 71 17 (!) 197/51 99 %     No intake or output data in the 24 hours ending 11/03/22 1459     I had a face to face encounter and independently examined this patient on 11/3/2022, as outlined below:  PHYSICAL EXAM:  General: WD, WN. Alert, cooperative, no acute distress    EENT:  EOMI. Anicteric sclerae. MMM  Resp:  CTA bilaterally, no wheezing or rales. No accessory muscle use  CV:  Regular  rhythm,  No edema  GI:  Soft, Non distended, Non tender. +Bowel sounds  Neurologic:  EOMs intact. No facial asymmetry. No aphasia or slurred speech. Symmetrical strength, Sensation grossly intact. Alert and oriented X 4.     Psych:   Good insight. Not anxious nor agitated  Skin:  No rashes.   No jaundice    Reviewed most current lab test results and cultures  YES  Reviewed most current radiology test results   YES  Review and summation of old records today    NO  Reviewed patient's current orders and MAR    YES  PMH/SH reviewed - no change compared to H&P  ________________________________________________________________________  Care Plan discussed with:    Comments   Patient X    Family  x Patient's family at bedside niece and daughter   RN X    Care Manager X    Consultant                       X Multidiciplinary team rounds were held today with , nursing, pharmacist and clinical coordinator. Patient's plan of care was discussed; medications were reviewed and discharge planning was addressed. ________________________________________________________________________        Comments   >50% of visit spent in counseling and coordination of care X    ________________________________________________________________________  Tracey Ladd MD     Procedures: see electronic medical records for all procedures/Xrays and details which were not copied into this note but were reviewed prior to creation of Plan. LABS:  I reviewed today's most current labs and imaging studies.   Pertinent labs include:  Recent Labs     11/03/22 0327 11/02/22 0557 11/01/22 0322   WBC 9.3 6.3 8.9   HGB 10.6* 10.6* 12.5   HCT 32.4* 32.6* 36.7    246 300     Recent Labs     11/03/22 0327 11/02/22 0557 11/01/22 0322    139 132*   K 4.0 4.2 4.4    108 100   CO2 23 23 25   * 100 134*   BUN 18 14 27*   CREA 2.07* 1.07* 1.40*   CA 9.3 9.2 9.9   ALB  --  3.3* 4.0   TBILI  --  0.5 0.8   ALT  --  33 39       Signed: Tracey Ladd MD

## 2022-11-04 LAB
ANION GAP SERPL CALC-SCNC: 10 MMOL/L (ref 5–15)
BASOPHILS # BLD: 0 K/UL (ref 0–0.1)
BASOPHILS NFR BLD: 0 % (ref 0–1)
BUN SERPL-MCNC: 17 MG/DL (ref 6–20)
BUN/CREAT SERPL: 7 (ref 12–20)
CALCIUM SERPL-MCNC: 8.5 MG/DL (ref 8.5–10.1)
CHLORIDE SERPL-SCNC: 106 MMOL/L (ref 97–108)
CO2 SERPL-SCNC: 23 MMOL/L (ref 21–32)
CREAT SERPL-MCNC: 2.3 MG/DL (ref 0.55–1.02)
DIFFERENTIAL METHOD BLD: ABNORMAL
EOSINOPHIL # BLD: 0.3 K/UL (ref 0–0.4)
EOSINOPHIL NFR BLD: 5 % (ref 0–7)
ERYTHROCYTE [DISTWIDTH] IN BLOOD BY AUTOMATED COUNT: 13.3 % (ref 11.5–14.5)
GLUCOSE SERPL-MCNC: 98 MG/DL (ref 65–100)
HCT VFR BLD AUTO: 28 % (ref 35–47)
HGB BLD-MCNC: 9.2 G/DL (ref 11.5–16)
IMM GRANULOCYTES # BLD AUTO: 0 K/UL (ref 0–0.04)
IMM GRANULOCYTES NFR BLD AUTO: 0 % (ref 0–0.5)
LYMPHOCYTES # BLD: 1.7 K/UL (ref 0.8–3.5)
LYMPHOCYTES NFR BLD: 29 % (ref 12–49)
MCH RBC QN AUTO: 30 PG (ref 26–34)
MCHC RBC AUTO-ENTMCNC: 32.9 G/DL (ref 30–36.5)
MCV RBC AUTO: 91.2 FL (ref 80–99)
MONOCYTES # BLD: 0.7 K/UL (ref 0–1)
MONOCYTES NFR BLD: 12 % (ref 5–13)
NEUTS SEG # BLD: 3.2 K/UL (ref 1.8–8)
NEUTS SEG NFR BLD: 54 % (ref 32–75)
NRBC # BLD: 0 K/UL (ref 0–0.01)
NRBC BLD-RTO: 0 PER 100 WBC
PLATELET # BLD AUTO: 246 K/UL (ref 150–400)
PMV BLD AUTO: 8.9 FL (ref 8.9–12.9)
POTASSIUM SERPL-SCNC: 3.5 MMOL/L (ref 3.5–5.1)
RBC # BLD AUTO: 3.07 M/UL (ref 3.8–5.2)
SODIUM SERPL-SCNC: 139 MMOL/L (ref 136–145)
VANCOMYCIN SERPL-MCNC: 13.8 UG/ML
WBC # BLD AUTO: 6 K/UL (ref 3.6–11)

## 2022-11-04 PROCEDURE — 74011250637 HC RX REV CODE- 250/637: Performed by: INTERNAL MEDICINE

## 2022-11-04 PROCEDURE — 74011000258 HC RX REV CODE- 258: Performed by: INTERNAL MEDICINE

## 2022-11-04 PROCEDURE — 80048 BASIC METABOLIC PNL TOTAL CA: CPT

## 2022-11-04 PROCEDURE — 80202 ASSAY OF VANCOMYCIN: CPT

## 2022-11-04 PROCEDURE — 74011250636 HC RX REV CODE- 250/636: Performed by: INTERNAL MEDICINE

## 2022-11-04 PROCEDURE — 94760 N-INVAS EAR/PLS OXIMETRY 1: CPT

## 2022-11-04 PROCEDURE — 85025 COMPLETE CBC W/AUTO DIFF WBC: CPT

## 2022-11-04 PROCEDURE — 74011000258 HC RX REV CODE- 258: Performed by: EMERGENCY MEDICINE

## 2022-11-04 PROCEDURE — 97530 THERAPEUTIC ACTIVITIES: CPT | Performed by: PHYSICAL THERAPIST

## 2022-11-04 PROCEDURE — 74011250637 HC RX REV CODE- 250/637: Performed by: NURSE PRACTITIONER

## 2022-11-04 PROCEDURE — 74011250636 HC RX REV CODE- 250/636: Performed by: EMERGENCY MEDICINE

## 2022-11-04 PROCEDURE — 97116 GAIT TRAINING THERAPY: CPT | Performed by: PHYSICAL THERAPIST

## 2022-11-04 PROCEDURE — 74011000250 HC RX REV CODE- 250: Performed by: INTERNAL MEDICINE

## 2022-11-04 PROCEDURE — 36415 COLL VENOUS BLD VENIPUNCTURE: CPT

## 2022-11-04 PROCEDURE — 65270000046 HC RM TELEMETRY

## 2022-11-04 RX ORDER — METOPROLOL TARTRATE 50 MG/1
50 TABLET ORAL EVERY 12 HOURS
Status: DISCONTINUED | OUTPATIENT
Start: 2022-11-04 | End: 2022-11-05 | Stop reason: HOSPADM

## 2022-11-04 RX ADMIN — LATANOPROST 1 DROP: 50 SOLUTION OPHTHALMIC at 21:36

## 2022-11-04 RX ADMIN — ASPIRIN 81 MG: 81 TABLET, COATED ORAL at 09:41

## 2022-11-04 RX ADMIN — METOPROLOL TARTRATE 50 MG: 50 TABLET, FILM COATED ORAL at 21:34

## 2022-11-04 RX ADMIN — CEFTRIAXONE 2 G: 2 INJECTION, POWDER, FOR SOLUTION INTRAMUSCULAR; INTRAVENOUS at 05:52

## 2022-11-04 RX ADMIN — HYDRALAZINE HYDROCHLORIDE 20 MG: 20 INJECTION INTRAMUSCULAR; INTRAVENOUS at 23:40

## 2022-11-04 RX ADMIN — METOPROLOL TARTRATE 12.5 MG: 25 TABLET, FILM COATED ORAL at 09:41

## 2022-11-04 RX ADMIN — HEPARIN SODIUM 5000 UNITS: 5000 INJECTION INTRAVENOUS; SUBCUTANEOUS at 15:55

## 2022-11-04 RX ADMIN — GABAPENTIN 200 MG: 100 CAPSULE ORAL at 21:34

## 2022-11-04 RX ADMIN — ALLOPURINOL 100 MG: 100 TABLET ORAL at 09:41

## 2022-11-04 RX ADMIN — SODIUM CHLORIDE, PRESERVATIVE FREE 10 ML: 5 INJECTION INTRAVENOUS at 15:55

## 2022-11-04 RX ADMIN — HEPARIN SODIUM 5000 UNITS: 5000 INJECTION INTRAVENOUS; SUBCUTANEOUS at 21:34

## 2022-11-04 RX ADMIN — ATORVASTATIN CALCIUM 80 MG: 40 TABLET, FILM COATED ORAL at 21:34

## 2022-11-04 RX ADMIN — GABAPENTIN 200 MG: 100 CAPSULE ORAL at 09:41

## 2022-11-04 RX ADMIN — HEPARIN SODIUM 5000 UNITS: 5000 INJECTION INTRAVENOUS; SUBCUTANEOUS at 05:54

## 2022-11-04 RX ADMIN — GABAPENTIN 200 MG: 100 CAPSULE ORAL at 15:54

## 2022-11-04 RX ADMIN — SODIUM CHLORIDE, PRESERVATIVE FREE 10 ML: 5 INJECTION INTRAVENOUS at 05:51

## 2022-11-04 RX ADMIN — TIMOLOL MALEATE 1 DROP: 5 SOLUTION OPHTHALMIC at 09:43

## 2022-11-04 RX ADMIN — SODIUM CHLORIDE, PRESERVATIVE FREE 10 ML: 5 INJECTION INTRAVENOUS at 21:40

## 2022-11-04 RX ADMIN — LOSARTAN POTASSIUM 100 MG: 100 TABLET, FILM COATED ORAL at 09:41

## 2022-11-04 RX ADMIN — AMPICILLIN SODIUM 2 G: 2 INJECTION, POWDER, FOR SOLUTION INTRAVENOUS at 03:34

## 2022-11-04 RX ADMIN — ACYCLOVIR SODIUM 550 MG: 50 INJECTION, SOLUTION INTRAVENOUS at 04:26

## 2022-11-04 NOTE — PROGRESS NOTES
Problem: Risk for Spread of Infection  Goal: Prevent transmission of infectious organism to others  Description: Prevent the transmission of infectious organisms to other patients, staff members, and visitors. Outcome: Progressing Towards Goal     Problem: Patient Education:  Go to Education Activity  Goal: Patient/Family Education  Outcome: Progressing Towards Goal     Problem: Falls - Risk of  Goal: *Absence of Falls  Description: Document Crosby Fall Risk and appropriate interventions in the flowsheet.   Outcome: Progressing Towards Goal  Note: Fall Risk Interventions:  Mobility Interventions: Bed/chair exit alarm         Medication Interventions: Bed/chair exit alarm    Elimination Interventions: Bed/chair exit alarm              Problem: Patient Education: Go to Patient Education Activity  Goal: Patient/Family Education  Outcome: Progressing Towards Goal     Problem: Patient Education: Go to Patient Education Activity  Goal: Patient/Family Education  Outcome: Progressing Towards Goal     Problem: Patient Education: Go to Patient Education Activity  Goal: Patient/Family Education  Outcome: Progressing Towards Goal

## 2022-11-04 NOTE — PROGRESS NOTES
Problem: Risk for Spread of Infection  Goal: Prevent transmission of infectious organism to others  Description: Prevent the transmission of infectious organisms to other patients, staff members, and visitors. Outcome: Progressing Towards Goal     Problem: Patient Education:  Go to Education Activity  Goal: Patient/Family Education  Outcome: Progressing Towards Goal     Problem: Falls - Risk of  Goal: *Absence of Falls  Description: Document [place] Fail Fall Risk and appropriate interventions in the flowsheet.   Outcome: Progressing Towards Goal  Note: Fall Risk Interventions:  Mobility Interventions: Bed/chair exit alarm         Medication Interventions: Bed/chair exit alarm    Elimination Interventions: Bed/chair exit alarm, Call light in reach              Problem: Patient Education: Go to Patient Education Activity  Goal: Patient/Family Education  Outcome: Progressing Towards Goal     Problem: Patient Education: Go to Patient Education Activity  Goal: Patient/Family Education  Outcome: Progressing Towards Goal     Problem: Patient Education: Go to Patient Education Activity  Goal: Patient/Family Education  Outcome: Progressing Towards Goal

## 2022-11-04 NOTE — PROGRESS NOTES
[facility]  [address]  [phone]  [email or web address]         Cardiology Progress Note      11/4/2022 7:43 AM    Admit Date: 11/1/2022    Admit Diagnosis:   Accelerated hypertension [I10]  High fever [R50.9]  Meningitis [G03.9]    Interval History/Subjective:     Rufino Nicolasa denies complaints.     Visit Vitals  /75   Pulse 87   Temp 98.3 °F (36.8 °C)   Resp 17   Ht 5' 4\" (1.626 m)   Wt 72.6 kg (160 lb 0.9 oz)   SpO2 95%   Breastfeeding No   BMI 27.47 kg/m²       Current Facility-Administered Medications   Medication Dose Route Frequency    Vancomycin dosing per renal function   Other Rx Dosing/Monitoring    gabapentin (NEURONTIN) capsule 200 mg  200 mg Oral TID    timolol (TIMOPTIC) 0.5 % ophthalmic solution 1 Drop  1 Drop Right Eye DAILY    losartan (COZAAR) tablet 100 mg  100 mg Oral DAILY    hydrALAZINE (APRESOLINE) 20 mg/mL injection 20 mg  20 mg IntraVENous Q6H PRN    sodium chloride (NS) flush 5-40 mL  5-40 mL IntraVENous Q8H    sodium chloride (NS) flush 5-40 mL  5-40 mL IntraVENous PRN    acetaminophen (TYLENOL) tablet 650 mg  650 mg Oral Q6H PRN    Or    acetaminophen (TYLENOL) suppository 650 mg  650 mg Rectal Q6H PRN    polyethylene glycol (MIRALAX) packet 17 g  17 g Oral DAILY    bisacodyL (DULCOLAX) tablet 5 mg  5 mg Oral DAILY PRN    promethazine (PHENERGAN) tablet 12.5 mg  12.5 mg Oral Q6H PRN    Or    ondansetron (ZOFRAN) injection 4 mg  4 mg IntraVENous Q6H PRN    heparin (porcine) injection 5,000 Units  5,000 Units SubCUTAneous Q8H    allopurinoL (ZYLOPRIM) tablet 100 mg  100 mg Oral DAILY    ALPRAZolam (XANAX) tablet 0.25 mg  0.25 mg Oral DAILY PRN    aspirin delayed-release tablet 81 mg  81 mg Oral DAILY    atorvastatin (LIPITOR) tablet 80 mg  80 mg Oral QHS    latanoprost (XALATAN) 0.005 % ophthalmic solution 1 Drop  1 Drop Both Eyes QHS    metoprolol tartrate (LOPRESSOR) tablet 12.5 mg  12.5 mg Oral Q12H Objective:      Physical Exam:  General: Well developed, in no acute distress, cooperative and alert  HEENT: No carotid bruits, PEERL, EOM intact. Heart:  reg rate and rhythm; normal S1/S2; no murmurs  Respiratory: Clear bilaterally x 4, no wheezing or rales  Abdomen:   Soft, non-tender, no distention, no masses. + BS. Extremities:  Normal cap refill, no cyanosis, atraumatic. No edema. Neuro: A&Ox3, speech clear  Skin: Skin color is normal. Non diaphoretic  Vascular: 2+ pulses symmetric in all extremities    Data Review:   Recent Labs     11/04/22 0357 11/03/22 0327 11/02/22  0557   WBC 6.0 9.3 6.3   HGB 9.2* 10.6* 10.6*   HCT 28.0* 32.4* 32.6*    284 246     Recent Labs     11/04/22 0357 11/03/22 0327 11/02/22  0557    138 139   K 3.5 4.0 4.2    105 108   CO2 23 23 23   GLU 98 125* 100   BUN 17 18 14   CREA 2.30* 2.07* 1.07*   CA 8.5 9.3 9.2   ALB  --   --  3.3*   TBILI  --   --  0.5   ALT  --   --  33       No results for input(s): TROIQ, CPK, CKMB in the last 72 hours. Intake/Output Summary (Last 24 hours) at 11/4/2022 1046  Last data filed at 11/4/2022 0752  Gross per 24 hour   Intake 550 ml   Output --   Net 550 ml          Telemetry: sinus rhythm. Echocardiogram (11/3/22): Left Ventricle: Normal left ventricular systolic function with a visually estimated EF of 50 - 55%. Left ventricle is smaller than normal. Normal wall thickness. Unable to assess wall motion. Aortic Valve: Moderate regurgitation. Right Atrium: Right atrium is smaller than normal.    Radiology Results in the last 24 hours:  No results found. Assessment:     Active Problems:    Meningitis (11/1/2022)      High fever (11/1/2022)      Accelerated hypertension (11/1/2022)      Plan:     Troponin elevation  Peak at 229, now trending down  Likely in the setting of accelerated HTN with sepsis  EKG non-ischemic  No CP symptoms  Echo normal    2.   Mobitz Type II HB  No further events overnight. Continue to monitor; avoid titration of BB therapy  May need to consider OP event monitoring    3. CAD  Hx of ANA MARÍA to LAD/Lcx in 5/2018  Continue medical management    4. Caroid artery stenosis  Followed by Dr. Jennifer Campbell ASA/haroldo    5. HTN  Continue BP meds    6. HLD  Continue statin therapy    7. Aortic stenosis  Mild-mod AS with mild AR on echo last year  Echo with mod AI, no stenosis this admission. EF preserved. No further recommendations from cardiac standpoint. Will sign off. Patient will follow up with our office in our office in 4 weeks from discharge.     Juan Baugh NP

## 2022-11-04 NOTE — PROGRESS NOTES
Hospitalist Progress Note    NAME: John Mujica   :  8/15/1932   MRN:  [id number]       Assessment / Plan:    Sepsis with severe headache, POA  - Met criteria for sepsis on admission  - MRI brain with no acute intracranial abnormality reported. The chronic microangiopathic white matter disease  - Has been afebrile with no leukocytosis  - No meningeal signs on physical exam  - Rapid COVID-19 negative  - Chest x-ray with no acute processes  - Was started on meningitis coverage, very low suspicion for meningitis. I discussed with ID and will stop antibiotics today. 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 Estimated discharge date:   Barriers: Improvement of her FABRICE    Code status: Full  Prophylaxis: Hep SQ  Recommended Disposition: Home w/Family     Subjective:     Patient was seen and examined. No acute events overnight. Discussed with RN overnight events. All patient's questions were answered. Patient's daughter at bedside, all questions were answered.     \"Doing very well and ready to go home\"      Review of Systems:  Symptom Y/N Comments  Symptom Y/N Comments Fever/Chills n   Chest Pain n    Poor Appetite    Edema     Cough n   Abdominal Pain n    Sputum    Joint Pain     SOB/RAMSEY n   Pruritis/Rash     Nausea/vomit n   Tolerating PT/OT     Diarrhea    Tolerating Diet y    Constipation    Other       Could NOT obtain due to:          Objective:     VITALS:   Last 24hrs VS reviewed since prior progress note. Most recent are:  Patient Vitals for the past 24 hrs:   Temp Pulse Resp BP SpO2   11/04/22 1201 98.5 °F (36.9 °C) 84 17 (!) 140/78 96 %   11/04/22 0838 98.3 °F (36.8 °C) 87 17 138/75 95 %   11/04/22 0430 98.7 °F (37.1 °C) 85 18 (!) 145/70 98 %   11/04/22 0000 98 °F (36.7 °C) 76 17 (!) 140/78 96 %   11/03/22 1913 98.3 °F (36.8 °C) 75 18 (!) 167/78 97 %   11/03/22 1631 98.6 °F (37 °C) 78 18 (!) 212/84 99 %   11/03/22 1419 -- -- -- (!) 153/84 --         Intake/Output Summary (Last 24 hours) at 11/4/2022 1344  Last data filed at 11/4/2022 1753  Gross per 24 hour   Intake 550 ml   Output --   Net 550 ml          I had a face to face encounter and independently examined this patient on 11/4/2022, as outlined below:  PHYSICAL EXAM:  General: WD, WN. Alert, cooperative, no acute distress    EENT:  EOMI. Anicteric sclerae. MMM  Resp:  CTA bilaterally, no wheezing or rales. No accessory muscle use  CV:  Regular  rhythm,  No edema  GI:  Soft, Non distended, Non tender. +Bowel sounds  Neurologic:  EOMs intact. No facial asymmetry. No aphasia or slurred speech. Symmetrical strength, Sensation grossly intact. Alert and oriented X 4.     Psych:   Good insight. Not anxious nor agitated  Skin:  No rashes.   No jaundice    Reviewed most current lab test results and cultures  YES  Reviewed most current radiology test results   YES  Review and summation of old records today    NO  Reviewed patient's current orders and MAR    YES  PMH/SH reviewed - no change compared to H&P  ________________________________________________________________________  Care Plan discussed with:    Comments Patient X    Family  x Patient's daughter at bedside   RN X    Care Manager X    Consultant                       X Multidiciplinary team rounds were held today with , nursing, pharmacist and clinical coordinator. Patient's plan of care was discussed; medications were reviewed and discharge planning was addressed. ________________________________________________________________________        Comments   >50% of visit spent in counseling and coordination of care X    ________________________________________________________________________  Bhavna Childress MD     Procedures: see electronic medical records for all procedures/Xrays and details which were not copied into this note but were reviewed prior to creation of Plan. LABS:  I reviewed today's most current labs and imaging studies.   Pertinent labs include:  Recent Labs     11/04/22  0357 11/03/22  0327 11/02/22  0557   WBC 6.0 9.3 6.3   HGB 9.2* 10.6* 10.6*   HCT 28.0* 32.4* 32.6*    284 246       Recent Labs     11/04/22  0357 11/03/22  0327 11/02/22  0557    138 139   K 3.5 4.0 4.2    105 108   CO2 23 23 23   GLU 98 125* 100   BUN 17 18 14   CREA 2.30* 2.07* 1.07*   CA 8.5 9.3 9.2   ALB  --   --  3.3*   TBILI  --   --  0.5   ALT  --   --  33         Signed: Bhavna Childress MD

## 2022-11-04 NOTE — PROGRESS NOTES
Problem: Mobility Impaired (Adult and Pediatric)  Goal: *Acute Goals and Plan of Care (Insert Text)  Description: FUNCTIONAL STATUS PRIOR TO ADMISSION: pt's dtr and granddtr live with her in a one story home. She is normally mod I/independent with all activity - ADLs and amb with rollator per her report    [facility]: The patient lived with dtr but did not require assist except when out of home    Physical Therapy Goals  Initiated 11/2/2022  1. Patient will move from supine to sit and sit to supine , scoot up and down, and roll side to side in bed with modified independence within 7 day(s). 2.  Patient will transfer from bed to chair and chair to bed with modified independence using the least restrictive device within 7 day(s). 3.  Patient will perform sit to stand with modified independence within 7 day(s). 4.  Patient will ambulate with modified independence for 150 feet with the least restrictive device within 7 day(s). Outcome: Progressing Towards Goal   PHYSICAL THERAPY TREATMENT  Patient: Norma Arellano (89 y.o. female)  Date: 11/4/2022  Diagnosis: Accelerated hypertension [I10]  High fever [R50.9]  Meningitis [G03.9] <principal problem not specified>      Precautions: Fall, Bed Alarm, Spinal, Seizure, Contact (chronic back pain with recent PT for back pain/foot pain related to Back deficits; on contact for R/O meningitis)  Chart, physical therapy assessment, plan of care and goals were reviewed. ASSESSMENT  Patient continues with skilled PT services and is progressing towards goals. Patient currently limited by generalized weakness, gait instability, impaired balance and decreased activity tolerance. Currently needing supervision to come to EOB and CGA to come to stand. Able to amb to the bathroom and back with CGA and no overt LOB but slow inga and is generally unsteady. Reports amb only short distances at home and plans to have assistance.   Recommend  PT follow up for mobility progression. Other factors to consider for discharge: at risk for falls, below baseline         PLAN :  Patient continues to benefit from skilled intervention to address the above impairments. Continue treatment per established plan of care. to address goals. Recommendation for discharge: (in order for the patient to meet his/her long term goals)  Physical therapy at least 2 days/week in the home AND ensure assist and/or supervision for safety with mobility      IF patient discharges home will need the following DME: patient owns DME required for discharge       SUBJECTIVE:   Patient stated I am going home today. I can't really walk but it will be okay    OBJECTIVE DATA SUMMARY:   Critical Behavior:  Neurologic State: Alert  Orientation Level: Oriented to person, Oriented to place, Disoriented to time, Disoriented to situation  Cognition: Follows commands  Safety/Judgement: Fall prevention, Awareness of environment  Functional Mobility Training:  Bed Mobility:  Rolling: Supervision  Supine to Sit: Supervision  Sit to Supine: Supervision  Scooting: Supervision        Transfers:  Sit to Stand: Contact guard assistance  Stand to Sit: Contact guard assistance                             Balance:  Sitting: Intact  Standing: Impaired  Standing - Static: Constant support;Good  Standing - Dynamic : Constant support; Fair  Ambulation/Gait Training:  Distance (ft): 20 Feet (ft) (x 2)  Assistive Device: Gait belt;Walker, rolling  Ambulation - Level of Assistance: Contact guard assistance        Gait Abnormalities: Decreased step clearance;Shuffling gait        Base of Support: Widened     Speed/Rhonda: Slow;Pace decreased (<100 feet/min); Shuffled  Step Length: Left shortened;Right shortened       Pain Rating:  No c/o pain during session    Activity Tolerance:   Fair and requires rest breaks    After treatment patient left in no apparent distress:   Supine in bed, Call bell within reach, Bed / chair alarm activated, and Caregiver / family present    COMMUNICATION/COLLABORATION:   The patients plan of care was discussed with: Physical therapist, Occupational therapist, and Registered nurse.      Mao Yi PT, DPT   Time Calculation: 24 mins

## 2022-11-04 NOTE — PROGRESS NOTES
Reason for Admission:  Meningitis                      RUR Score:  15%                   Plan for utilizing home health: Agreeable          PCP:    First and Last name:  Mahnaz Ruiz MD                 Name of Practice:               Are you a current patient: Yes/No:               Approximate date of last visit: 10/28/22              Can you participate in a virtual visit with your PCP:                     Current Advanced Directive/Advance Care Plan: Full Code  CM confirmed patient is a Full Code     Healthcare Decision Maker:   Click here to complete [address] including selection of the Healthcare Decision Maker Relationship (ie \"Primary\")           DaughterKelvin, [phone]                  Transition of Care Plan:                    Patient lives at home with her daughter, Mirian. Patient uses a cane and rollator. Patient's daughter assists her with some care. Patient's family is her transport. Patient uses [facility] in [place], [place]. Current: Home with HH. PT/OT are recommending HH. MD note indicates probable dc home w/ diane on Sat.

## 2022-11-04 NOTE — PROGRESS NOTES
Attended Palliative IDT.   Sybil Gandara M.Div, [facility] Paging Service [phone])

## 2022-11-04 NOTE — PROGRESS NOTES
End of Shift Note    Bedside shift change report given to Nora Finley RN (oncoming nurse) by Marquise Terrazas RN (offgoing nurse). Report included the following information SBAR, Kardex, Intake/Output, and MAR    Shift worked:  night   Shift summary and any significant changes:     Uneventful night . Bed alarm on zone two. AM labs done. Call bell and phone within reach of patient. Concerns for physician to address:  none   Zone phone for oncoming shift:        Patient Information  Tomas Sanchez  80 y.o.  11/1/2022  2:31 AM by Mayra Méndez MD. Tomas Sanchez was admitted from Home    Problem List  Patient Active Problem List    Diagnosis Date Noted    Meningitis 11/01/2022    High fever 11/01/2022    Accelerated hypertension 11/01/2022    Mixed hyperlipidemia 10/04/2021    Carotid stenosis, asymptomatic, right 12/16/2020    Carotid stenosis, symptomatic w/o infarct, left 11/02/2020    Bilateral carotid artery stenosis 10/19/2020    TIA (transient ischemic attack) 10/17/2020    High cholesterol 09/21/2020    Essential hypertension 12/28/2018    Coronary artery disease involving native coronary artery of native heart without angina pectoris 06/12/2018    S/P coronary artery stent placement 05/25/2018    Hx of diverticulitis of colon 05/18/2018    Gout      Past Medical History:   Diagnosis Date    CAD (coronary artery disease)     Diverticulitis     Glaucoma     Gout     HTN (hypertension) 5/18/2018    Hx of diverticulitis of colon 5/18/2018    Hyperlipidemia     Hypertension     Other ill-defined conditions(799.89)     high cholesterol    S/P coronary artery stent placement 05/25/2018    Stroke ([address] 10/2020    TIA         Activity:  Activity Level:  Up with Assistance  Number times ambulated in hallways past shift:   Number of times OOB to chair past shift: 2    Cardiac:   Cardiac Monitoring: Yes      Cardiac Rhythm: Sinus Rhythm    Access:   Current line(s): PIV     Genitourinary:   Urinary status: incontinent    Respiratory:   O2 Device: None (Room air)  Chronic home O2 use?: NO  Incentive spirometer at bedside: NO       GI:  Last Bowel Movement Date: 11/03/22  Current diet:  ADULT DIET Regular; Low Fat/Low Chol/High Fiber/DANNA  Passing flatus: YES  Tolerating current diet: YES       Pain Management:   Patient states pain is manageable on current regimen: YES    Skin:  Stevenson Score: 19  Interventions: increase time out of bed    Patient Safety:  Fall Score:  Total Score: 2  Interventions: bed/chair alarm, gripper socks, and pt to call before getting OOB  High Fall Risk: Yes  @Rollbelt  @dexterity to release roll belt  Yes/No ( must document dexterity  here by stating Yes or No here, otherwise this is a restraint and must follow restraint documentation policy.)    DVT prophylaxis:  DVT prophylaxis Med- Yes  DVT prophylaxis SCD or GABO-      Wounds: (If Applicable)  Wounds- No  Location     Active Consults:  IP CONSULT TO CARDIOLOGY  IP CONSULT TO NEUROLOGY  IP CONSULT TO INFECTIOUS DISEASES  IP CONSULT TO PALLIATIVE CARE - PROVIDER    Length of Stay:  Expected LOS: 3d 12h  Actual LOS: 3  Discharge Plan:        Tabatha Mcdowell RN

## 2022-11-04 NOTE — PROGRESS NOTES
End of Shift Note     Bedside shift change report given to Sharee Corona (oncoming nurse) by Jr Lora RN (offgoing nurse). Report included the following information SBAR, Kardex, Intake/Output, and MAR     Shift worked: days   Shift summary and any significant changes:      Uneventful day. CM and attending following for DC needs, potential DC for 11/5      Concerns for physician to address:  none   Zone phone for oncoming shift:         Patient Information  Portillo Rick  80 y.o.  11/1/2022  2:31 AM by Whitney Perkins MD. Portillo Rick was admitted from Home     Problem List       Patient Active Problem List     Diagnosis Date Noted    Meningitis 11/01/2022    High fever 11/01/2022    Accelerated hypertension 11/01/2022    Mixed hyperlipidemia 10/04/2021    Carotid stenosis, asymptomatic, right 12/16/2020    Carotid stenosis, symptomatic w/o infarct, left 11/02/2020    Bilateral carotid artery stenosis 10/19/2020    TIA (transient ischemic attack) 10/17/2020    High cholesterol 09/21/2020    Essential hypertension 12/28/2018    Coronary artery disease involving native coronary artery of native heart without angina pectoris 06/12/2018    S/P coronary artery stent placement 05/25/2018    Hx of diverticulitis of colon 05/18/2018    Gout             Past Medical History:   Diagnosis Date    CAD (coronary artery disease)      Diverticulitis      Glaucoma      Gout      HTN (hypertension) 5/18/2018    Hx of diverticulitis of colon 5/18/2018    Hyperlipidemia      Hypertension      Other ill-defined conditions(799.89)       high cholesterol    S/P coronary artery stent placement 05/25/2018    Stroke ([address] 10/2020     TIA            Activity:  Activity Level:  Up with Assistance  Number times ambulated in hallways past shift:   Number of times OOB to chair past shift: 2     Cardiac:   Cardiac Monitoring: Yes      Cardiac Rhythm: Sinus Rhythm     Access:   Current line(s): PIV      Genitourinary: Urinary status: incontinent     Respiratory:   O2 Device: None (Room air)  Chronic home O2 use?: NO  Incentive spirometer at bedside: NO     GI:  Last Bowel Movement Date: 11/03/22  Current diet:  ADULT DIET Regular; Low Fat/Low Chol/High Fiber/DANNA  Passing flatus: YES  Tolerating current diet: YES     Pain Management:   Patient states pain is manageable on current regimen: YES     Skin:  Stevenson Score: 19  Interventions: increase time out of bed    Patient Safety:  Fall Score:  Total Score: 2  Interventions: bed/chair alarm, gripper socks, and pt to call before getting OOB  High Fall Risk: Yes  @Rollbelt  @dexterity to release roll belt  Yes/No ( must document dexterity  here by stating Yes or No here, otherwise this is a restraint and must follow restraint documentation policy.)     DVT prophylaxis:  DVT prophylaxis Med- Yes  DVT prophylaxis SCD or GABO-       Wounds: (If Applicable)  Wounds- No  Location      Active Consults:  IP CONSULT TO CARDIOLOGY  IP CONSULT TO NEUROLOGY  IP CONSULT TO INFECTIOUS DISEASES  IP CONSULT TO PALLIATIVE CARE - PROVIDER     Length of Stay:  Expected LOS: 3d 12h  Actual LOS: 3  Discharge Plan:

## 2022-11-04 NOTE — PROGRESS NOTES
Spiritual Care Assessment/Progress Note  [facility]      NAME: Bettye Whitfield      MRN: [id number]  AGE: 80 y.o.  SEX: female  Anabaptist Affiliation: Adventism   Language: English     11/4/2022     Total Time (in minutes): 10     Spiritual Assessment begun in [facility]LE[facility] through conversation with:         [x]Patient        [] Family    [] Friend(s)        Reason for Consult: Palliative Care, Initial/Spiritual Assessment     Spiritual beliefs: (Please include comment if needed)     [x] Identifies with a raquel tradition:         [] Supported by a raquel community:            [] Claims no spiritual orientation:           [] Seeking spiritual identity:                [] Adheres to an individual form of spirituality:           [] Not able to assess:                           Identified resources for coping:      [x] Prayer                               [] Music                  [] Guided Imagery     [x] Family/friends                 [] Pet visits     [] Devotional reading                         [] Unknown     [] Other:                                           Interventions offered during this visit: (See comments for more details)    Patient Interventions: Initial/Spiritual assessment, patient floor, Coping skills reviewed/reinforced, Iconic (affirming the presence of God/Higher Power), Affirmation of raquel, Life review/legacy           Plan of Care:     [] Support spiritual and/or cultural needs    [] Support AMD and/or advance care planning process      [] Support grieving process   [] Coordinate Rites and/or Rituals    [] Coordination with community clergy   [] No spiritual needs identified at this time   [] Detailed Plan of Care below (See Comments)  [] Make referral to Music Therapy  [] Make referral to Pet Therapy     [] Make referral to Addiction services  [] Make referral to [facility]  [] Make referral to Spiritual Care Partner  [] No future visits requested [] Contact Spiritual Care for further referrals     Comments: Initial spiritual assessment with palliative pt in 3112. Pt greeted  pleasantly. Self-reported to be feeling much better. Credited God with having had a long life. Pleased with care, had three daughters, youngest daughter is main support. Pt hopes to be going home tomorrow. In positive spirits. Extended blessing. Contact Spiritual Care for any further referrals.  Keagan Rodríguez M.Div, [facility]   Paging Service [phone])

## 2022-11-04 NOTE — PROGRESS NOTES
Pharmacy Antimicrobial Kinetic Dosing    Indication for Antimicrobials: CNS infection, sepsis    Current Regimen of Each Antimicrobial:  Vancomycin changed to dosing per level (Start Date ; Day 4)  Ampicillin 2 g IV q8h (Start Date ; Day 4)  Ceftriaxone 2 g IV q12h (Start Date ; Day 4)  Acyclovir 550 mg IV changed to q24h (Start Date ; Day 4)    Previous Antimicrobial Therapy:      Goal Level: AUC: 400-600 mg/hr/Liter/day    Date Dose & Interval Measured (mcg/mL) Predicted AUC/RAMÓN   11/3 750 mg q24h 15.2 --    500 mg x1 13.8 614/22.9           Date & time of next level: Vanc random , 900    Dosing calculator used: GTI calcMeetapp    Significant Positive Cultures:    blood: NG. pending    Conditions for Dosing Consideration: None    Labs:  Recent Labs     22  0557   CREA 2.30* 2.07* 1.07*   BUN 17 18 14   PCT  --   --  <0.05     Recent Labs     22  0557   WBC 6.0 9.3 6.3     Temp (24hrs), Av.4 °F (36.9 °C), Min:98 °F (36.7 °C), Max:98.7 °F (37.1 °C)    Creatinine Clearance (mL/min):   CrCl (Adjusted Body Weight): 15.9 mL/min   If actual weight < IBW: CrCl (Actual Body Weight) 18.6    Impression/Plan:   ID, neuro (incomplete LP) on board  SCr continue to increase. Vancomycin random level indicated a reduced dose of 500 mg IV q24h. But in light of continuous increasing nature of SCr, will order only a Vancomycin 500 mg IV x1 today, and recheck random level tomorrow AM  Adjusted acyclovir to 550 mg q24h per renal protocol  Continue with the current dosages of ampicillin and ceftriaxone  BMP daily  Antimicrobial stop date TBD     Pharmacy will follow daily and adjust medications as appropriate for renal function and/or serum levels.     Thank you,  Tito Lopez, PHARMD

## 2022-11-04 NOTE — PROGRESS NOTES
Physician Progress Note      PATIENT:               Valerie Muñoz  CSN #:                  [id number]  :                       8/15/1932  ADMIT DATE:       2022 2:31 AM  DISCH DATE:  RESPONDING  PROVIDER #:        Lawyer Niels CHAO          QUERY TEXT:    Patient seen in ED 2x in past wk for HA and returns with HA and elevated BP despite taking prescription medication per ED pn. Noted documentation of sepsis in H&P and on P. Melecio Chase NP pn. If possible, please document in progress notes and discharge summary the source of sepsis:    The medical record reflects the following:    Risk Factors: 43-year-old female with CKD4, HLD, and CAD s/p stents    Clinical Indicators:  : Temp 101.1, HR up to 103, RR up to 23, /72, 202/101, Headache pain rated at 8/10  (flow sheet)  Rapid COVID-19 negative  WBC 8.9 - 6.3 - 9.3  procal <0.05  POC lactic acid 1.87  Blood cx No growth in 2 days  CXR no acute process  UA neg nitrites and bacteria, trace leuks     ID pn:  Pt complains of R/ear drainage. Single episode of fever 101.1      ? Otitis media  Acute meningitis very unlikely on clinical history and exam. If still convinced of meningitis- recommend lumbar puncture    Treatment: CBC and CMP monitoring, Blood cultures, UA, COVID test, Tylenol 1,000 mg po x1 (), Tylenol 650 mg po x1 (), Vancomycin 1750 mg IV x1, Ampicillin 2g IV q8h, Rocephin 2g IV q12h    Thank you,  Rosa Frank RN, CDI  Options provided:  -- Sepsis POA, due to, Please document source. -- Sepsis POA, now resolved, due to, Please document source. -- Sepsis ruled out, and SIRS confirmed due to, Please document etiology  -- Sepsis ruled out  -- Other - I will add my own diagnosis  -- Disagree - Not applicable / Not valid  -- Disagree - Clinically unable to determine / Unknown  -- Refer to Clinical Documentation Reviewer    PROVIDER RESPONSE TEXT:    After further study sepsis has been ruled out.     Query created by: Sammy Melendez Merary Hurst on 11/3/2022 1:04 PM      Electronically signed by:  Nadira Zayas NP 11/4/2022 8:05 AM

## 2022-11-05 VITALS
HEART RATE: 76 BPM | RESPIRATION RATE: 18 BRPM | DIASTOLIC BLOOD PRESSURE: 76 MMHG | WEIGHT: 160.05 LBS | BODY MASS INDEX: 27.33 KG/M2 | HEIGHT: 64 IN | SYSTOLIC BLOOD PRESSURE: 142 MMHG | TEMPERATURE: 98.9 F | OXYGEN SATURATION: 99 %

## 2022-11-05 LAB
ANION GAP SERPL CALC-SCNC: 10 MMOL/L (ref 5–15)
BASOPHILS # BLD: 0 K/UL (ref 0–0.1)
BASOPHILS NFR BLD: 0 % (ref 0–1)
BUN SERPL-MCNC: 14 MG/DL (ref 6–20)
BUN/CREAT SERPL: 9 (ref 12–20)
CALCIUM SERPL-MCNC: 8.8 MG/DL (ref 8.5–10.1)
CHLORIDE SERPL-SCNC: 105 MMOL/L (ref 97–108)
CO2 SERPL-SCNC: 25 MMOL/L (ref 21–32)
CREAT SERPL-MCNC: 1.62 MG/DL (ref 0.55–1.02)
DIFFERENTIAL METHOD BLD: ABNORMAL
EOSINOPHIL # BLD: 0.3 K/UL (ref 0–0.4)
EOSINOPHIL NFR BLD: 5 % (ref 0–7)
ERYTHROCYTE [DISTWIDTH] IN BLOOD BY AUTOMATED COUNT: 13.4 % (ref 11.5–14.5)
GLUCOSE SERPL-MCNC: 110 MG/DL (ref 65–100)
HCT VFR BLD AUTO: 28.7 % (ref 35–47)
HGB BLD-MCNC: 9.6 G/DL (ref 11.5–16)
IMM GRANULOCYTES # BLD AUTO: 0 K/UL (ref 0–0.04)
IMM GRANULOCYTES NFR BLD AUTO: 1 % (ref 0–0.5)
LYMPHOCYTES # BLD: 1.8 K/UL (ref 0.8–3.5)
LYMPHOCYTES NFR BLD: 29 % (ref 12–49)
MCH RBC QN AUTO: 30.5 PG (ref 26–34)
MCHC RBC AUTO-ENTMCNC: 33.4 G/DL (ref 30–36.5)
MCV RBC AUTO: 91.1 FL (ref 80–99)
MONOCYTES # BLD: 0.9 K/UL (ref 0–1)
MONOCYTES NFR BLD: 14 % (ref 5–13)
NEUTS SEG # BLD: 3 K/UL (ref 1.8–8)
NEUTS SEG NFR BLD: 51 % (ref 32–75)
NRBC # BLD: 0 K/UL (ref 0–0.01)
NRBC BLD-RTO: 0 PER 100 WBC
PLATELET # BLD AUTO: 271 K/UL (ref 150–400)
PMV BLD AUTO: 8.9 FL (ref 8.9–12.9)
POTASSIUM SERPL-SCNC: 3.4 MMOL/L (ref 3.5–5.1)
RBC # BLD AUTO: 3.15 M/UL (ref 3.8–5.2)
SODIUM SERPL-SCNC: 140 MMOL/L (ref 136–145)
WBC # BLD AUTO: 6 K/UL (ref 3.6–11)

## 2022-11-05 PROCEDURE — 74011250637 HC RX REV CODE- 250/637: Performed by: INTERNAL MEDICINE

## 2022-11-05 PROCEDURE — 36415 COLL VENOUS BLD VENIPUNCTURE: CPT

## 2022-11-05 PROCEDURE — 85025 COMPLETE CBC W/AUTO DIFF WBC: CPT

## 2022-11-05 PROCEDURE — 94760 N-INVAS EAR/PLS OXIMETRY 1: CPT

## 2022-11-05 PROCEDURE — 80048 BASIC METABOLIC PNL TOTAL CA: CPT

## 2022-11-05 PROCEDURE — 74011250636 HC RX REV CODE- 250/636: Performed by: INTERNAL MEDICINE

## 2022-11-05 PROCEDURE — 74011000250 HC RX REV CODE- 250: Performed by: INTERNAL MEDICINE

## 2022-11-05 RX ORDER — GABAPENTIN 100 MG/1
100 CAPSULE ORAL 3 TIMES DAILY
Qty: 90 CAPSULE | Refills: 1 | Status: SHIPPED | OUTPATIENT
Start: 2022-11-05 | End: 2023-01-04

## 2022-11-05 RX ADMIN — TIMOLOL MALEATE 1 DROP: 5 SOLUTION OPHTHALMIC at 09:33

## 2022-11-05 RX ADMIN — ALLOPURINOL 100 MG: 100 TABLET ORAL at 09:32

## 2022-11-05 RX ADMIN — METOPROLOL TARTRATE 50 MG: 50 TABLET, FILM COATED ORAL at 09:32

## 2022-11-05 RX ADMIN — GABAPENTIN 200 MG: 100 CAPSULE ORAL at 09:32

## 2022-11-05 RX ADMIN — ASPIRIN 81 MG: 81 TABLET, COATED ORAL at 09:32

## 2022-11-05 RX ADMIN — SODIUM CHLORIDE, PRESERVATIVE FREE 10 ML: 5 INJECTION INTRAVENOUS at 06:19

## 2022-11-05 RX ADMIN — HEPARIN SODIUM 5000 UNITS: 5000 INJECTION INTRAVENOUS; SUBCUTANEOUS at 05:34

## 2022-11-05 NOTE — DISCHARGE SUMMARY
Hospitalist Discharge Summary     Patient ID:  Janis Taylor  [id number]  2 y.o.  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 O. female     Known hypertension  Coronary artery disease status post stents  Hyperlipidemia  TIA 2020  Right carotid endarterectomy 2020     Currently alert, hard of hearing, mildly confused  Oriented to person and place, not year     Recently seen in the emergency room on 10/28 and 10/30 for headache and elevated BP              No fevers or leukocytosis              Head CT negative              BP meds adjusted  Still with persistent headache yesterday              Granddaughter notes headaches coming and going, severe at times                          Never fully went away              No fevers at home              No other associated symptoms  Came back to emergency room today  Febrile in ED to 101.2  No sore throat, neck pain, cough, shortness of breath, chest pain  No nausea vomiting, diarrhea, abdominal pain, dysuria  No skin rashes, joint redness or swelling     Emergency room  Febrile to 1-1.2, heart rate 103  No meningismus  Alert, oriented x2  Non focal motor exam  WBC 8.9  Creatinine 1.40, at baseline  HS troponin 219 --> 229, EKG with non specific ST-T changes  Normal PLTs and LFTs  UA 0-4 WBC, 0-5 RBC, negative bacteria  pCXR with no ASD  Head CT negative  ED sent Blood cultures  ED attempted LP x 2, unsuccessful  ED started empiric ceftriaxone, vanco, ampicillin  IR attempted LP this AM, also unable to get CSF due to anatomy  We were called to admit the patient    ______________________________________________________________________  DISCHARGE SUMMARY/HOSPITAL COURSE:  for full details see H&P, daily progress notes, labs, consult notes. Sepsis with severe headache, POA  - Met criteria for sepsis on admission  - MRI brain with no acute intracranial abnormality reported. The chronic microangiopathic white matter disease  - Has been afebrile with no leukocytosis  - No meningeal signs on physical exam  - Rapid COVID-19 negative  - Chest x-ray with no acute processes  - Was started on meningitis coverage, but very low suspicion for meningitis. After discussion with ID, antibiotics discontinued  - Unsuccessful LP x2 secondary to lumbar stenosis  - Procalcitonin 0.05  -- patient back to baseline, non toxic at time of discharge. No infectious etiology for sepsis syndrome found     Hypertensive urgency  - Blood pressure is now controlled   - FABRICE resolved. Restart losartan. Continue BB    Coronary artery disease  - Status post PCI  - Remains Chest pain-free  - Mild troponin elevation admission  - Cardiology was consulted, input is appreciated    FABRICE on CKD stage IV  - Creatinine around 2 from baseline 1.6  - Cr has come down. 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 New Rx sent to pharmacy    _______________________________________________________________________  Patient seen and examined by me on discharge day. Pertinent Findings:  Gen:    Not in distress  Chest: Clear lungs  CVS:   Regular rhythm.   No edema  Abd:  Soft, not distended, not tender  Neuro:  Alert, Oriented x 4, grossly non focal exam  _______________________________________________________________________  DISCHARGE MEDICATIONS:   Current Discharge Medication List        START taking these medications    Details   gabapentin (NEURONTIN) 100 mg capsule Take 1 Capsule by mouth three (3) times daily for 60 days. Max Daily Amount: 300 mg. Qty: 90 Capsule, Refills: 1  Start date: 11/5/2022, End date: 1/4/2023    Associated Diagnoses: Neuropathic pain           CONTINUE these medications which have NOT CHANGED    Details   butalbital-acetaminophen-caff (FIORICET) -40 mg per capsule Take 1 Capsule by mouth every four (4) hours as needed for Headache. Qty: 12 Capsule, Refills: 0      losartan (COZAAR) 50 mg tablet Take 1 Tablet by mouth daily for 30 days. Qty: 30 Tablet, Refills: 0      ezetimibe (ZETIA) 10 mg tablet TAKE 1 TABLET BY MOUTH EVERY DAY  Qty: 90 Tablet, Refills: 1      cholecalciferol (VITAMIN D3) (5000 Units/125 mcg) tab tablet Take 5,000 Units by mouth daily. naloxone (Narcan) 4 mg/actuation nasal spray Use 1 spray intranasally, then discard. Repeat with new spray every 2 min as needed for opioid overdose symptoms, alternating nostrils. Qty: 1 Each, Refills: 0      polyethylene glycol (MIRALAX) 17 gram packet Take 17 g by mouth as needed for Constipation. trolamine salicylate (Aspercreme) 10 % lotion Apply  to affected area as needed. atorvastatin (LIPITOR) 80 mg tablet Take 1 Tab by mouth nightly. Qty: 30 Tab, Refills: 1      metoprolol tartrate (LOPRESSOR) 25 mg tablet Take 0.5 Tabs by mouth every twelve (12) hours. Qty: 90 Tab, Refills: 2      docusate sodium (STOOL SOFTENER PO) Take  by mouth as needed. ferrous sulfate (IRON PO) Take 65 mg by mouth daily. aspirin delayed-release 81 mg tablet Take 81 mg by mouth daily. timolol (TIMOPTIC-XE) 0.5 % ophthalmic gel-forming Administer 1 Drop to right eye daily.       latanoprost (XALATAN) 0.005 % ophthalmic solution Administer 1 Drop to both eyes nightly. Indications: wide-angle glaucoma      fluticasone (FLONASE ALLERGY RELIEF) 50 mcg/actuation nasal spray 1 [place] by Both Nostrils route daily. acetaminophen (TYLENOL EXTRA STRENGTH) 500 mg tablet Take 1,000 mg by mouth every six (6) hours as needed for Pain. fexofenadine (ALLEGRA) 180 mg tablet Take 180 mg by mouth nightly. allopurinol (ZYLOPRIM) 100 mg tablet Take 100 mg by mouth daily. ALPRAZolam (XANAX) 0.25 mg tablet Take 0.25 mg by mouth daily as needed for Anxiety. My Recommended Diet, Activity, Wound Care, and follow-up labs are listed in the patient's Discharge Insturctions which I have personally completed and reviewed.   Risk of deterioration: Low    Condition at Discharge:  Stable  _____________________________________________________________________    Disposition  Home with family and home health services  ____________________________________________________________________    Care Plan discussed with:   Patient, Family, RN, Care Manager, Consultant    ____________________________________________________________________    Code Status: Full Code  ____________________________________________________________________      Condition at Discharge:  Stable  _____________________________________________________________________  Follow up with:   PCP : DANIELE Bingham  Follow-up Information       Follow up With Specialties Details Why Contact Info    Toshia Butts [address] Nurse Practitioner   [address]  [phone]                  Total time in minutes spent coordinating this discharge (includes going over instructions, follow-up, prescriptions, and preparing report for sign off to her PCP) :  35 minutes    Signed:  Ayana Wagner MD

## 2022-11-05 NOTE — PROGRESS NOTES
End of Shift Note     Bedside shift change report given to , RN (oncoming nurse) by Elisa Reynoso RN (offgoing nurse). Report included the following information SBAR, Kardex, Intake/Output, and MAR     Shift worked: days   Shift summary and any significant changes:      Uneventful day. Bed alarm on zone two. Call bell and phone within reach of patient. Concerns for physician to address:  none   Zone phone for oncoming shift:         Patient Information  Perla Lynn  [address].  11/1/2022  2:31 AM by Trista Maldonado MD. Perla Lynn was admitted from Home     Problem List       Patient Active Problem List     Diagnosis Date Noted    Meningitis 11/01/2022    High fever 11/01/2022    Accelerated hypertension 11/01/2022    Mixed hyperlipidemia 10/04/2021    Carotid stenosis, asymptomatic, right 12/16/2020    Carotid stenosis, symptomatic w/o infarct, left 11/02/2020    Bilateral carotid artery stenosis 10/19/2020    TIA (transient ischemic attack) 10/17/2020    High cholesterol 09/21/2020    Essential hypertension 12/28/2018    Coronary artery disease involving native coronary artery of native heart without angina pectoris 06/12/2018    S/P coronary artery stent placement 05/25/2018    Hx of diverticulitis of colon 05/18/2018    Gout             Past Medical History:   Diagnosis Date    CAD (coronary artery disease)      Diverticulitis      Glaucoma      Gout      HTN (hypertension) 5/18/2018    Hx of diverticulitis of colon 5/18/2018    Hyperlipidemia      Hypertension      Other ill-defined conditions(799.89)       high cholesterol    S/P coronary artery stent placement 05/25/2018    Stroke ([address] 10/2020     TIA            Activity:  Activity Level:  Up with Assistance  Number times ambulated in hallways past shift:   Number of times OOB to chair past shift: 2     Cardiac:   Cardiac Monitoring: Yes      Cardiac Rhythm: Sinus Rhythm     Access:   Current line(s): PIV      Genitourinary: Urinary status: incontinent     Respiratory:   O2 Device: None (Room air)  Chronic home O2 use?: NO  Incentive spirometer at bedside: NO     GI:  Last Bowel Movement Date: 11/03/22  Current diet:  ADULT DIET Regular; Low Fat/Low Chol/High Fiber/DANNA  Passing flatus: YES  Tolerating current diet: YES     Pain Management:   Patient states pain is manageable on current regimen: YES     Skin:  Stevenson Score: 19  Interventions: increase time out of bed    Patient Safety:  Fall Score:  Total Score: 2  Interventions: bed/chair alarm, gripper socks, and pt to call before getting OOB  High Fall Risk: Yes  @Rollbelt  @dexterity to release roll belt  Yes/No ( must document dexterity  here by stating Yes or No here, otherwise this is a restraint and must follow restraint documentation policy.)     DVT prophylaxis:  DVT prophylaxis Med- Yes  DVT prophylaxis SCD or GABO-       Wounds: (If Applicable)  Wounds- No  Location      Active Consults:  IP CONSULT TO CARDIOLOGY  IP CONSULT TO NEUROLOGY  IP CONSULT TO INFECTIOUS DISEASES  IP CONSULT TO PALLIATIVE CARE - PROVIDER     Length of Stay:  Expected LOS: 2d 4h  Actual LOS: 4  Discharge Plan:

## 2022-11-05 NOTE — PROGRESS NOTES
Problem: Risk for Spread of Infection  Goal: Prevent transmission of infectious organism to others  Description: Prevent the transmission of infectious organisms to other patients, staff members, and visitors. Outcome: Progressing Towards Goal     Problem: Patient Education:  Go to Education Activity  Goal: Patient/Family Education  Outcome: Progressing Towards Goal     Problem: Falls - Risk of  Goal: *Absence of Falls  Description: Document Patrick Altamirano Fall Risk and appropriate interventions in the flowsheet.   Outcome: Progressing Towards Goal  Note: Fall Risk Interventions:  Mobility Interventions: Bed/chair exit alarm         Medication Interventions: Bed/chair exit alarm    Elimination Interventions: Bed/chair exit alarm, Call light in reach              Problem: Patient Education: Go to Patient Education Activity  Goal: Patient/Family Education  Outcome: Progressing Towards Goal     Problem: Patient Education: Go to Patient Education Activity  Goal: Patient/Family Education  Outcome: Progressing Towards Goal     Problem: Patient Education: Go to Patient Education Activity  Goal: Patient/Family Education  Outcome: Progressing Towards Goal

## 2022-11-05 NOTE — PROGRESS NOTES
11: 19AM UPDATE  Discharge orders placed. Pt to d/c home today by private vehicle with granddaughter. Reviewed HH PT recommendation. Pt agreeable. [facility]OC offered. Has [employer] insurance & Medicaid. CM advised pt and MD that insurance likely will not be able to be verified until Monday due to weekend, but can still send referrals out to find accepting agency. Referral sent to [facility]. Awaiting response at this time. 11:45AM UPDATE  [facility] denied referral as they do not accept pt's insurance [employer]). Additional referrals sent to [facility], [facility], and [facility] via [employer]. Awaiting response at this time. 1:21PM UPDATE  [facility] denied referral due to being OON. [facility] can accept, but have to verify benefits on Monday. Info placed in AVS.     2:02PM UPDATE  Provided 2nd IM Letter at bedside, signed copy placed in CM basket to scan on pt's chart. Pt to call and schedule PCP apnt as offices are closed at this time. Family member at bedside to transport home. Both aware that [facility] has accepted by has to verify her HH benefits with insurance Monday. All information entered into pt AVS.     Pt has no additional CM needs at this time. Care Management Interventions  PCP Verified by CM: Yes  Last Visit to PCP: 10/28/22  Mode of Transport at Discharge:  Other (see comment) (Family)  Transition of Care Consult (CM Consult): [address]: No  Reason [facility]: Unable to staff case (unable to accept pt's insurance)  Discharge Durable Medical Equipment: No  Health Maintenance Reviewed: Yes  Physical Therapy Consult: Yes  Occupational Therapy Consult: Yes  Speech Therapy Consult: No  Support Systems: Child(jazlyn), Other Family Member(s)  Confirm Follow Up Transport: Family  The Plan for Transition of Care is Related to the Following Treatment Goals : HH PT/OT  The Patient and/or Patient Representative was Provided with a Choice of Provider and Agrees with the Discharge Plan?: Yes  Name of the Patient Representative Who was Provided with a Choice of Provider and Agrees with the Discharge Plan: Livan Faust of Choice List was Provided with Basic Dialogue that Supports the Patient's Individualized Plan of Care/Goals, Treatment Preferences and Shares the Quality Data Associated with the Providers?: Yes  Discharge Location  Patient Expects to be Discharged to[de-identified] Home with home health    Hieu Carter [place] [id number] Manager  [phone]

## 2022-11-05 NOTE — DISCHARGE INSTRUCTIONS
HOSPITALIST DISCHARGE INSTRUCTIONS    NAME: Adolfo Braun   :  8/15/1932   MRN:  [id number]     Date/Time:  2022 11:07 AM    ADMIT DATE: 2022   DISCHARGE DATE: 2022         It is important that you take the medication exactly as they are prescribed. Keep your medication in the bottles provided by the pharmacist and keep a list of the medication names, dosages, and times to be taken in your wallet. Do not take other medications without consulting your doctor. What to do at Home    Recommended diet:  Cardiac Diet    Recommended activity: PT/OT per Home Health      If you have questions regarding the hospital related prescriptions or hospital related issues please call [address] [place] [address] office at [phone]. You can always direct your questions to your primary care doctor if you are unable to reach your hospital physician; your PCP works as an extension of your hospital doctor just like your hospital doctor is an extension of your PCP for your time at the hospital [facility])    If you experience any of the following symptoms then please call your primary care physician or return to the emergency room if you cannot get hold of your doctor:    Fever, chills, nausea, vomiting, or persistent diarrhea  Worsening weakness or new problems with your speech or balance  Dark stools or visible blood in your stools  New Leg swelling or shortness of breath as these could be signs of a clot    Additional Instructions:      Bring these papers with you to your follow up appointments. The papers will help your doctors be sure to continue the care plan from the hospital.              Information obtained by :  I understand that if any problems occur once I am at home I am to contact my physician. I understand and acknowledge receipt of the instructions indicated above. Physician's or R.N.'s Signature                                                                  Date/Time                                                                                                                                              Patient or Representative Signature

## 2022-11-05 NOTE — PROGRESS NOTES
Problem: Risk for Spread of Infection  Goal: Prevent transmission of infectious organism to others  Description: Prevent the transmission of infectious organisms to other patients, staff members, and visitors. 11/5/2022 1338 by Eric Hernandez RN  Outcome: Resolved/Met  11/5/2022 1050 by Eric Hernandez RN  Outcome: Progressing Towards Goal     Problem: Patient Education:  Go to Education Activity  Goal: Patient/Family Education  11/5/2022 1338 by Eric Hernandez RN  Outcome: Resolved/Met  11/5/2022 1050 by Eric Hernandez RN  Outcome: Progressing Towards Goal     Problem: Falls - Risk of  Goal: *Absence of Falls  Description: Document Cherylle Cockayne Fall Risk and appropriate interventions in the flowsheet.   11/5/2022 1338 by Eric Hernandez RN  Outcome: Resolved/Met  11/5/2022 1050 by Eric Hernandez RN  Outcome: Progressing Towards Goal  Note: Fall Risk Interventions:  Mobility Interventions: Bed/chair exit alarm         Medication Interventions: Bed/chair exit alarm    Elimination Interventions: Bed/chair exit alarm              Problem: Patient Education: Go to Patient Education Activity  Goal: Patient/Family Education  11/5/2022 1338 by Eric Hernandez RN  Outcome: Resolved/Met  11/5/2022 1050 by Eric Hernandez RN  Outcome: Progressing Towards Goal     Problem: Patient Education: Go to Patient Education Activity  Goal: Patient/Family Education  11/5/2022 1338 by Eric Hernandez RN  Outcome: Resolved/Met  11/5/2022 1050 by Eric Hernandez RN  Outcome: Progressing Towards Goal     Problem: Patient Education: Go to Patient Education Activity  Goal: Patient/Family Education  11/5/2022 1338 by Eric Hernandez RN  Outcome: Resolved/Met  11/5/2022 1050 by Eric Hernandez RN  Outcome: Progressing Towards Goal

## 2022-11-05 NOTE — PROGRESS NOTES
End of Shift Note    Bedside shift change report given to Sapna Hawkins RN (oncoming nurse) by Ilana Lopez RN (offgoing nurse). Report included the following information SBAR, Kardex, Intake/Output, and MAR    Shift worked:  night   Shift summary and any significant changes:     Uneventful night . Bed alarm on zone two. AM labs done. Call bell and phone within reach of patient. Concerns for physician to address:  none   Zone phone for oncoming shift:        Patient Information  Freddy Beverage  80 y.o.  11/1/2022  2:31 AM by Laila Meek MD. Freddy Rutherford was admitted from Home    Problem List  Patient Active Problem List    Diagnosis Date Noted    Meningitis 11/01/2022    High fever 11/01/2022    Accelerated hypertension 11/01/2022    Mixed hyperlipidemia 10/04/2021    Carotid stenosis, asymptomatic, right 12/16/2020    Carotid stenosis, symptomatic w/o infarct, left 11/02/2020    Bilateral carotid artery stenosis 10/19/2020    TIA (transient ischemic attack) 10/17/2020    High cholesterol 09/21/2020    Essential hypertension 12/28/2018    Coronary artery disease involving native coronary artery of native heart without angina pectoris 06/12/2018    S/P coronary artery stent placement 05/25/2018    Hx of diverticulitis of colon 05/18/2018    Gout      Past Medical History:   Diagnosis Date    CAD (coronary artery disease)     Diverticulitis     Glaucoma     Gout     HTN (hypertension) 5/18/2018    Hx of diverticulitis of colon 5/18/2018    Hyperlipidemia     Hypertension     Other ill-defined conditions(799.89)     high cholesterol    S/P coronary artery stent placement 05/25/2018    Stroke ([address] 10/2020    TIA         Activity:  Activity Level:  Up with Assistance  Number times ambulated in hallways past shift:   Number of times OOB to chair past shift: 2    Cardiac:   Cardiac Monitoring: Yes      Cardiac Rhythm: Sinus Rhythm    Access:   Current line(s): PIV     Genitourinary:   Urinary status: incontinent    Respiratory:   O2 Device: None (Room air)  Chronic home O2 use?: NO  Incentive spirometer at bedside: NO       GI:  Last Bowel Movement Date: 11/03/22  Current diet:  ADULT DIET Regular; Low Fat/Low Chol/High Fiber/DANNA  Passing flatus: YES  Tolerating current diet: YES       Pain Management:   Patient states pain is manageable on current regimen: YES    Skin:  Stevenson Score: 19  Interventions: increase time out of bed    Patient Safety:  Fall Score:  Total Score: 2  Interventions: bed/chair alarm, gripper socks, and pt to call before getting OOB  High Fall Risk: Yes  @Rollbelt  @dexterity to release roll belt  Yes/No ( must document dexterity  here by stating Yes or No here, otherwise this is a restraint and must follow restraint documentation policy.)    DVT prophylaxis:  DVT prophylaxis Med- Yes  DVT prophylaxis SCD or GABO-      Wounds: (If Applicable)  Wounds- No  Location     Active Consults:  IP CONSULT TO CARDIOLOGY  IP CONSULT TO NEUROLOGY  IP CONSULT TO INFECTIOUS DISEASES  IP CONSULT TO PALLIATIVE CARE - PROVIDER    Length of Stay:  Expected LOS: 2d 4h  Actual LOS: 4  Discharge Plan:        Rose Marie Germain RN

## 2022-11-05 NOTE — PROGRESS NOTES
Problem: Risk for Spread of Infection  Goal: Prevent transmission of infectious organism to others  Description: Prevent the transmission of infectious organisms to other patients, staff members, and visitors. Outcome: Progressing Towards Goal     Problem: Patient Education:  Go to Education Activity  Goal: Patient/Family Education  Outcome: Progressing Towards Goal     Problem: Falls - Risk of  Goal: *Absence of Falls  Description: Document Gustavo Zambrano Fall Risk and appropriate interventions in the flowsheet.   Outcome: Progressing Towards Goal  Note: Fall Risk Interventions:  Mobility Interventions: Bed/chair exit alarm         Medication Interventions: Bed/chair exit alarm    Elimination Interventions: Bed/chair exit alarm              Problem: Patient Education: Go to Patient Education Activity  Goal: Patient/Family Education  Outcome: Progressing Towards Goal     Problem: Patient Education: Go to Patient Education Activity  Goal: Patient/Family Education  Outcome: Progressing Towards Goal     Problem: Patient Education: Go to Patient Education Activity  Goal: Patient/Family Education  Outcome: Progressing Towards Goal

## 2022-11-07 LAB
BACTERIA SPEC CULT: NORMAL
SERVICE CMNT-IMP: NORMAL

## 2022-11-12 ENCOUNTER — HOSPITAL ENCOUNTER (EMERGENCY)
Age: 87
Discharge: HOME | End: 2022-11-12
Attending: EMERGENCY MEDICINE
Payer: MEDICARE

## 2022-11-12 VITALS
SYSTOLIC BLOOD PRESSURE: 119 MMHG | HEART RATE: 74 BPM | RESPIRATION RATE: 21 BRPM | HEIGHT: 64 IN | TEMPERATURE: 98.5 F | DIASTOLIC BLOOD PRESSURE: 54 MMHG | WEIGHT: 166 LBS | BODY MASS INDEX: 28.34 KG/M2 | OXYGEN SATURATION: 100 %

## 2022-11-12 DIAGNOSIS — G44.201 ACUTE INTRACTABLE TENSION-TYPE HEADACHE: ICD-10-CM

## 2022-11-12 DIAGNOSIS — I16.0 HYPERTENSIVE URGENCY: Primary | ICD-10-CM

## 2022-11-12 LAB
ALBUMIN SERPL-MCNC: 3 G/DL (ref 3.5–5)
ALBUMIN/GLOB SERPL: 0.8 {RATIO} (ref 1.1–2.2)
ALP SERPL-CCNC: 96 U/L (ref 45–117)
ALT SERPL-CCNC: 44 U/L (ref 12–78)
ANION GAP SERPL CALC-SCNC: 6 MMOL/L (ref 5–15)
AST SERPL-CCNC: 41 U/L (ref 15–37)
BASOPHILS # BLD: 0 K/UL (ref 0–0.1)
BASOPHILS NFR BLD: 0 % (ref 0–1)
BILIRUB SERPL-MCNC: 0.3 MG/DL (ref 0.2–1)
BUN SERPL-MCNC: 15 MG/DL (ref 6–20)
BUN/CREAT SERPL: 12 (ref 12–20)
CALCIUM SERPL-MCNC: 9.2 MG/DL (ref 8.5–10.1)
CHLORIDE SERPL-SCNC: 107 MMOL/L (ref 97–108)
CO2 SERPL-SCNC: 27 MMOL/L (ref 21–32)
CREAT SERPL-MCNC: 1.27 MG/DL (ref 0.55–1.02)
DIFFERENTIAL METHOD BLD: ABNORMAL
EOSINOPHIL # BLD: 0.4 K/UL (ref 0–0.4)
EOSINOPHIL NFR BLD: 6 % (ref 0–7)
ERYTHROCYTE [DISTWIDTH] IN BLOOD BY AUTOMATED COUNT: 13.7 % (ref 11.5–14.5)
GLOBULIN SER CALC-MCNC: 3.9 G/DL (ref 2–4)
GLUCOSE SERPL-MCNC: 138 MG/DL (ref 65–100)
HCT VFR BLD AUTO: 31 % (ref 35–47)
HGB BLD-MCNC: 10.3 G/DL (ref 11.5–16)
IMM GRANULOCYTES # BLD AUTO: 0 K/UL (ref 0–0.04)
IMM GRANULOCYTES NFR BLD AUTO: 1 % (ref 0–0.5)
LYMPHOCYTES # BLD: 2 K/UL (ref 0.8–3.5)
LYMPHOCYTES NFR BLD: 34 % (ref 12–49)
MCH RBC QN AUTO: 30.7 PG (ref 26–34)
MCHC RBC AUTO-ENTMCNC: 33.2 G/DL (ref 30–36.5)
MCV RBC AUTO: 92.3 FL (ref 80–99)
MONOCYTES # BLD: 0.5 K/UL (ref 0–1)
MONOCYTES NFR BLD: 8 % (ref 5–13)
NEUTS SEG # BLD: 3.2 K/UL (ref 1.8–8)
NEUTS SEG NFR BLD: 51 % (ref 32–75)
NRBC # BLD: 0 K/UL (ref 0–0.01)
NRBC BLD-RTO: 0 PER 100 WBC
PLATELET # BLD AUTO: 394 K/UL (ref 150–400)
PMV BLD AUTO: 9 FL (ref 8.9–12.9)
POTASSIUM SERPL-SCNC: 3.4 MMOL/L (ref 3.5–5.1)
PROT SERPL-MCNC: 6.9 G/DL (ref 6.4–8.2)
RBC # BLD AUTO: 3.36 M/UL (ref 3.8–5.2)
SODIUM SERPL-SCNC: 140 MMOL/L (ref 136–145)
WBC # BLD AUTO: 6.1 K/UL (ref 3.6–11)

## 2022-11-12 PROCEDURE — 99284 EMERGENCY DEPT VISIT MOD MDM: CPT

## 2022-11-12 PROCEDURE — 74011250637 HC RX REV CODE- 250/637: Performed by: EMERGENCY MEDICINE

## 2022-11-12 PROCEDURE — 36415 COLL VENOUS BLD VENIPUNCTURE: CPT

## 2022-11-12 PROCEDURE — 85025 COMPLETE CBC W/AUTO DIFF WBC: CPT

## 2022-11-12 PROCEDURE — 96374 THER/PROPH/DIAG INJ IV PUSH: CPT

## 2022-11-12 PROCEDURE — 93005 ELECTROCARDIOGRAM TRACING: CPT

## 2022-11-12 PROCEDURE — 80053 COMPREHEN METABOLIC PANEL: CPT

## 2022-11-12 PROCEDURE — 74011250636 HC RX REV CODE- 250/636: Performed by: EMERGENCY MEDICINE

## 2022-11-12 RX ORDER — CLONIDINE HYDROCHLORIDE 0.1 MG/1
0.2 TABLET ORAL
Status: COMPLETED | OUTPATIENT
Start: 2022-11-12 | End: 2022-11-12

## 2022-11-12 RX ORDER — KETOROLAC TROMETHAMINE 30 MG/ML
15 INJECTION, SOLUTION INTRAMUSCULAR; INTRAVENOUS
Status: COMPLETED | OUTPATIENT
Start: 2022-11-12 | End: 2022-11-12

## 2022-11-12 RX ORDER — CLONIDINE HYDROCHLORIDE 0.1 MG/1
0.1 TABLET ORAL 2 TIMES DAILY
Qty: 60 TABLET | Refills: 0 | Status: SHIPPED | OUTPATIENT
Start: 2022-11-12 | End: 2022-12-12

## 2022-11-12 RX ADMIN — CLONIDINE HYDROCHLORIDE 0.2 MG: 0.1 TABLET ORAL at 20:41

## 2022-11-12 RX ADMIN — KETOROLAC TROMETHAMINE 15 MG: 30 INJECTION, SOLUTION INTRAMUSCULAR; INTRAVENOUS at 20:41

## 2022-11-13 LAB
ATRIAL RATE: 83 BPM
CALCULATED P AXIS, ECG09: 38 DEGREES
CALCULATED R AXIS, ECG10: -36 DEGREES
CALCULATED T AXIS, ECG11: -48 DEGREES
DIAGNOSIS, 93000: NORMAL
P-R INTERVAL, ECG05: 226 MS
Q-T INTERVAL, ECG07: 360 MS
QRS DURATION, ECG06: 76 MS
QTC CALCULATION (BEZET), ECG08: 423 MS
VENTRICULAR RATE, ECG03: 83 BPM

## 2022-11-13 NOTE — DISCHARGE INSTRUCTIONS
Your examination is very reassuring today. I would recommend that you continue all of your current medications including your blood pressure medications (losartan and metoprolol). In addition, you should start taking clonidine 0.1 mg twice a day, and aim for an blood pressure of approximately 140/90. Follow-up closely with your primary care provider. It was a pleasure taking care of you at [facility] Emergency Department today. We know that when you come to [facility], you are entrusting us with your health, comfort, and safety. Our physicians and nurses honor that trust, and we truly appreciate the opportunity to care for you and your loved ones. We also value your feedback. If you receive a survey about your Emergency Department experience today, please fill it out. We care about our patients' feedback, and we listen to what you have to say. Thank you!

## 2022-11-13 NOTE — ED NOTES
MD reviewed discharge instructions with the patient. The patient verbalized understanding. All questions and concerns were addressed. The patient is discharged via wheelchair with instructions and prescriptions in hand. Pt is alert and oriented x 4. Respirations are clear and unlabored.

## 2022-11-13 NOTE — ED PROVIDER NOTES
EMERGENCY DEPARTMENT HISTORY AND PHYSICAL EXAM           Date: 11/12/2022  Patient Name: Stanford Dubin  Patient Age and Sex: 80 y.o. female  MRN:  [id number]  CSN:  [id number]    History of Presenting Illness     Chief Complaint   Patient presents with    Headache     For a couple of days; here recently for admission for high blood pressure. Rescue bp 249/97 pta. History Provided By: Patient    Ability to gather history was limited by:     HPI: Stanford Dubin, 80 y.o. female with history of coronary artery disease, hypertension, complains of frontal headache, moderate severity, persistent for weeks. She has also had poorly controlled severe hypertension. She has been to this emergency department multiple times in the last 2 weeks or so, including a hospitalization last week where she had the same symptoms but also had a fever of 101 degrees at that time. She had extensive work-up including 2 attempts at lumbar puncture, CT, MRI etc., and ultimately no concerning findings, no signs of PRESS or meningitis. Her amlodipine was stopped and she was switched over to losartan but this does not seem to have improved her persistent high blood pressures and headaches. No neck pain. No altered mental status or confusion or neurologic symptoms. Location:    Quality:      Severity:    Duration:   Timing:      Context:    Modifying factors:   Associated symptoms:     Past History     The patient's medical, surgical, and social history on file were reviewed by me today.     The family history was reviewed by me today and was non-contributory, unless otherwise specified below:    Past Medical History:  Past Medical History:   Diagnosis Date    CAD (coronary artery disease)     Diverticulitis     Glaucoma     Gout     HTN (hypertension) 5/18/2018    Hx of diverticulitis of colon 5/18/2018    Hyperlipidemia     Hypertension     Other ill-defined conditions(799.89)     high cholesterol    S/P coronary artery stent placement 05/25/2018    Stroke ([address] 10/2020    TIA       Past Surgical History:  Past Surgical History:   Procedure Laterality Date    HX CAROTID ENDARTERECTOMY Left 11/02/2020    HX HYSTERECTOMY      [facility] CARDIAC SURG PROCEDURE UNLIST  05/2018    x2 cardiac stent    [facility] COLONOSCOPY FLX DX W/COLLJ SPEC WHEN PFRMD  10/18/2013            Family History:  Family History   Problem Relation Age of Onset    Stroke Mother     Diabetes Sister     No Known Problems Father        Social History:  Social History     Tobacco Use    Smoking status: Never    Smokeless tobacco: Never   Vaping Use    Vaping Use: Never used   Substance Use Topics    Alcohol use: No    Drug use: No       Current Medications:  No current facility-administered medications on file prior to encounter. Current Outpatient Medications on File Prior to Encounter   Medication Sig Dispense Refill    gabapentin (NEURONTIN) 100 mg capsule Take 1 Capsule by mouth three (3) times daily for 60 days. Max Daily Amount: 300 mg. 90 Capsule 1    butalbital-acetaminophen-caff (FIORICET) -40 mg per capsule Take 1 Capsule by mouth every four (4) hours as needed for Headache. 12 Capsule 0    losartan (COZAAR) 50 mg tablet Take 1 Tablet by mouth daily for 30 days. 30 Tablet 0    ezetimibe (ZETIA) 10 mg tablet TAKE 1 TABLET BY MOUTH EVERY DAY 90 Tablet 1    cholecalciferol (VITAMIN D3) (5000 Units/125 mcg) tab tablet Take 5,000 Units by mouth daily. naloxone (Narcan) 4 mg/actuation nasal spray Use 1 spray intranasally, then discard. Repeat with new spray every 2 min as needed for opioid overdose symptoms, alternating nostrils. 1 Each 0    polyethylene glycol (MIRALAX) 17 gram packet Take 17 g by mouth as needed for Constipation. trolamine salicylate (Aspercreme) 10 % lotion Apply  to affected area as needed. atorvastatin (LIPITOR) 80 mg tablet Take 1 Tab by mouth nightly.  30 Tab 1    metoprolol tartrate (LOPRESSOR) 25 mg tablet Take 0.5 Tabs by mouth every twelve (12) hours. 90 Tab 2    docusate sodium (STOOL SOFTENER PO) Take  by mouth as needed. ferrous sulfate (IRON PO) Take 65 mg by mouth daily. aspirin delayed-release 81 mg tablet Take 81 mg by mouth daily. timolol (TIMOPTIC-XE) 0.5 % ophthalmic gel-forming Administer 1 Drop to right eye daily. latanoprost (XALATAN) 0.005 % ophthalmic solution Administer 1 Drop to both eyes nightly. Indications: wide-angle glaucoma      fluticasone (FLONASE ALLERGY RELIEF) 50 mcg/actuation nasal spray 1 [place] by Both Nostrils route daily. acetaminophen (TYLENOL EXTRA STRENGTH) 500 mg tablet Take 1,000 mg by mouth every six (6) hours as needed for Pain. fexofenadine (ALLEGRA) 180 mg tablet Take 180 mg by mouth nightly. allopurinol (ZYLOPRIM) 100 mg tablet Take 100 mg by mouth daily. ALPRAZolam (XANAX) 0.25 mg tablet Take 0.25 mg by mouth daily as needed for Anxiety. Allergies: Allergies   Allergen Reactions    Lisinopril Cough     Review of Systems   A complete ROS was reviewed by me today and was negative, unless otherwise specified below:    Review of Systems   Constitutional:  Negative for fatigue and fever. Respiratory:  Negative for shortness of breath. Cardiovascular:  Negative for chest pain. Gastrointestinal:  Negative for abdominal pain. Neurological:  Positive for headaches. Psychiatric/Behavioral:  Negative for confusion. All other systems reviewed and are negative.     Physical Exam   Vital Signs  Patient Vitals for the past 8 hrs:   Temp Pulse Resp BP SpO2   11/12/22 2154 -- 74 21 (!) 119/54 100 %   11/12/22 2140 -- 75 20 (!) 124/57 100 %   11/12/22 2130 -- 64 -- (!) 118/53 --   11/12/22 2128 -- 66 13 -- --   11/12/22 2115 -- 77 16 (!) 205/71 97 %   11/12/22 2110 -- 80 18 (!) 233/79 96 %   11/12/22 2045 -- 90 16 (!) 241/107 --   11/12/22 2041 -- 87 13 (!) 241/98 --   11/12/22 2000 -- 79 12 (!) 214/79 98 %   11/12/22 1945 -- 75 12 (!) 237/79 -- 11/12/22 1930 -- 74 15 (!) 240/79 --   11/12/22 1917 -- 81 16 (!) 252/94 99 %   11/12/22 1644 98.5 °F (36.9 °C) 81 14 (!) 224/82 99 %          Physical Exam  Vitals and nursing note reviewed. Constitutional:       General: She is not in acute distress. Appearance: Normal appearance. She is well-developed. She is not ill-appearing. HENT:      Head: Normocephalic and atraumatic. Cardiovascular:      Rate and Rhythm: Normal rate and regular rhythm. Heart sounds: Normal heart sounds. No murmur heard. Pulmonary:      Effort: Pulmonary effort is normal. No respiratory distress. Breath sounds: Normal breath sounds. No wheezing. Abdominal:      General: There is no distension. Palpations: Abdomen is soft. Tenderness: There is no abdominal tenderness. Musculoskeletal:         General: No deformity. Normal range of motion. Cervical back: Normal range of motion and neck supple. Skin:     General: Skin is warm and dry. Findings: No rash. Neurological:      General: No focal deficit present. Mental Status: She is alert and oriented to person, place, and time. GCS: GCS eye subscore is 4. GCS verbal subscore is 5. GCS motor subscore is 6. Cranial Nerves: Cranial nerves 2-12 are intact. No cranial nerve deficit, dysarthria or facial asymmetry.    Psychiatric:         Speech: Speech normal.         Behavior: Behavior normal.         Cognition and Memory: Cognition normal.       Diagnostic Study Results   Labs  Recent Results (from the past 24 hour(s))   EKG, 12 LEAD, INITIAL    Collection Time: 11/12/22  4:55 PM   Result Value Ref Range    Ventricular Rate 83 BPM    Atrial Rate 83 BPM    P-R Interval 226 ms    QRS Duration 76 ms    Q-T Interval 360 ms    QTC Calculation (Bezet) 423 ms    Calculated P Axis 38 degrees    Calculated R Axis -36 degrees    Calculated T Axis -48 degrees    Diagnosis       Sinus rhythm with 1st degree AV block  Left axis deviation  Voltage criteria for left ventricular hypertrophy  Nonspecific T wave abnormality  When compared with ECG of 01-NOV-2022 05:43,  Nonspecific T wave abnormality now evident in Anterior leads     CBC WITH AUTOMATED DIFF    Collection Time: 11/12/22  5:24 PM   Result Value Ref Range    WBC 6.1 3.6 - 11.0 K/uL    RBC 3.36 (L) 3.80 - 5.20 M/uL    HGB 10.3 (L) 11.5 - 16.0 g/dL    HCT 31.0 (L) 35.0 - 47.0 %    MCV 92.3 80.0 - 99.0 FL    MCH 30.7 26.0 - 34.0 PG    MCHC 33.2 30.0 - 36.5 g/dL    RDW 13.7 11.5 - 14.5 %    PLATELET 964 148 - 109 K/uL    MPV 9.0 8.9 - 12.9 FL    NRBC 0.0 0  WBC    ABSOLUTE NRBC 0.00 0.00 - 0.01 K/uL    NEUTROPHILS 51 32 - 75 %    LYMPHOCYTES 34 12 - 49 %    MONOCYTES 8 5 - 13 %    EOSINOPHILS 6 0 - 7 %    BASOPHILS 0 0 - 1 %    IMMATURE GRANULOCYTES 1 (H) 0.0 - 0.5 %    ABS. NEUTROPHILS 3.2 1.8 - 8.0 K/UL    ABS. LYMPHOCYTES 2.0 0.8 - 3.5 K/UL    ABS. MONOCYTES 0.5 0.0 - 1.0 K/UL    ABS. EOSINOPHILS 0.4 0.0 - 0.4 K/UL    ABS. BASOPHILS 0.0 0.0 - 0.1 K/UL    ABS. IMM. GRANS. 0.0 0.00 - 0.04 K/UL    DF AUTOMATED     METABOLIC PANEL, COMPREHENSIVE    Collection Time: 11/12/22  5:24 PM   Result Value Ref Range    Sodium 140 136 - 145 mmol/L    Potassium 3.4 (L) 3.5 - 5.1 mmol/L    Chloride 107 97 - 108 mmol/L    CO2 27 21 - 32 mmol/L    Anion gap 6 5 - 15 mmol/L    Glucose 138 (H) 65 - 100 mg/dL    BUN 15 6 - 20 MG/DL    Creatinine 1.27 (H) 0.55 - 1.02 MG/DL    BUN/Creatinine ratio 12 12 - 20      eGFR 40 (L) >60 ml/min/1.73m2    Calcium 9.2 8.5 - 10.1 MG/DL    Bilirubin, total 0.3 0.2 - 1.0 MG/DL    ALT (SGPT) 44 12 - 78 U/L    AST (SGOT) 41 (H) 15 - 37 U/L    Alk.  phosphatase 96 45 - 117 U/L    Protein, total 6.9 6.4 - 8.2 g/dL    Albumin 3.0 (L) 3.5 - 5.0 g/dL    Globulin 3.9 2.0 - 4.0 g/dL    A-G Ratio 0.8 (L) 1.1 - 2.2         Radiologic Studies  No orders to display     CT Results  (Last 48 hours)      None          CXR Results  (Last 48 hours)      None            Billable Procedures   EKG reviewed by ED Physician in the absence of a cardiologist: Yes  EKG below was interpreted by Jenna Davies MD    EKG    Date/Time: 11/12/2022 8:25 PM  Performed by: Arlyn Koehler MD  Authorized by: Arlyn Koehler MD     ECG reviewed by ED Physician in the absence of a cardiologist: yes    Interpretation:     Interpretation: non-specific    Rate:     ECG rate assessment: normal    Rhythm:     Rhythm: sinus rhythm    Ectopy:     Ectopy: none    QRS:     QRS axis:  Normal  ST segments:     ST segments:  Normal  T waves:     T waves: normal      Medical Decision Making     I reviewed the patient's most recent Emergency Dept notes and diagnostic tests in formulating my MDM on today's visit. Provider Notes (Medical Decision Making):     80-year-old female presenting with frontal headache and persistent high blood pressures, for weeks. Recent thorough work-up as an inpatient, no signs of encephalopathy or hypertensive emergency or meningitis. Very well appearing for her age, alert and oriented. Blood pressure 214/80. Nonfocal neurologic exam, no concerning deficits. All of her laboratories are very reassuring. No indication for repeat head CT imaging today. Records reviewed: I reviewed her recent hospitalization notes and the discharge summary from November 5 summarizing her extensive work-up here including LP attempts, CT, MRI etc., no findings identified. After administration of clonidine 0.2 mg orally her blood pressure significantly improved, currently blood pressure 120s/50s, and she does not feel lightheaded or have any significant symptoms. She is stable for discharge home and outpatient management of her hypertensive urgency. I prescribed clonidine 0.1 mg twice daily that she will take in addition to her current blood pressure medications and close PMD follow-up.         Jenna Davies MD  8:21 PM  11/12/2022       Social History     Tobacco Use    Smoking status: Never Smokeless tobacco: Never   Vaping Use    Vaping Use: Never used   Substance Use Topics    Alcohol use: No    Drug use: No       Medications Administered during ED course:  Medications   cloNIDine HCL (CATAPRES) tablet 0.2 mg (0.2 mg Oral Given 11/12/22 2041)   ketorolac (TORADOL) injection 15 mg (15 mg IntraVENous Given 11/12/22 2041)          Prescriptions from today's ED visit:  Current Discharge Medication List        START taking these medications    Details   cloNIDine HCL (CATAPRES) 0.1 mg tablet Take 1 Tablet by mouth two (2) times a day for 30 days. Qty: 60 Tablet, Refills: 0  Start date: 11/12/2022, End date: 12/12/2022            Diagnosis and Disposition     Disposition:  Discharged    Clinical Impression:   1. Hypertensive urgency    2. Acute intractable tension-type headache        Attestation:  I personally performed the services described in this documentation on this date 11/12/2022 for patient Triny Little. Escobar Monaco MD        I was the first provider for this patient on this visit. To the best of my ability I reviewed relevant prior medical records, electrocardiograms, laboratories, and radiologic studies. The patient's presenting problems were discussed, and the patient was in agreement with the care plan formulated and outlined with them. Escobar Monaco MD    Please note that this dictation was completed with Dragon voice recognition software. Quite often unanticipated grammatical, syntax, homophones, and other interpretive errors are inadvertently transcribed by the computer software. Please disregard these errors and excuse any errors that have escaped final proofreading.

## 2022-12-09 ENCOUNTER — APPOINTMENT (OUTPATIENT)
Dept: CT IMAGING | Age: 87
DRG: 069 | End: 2022-12-09
Attending: EMERGENCY MEDICINE
Payer: MEDICARE

## 2022-12-09 ENCOUNTER — APPOINTMENT (OUTPATIENT)
Dept: MRI IMAGING | Age: 87
DRG: 069 | End: 2022-12-09
Attending: INTERNAL MEDICINE
Payer: MEDICARE

## 2022-12-09 ENCOUNTER — HOSPITAL ENCOUNTER (INPATIENT)
Age: 87
LOS: 5 days | Discharge: HOME HEALTH | DRG: 069 | End: 2022-12-14
Attending: EMERGENCY MEDICINE | Admitting: INTERNAL MEDICINE
Payer: MEDICARE

## 2022-12-09 DIAGNOSIS — R29.898 RIGHT ARM WEAKNESS: Primary | ICD-10-CM

## 2022-12-09 DIAGNOSIS — I10 ESSENTIAL HYPERTENSION: ICD-10-CM

## 2022-12-09 DIAGNOSIS — G45.9 TIA (TRANSIENT ISCHEMIC ATTACK): ICD-10-CM

## 2022-12-09 DIAGNOSIS — M48.9 CERVICAL SPINE DISEASE: ICD-10-CM

## 2022-12-09 PROBLEM — I63.9 STROKE (CEREBRUM) (HCC): Status: ACTIVE | Noted: 2022-12-09

## 2022-12-09 LAB
ALBUMIN SERPL-MCNC: 2.7 G/DL (ref 3.5–5)
ALBUMIN/GLOB SERPL: 0.7 {RATIO} (ref 1.1–2.2)
ALP SERPL-CCNC: 91 U/L (ref 45–117)
ALT SERPL-CCNC: 17 U/L (ref 12–78)
ANION GAP SERPL CALC-SCNC: 6 MMOL/L (ref 5–15)
AST SERPL-CCNC: 20 U/L (ref 15–37)
BASOPHILS # BLD: 0 K/UL (ref 0–0.1)
BASOPHILS NFR BLD: 0 % (ref 0–1)
BILIRUB SERPL-MCNC: 0.3 MG/DL (ref 0.2–1)
BUN SERPL-MCNC: 16 MG/DL (ref 6–20)
BUN/CREAT SERPL: 12 (ref 12–20)
CALCIUM SERPL-MCNC: 8.4 MG/DL (ref 8.5–10.1)
CHLORIDE SERPL-SCNC: 102 MMOL/L (ref 97–108)
CO2 SERPL-SCNC: 30 MMOL/L (ref 21–32)
COMMENT, HOLDF: NORMAL
CREAT SERPL-MCNC: 1.33 MG/DL (ref 0.55–1.02)
DIFFERENTIAL METHOD BLD: ABNORMAL
EOSINOPHIL # BLD: 0.4 K/UL (ref 0–0.4)
EOSINOPHIL NFR BLD: 6 % (ref 0–7)
ERYTHROCYTE [DISTWIDTH] IN BLOOD BY AUTOMATED COUNT: 13.2 % (ref 11.5–14.5)
GLOBULIN SER CALC-MCNC: 3.7 G/DL (ref 2–4)
GLUCOSE SERPL-MCNC: 114 MG/DL (ref 65–100)
HCT VFR BLD AUTO: 26.9 % (ref 35–47)
HGB BLD-MCNC: 8.8 G/DL (ref 11.5–16)
IMM GRANULOCYTES # BLD AUTO: 0 K/UL (ref 0–0.04)
IMM GRANULOCYTES NFR BLD AUTO: 1 % (ref 0–0.5)
INR PPP: 1.1 (ref 0.9–1.1)
LYMPHOCYTES # BLD: 1.6 K/UL (ref 0.8–3.5)
LYMPHOCYTES NFR BLD: 22 % (ref 12–49)
MCH RBC QN AUTO: 30 PG (ref 26–34)
MCHC RBC AUTO-ENTMCNC: 32.7 G/DL (ref 30–36.5)
MCV RBC AUTO: 91.8 FL (ref 80–99)
MONOCYTES # BLD: 0.7 K/UL (ref 0–1)
MONOCYTES NFR BLD: 10 % (ref 5–13)
NEUTS SEG # BLD: 4.6 K/UL (ref 1.8–8)
NEUTS SEG NFR BLD: 61 % (ref 32–75)
NRBC # BLD: 0 K/UL (ref 0–0.01)
NRBC BLD-RTO: 0 PER 100 WBC
PLATELET # BLD AUTO: 295 K/UL (ref 150–400)
PMV BLD AUTO: 9.3 FL (ref 8.9–12.9)
POTASSIUM SERPL-SCNC: 3.5 MMOL/L (ref 3.5–5.1)
PROT SERPL-MCNC: 6.4 G/DL (ref 6.4–8.2)
PROTHROMBIN TIME: 11.1 SEC (ref 9–11.1)
RBC # BLD AUTO: 2.93 M/UL (ref 3.8–5.2)
SAMPLES BEING HELD,HOLD: NORMAL
SODIUM SERPL-SCNC: 138 MMOL/L (ref 136–145)
WBC # BLD AUTO: 7.4 K/UL (ref 3.6–11)

## 2022-12-09 PROCEDURE — 70450 CT HEAD/BRAIN W/O DYE: CPT

## 2022-12-09 PROCEDURE — 74011250637 HC RX REV CODE- 250/637: Performed by: INTERNAL MEDICINE

## 2022-12-09 PROCEDURE — 0042T CT CODE NEURO PERF W CBF: CPT

## 2022-12-09 PROCEDURE — 72156 MRI NECK SPINE W/O & W/DYE: CPT

## 2022-12-09 PROCEDURE — 36415 COLL VENOUS BLD VENIPUNCTURE: CPT

## 2022-12-09 PROCEDURE — 99223 1ST HOSP IP/OBS HIGH 75: CPT | Performed by: PSYCHIATRY & NEUROLOGY

## 2022-12-09 PROCEDURE — 99285 EMERGENCY DEPT VISIT HI MDM: CPT

## 2022-12-09 PROCEDURE — 80053 COMPREHEN METABOLIC PANEL: CPT

## 2022-12-09 PROCEDURE — 85025 COMPLETE CBC W/AUTO DIFF WBC: CPT

## 2022-12-09 PROCEDURE — 70496 CT ANGIOGRAPHY HEAD: CPT

## 2022-12-09 PROCEDURE — 74011250636 HC RX REV CODE- 250/636: Performed by: INTERNAL MEDICINE

## 2022-12-09 PROCEDURE — 85610 PROTHROMBIN TIME: CPT

## 2022-12-09 PROCEDURE — 70551 MRI BRAIN STEM W/O DYE: CPT

## 2022-12-09 PROCEDURE — 74011000636 HC RX REV CODE- 636: Performed by: EMERGENCY MEDICINE

## 2022-12-09 PROCEDURE — A9576 INJ PROHANCE MULTIPACK: HCPCS | Performed by: INTERNAL MEDICINE

## 2022-12-09 PROCEDURE — 4A03X5D MEASUREMENT OF ARTERIAL FLOW, INTRACRANIAL, EXTERNAL APPROACH: ICD-10-PCS | Performed by: STUDENT IN AN ORGANIZED HEALTH CARE EDUCATION/TRAINING PROGRAM

## 2022-12-09 PROCEDURE — 65270000046 HC RM TELEMETRY

## 2022-12-09 RX ORDER — ATORVASTATIN CALCIUM 40 MG/1
80 TABLET, FILM COATED ORAL
Status: DISCONTINUED | OUTPATIENT
Start: 2022-12-09 | End: 2022-12-11

## 2022-12-09 RX ORDER — TIMOLOL MALEATE 5 MG/ML
1 SOLUTION/ DROPS OPHTHALMIC 2 TIMES DAILY
Status: DISCONTINUED | OUTPATIENT
Start: 2022-12-09 | End: 2022-12-14 | Stop reason: HOSPADM

## 2022-12-09 RX ORDER — POLYETHYLENE GLYCOL 3350 17 G/17G
17 POWDER, FOR SOLUTION ORAL DAILY PRN
Status: DISCONTINUED | OUTPATIENT
Start: 2022-12-09 | End: 2022-12-14 | Stop reason: HOSPADM

## 2022-12-09 RX ORDER — EZETIMIBE 10 MG/1
10 TABLET ORAL DAILY
Status: DISCONTINUED | OUTPATIENT
Start: 2022-12-10 | End: 2022-12-14 | Stop reason: HOSPADM

## 2022-12-09 RX ORDER — LATANOPROST 50 UG/ML
1 SOLUTION/ DROPS OPHTHALMIC
Status: DISCONTINUED | OUTPATIENT
Start: 2022-12-09 | End: 2022-12-14 | Stop reason: HOSPADM

## 2022-12-09 RX ORDER — METOPROLOL TARTRATE 25 MG/1
12.5 TABLET, FILM COATED ORAL EVERY 12 HOURS
Status: DISCONTINUED | OUTPATIENT
Start: 2022-12-09 | End: 2022-12-10

## 2022-12-09 RX ORDER — GABAPENTIN 100 MG/1
100 CAPSULE ORAL 3 TIMES DAILY
Status: DISCONTINUED | OUTPATIENT
Start: 2022-12-09 | End: 2022-12-14 | Stop reason: HOSPADM

## 2022-12-09 RX ORDER — TIMOLOL MALEATE 5 MG/ML
1 SOLUTION OPHTHALMIC DAILY
Status: DISCONTINUED | OUTPATIENT
Start: 2022-12-10 | End: 2022-12-09 | Stop reason: CLARIF

## 2022-12-09 RX ORDER — ENOXAPARIN SODIUM 100 MG/ML
30 INJECTION SUBCUTANEOUS EVERY 24 HOURS
Status: DISCONTINUED | OUTPATIENT
Start: 2022-12-09 | End: 2022-12-14 | Stop reason: HOSPADM

## 2022-12-09 RX ORDER — ACETAMINOPHEN 325 MG/1
650 TABLET ORAL
Status: DISCONTINUED | OUTPATIENT
Start: 2022-12-09 | End: 2022-12-14 | Stop reason: HOSPADM

## 2022-12-09 RX ORDER — ALLOPURINOL 100 MG/1
100 TABLET ORAL DAILY
Status: DISCONTINUED | OUTPATIENT
Start: 2022-12-10 | End: 2022-12-14 | Stop reason: HOSPADM

## 2022-12-09 RX ORDER — FLUTICASONE PROPIONATE 50 MCG
1 SPRAY, SUSPENSION (ML) NASAL DAILY
Status: DISCONTINUED | OUTPATIENT
Start: 2022-12-10 | End: 2022-12-14 | Stop reason: HOSPADM

## 2022-12-09 RX ORDER — CLONIDINE HYDROCHLORIDE 0.1 MG/1
0.1 TABLET ORAL 2 TIMES DAILY
Status: DISCONTINUED | OUTPATIENT
Start: 2022-12-10 | End: 2022-12-11

## 2022-12-09 RX ORDER — ASPIRIN 81 MG/1
81 TABLET ORAL DAILY
Status: DISCONTINUED | OUTPATIENT
Start: 2022-12-10 | End: 2022-12-14 | Stop reason: HOSPADM

## 2022-12-09 RX ORDER — ACETAMINOPHEN 650 MG/1
650 SUPPOSITORY RECTAL
Status: DISCONTINUED | OUTPATIENT
Start: 2022-12-09 | End: 2022-12-14 | Stop reason: HOSPADM

## 2022-12-09 RX ORDER — ALPRAZOLAM 0.25 MG/1
0.25 TABLET ORAL
Status: DISCONTINUED | OUTPATIENT
Start: 2022-12-09 | End: 2022-12-09

## 2022-12-09 RX ADMIN — GABAPENTIN 100 MG: 100 CAPSULE ORAL at 17:34

## 2022-12-09 RX ADMIN — GABAPENTIN 100 MG: 100 CAPSULE ORAL at 21:48

## 2022-12-09 RX ADMIN — METOPROLOL TARTRATE 12.5 MG: 25 TABLET, FILM COATED ORAL at 21:45

## 2022-12-09 RX ADMIN — ENOXAPARIN SODIUM 30 MG: 100 INJECTION SUBCUTANEOUS at 17:34

## 2022-12-09 RX ADMIN — IOPAMIDOL 100 ML: 755 INJECTION, SOLUTION INTRAVENOUS at 12:53

## 2022-12-09 RX ADMIN — GADOTERIDOL 15 ML: 279.3 INJECTION, SOLUTION INTRAVENOUS at 19:17

## 2022-12-09 RX ADMIN — ATORVASTATIN CALCIUM 80 MG: 40 TABLET, FILM COATED ORAL at 21:40

## 2022-12-09 NOTE — H&P
Hospitalist Admission Note    NAME: Cynthia Marti   :  8/15/1932   MRN:  [id number]     Date/Time:  2022 3:09 PM    Patient PCP: DANIELE Mauro  ______________________________________________________________________  Given the patient's current clinical presentation, I have a high level of concern for decompensation if discharged from the emergency department. Complex decision making was performed, which includes reviewing the patient's available past medical records, laboratory results, and x-ray films. My assessment of this patient's clinical condition and my plan of care is as follows. Assessment / Plan:  Dysarthria, right-sided weakness rule out CVA  -Check MRI of the brain, follow-up on final results of CTA. Will check echocardiogram  -Check hemoglobin A1c and lipid panel.  -Consult PT OT. Consult neurology.  -Continue aspirin and Lipitor until we have the results of MRI    Abnormal CT of neck  Severe cervical spinal disease with possibility of compression  -We will check MRI of the cervical spine given abnormality of CTA. We will consult neurosurgery. Hypertension  -Allow permissive hypertension. Resume home clonidine and metoprolol for tomorrow    Dyslipidemia  Gouty arthritis  Coronary disease  History of TIA  Lumbar spinal stenosis  Neuropathy  -Continue statin, aspirin, Zetia      Code Status: Full code  Surrogate Decision Maker:    DVT Prophylaxis: Lovenox  GI Prophylaxis: not indicated    Baseline: From home,    ADLs      Subjective:   CHIEF COMPLAINT: Dysarthria    HISTORY OF PRESENT ILLNESS:     Froylan Ordonez is a 80 y.o.  female who presents with past medical history of hypertension, coronary artery disease, TIAs coming the hospital chief complaints of dysarthria and also right-sided weakness. Patient reports that she is not able to use her right hand and also had some speech difficulties. She also reports some associated headache as well. She was working with her physical therapist today who noted her to be weak on the right side. Does not report any vision changes. Does not report any chest pain or shortness of breath. On arrival to ED, her vital signs are within normal limits. On labs hemoglobin is 8.8 BMP shows a creatinine of 1.33, LFTs are within normal limits. CT head shows no acute process. 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 We were asked to admit for work up and evaluation of the above problems. Past Medical History:   Diagnosis Date    CAD (coronary artery disease)     Diverticulitis     Glaucoma     Gout     HTN (hypertension) 5/18/2018    Hx of diverticulitis of colon 5/18/2018    Hyperlipidemia     Hypertension     Other ill-defined conditions(799.89)     high cholesterol    S/P coronary artery stent placement 05/25/2018    Stroke ([address] 10/2020    TIA        Past Surgical History:   Procedure Laterality Date    HX CAROTID ENDARTERECTOMY Left 11/02/2020    HX HYSTERECTOMY      MT CARDIAC SURG PROCEDURE UNLIST  05/2018    x2 cardiac stent    MT COLONOSCOPY FLX DX W/COLLJ SPEC WHEN PFRMD  10/18/2013            Social History     Tobacco Use    Smoking status: Never    Smokeless tobacco: Never   Substance Use Topics    Alcohol use: No        Family History   Problem Relation Age of Onset    Stroke Mother     Diabetes Sister     No Known Problems Father      Allergies   Allergen Reactions    Lisinopril Cough        Prior to Admission medications    Medication Sig Start Date End Date Taking? Authorizing Provider   cloNIDine HCL (CATAPRES) 0.1 mg tablet Take 1 Tablet by mouth two (2) times a day for 30 days. 11/12/22 12/12/22  Geneva Gerardo MD   gabapentin (NEURONTIN) 100 mg capsule Take 1 Capsule by mouth three (3) times daily for 60 days.  Max Daily Amount: 300 mg. 11/5/22 1/4/23  Chele Burns MD butalbital-acetaminophen-caff (FIORICET) -40 mg per capsule Take 1 Capsule by mouth every four (4) hours as needed for Headache. 10/30/22   Juliano Bailey MD   ezetimibe (ZETIA) 10 mg tablet TAKE 1 TABLET BY MOUTH EVERY DAY 12/8/21   Clarence Booth NP   cholecalciferol (VITAMIN D3) (5000 Units/125 mcg) tab tablet Take 5,000 Units by mouth daily. Provider, Historical   naloxone (Narcan) 4 mg/actuation nasal spray Use 1 spray intranasally, then discard. Repeat with new spray every 2 min as needed for opioid overdose symptoms, alternating nostrils. 11/3/20   Yury GREENFIELD NP   polyethylene glycol (MIRALAX) 17 gram packet Take 17 g by mouth as needed for Constipation. Provider, Historical   trolamine salicylate (Aspercreme) 10 % lotion Apply  to affected area as needed. Provider, Historical   atorvastatin (LIPITOR) 80 mg tablet Take 1 Tab by mouth nightly. 10/19/20   Kateryna Wright MD   metoprolol tartrate (LOPRESSOR) 25 mg tablet Take 0.5 Tabs by mouth every twelve (12) hours. 3/13/19   Kerline Gee MD   docusate sodium (STOOL SOFTENER PO) Take  by mouth as needed. Provider, Historical   ferrous sulfate (IRON PO) Take 65 mg by mouth daily. Provider, Historical   aspirin delayed-release 81 mg tablet Take 81 mg by mouth daily. Provider, Historical   timolol (TIMOPTIC-XE) 0.5 % ophthalmic gel-forming Administer 1 Drop to right eye daily. Provider, Historical   latanoprost (XALATAN) 0.005 % ophthalmic solution Administer 1 Drop to both eyes nightly. Indications: wide-angle glaucoma    Provider, Historical   fluticasone (FLONASE ALLERGY RELIEF) 50 mcg/actuation nasal spray 1 [place] by Both Nostrils route daily. Provider, Historical   acetaminophen (TYLENOL EXTRA STRENGTH) 500 mg tablet Take 1,000 mg by mouth every six (6) hours as needed for Pain. Provider, Historical   fexofenadine (ALLEGRA) 180 mg tablet Take 180 mg by mouth nightly.     Other, MD Papo allopurinol (ZYLOPRIM) 100 mg tablet Take 100 mg by mouth daily. Papo Kirkpatrick MD   ALPRAZolam (XANAX) 0.25 mg tablet Take 0.25 mg by mouth daily as needed for Anxiety. Papo Kirkpatrick MD       REVIEW OF SYSTEMS:     I am not able to complete the review of systems because: The patient is intubated and sedated    The patient has altered mental status due to his acute medical problems    The patient has baseline aphasia from prior stroke(s)    The patient has baseline dementia and is not reliable historian    The patient is in acute medical distress and unable to provide information           Total of 12 systems reviewed as follows:       POSITIVE= underlined text  Negative = text not underlined  General:  fever, chills, sweats, generalized weakness, weight loss/gain,      loss of appetite   Eyes:    blurred vision, eye pain, loss of vision, double vision  ENT:    rhinorrhea, pharyngitis   Respiratory:   cough, sputum production, SOB, RAMSEY, wheezing, pleuritic pain   Cardiology:   chest pain, palpitations, orthopnea, PND, edema, syncope   Gastrointestinal:  abdominal pain , N/V, diarrhea, dysphagia, constipation, bleeding   Genitourinary:  frequency, urgency, dysuria, hematuria, incontinence   Muskuloskeletal :  arthralgia, myalgia, back pain  Hematology:  easy bruising, nose or gum bleeding, lymphadenopathy   Dermatological: rash, ulceration, pruritis, color change / jaundice  Endocrine:   hot flashes or polydipsia   Neurological:  headache, dizziness, confusion, focal weakness, paresthesia,     Speech difficulties, memory loss, gait difficulty  Psychological: Feelings of anxiety, depression, agitation    Objective:   VITALS:    Visit Vitals  BP (!) 126/50   Pulse (!) 56   Temp 97.7 °F (36.5 °C)   Resp 13   Ht 5' 4\" (1.626 m)   Wt 73.1 kg (161 lb 3.2 oz)   SpO2 98%   BMI 27.67 kg/m²       PHYSICAL EXAM:    General:    Alert, cooperative, no distress, appears stated age.      HEENT: Atraumatic, anicteric sclerae, pink conjunctivae     No oral ulcers, mucosa moist, throat clear, dentition fair  Neck:  Supple, symmetrical,  thyroid: non tender  Lungs:   Clear to auscultation bilaterally. No Wheezing or Rhonchi. No rales. Chest wall:  No tenderness  No Accessory muscle use. Heart:   Regular  rhythm,  No  murmur   No edema  Abdomen:   Soft, non-tender. Not distended. Bowel sounds normal  Extremities: No cyanosis. No clubbing,      Skin turgor normal, Capillary refill normal, Radial dial pulse 2+  Skin:     Not pale. Not Jaundiced  No rashes   Psych:  Good insight. Not depressed. Not anxious or agitated. Neurologic: Alert, awake, oriented x3, able to lift all 4 extremities against gravity, sensation to light touch intact  _______________________________________________________________________  Care Plan discussed with:    Comments   Patient y    Family      RN yy    Care Manager                    Consultant:      _______________________________________________________________________  Expected  Disposition:   Home with Family y   HH/PT/OT/RN    SNF/LTC    [facility]    ________________________________________________________________________  TOTAL TIME:  61 Minutes    Critical Care Provided     Minutes non procedure based      Comments    y Reviewed previous records   >50% of visit spent in counseling and coordination of care y Discussion with patient and/or family and questions answered       ________________________________________________________________________  Signed: Leonora Jeter MD    Procedures: see electronic medical records for all procedures/Xrays and details which were not copied into this note but were reviewed prior to creation of Plan.     LAB DATA REVIEWED:    Recent Results (from the past 24 hour(s))   CBC WITH AUTOMATED DIFF    Collection Time: 12/09/22  1:22 PM   Result Value Ref Range    WBC 7.4 3.6 - 11.0 K/uL    RBC 2.93 (L) 3.80 - 5.20 M/uL    HGB 8.8 (L) 11.5 - 16.0 g/dL    HCT 26.9 (L) 35.0 - 47.0 % MCV 91.8 80.0 - 99.0 FL    MCH 30.0 26.0 - 34.0 PG    MCHC 32.7 30.0 - 36.5 g/dL    RDW 13.2 11.5 - 14.5 %    PLATELET 769 997 - 565 K/uL    MPV 9.3 8.9 - 12.9 FL    NRBC 0.0 0  WBC    ABSOLUTE NRBC 0.00 0.00 - 0.01 K/uL    NEUTROPHILS 61 32 - 75 %    LYMPHOCYTES 22 12 - 49 %    MONOCYTES 10 5 - 13 %    EOSINOPHILS 6 0 - 7 %    BASOPHILS 0 0 - 1 %    IMMATURE GRANULOCYTES 1 (H) 0.0 - 0.5 %    ABS. NEUTROPHILS 4.6 1.8 - 8.0 K/UL    ABS. LYMPHOCYTES 1.6 0.8 - 3.5 K/UL    ABS. MONOCYTES 0.7 0.0 - 1.0 K/UL    ABS. EOSINOPHILS 0.4 0.0 - 0.4 K/UL    ABS. BASOPHILS 0.0 0.0 - 0.1 K/UL    ABS. IMM. GRANS. 0.0 0.00 - 0.04 K/UL    DF AUTOMATED     METABOLIC PANEL, COMPREHENSIVE    Collection Time: 12/09/22  1:22 PM   Result Value Ref Range    Sodium 138 136 - 145 mmol/L    Potassium 3.5 3.5 - 5.1 mmol/L    Chloride 102 97 - 108 mmol/L    CO2 30 21 - 32 mmol/L    Anion gap 6 5 - 15 mmol/L    Glucose 114 (H) 65 - 100 mg/dL    BUN 16 6 - 20 MG/DL    Creatinine 1.33 (H) 0.55 - 1.02 MG/DL    BUN/Creatinine ratio 12 12 - 20      eGFR 38 (L) >60 ml/min/1.73m2    Calcium 8.4 (L) 8.5 - 10.1 MG/DL    Bilirubin, total 0.3 0.2 - 1.0 MG/DL    ALT (SGPT) 17 12 - 78 U/L    AST (SGOT) 20 15 - 37 U/L    Alk. phosphatase 91 45 - 117 U/L    Protein, total 6.4 6.4 - 8.2 g/dL    Albumin 2.7 (L) 3.5 - 5.0 g/dL    Globulin 3.7 2.0 - 4.0 g/dL    A-G Ratio 0.7 (L) 1.1 - 2.2     PROTHROMBIN TIME + INR    Collection Time: 12/09/22  1:22 PM   Result Value Ref Range    INR 1.1 0.9 - 1.1      Prothrombin time 11.1 9.0 - 11.1 sec   SAMPLES BEING HELD    Collection Time: 12/09/22  1:23 PM   Result Value Ref Range    SAMPLES BEING HELD PST     COMMENT        Add-on orders for these samples will be processed based on acceptable specimen integrity and analyte stability, which may vary by analyte.

## 2022-12-09 NOTE — ED PROVIDER NOTES
EMERGENCY DEPARTMENT HISTORY AND PHYSICAL EXAM      Date: 12/9/2022  Patient Name: Bryant Zhang    History of Presenting Illness     Chief Complaint   Patient presents with    Dysarthria     Pt comes via EMS from home with complaint of slurred speech and rt side headache since last night. LKW:  2000. Per EMS, . Pt has headache, right arm weakness, and slurred speech. No blood thinners or recent trauma or surgery. HPI: Bryant Zhang, 80 y.o. female presenting to the ED with slurred speech and right upper extremity weakness. Last known normal was 8 PM last night. EMS reports that patient was at physical therapy today, for which she received treatment for bilateral lower extremity weakness, but they noted to neuro symptoms above and sent her to the ED. Patient reports that she feels well but was having some weakness to her right upper extremity starting yesterday. There are no other complaints, changes, or physical findings at this time. PCP: DANIELE Cerda    No current facility-administered medications on file prior to encounter. Current Outpatient Medications on File Prior to Encounter   Medication Sig Dispense Refill    cloNIDine HCL (CATAPRES) 0.1 mg tablet Take 1 Tablet by mouth two (2) times a day for 30 days. 60 Tablet 0    gabapentin (NEURONTIN) 100 mg capsule Take 1 Capsule by mouth three (3) times daily for 60 days. Max Daily Amount: 300 mg. 90 Capsule 1    butalbital-acetaminophen-caff (FIORICET) -40 mg per capsule Take 1 Capsule by mouth every four (4) hours as needed for Headache. 12 Capsule 0    ezetimibe (ZETIA) 10 mg tablet TAKE 1 TABLET BY MOUTH EVERY DAY 90 Tablet 1    cholecalciferol (VITAMIN D3) (5000 Units/125 mcg) tab tablet Take 5,000 Units by mouth daily. naloxone (Narcan) 4 mg/actuation nasal spray Use 1 spray intranasally, then discard.  Repeat with new spray every 2 min as needed for opioid overdose symptoms, alternating nostrils. 1 Each 0    polyethylene glycol (MIRALAX) 17 gram packet Take 17 g by mouth as needed for Constipation. trolamine salicylate (Aspercreme) 10 % lotion Apply  to affected area as needed. atorvastatin (LIPITOR) 80 mg tablet Take 1 Tab by mouth nightly. 30 Tab 1    metoprolol tartrate (LOPRESSOR) 25 mg tablet Take 0.5 Tabs by mouth every twelve (12) hours. 90 Tab 2    docusate sodium (STOOL SOFTENER PO) Take  by mouth as needed. ferrous sulfate (IRON PO) Take 65 mg by mouth daily. aspirin delayed-release 81 mg tablet Take 81 mg by mouth daily. timolol (TIMOPTIC-XE) 0.5 % ophthalmic gel-forming Administer 1 Drop to right eye daily. latanoprost (XALATAN) 0.005 % ophthalmic solution Administer 1 Drop to both eyes nightly. Indications: wide-angle glaucoma      fluticasone (FLONASE ALLERGY RELIEF) 50 mcg/actuation nasal spray 1 [place] by Both Nostrils route daily. acetaminophen (TYLENOL EXTRA STRENGTH) 500 mg tablet Take 1,000 mg by mouth every six (6) hours as needed for Pain. fexofenadine (ALLEGRA) 180 mg tablet Take 180 mg by mouth nightly. allopurinol (ZYLOPRIM) 100 mg tablet Take 100 mg by mouth daily. ALPRAZolam (XANAX) 0.25 mg tablet Take 0.25 mg by mouth daily as needed for Anxiety.          Past History     Past Medical History:  Past Medical History:   Diagnosis Date    CAD (coronary artery disease)     Diverticulitis     Glaucoma     Gout     HTN (hypertension) 5/18/2018    Hx of diverticulitis of colon 5/18/2018    Hyperlipidemia     Hypertension     Other ill-defined conditions(799.89)     high cholesterol    S/P coronary artery stent placement 05/25/2018    Stroke ([address] 10/2020    TIA       Past Surgical History:  Past Surgical History:   Procedure Laterality Date    HX CAROTID ENDARTERECTOMY Left 11/02/2020    HX HYSTERECTOMY      [place] CARDIAC SURG PROCEDURE UNLIST  05/2018    x2 cardiac stent    [place] COLONOSCOPY FLX DX W/COLLJ SPEC WHEN PFRMD  10/18/2013 Family History:  Family History   Problem Relation Age of Onset    Stroke Mother     Diabetes Sister     No Known Problems Father        Social History:  Social History     Tobacco Use    Smoking status: Never    Smokeless tobacco: Never   Vaping Use    Vaping Use: Never used   Substance Use Topics    Alcohol use: No    Drug use: No       Allergies: Allergies   Allergen Reactions    Lisinopril Cough         Review of Systems   Review of Systems   Constitutional:  Negative for chills and fever. HENT:  Negative for rhinorrhea. Eyes:  Negative for redness. Respiratory:  Negative for shortness of breath. Cardiovascular:  Negative for chest pain. Gastrointestinal:  Negative for abdominal pain. Genitourinary:  Negative for dysuria. Musculoskeletal:  Negative for back pain. Neurological:  Positive for speech difficulty and weakness. Negative for syncope. Psychiatric/Behavioral:  The patient is not nervous/anxious. All other systems reviewed and are negative. Physical Exam   Physical Exam  Vitals and nursing note reviewed. Constitutional:       Appearance: Normal appearance. HENT:      Head: Normocephalic and atraumatic. Mouth/Throat:      Mouth: Mucous membranes are moist.   Eyes:      Extraocular Movements: Extraocular movements intact. Cardiovascular:      Rate and Rhythm: Normal rate and regular rhythm. Pulses: Normal pulses. Pulmonary:      Effort: Pulmonary effort is normal.      Breath sounds: Normal breath sounds. Abdominal:      Palpations: Abdomen is soft. Tenderness: There is no abdominal tenderness. Musculoskeletal:         General: No deformity. Cervical back: Neck supple. Skin:     General: Skin is warm and dry. Neurological:      Mental Status: She is alert.       Comments: Slurred speech w/o dysarthria or aphasia, RUE w pronator drift and 4/5 strength, 5/5 strength to other extremities, no sensation changes   Psychiatric:         Mood and Affect: Mood normal.         Behavior: Behavior normal.       Diagnostic Study Results     Labs -     Recent Results (from the past 24 hour(s))   CBC WITH AUTOMATED DIFF    Collection Time: 12/09/22  1:22 PM   Result Value Ref Range    WBC 7.4 3.6 - 11.0 K/uL    RBC 2.93 (L) 3.80 - 5.20 M/uL    HGB 8.8 (L) 11.5 - 16.0 g/dL    HCT 26.9 (L) 35.0 - 47.0 %    MCV 91.8 80.0 - 99.0 FL    MCH 30.0 26.0 - 34.0 PG    MCHC 32.7 30.0 - 36.5 g/dL    RDW 13.2 11.5 - 14.5 %    PLATELET 003 370 - 305 K/uL    MPV 9.3 8.9 - 12.9 FL    NRBC 0.0 0  WBC    ABSOLUTE NRBC 0.00 0.00 - 0.01 K/uL    NEUTROPHILS 61 32 - 75 %    LYMPHOCYTES 22 12 - 49 %    MONOCYTES 10 5 - 13 %    EOSINOPHILS 6 0 - 7 %    BASOPHILS 0 0 - 1 %    IMMATURE GRANULOCYTES 1 (H) 0.0 - 0.5 %    ABS. NEUTROPHILS 4.6 1.8 - 8.0 K/UL    ABS. LYMPHOCYTES 1.6 0.8 - 3.5 K/UL    ABS. MONOCYTES 0.7 0.0 - 1.0 K/UL    ABS. EOSINOPHILS 0.4 0.0 - 0.4 K/UL    ABS. BASOPHILS 0.0 0.0 - 0.1 K/UL    ABS. IMM. GRANS. 0.0 0.00 - 0.04 K/UL    DF AUTOMATED     METABOLIC PANEL, COMPREHENSIVE    Collection Time: 12/09/22  1:22 PM   Result Value Ref Range    Sodium 138 136 - 145 mmol/L    Potassium 3.5 3.5 - 5.1 mmol/L    Chloride 102 97 - 108 mmol/L    CO2 30 21 - 32 mmol/L    Anion gap 6 5 - 15 mmol/L    Glucose 114 (H) 65 - 100 mg/dL    BUN 16 6 - 20 MG/DL    Creatinine 1.33 (H) 0.55 - 1.02 MG/DL    BUN/Creatinine ratio 12 12 - 20      eGFR 38 (L) >60 ml/min/1.73m2    Calcium 8.4 (L) 8.5 - 10.1 MG/DL    Bilirubin, total 0.3 0.2 - 1.0 MG/DL    ALT (SGPT) 17 12 - 78 U/L    AST (SGOT) 20 15 - 37 U/L    Alk.  phosphatase 91 45 - 117 U/L    Protein, total 6.4 6.4 - 8.2 g/dL    Albumin 2.7 (L) 3.5 - 5.0 g/dL    Globulin 3.7 2.0 - 4.0 g/dL    A-G Ratio 0.7 (L) 1.1 - 2.2     PROTHROMBIN TIME + INR    Collection Time: 12/09/22  1:22 PM   Result Value Ref Range    INR 1.1 0.9 - 1.1      Prothrombin time 11.1 9.0 - 11.1 sec   SAMPLES BEING HELD    Collection Time: 12/09/22  1:23 PM Result Value Ref Range    SAMPLES BEING HELD PST     COMMENT        Add-on orders for these samples will be processed based on acceptable specimen integrity and analyte stability, which may vary by analyte. Radiologic Studies -   CTA CODE NEURO HEAD AND NECK W CONT   Final Result   CTA Head:   No large vessel occlusion or significant flow-limiting stenosis. CTA Neck:   No large vessel occlusion or significant flow-limiting stenosis. CT Brain Perfusion:   No blood flow or volume deficits to suggest acute ischemia or tissue at ischemic   risk. 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 Clinical correlation and follow-up cervical spine MRI   is advised. CT CODE NEURO PERF W CBF   Final Result   CTA Head:   No large vessel occlusion or significant flow-limiting stenosis. CTA Neck:   No large vessel occlusion or significant flow-limiting stenosis. CT Brain Perfusion:   No blood flow or volume deficits to suggest acute ischemia or tissue at ischemic   risk. 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 Clinical correlation and follow-up cervical spine MRI   is advised. CT CODE NEURO HEAD WO CONTRAST   Final Result   No acute intracranial abnormality. Chronic microangiopathic white matter disease. CT Results  (Last 48 hours)                 12/09/22 1253  CTA CODE NEURO HEAD AND NECK W CONT Final result    Impression:  CTA Head:   No large vessel occlusion or significant flow-limiting stenosis. CTA Neck:   No large vessel occlusion or significant flow-limiting stenosis.        CT Brain Perfusion:   No blood flow or volume deficits to suggest acute ischemia or tissue at ischemic   risk. 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 Clinical correlation and follow-up cervical spine MRI   is advised. Narrative:  EXAM:  CT CODE NEURO PERF W CBF, CTA CODE NEURO HEAD AND NECK W CONT       INDICATION:   80-year-old female with concern for CVA. COMPARISON:  None. CONTRAST:  100 mL of Isovue-370. TECHNIQUE:  Unenhanced  images were obtained to localize the volume for   acquisition. Multislice helical axial CT angiography was performed from the   aortic arch to the top of the head during uneventful rapid bolus intravenous   contrast administration. Coronal and sagittal reformations and 3D/MIP  post   processing were performed. CT brain perfusion was performed with generation of hemodynamic maps of multiple   parameters, including cerebral blood flow, cerebral blood volume, and MTT (mean   transit time). CT dose reduction was achieved through use of a standardized protocol tailored   for this examination and automatic exposure control for dose modulation. This   study was analyzed by the [address] ai algorithm. FINDINGS:       CTA Head:   There is no evidence of large vessel occlusion or flow-limiting stenosis of the   intracranial internal carotid, anterior cerebral, and middle cerebral arteries. Calcification of the bilateral carotid siphons without significant stenosis. The   anterior communicating artery is patent. There is no evidence of large vessel occlusion or flow-limiting stenosis of the   intracranial vertebral arteries, basilar artery, or posterior cerebral arteries. The left posterior communicating artery is patent. There is no evidence of aneurysm or vascular malformation.  The dural venous   sinuses and deep cerebral venous system are patent. No evidence of abnormal   enhancement on delayed phase images. CT Brain Perfusion:   Brain parenchymal perfusion study demonstrates no suggestion of acute infarct. No regional areas of elevated Tmax, decreased cerebral blood flow or blood   volume. rCBF < 30% = 0 cc. Tmax > 6 seconds = 0 cc. CTA NECK:   NASCET method was utilized for calculating stenosis. The aortic arch is unremarkable. The common carotid arteries demonstrate no   significant stenosis. There is no evidence of significant stenosis in the   cervical right internal carotid artery. There is no evidence of significant   stenosis in the cervical left internal carotid artery. The carotid bulbs plaques   without significant luminal narrowing.    % of right carotid artery stenosis: 0   % of left carotid artery stenosis: 0       There is a left vertebral artery dominant vertebrobasilar arterial system. The   cervical vertebral arteries are normal in course, size and contour without   significant stenosis. 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 No mass lesion or enlarged lymphadenopathy in the neck. Visualized lung apices are clear. Edentulous maxilla and mandible. Markedly circumferential soft tissue thickening   with granular enhancement versus calcifications around the dens/atlantoaxial   articulation with associated chronic osseous erosion of the dens and anterior   arch of the C1, may suggest rheumatoid. There is associated widening of the   atlantoaxial interval measuring up to 0.9 cm and severe narrowing of the   craniocervical junction canal with compression of the proximal cervical cord. 12/09/22 1253  CT CODE NEURO PERF W CBF Final result    Impression:  CTA Head:   No large vessel occlusion or significant flow-limiting stenosis. CTA Neck:   No large vessel occlusion or significant flow-limiting stenosis.        CT Brain Perfusion:   No blood flow or volume deficits to suggest acute ischemia or tissue at ischemic   risk. 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 Clinical correlation and follow-up cervical spine MRI   is advised. Narrative:  EXAM:  CT CODE NEURO PERF W CBF, CTA CODE NEURO HEAD AND NECK W CONT       INDICATION:   25-year-old female with concern for CVA. COMPARISON:  None. CONTRAST:  100 mL of Isovue-370. TECHNIQUE:  Unenhanced  images were obtained to localize the volume for   acquisition. Multislice helical axial CT angiography was performed from the   aortic arch to the top of the head during uneventful rapid bolus intravenous   contrast administration. Coronal and sagittal reformations and 3D/MIP  post   processing were performed. CT brain perfusion was performed with generation of hemodynamic maps of multiple   parameters, including cerebral blood flow, cerebral blood volume, and MTT (mean   transit time). CT dose reduction was achieved through use of a standardized protocol tailored   for this examination and automatic exposure control for dose modulation. This   study was analyzed by the [address] ai algorithm. FINDINGS:       CTA Head:   There is no evidence of large vessel occlusion or flow-limiting stenosis of the   intracranial internal carotid, anterior cerebral, and middle cerebral arteries. Calcification of the bilateral carotid siphons without significant stenosis. The   anterior communicating artery is patent. There is no evidence of large vessel occlusion or flow-limiting stenosis of the   intracranial vertebral arteries, basilar artery, or posterior cerebral arteries. The left posterior communicating artery is patent. There is no evidence of aneurysm or vascular malformation.  The dural venous   sinuses and deep cerebral venous system are patent. No evidence of abnormal   enhancement on delayed phase images. CT Brain Perfusion:   Brain parenchymal perfusion study demonstrates no suggestion of acute infarct. No regional areas of elevated Tmax, decreased cerebral blood flow or blood   volume. rCBF < 30% = 0 cc. Tmax > 6 seconds = 0 cc. CTA NECK:   NASCET method was utilized for calculating stenosis. The aortic arch is unremarkable. The common carotid arteries demonstrate no   significant stenosis. There is no evidence of significant stenosis in the   cervical right internal carotid artery. There is no evidence of significant   stenosis in the cervical left internal carotid artery. The carotid bulbs plaques   without significant luminal narrowing.    % of right carotid artery stenosis: 0   % of left carotid artery stenosis: 0       There is a left vertebral artery dominant vertebrobasilar arterial system. The   cervical vertebral arteries are normal in course, size and contour without   significant stenosis. 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 No mass lesion or enlarged lymphadenopathy in the neck. Visualized lung apices are clear. Edentulous maxilla and mandible. Markedly circumferential soft tissue thickening   with granular enhancement versus calcifications around the dens/atlantoaxial   articulation with associated chronic osseous erosion of the dens and anterior   arch of the C1, may suggest rheumatoid. There is associated widening of the   atlantoaxial interval measuring up to 0.9 cm and severe narrowing of the   craniocervical junction canal with compression of the proximal cervical cord. 12/09/22 1238  CT CODE NEURO HEAD WO CONTRAST Final result    Impression:  No acute intracranial abnormality. Chronic microangiopathic white matter disease.                Narrative: EXAM: CT CODE NEURO HEAD WO CONTRAST       INDICATION: Code Stroke       COMPARISON: 11/1/2022 brain MRI and head CT. CONTRAST: None. TECHNIQUE: Unenhanced CT of the head was performed using 5 mm images. Brain and   bone windows were generated. Coronal and sagittal reformats. CT dose reduction   was achieved through use of a standardized protocol tailored for this   examination and automatic exposure control for dose modulation. FINDINGS:   The ventricles sizes and configuration are within normal limits. Patchy   periventricular and deep white matter ill-defined hypodensities, nonspecific and   likely microangiopathic white matter disease. Stable appearance of bilateral   basal ganglia and dentate nuclei calcific densities, likely age-related   mineralization. There is no intracranial hemorrhage, extra-axial collection, or   mass effect. The basilar cisterns are open. No CT evidence of acute infarct. The bone windows demonstrate no abnormalities. The visualized portions of the   paranasal sinuses and mastoid air cells are clear. Status post bilateral lense   replacement. The orbits are otherwise unremarkable. CXR Results  (Last 48 hours)      None              Medical Decision Making   I am the first provider for this patient. I reviewed the vital signs, available nursing notes, past medical history, past surgical history, family history and social history. Vital Signs-Reviewed the patient's vital signs. Patient Vitals for the past 24 hrs:   Temp Pulse Resp BP SpO2   12/09/22 1415 -- (!) 54 17 (!) 124/46 96 %   12/09/22 1311 97.7 °F (36.5 °C) 62 16 (!) 133/44 98 %         Provider Notes (Medical Decision Making):   80year-old presenting to ED with chief complaint of right sided weakness, slurred speech, last known normal 8 PM last night. Right upper extremity weakness noted on exam as well as possible slurred speech.   Evaluated by neurology who recommends a baby aspirin and admission for stroke work-up. Discussed with Dr. Ollie Prado. ED Course:     Initial assessment performed. The patients presenting problems have been discussed, and they are in agreement with the care plan formulated and outlined with them. I have encouraged them to ask questions as they arise throughout their visit. Disposition:  admit    PLAN:  1. Current Discharge Medication List        2.    Follow-up Information    None       Return to ED if worse     Diagnosis     Clinical Impression: R sided weakness

## 2022-12-09 NOTE — PROGRESS NOTES
Speech Pathology Note    SLP orders received, however note H&P pending. SLP to follow up when more information available in the chart. Thank you.     Estella Cedeno M.S., CCC-SLP

## 2022-12-09 NOTE — CONSULTS
Date of Consultation:  December 9, 2022    Referring Physician: Kanchan Zavala MD     Reason for Consultation:  Headache, slurred speech and right hand weakness     Chief Complaint   Patient presents with    Dysarthria     Pt comes via EMS from home with complaint of slurred speech and rt side headache since last night. LKW:  2000. Per EMS, . Pt has headache, right arm weakness, and slurred speech. No blood thinners or recent trauma or surgery. History of Present Illness:   Dioni Olvera is a 80 y.o. female with a history of hypertension, hyperlipidemia and CAD who is currently admitted to the hospital after she developed right hand weakness. Patient reports that she was at her baseline state of health and felt that she was unable to hold anything in her right hand. She denies any paresthesias or numbness in the hands. She also noted that she had a severe headache located in the bifrontal region that traveled down to her right ear as well as her right jaw. She also reports at times she has the sensation of roaring in her right ear. Several ER visits to the hospital without cause being found. She has been taking Tylenol for her pain with some improvement. Currently she reports that her symptoms have resolved. She denies any neck pain.     Past Medical History:   Diagnosis Date    CAD (coronary artery disease)     Diverticulitis     Glaucoma     Gout     HTN (hypertension) 5/18/2018    Hx of diverticulitis of colon 5/18/2018    Hyperlipidemia     Hypertension     Other ill-defined conditions(799.89)     high cholesterol    S/P coronary artery stent placement 05/25/2018    Stroke ([address] 10/2020    TIA        Past Surgical History:   Procedure Laterality Date    HX CAROTID ENDARTERECTOMY Left 11/02/2020    HX HYSTERECTOMY      HI CARDIAC SURG PROCEDURE UNLIST  05/2018    x2 cardiac stent    HI COLONOSCOPY FLX DX W/COLLJ SPEC WHEN PFRMD  10/18/2013             Family History   Problem Relation Age of Onset    Stroke Mother     Diabetes Sister     No Known Problems Father         Social History     Tobacco Use    Smoking status: Never    Smokeless tobacco: Never   Substance Use Topics    Alcohol use: No        Allergies   Allergen Reactions    Lisinopril Cough        Prior to Admission medications    Medication Sig Start Date End Date Taking? Authorizing Provider   cloNIDine HCL (CATAPRES) 0.1 mg tablet Take 1 Tablet by mouth two (2) times a day for 30 days. 11/12/22 12/12/22  Alecia Diop MD   gabapentin (NEURONTIN) 100 mg capsule Take 1 Capsule by mouth three (3) times daily for 60 days. Max Daily Amount: 300 mg. 11/5/22 1/4/23  Leopoldo Darnel, MD   butalbital-acetaminophen-caff (FIORICET) -40 mg per capsule Take 1 Capsule by mouth every four (4) hours as needed for Headache. 10/30/22   Leo Madsen MD   ezetimibe (ZETIA) 10 mg tablet TAKE 1 TABLET BY MOUTH EVERY DAY 12/8/21   Kris Booth NP   cholecalciferol (VITAMIN D3) (5000 Units/125 mcg) tab tablet Take 5,000 Units by mouth daily. Provider, Historical   naloxone (Narcan) 4 mg/actuation nasal spray Use 1 spray intranasally, then discard. Repeat with new spray every 2 min as needed for opioid overdose symptoms, alternating nostrils. 11/3/20   Nanine Holiday P, NP   polyethylene glycol (MIRALAX) 17 gram packet Take 17 g by mouth as needed for Constipation. Provider, Historical   trolamine salicylate (Aspercreme) 10 % lotion Apply  to affected area as needed. Provider, Historical   atorvastatin (LIPITOR) 80 mg tablet Take 1 Tab by mouth nightly. 10/19/20   Quiana Roque MD   metoprolol tartrate (LOPRESSOR) 25 mg tablet Take 0.5 Tabs by mouth every twelve (12) hours. 3/13/19   Ravi Gee MD   docusate sodium (STOOL SOFTENER PO) Take  by mouth as needed. Provider, Historical   ferrous sulfate (IRON PO) Take 65 mg by mouth daily.     Provider, Historical   aspirin delayed-release 81 mg tablet Take 81 mg by mouth daily. Provider, Historical   timolol (TIMOPTIC-XE) 0.5 % ophthalmic gel-forming Administer 1 Drop to right eye daily. Provider, Historical   latanoprost (XALATAN) 0.005 % ophthalmic solution Administer 1 Drop to both eyes nightly. Indications: wide-angle glaucoma    Provider, Historical   fluticasone (FLONASE ALLERGY RELIEF) 50 mcg/actuation nasal spray 1 [place] by Both Nostrils route daily. Provider, Historical   acetaminophen (TYLENOL EXTRA STRENGTH) 500 mg tablet Take 1,000 mg by mouth every six (6) hours as needed for Pain. Provider, Historical   fexofenadine (ALLEGRA) 180 mg tablet Take 180 mg by mouth nightly. Other, MD Papo   allopurinol (ZYLOPRIM) 100 mg tablet Take 100 mg by mouth daily. Papo Kirkpatrick MD   ALPRAZolam (XANAX) 0.25 mg tablet Take 0.25 mg by mouth daily as needed for Anxiety. Papo Kirkpatrick MD       Review of Systems:    General, constitutional: negative  Eyes, vision: negative  Ears, nose, throat: negative  Cardiovascular, heart: negative  Respiratory: negative  Gastrointestinal: negative  Genitourinary: negative  Musculoskeletal: negative  Skin and integumentary: negative  Psychiatric: negative  Endocrine: negative  Neurological: negative, except for HPI  Hematologic/lymphatic: negative  Allergy/immunology: negative    PHYSICAL EXAMINATION:   Visit Vitals  BP (!) 126/50   Pulse (!) 56   Temp 97.7 °F (36.5 °C)   Resp 13   Ht 5' 4\" (1.626 m)   Wt 161 lb 3.2 oz (73.1 kg)   SpO2 98%   BMI 27.67 kg/m²       Physical Exam:  General:  no acute distress  Neck: no carotid bruits  Lungs: clear to auscultation  Heart:  no murmurs, regular rate and rhythm   Lower extremity: no edema    Neurological exam:  Mental Status: Awake, alert, oriented to person, place and time  Attention and Concentration: able to state the days of the week backwards   Speech and Language: No dysarthria.  Able to name, repeat and follow commands   Fund of knowledge was preserved    Cranial nerves: II-XII:  Pupils equal and reactive, visual fields intact by confrontation  Extraocular movements intact, no evidence of nystagmus or ptosis   Facial sensation intact   Facial movements symmetric   Hearing intact to soft rub bilaterally   Shoulder shrug symmetric and strong   Tongue protrusion full and midline without fasciculation or atrophy    Motor:   Normal tone and Bulk   Drift: No evidence of pronator drift     Strength testing:   deltoid triceps biceps Wrist ext. Wrist flex. intrinsics   Right 5 5 5 5 5 4+   Left 5 5 5 5 5 5      Hip flex. Hip ext. Knee ext. Knee flex Dorsi flex Plantar flex   Right  5 5 5 5 5 5   Left  5 5 5 5 5 5       Sensation intact to light touch and pinprick. There is no extinction. Reflexes:   Biceps Triceps  Brachiorad Patellar Achilles Plantar Hoffmans   Right  2 2 2 2 1 Down Neg   Left  2 2 2 2 1 Down Neg      Cerebellar testing: Finger-nose-finger was intact    Gait was deferred as she was in the emergency room. Data:     Lab Results   Component Value Date/Time    Hemoglobin A1c 5.8 (H) 10/18/2020 12:03 AM    Sodium 138 12/09/2022 01:22 PM    Potassium 3.5 12/09/2022 01:22 PM    Chloride 102 12/09/2022 01:22 PM    Glucose 114 (H) 12/09/2022 01:22 PM    BUN 16 12/09/2022 01:22 PM    Creatinine 1.33 (H) 12/09/2022 01:22 PM    Calcium 8.4 (L) 12/09/2022 01:22 PM    WBC 7.4 12/09/2022 01:22 PM    HCT 26.9 (L) 12/09/2022 01:22 PM    HGB 8.8 (L) 12/09/2022 01:22 PM    PLATELET 948 45/69/6876 01:22 PM       Imaging:    Results from Hospital Encounter encounter on 11/01/22    MRI BRAIN WO CONT    Narrative  EXAM: MRI BRAIN WO CONT    INDICATION: 22-year-old female with headache. COMPARISON: 11/1/2022 head CT and 10/19/2020 MRI. CONTRAST: None. TECHNIQUE:  Multiplanar multisequence acquisition without contrast of the brain. FINDINGS:  The ventricles sizes and configuration are within normal limits.  Patchy  periventricular and deep white matter T2/FLAIR hyperintensity, nonspecific and  likely microangiopathic ischemic changes. There is no acute infarct, hemorrhage,  extra-axial fluid collection, or mass effect. There is no cerebellar tonsillar  herniation. Expected arterial flow-voids are present. Moderate diffuse mucosal thickening of the paranasal sinuses with near complete  resolution of the left sphenoid and bilateral anterior ethmoid sinuses. The  mastoids are free of fluid bilaterally. Status post bilateral lense replacement. The orbits are otherwise unremarkable. No significant osseous or scalp lesions  are identified. Impression  No acute intracranial abnormality. Chronic microangiopathic white matter disease. Results from Hospital Encounter encounter on 12/09/22    CT CODE NEURO PERF W CBF    Narrative  EXAM:  CT CODE NEURO PERF W CBF, CTA CODE NEURO HEAD AND NECK W CONT    INDICATION:   63-year-old female with concern for CVA. COMPARISON:  None. CONTRAST:  100 mL of Isovue-370. TECHNIQUE:  Unenhanced  images were obtained to localize the volume for  acquisition. Multislice helical axial CT angiography was performed from the  aortic arch to the top of the head during uneventful rapid bolus intravenous  contrast administration. Coronal and sagittal reformations and 3D/MIP  post  processing were performed. CT brain perfusion was performed with generation of hemodynamic maps of multiple  parameters, including cerebral blood flow, cerebral blood volume, and MTT (mean  transit time). CT dose reduction was achieved through use of a standardized protocol tailored  for this examination and automatic exposure control for dose modulation. This  study was analyzed by the [address] ai algorithm. FINDINGS:    CTA Head:  There is no evidence of large vessel occlusion or flow-limiting stenosis of the  intracranial internal carotid, anterior cerebral, and middle cerebral arteries.   Calcification of the bilateral carotid siphons without significant stenosis. The  anterior communicating artery is patent. There is no evidence of large vessel occlusion or flow-limiting stenosis of the  intracranial vertebral arteries, basilar artery, or posterior cerebral arteries. The left posterior communicating artery is patent. There is no evidence of aneurysm or vascular malformation. The dural venous  sinuses and deep cerebral venous system are patent. No evidence of abnormal  enhancement on delayed phase images. CT Brain Perfusion:  Brain parenchymal perfusion study demonstrates no suggestion of acute infarct. No regional areas of elevated Tmax, decreased cerebral blood flow or blood  volume. rCBF < 30% = 0 cc. Tmax > 6 seconds = 0 cc. CTA NECK:  NASCET method was utilized for calculating stenosis. The aortic arch is unremarkable. The common carotid arteries demonstrate no  significant stenosis. There is no evidence of significant stenosis in the  cervical right internal carotid artery. There is no evidence of significant  stenosis in the cervical left internal carotid artery. The carotid bulbs plaques  without significant luminal narrowing.  % of right carotid artery stenosis: 0  % of left carotid artery stenosis: 0    There is a left vertebral artery dominant vertebrobasilar arterial system. The  cervical vertebral arteries are normal in course, size and contour without  significant stenosis. 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 No mass lesion or enlarged lymphadenopathy in the neck. Visualized lung apices are clear. Edentulous maxilla and mandible. Markedly circumferential soft tissue thickening  with granular enhancement versus calcifications around the dens/atlantoaxial  articulation with associated chronic osseous erosion of the dens and anterior  arch of the C1, may suggest rheumatoid.  There is associated widening of the  atlantoaxial interval measuring up to 0.9 cm and severe narrowing of the  craniocervical junction canal with compression of the proximal cervical cord. Impression  CTA Head:  No large vessel occlusion or significant flow-limiting stenosis. CTA Neck:  No large vessel occlusion or significant flow-limiting stenosis. CT Brain Perfusion:  No blood flow or volume deficits to suggest acute ischemia or tissue at ischemic  risk. 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 Clinical correlation and follow-up cervical spine MRI  is advised. IMPRESSION/RECOMMENDATIONS:  Taylor Cole is a 80 y.o. female with a history of hypertension, hyperlipidemia and CAD who is currently admitted to the hospital after she developed right hand weakness associated with headache. She has previously been evaluated with an MRI of the brain which showed no abnormalities. There is concern for possible cervical spine stenosis given the right hand weakness. She also did have a CTA head and neck which showed concerning findings of the atlantoaxial joint and possible cervical spinal stenosis of the proximal cervical cord. Headache associated with right hand weakness: Concerning for cervical spinal stenosis given the CTA findings. Her neurological exam at this time is largely unremarkable with the exception of trace weakness in the right hand. It is less likely that her symptoms are related to stroke or TIA. - Would recommend obtaining an MRI of the brain to rule out stroke as a possible cause of her symptoms. Again this is less likely  - Would recommend obtaining an MRI of the cervical spine with and without contrast to determine if there is evidence of rheumatoid arthritis in the atlantoaxial joint.   This will also give us information regarding spinal stenosis  - Based on the above imaging, we may need to consult neurosurgery however we can hold off on this for now. It is unclear if patient would even be a surgical candidate  - For the headache we can continue with Tylenol as it does help her pain. We did discuss the possibility of rebound headaches with taking this medication daily and patient voiced understanding.  - Please obtain PT and OT consultations    Thank you very much for this consultation, we will continue to follow. Please call with any additional questions.     Spencer Frederick MD

## 2022-12-10 LAB
ANION GAP SERPL CALC-SCNC: 3 MMOL/L (ref 5–15)
BUN SERPL-MCNC: 12 MG/DL (ref 6–20)
BUN/CREAT SERPL: 9 (ref 12–20)
CALCIUM SERPL-MCNC: 8.3 MG/DL (ref 8.5–10.1)
CHLORIDE SERPL-SCNC: 102 MMOL/L (ref 97–108)
CHOLEST SERPL-MCNC: 100 MG/DL
CO2 SERPL-SCNC: 32 MMOL/L (ref 21–32)
CREAT SERPL-MCNC: 1.3 MG/DL (ref 0.55–1.02)
ERYTHROCYTE [DISTWIDTH] IN BLOOD BY AUTOMATED COUNT: 13.5 % (ref 11.5–14.5)
EST. AVERAGE GLUCOSE BLD GHB EST-MCNC: 120 MG/DL
GLUCOSE SERPL-MCNC: 112 MG/DL (ref 65–100)
HBA1C MFR BLD: 5.8 % (ref 4–5.6)
HCT VFR BLD AUTO: 29.2 % (ref 35–47)
HDLC SERPL-MCNC: 53 MG/DL
HDLC SERPL: 1.9 {RATIO} (ref 0–5)
HGB BLD-MCNC: 9.4 G/DL (ref 11.5–16)
LDLC SERPL CALC-MCNC: 28.2 MG/DL (ref 0–100)
MCH RBC QN AUTO: 29.1 PG (ref 26–34)
MCHC RBC AUTO-ENTMCNC: 32.2 G/DL (ref 30–36.5)
MCV RBC AUTO: 90.4 FL (ref 80–99)
NRBC # BLD: 0 K/UL (ref 0–0.01)
NRBC BLD-RTO: 0 PER 100 WBC
PLATELET # BLD AUTO: 313 K/UL (ref 150–400)
PMV BLD AUTO: 8.6 FL (ref 8.9–12.9)
POTASSIUM SERPL-SCNC: 3.7 MMOL/L (ref 3.5–5.1)
RBC # BLD AUTO: 3.23 M/UL (ref 3.8–5.2)
SODIUM SERPL-SCNC: 137 MMOL/L (ref 136–145)
TRIGL SERPL-MCNC: 94 MG/DL (ref ?–150)
VLDLC SERPL CALC-MCNC: 18.8 MG/DL
WBC # BLD AUTO: 6.2 K/UL (ref 3.6–11)

## 2022-12-10 PROCEDURE — 74011250637 HC RX REV CODE- 250/637: Performed by: INTERNAL MEDICINE

## 2022-12-10 PROCEDURE — 85027 COMPLETE CBC AUTOMATED: CPT

## 2022-12-10 PROCEDURE — 80061 LIPID PANEL: CPT

## 2022-12-10 PROCEDURE — 97165 OT EVAL LOW COMPLEX 30 MIN: CPT

## 2022-12-10 PROCEDURE — 65270000046 HC RM TELEMETRY

## 2022-12-10 PROCEDURE — 97162 PT EVAL MOD COMPLEX 30 MIN: CPT

## 2022-12-10 PROCEDURE — 74011250637 HC RX REV CODE- 250/637: Performed by: NURSE PRACTITIONER

## 2022-12-10 PROCEDURE — 74011250636 HC RX REV CODE- 250/636: Performed by: INTERNAL MEDICINE

## 2022-12-10 PROCEDURE — 36415 COLL VENOUS BLD VENIPUNCTURE: CPT

## 2022-12-10 PROCEDURE — 80048 BASIC METABOLIC PNL TOTAL CA: CPT

## 2022-12-10 PROCEDURE — 97535 SELF CARE MNGMENT TRAINING: CPT

## 2022-12-10 PROCEDURE — 74011000250 HC RX REV CODE- 250: Performed by: INTERNAL MEDICINE

## 2022-12-10 PROCEDURE — 99233 SBSQ HOSP IP/OBS HIGH 50: CPT | Performed by: PSYCHIATRY & NEUROLOGY

## 2022-12-10 PROCEDURE — 83036 HEMOGLOBIN GLYCOSYLATED A1C: CPT

## 2022-12-10 PROCEDURE — 97530 THERAPEUTIC ACTIVITIES: CPT

## 2022-12-10 RX ORDER — METOPROLOL TARTRATE 25 MG/1
12.5 TABLET, FILM COATED ORAL EVERY 12 HOURS
Status: DISCONTINUED | OUTPATIENT
Start: 2022-12-10 | End: 2022-12-14 | Stop reason: HOSPADM

## 2022-12-10 RX ORDER — METOPROLOL TARTRATE 25 MG/1
25 TABLET, FILM COATED ORAL EVERY 12 HOURS
Status: DISCONTINUED | OUTPATIENT
Start: 2022-12-10 | End: 2022-12-10

## 2022-12-10 RX ADMIN — EZETIMIBE 10 MG: 10 TABLET ORAL at 09:09

## 2022-12-10 RX ADMIN — ALLOPURINOL 100 MG: 100 TABLET ORAL at 09:09

## 2022-12-10 RX ADMIN — LATANOPROST 1 DROP: 50 SOLUTION OPHTHALMIC at 00:50

## 2022-12-10 RX ADMIN — GABAPENTIN 100 MG: 100 CAPSULE ORAL at 09:09

## 2022-12-10 RX ADMIN — TIMOLOL MALEATE 1 DROP: 5 SOLUTION/ DROPS OPHTHALMIC at 17:10

## 2022-12-10 RX ADMIN — ASPIRIN 81 MG: 81 TABLET, COATED ORAL at 09:09

## 2022-12-10 RX ADMIN — LATANOPROST 1 DROP: 50 SOLUTION OPHTHALMIC at 21:23

## 2022-12-10 RX ADMIN — TIMOLOL MALEATE 1 DROP: 5 SOLUTION/ DROPS OPHTHALMIC at 09:14

## 2022-12-10 RX ADMIN — ENOXAPARIN SODIUM 30 MG: 100 INJECTION SUBCUTANEOUS at 14:23

## 2022-12-10 RX ADMIN — METOPROLOL TARTRATE 25 MG: 25 TABLET, FILM COATED ORAL at 09:09

## 2022-12-10 RX ADMIN — METOPROLOL TARTRATE 12.5 MG: 25 TABLET ORAL at 21:00

## 2022-12-10 RX ADMIN — CLONIDINE HYDROCHLORIDE 0.1 MG: 0.1 TABLET ORAL at 09:09

## 2022-12-10 RX ADMIN — GABAPENTIN 100 MG: 100 CAPSULE ORAL at 17:10

## 2022-12-10 RX ADMIN — FLUTICASONE PROPIONATE 1 SPRAY: 50 SPRAY, METERED NASAL at 09:14

## 2022-12-10 RX ADMIN — ATORVASTATIN CALCIUM 80 MG: 40 TABLET, FILM COATED ORAL at 21:23

## 2022-12-10 RX ADMIN — GABAPENTIN 100 MG: 100 CAPSULE ORAL at 21:23

## 2022-12-10 NOTE — PROGRESS NOTES
Problem: Self Care Deficits Care Plan (Adult)  Goal: *Acute Goals and Plan of Care (Insert Text)  Description: FUNCTIONAL STATUS PRIOR TO ADMISSION: Patient was modified independent using a walker for functional mobility. HOME SUPPORT: The patient lived with daughter and required max assist for donning of socks. Pt is overall mod I or Independent with support for some IADL tasks. Occupational Therapy Goals  Initiated 12/10/2022  1. Patient will perform bathing with supervision/set-up within 7 day(s). 2.  Patient will perform lower body dressing with moderate assistance  within 7 day(s). 3.  Patient will perform toilet transfers with supervision/set-up within 7 day(s). 5.  Patient will perform all aspects of toileting with supervision/set-up within 7 day(s). 6.  Patient will participate in upper extremity therapeutic exercise/activities with supervision/set-up for 10 minutes within 7 day(s). Outcome: Progressing Towards Goal   OCCUPATIONAL THERAPY EVALUATION  Patient: Neil Santillan (40 y.o. female)  Date: 12/10/2022  Primary Diagnosis: Stroke (cerebrum) [facility]) [I63.9]       Precautions:  Fall    ASSESSMENT  Based on the objective data described below, the patient presents close to functional baseline. Pt with strength for basic ADL needs, however with some weakness and decreased balance. Pt able to demonstrate grooming skills with SBA standing at sink, with assistance to remove protection tab on toothpaste, however was able to remove twist off cap. Pt is max A with socks, however reported that she is able to do underwear and pants without difficulties. Pt is S-max A with ADL tasks. Pt overall min A with functional mobility. Pt has strong support of daughter and granddaughter at home. Recommend pt discharge home with [address] [place] [address] once medically ready. Current Level of Function Impacting Discharge (ADLs/self-care): S-max A     Functional Outcome Measure:   The patient scored Total: 40/100 on the Barthel Index outcome measure which is indicative of being partially dependent in basic self-care. Patient will benefit from skilled therapy intervention to address the above noted impairments. PLAN :  Recommendations and Planned Interventions: self care training and functional mobility training    Frequency/Duration: Patient will be followed by occupational therapy 4 times a week to address goals.     Recommendation for discharge: (in order for the patient to meet his/her long term goals)  Occupational therapy at least 2 days/week in the home AND ensure assist and/or supervision for safety with transfers    This discharge recommendation:  Has not yet been discussed the attending provider and/or case management    IF patient discharges home will need the following DME: None        SUBJECTIVE:   Patient stated pt agreeable to OT evaluation     OBJECTIVE DATA SUMMARY:   HISTORY:   Past Medical History:   Diagnosis Date    CAD (coronary artery disease)     Diverticulitis     Glaucoma     Gout     HTN (hypertension) 5/18/2018    Hx of diverticulitis of colon 5/18/2018    Hyperlipidemia     Hypertension     Other ill-defined conditions(799.89)     high cholesterol    S/P coronary artery stent placement 05/25/2018    Stroke ([facility] [address] 10/2020    TIA     Past Surgical History:   Procedure Laterality Date    HX CAROTID ENDARTERECTOMY Left 11/02/2020    HX HYSTERECTOMY      DE CARDIAC SURG PROCEDURE UNLIST  05/2018    x2 cardiac stent    DE COLONOSCOPY FLX DX W/COLLJ SPEC WHEN PFRMD  10/18/2013            Expanded or extensive additional review of patient history:     Home Situation  Home Environment: Private residence  Wheelchair Ramp: Yes  One/Two Story Residence: One story  Living Alone: No  Support Systems: Child(jazlyn) (daughter)  Patient Expects to be Discharged to[de-identified] Home with family assistance  Current DME Used/Available at Home: Cane, straight, Tub transfer bench, Walker, rolling, Wheelchair  Tub or Shower Type: Tub/Shower combination    Hand dominance: Right    EXAMINATION OF PERFORMANCE DEFICITS:  Cognitive/Behavioral Status:  Neurologic State: Alert  Orientation Level: Oriented X4  Cognition: Appropriate decision making; Appropriate for age attention/concentration; Appropriate safety awareness; Follows commands  Perception: Appears intact  Perseveration: No perseveration noted  Safety/Judgement: Awareness of environment;Decreased insight into deficits; Fall prevention;Good awareness of safety precautions         Vision/Perceptual:             Acuity: Within Defined Limits    Corrective Lenses: Glasses    Range of Motion:    AROM: Within functional limits  PROM: Within functional limits             Strength:    Strength: Generally decreased, functional                Coordination:  Coordination: Within functional limits            Tone & Sensation:    Tone: Normal  Sensation: Impaired (chronic numbness/tingling bilateral feet)                      Balance:  Sitting: Impaired  Sitting - Static: Good (unsupported)  Sitting - Dynamic: Fair (occasional)  Standing: Impaired  Standing - Static: Good;Constant support  Standing - Dynamic : Fair;Constant support    Functional Mobility and Transfers for ADLs:  Bed Mobility:  Supine to Sit: Minimum assistance  Scooting: Minimum assistance    Transfers:  Sit to Stand: Contact guard assistance  Stand to Sit: Contact guard assistance  Bed to Chair: Contact guard assistance; Adaptive equipment (RW)    ADL Assessment:       Oral Facial Hygiene/Grooming: Contact guard assistance (standing at sink)                   Lower Body Dressing: Modified independent    Toileting: Contact guard assistance                ADL Intervention and task modifications:       Grooming  Position Performed: Standing  Washing Face: Contact guard assistance  Washing Hands: Contact guard assistance  Brushing Teeth: Minimum assistance                        Lower Body Dressing Assistance  Socks: Maximum assistance         Cognitive Retraining  Safety/Judgement: Awareness of environment;Decreased insight into deficits; Fall prevention;Good awareness of safety precautions    Functional Measure:    Barthel Index:  Bathin  Bladder: 5  Bowels: 10  Groomin  Dressin  Feedin  Mobility: 0  Stairs: 0  Toilet Use: 5  Transfer (Bed to Chair and Back): 10  Total: 40/100      The Barthel ADL Index: Guidelines  1. The index should be used as a record of what a patient does, not as a record of what a patient could do. 2. The main aim is to establish degree of independence from any help, physical or verbal, however minor and for whatever reason. 3. The need for supervision renders the patient not independent. 4. A patient's performance should be established using the best available evidence. Asking the patient, friends/relatives and nurses are the usual sources, but direct observation and common sense are also important. However direct testing is not needed. 5. Usually the patient's performance over the preceding 24-48 hours is important, but occasionally longer periods will be relevant. 6. Middle categories imply that the patient supplies over 50 per cent of the effort. 7. Use of aids to be independent is allowed. Score Interpretation (from [address] [address])    Independent   60-79 Minimally independent   40-59 Partially dependent   20-39 Very dependent   <20 Totally dependent     -Ganesh Michelle., Barthel, D.W. (1965). Functional evaluation: the Barthel Index. [facility] ([address] (1997). The Barthel activities of daily living index: self-reporting versus actual performance in the old (> or = 75 years). Journal of [address] [place] [address] 45(7), [facility], .LenoraLenora, Ramon Noe., [facility]. ().  Measuring the change in disability after inpatient rehabilitation; comparison of the responsiveness of the Barthel Index and Functional [place] Measure. Journal of Neurology, Neurosurgery, and Psychiatry, 66(4), 928-614. MICHAEL Xavier, OBEY Parham, & Tor Corona M.A. (2004) Assessment of post-stroke quality of life in cost-effectiveness studies: The usefulness of the Barthel Index and the EuroQoL-5D. Quality of Life Research, 15, 309-00     Occupational Therapy Evaluation Charge Determination   History Examination Decision-Making   MEDIUM Complexity : Expanded review of history including physical, cognitive and psychosocial  history  LOW Complexity : 1-3 performance deficits relating to physical, cognitive , or psychosocial skils that result in activity limitations and / or participation restrictions  LOW Complexity : No comorbidities that affect functional and no verbal or physical assistance needed to complete eval tasks       Based on the above components, the patient evaluation is determined to be of the following complexity level: LOW   Pain Rating:  None reported     Activity Tolerance:   Good    After treatment patient left in no apparent distress:    Sitting in chair, Call bell within reach, Bed / chair alarm activated, and Caregiver / family present    COMMUNICATION/EDUCATION:   The patients plan of care was discussed with: Physical therapist.     Home safety education was provided and the patient/caregiver indicated understanding. This patients plan of care is appropriate for delegation to [facility].     Thank you for this referral.  Anle Calderon OT  Time Calculation: 24 mins

## 2022-12-10 NOTE — PROGRESS NOTES
Neurology Note    Patient ID:  Bettye Whitfield  [id number]  84 y.o.  8/15/1932      Date of Consultation:  December 10, 2022      Assessment and Plan:    The patient is a pleasant 80-year-old female who presents to the hospital with transient episode of dysarthria and right-sided weakness. The symptoms have resolved since admission. Transient dysarthria and right upper extremity weakness:  Brain MRI revealed no evidence of acute stroke but chronic microvascular ischemic disease. This is most consistent with a probable TIA. The patient should continue on 81 mg aspirin. Hypertension: Continue aggressive control with goal systolic blood pressure less than 140  CTA with no large vessel flow-limiting stenosis. Dyslipidemia: Patient continues on home medication. Please check updated lipid panel  Please obtain updated hemoglobin A1c and continue with glycemic control. I provided tia/ stroke education today to the patient and her 2 daughters. In regards to risk factors for stroke and lifestyle modifications to help minimize the risk of future stroke. This included medication compliance, regular follow up with primary care physician,  and healthy lifestyle habits (nutrition/exercise)      Severe cervical spine disease:  Updated MRI of the cervical spine was reviewed. The images was reviewed by neurosurgery with no indication for acute intervention. There is no other additional neurology recommendations at this time. If questions arise, please do not hesitate to contact me and I will return to see the patient. The patient should follow-up in clinic in approximately 4 weeks time after discharge with one of the neurology providers. Subjective: my speech is better       History of Present Illness:   Bettye Whitfield is a 80 y.o. female who presented to the emergency department on December 9 with headache, slurred speech and right hand weakness.   She has been followed by one of my colleagues, , during the hospitalization. The patient states this morning that her right hand strength has improved as well as her speech. She denies any new symptoms this morning. The patient did have her brain MRI and cervical spine MRI performed overnight.     Past Medical History:   Diagnosis Date    CAD (coronary artery disease)     Diverticulitis     Glaucoma     Gout     HTN (hypertension) 5/18/2018    Hx of diverticulitis of colon 5/18/2018    Hyperlipidemia     Hypertension     Other ill-defined conditions(799.89)     high cholesterol    S/P coronary artery stent placement 05/25/2018    Stroke ([address] 10/2020    TIA        Past Surgical History:   Procedure Laterality Date    HX CAROTID ENDARTERECTOMY Left 11/02/2020    HX HYSTERECTOMY      DC CARDIAC SURG PROCEDURE UNLIST  05/2018    x2 cardiac stent    DC COLONOSCOPY FLX DX W/COLLJ SPEC WHEN PFRMD  10/18/2013             Family History   Problem Relation Age of Onset    Stroke Mother     Diabetes Sister     No Known Problems Father         Social History     Tobacco Use    Smoking status: Never    Smokeless tobacco: Never   Substance Use Topics    Alcohol use: No        Allergies   Allergen Reactions    Lisinopril Cough        Current Facility-Administered Medications   Medication Dose Route Frequency    metoprolol tartrate (LOPRESSOR) tablet 25 mg  25 mg Oral Q12H    allopurinoL (ZYLOPRIM) tablet 100 mg  100 mg Oral DAILY    aspirin delayed-release tablet 81 mg  81 mg Oral DAILY    atorvastatin (LIPITOR) tablet 80 mg  80 mg Oral QHS    cloNIDine HCL (CATAPRES) tablet 0.1 mg  0.1 mg Oral BID    ezetimibe (ZETIA) tablet 10 mg  10 mg Oral DAILY    fluticasone propionate (FLONASE) 50 mcg/actuation nasal spray 1 Spray  1 Spray Both Nostrils DAILY    gabapentin (NEURONTIN) capsule 100 mg  100 mg Oral TID    latanoprost (XALATAN) 0.005 % ophthalmic solution 1 Drop  1 Drop Both Eyes QHS    acetaminophen (TYLENOL) tablet 650 mg  650 mg Oral Q4H PRN    Or acetaminophen (TYLENOL) solution 650 mg  650 mg Per NG tube Q4H PRN    Or    acetaminophen (TYLENOL) suppository 650 mg  650 mg Rectal Q4H PRN    polyethylene glycol (MIRALAX) packet 17 g  17 g Oral DAILY PRN    enoxaparin (LOVENOX) injection 30 mg  30 mg SubCUTAneous Q24H    timolol (TIMOPTIC) 0.5 % ophthalmic solution 1 Drop  1 Drop Right Eye BID         Review of Systems:    General, constitutional: negative  Eyes, vision: negative  Ears, nose, throat: negative  Cardiovascular, heart: negative  Respiratory: negative  Gastrointestinal: negative  Genitourinary: negative  Musculoskeletal: negative  Skin and integumentary: negative  Psychiatric: negative  Endocrine: negative  Neurological: negative, except for HPI  Hematologic/lymphatic: negative  Allergy/immunology: negative      Objective:     Visit Vitals  BP (!) 154/55 (BP 1 Location: Right lower arm)   Pulse 78   Temp 98.4 °F (36.9 °C)   Resp 16   Ht 5' 4\" (1.626 m)   Wt 161 lb 3.2 oz (73.1 kg)   SpO2 98%   BMI 27.67 kg/m²       Physical Exam:    General:  appears well nourished in no acute distress  Neck: no carotid bruits  Lungs: clear to auscultation  Heart:  no murmurs, regular rate  Lower extremity: peripheral pulses palpable and no edema  Skin: intact. Arthritic joints    Neurological exam:    Awake, alert, oriented to person, place and time  Recent and remote memory were normal  Attention and concentration were intact  Language was intact. There was no aphasia  Speech: no dysarthria  Fund of knowledge was preserved    Cranial nerves:   II-XII were tested    [employer] fields were full  Eomi, no evidence of nystagmus  Facial sensation:  normal and symmetric  Facial motor: normal and symmetric  Hearing - diminished  SCM strength intact  Tongue: midline without fasciculations    Motor: Tone normal  Pronator drift was absent  No evidence of fasciculations    Strength testing:  She was at least a 4+/5 throughout.   No focal weakness. Sensory:  Upper extremity: intact to pp  Lower extremity: mild distal loss to pp bilaterally. No asymmetry    Reflexes:  Reduced throughout upper and lower extremity    Cerebellar testing:  no tremor apparent, finger/nose was intact    Labs:     Lab Results   Component Value Date/Time    Hemoglobin A1c 5.8 (H) 10/18/2020 12:03 AM    Sodium 138 12/09/2022 01:22 PM    Potassium 3.5 12/09/2022 01:22 PM    Chloride 102 12/09/2022 01:22 PM    Glucose 114 (H) 12/09/2022 01:22 PM    BUN 16 12/09/2022 01:22 PM    Creatinine 1.33 (H) 12/09/2022 01:22 PM    Calcium 8.4 (L) 12/09/2022 01:22 PM    WBC 7.4 12/09/2022 01:22 PM    HCT 26.9 (L) 12/09/2022 01:22 PM    HGB 8.8 (L) 12/09/2022 01:22 PM    PLATELET 935 48/69/0582 01:22 PM       Imaging:    Results from Hospital Encounter encounter on 12/09/22    MRI CERV SPINE W WO CONT    Narrative  EXAM: MRI CERV SPINE W WO CONT    INDICATION: spinal cord compression. Exam:  MRI cervical spine. Comparisons:  10/18/2020    Sequences:  Sagittal T1, T2, and STIR. Axial T1, T2. After the intravenous  administration of 15 mL of ProHance sagittal and axial T1-weighted images were  obtained . FINDINGS:  Alignment is normal.  Canal is congenitally narrowed. Multilevel  endplate degenerative change. Subchondral cyst formation within the posterior  base of the dens unchanged. There are multifocal areas of increased T2 signal  within the cord. No abnormal enhancement. .  Paraspinous soft tissues are within  normal limits. Craniocervical junction: There is low signal material posterior to the dens. On  previous CT this was hyperdense and likely represents chronic soft tissue. May  represent pannus or chronic changes from CPPD. This results in severe narrowing  of the canal with compression of the cord. C2-C3: Disc height loss and degeneration of this disc. Disc osteophytic bulge. Thickening of the ligamentum flavum.  Moderate severe narrowing of the canal with  compression of the cord. Moderate narrowing of the foramen    C3-C4: Disc osteophytic bulge. Central disc protrusion. Thickening of the  ligamentum flavum with uncovertebral degenerative change. Severe narrowing of  the canal with compression of the cord. Severe foraminal narrowing    C4-C5: Irregular disc osteophytic bulge with central protrusion with facet  arthropathy and uncovertebral degenerative change. Thickening of the ligamentum  flavum. Canal stenosis is severe with flattening of the cord. Severe narrowing  of the foramen    C5-C6: Disc osteophytic bulge. Bilateral uncovertebral degenerative change and  thickening of the ligamentum flavum. Canal stenosis is severe with flattening of  the cord. Severe bilateral foraminal narrowing    C6-C7: Disc osteophytic bulge with uncovertebral degenerative change left  greater than right. Canal stenosis is moderate to severe. Severe left and  moderate right foraminal narrowing    C7-T1: Congenitally narrowed canal.    Impression  1. 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 Severe narrowing of the canal at C1-2, moderate severe narrowing of the canal at  C2-3, severe narrowing of the canal at C3-4, C4-5 and C5-6 with multifocal areas  of T2 signal abnormality within the cord. No abnormal enhancement  2. Hypertrophy soft tissue posterior to the dens may represent chronic pannus or  chronic CPPD      Results from Hospital Encounter encounter on 12/09/22    CT CODE NEURO PERF W CBF    Narrative  EXAM:  CT CODE NEURO PERF W CBF, CTA CODE NEURO HEAD AND NECK W CONT    INDICATION:   51-year-old female with concern for CVA. COMPARISON:  None. CONTRAST:  100 mL of Isovue-370. TECHNIQUE:  Unenhanced  images were obtained to localize the volume for  acquisition. Multislice helical axial CT angiography was performed from the  aortic arch to the top of the head during uneventful rapid bolus intravenous  contrast administration. Coronal and sagittal reformations and 3D/MIP  post  processing were performed. CT brain perfusion was performed with generation of hemodynamic maps of multiple  parameters, including cerebral blood flow, cerebral blood volume, and MTT (mean  transit time). CT dose reduction was achieved through use of a standardized protocol tailored  for this examination and automatic exposure control for dose modulation. This  study was analyzed by the [address] ai algorithm. FINDINGS:    CTA Head:  There is no evidence of large vessel occlusion or flow-limiting stenosis of the  intracranial internal carotid, anterior cerebral, and middle cerebral arteries. Calcification of the bilateral carotid siphons without significant stenosis. The  anterior communicating artery is patent. There is no evidence of large vessel occlusion or flow-limiting stenosis of the  intracranial vertebral arteries, basilar artery, or posterior cerebral arteries. The left posterior communicating artery is patent. There is no evidence of aneurysm or vascular malformation. The dural venous  sinuses and deep cerebral venous system are patent. No evidence of abnormal  enhancement on delayed phase images. CT Brain Perfusion:  Brain parenchymal perfusion study demonstrates no suggestion of acute infarct. No regional areas of elevated Tmax, decreased cerebral blood flow or blood  volume. rCBF < 30% = 0 cc. Tmax > 6 seconds = 0 cc. CTA NECK:  NASCET method was utilized for calculating stenosis. The aortic arch is unremarkable. The common carotid arteries demonstrate no  significant stenosis. There is no evidence of significant stenosis in the  cervical right internal carotid artery. There is no evidence of significant  stenosis in the cervical left internal carotid artery.  The carotid bulbs plaques  without significant luminal narrowing.  % of right carotid artery stenosis: 0  % of left carotid artery stenosis: 0    There is a left vertebral artery dominant vertebrobasilar arterial system. The  cervical vertebral arteries are normal in course, size and contour without  significant stenosis. 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 No mass lesion or enlarged lymphadenopathy in the neck. Visualized lung apices are clear. Edentulous maxilla and mandible. Markedly circumferential soft tissue thickening  with granular enhancement versus calcifications around the dens/atlantoaxial  articulation with associated chronic osseous erosion of the dens and anterior  arch of the C1, may suggest rheumatoid. There is associated widening of the  atlantoaxial interval measuring up to 0.9 cm and severe narrowing of the  craniocervical junction canal with compression of the proximal cervical cord. Impression  CTA Head:  No large vessel occlusion or significant flow-limiting stenosis. CTA Neck:  No large vessel occlusion or significant flow-limiting stenosis. CT Brain Perfusion:  No blood flow or volume deficits to suggest acute ischemia or tissue at ischemic  risk. 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 Clinical correlation and follow-up cervical spine MRI  is advised. I did independently review the brain MRI from December 9, 2022. There is chronic microvascular ischemic changes but no evidence of an acute stroke. MRI of her cervical spine revealed moderate to severe degenerative disease at multiple levels. CTA revealed no large vessel or intracranial flow-limiting stenosis. I spent   35   minutes providing care to this  acutely ill inpatient with > 50% of the time counseling and assisting in the coordination of care of the patient on the patient's hospital floor/unit.            Active Problems:    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[address] (12/9/2022)                   Signed By:  Roberto Fabian DO FAAN    December 10, 2022

## 2022-12-10 NOTE — PROGRESS NOTES
Speech Pathology Note    Reviewed chart and note patient admitted with dysarthria with concern for CVA. Note CT showed \"no acute intracranial abnormality\" and MRI showed \"chronic microvascular ischemic changes with no acute infarction. \" Note patient passed the [facility] screen and a regular diet was ordered. NIHSS=2. Discussed case with RN who reported no SLP-related concerns. Introduced self and role of SLP to patient. Patient reported difficulty \"getting words out\", but reports speech and language are now at baseline. Patient denied any decline in cognitive or swallowing function, and patient informally observed drinking thin liquids with no difficulty. Patient Ox4. Formal SLP evaluation not clinically indicated at this time. Will sign off. Please re-consult if further needs arise. Thank you.     Lulú Taylor M.S., CCC-SLP

## 2022-12-10 NOTE — PROGRESS NOTES
Problem: Mobility Impaired (Adult and Pediatric)  Goal: *Acute Goals and Plan of Care (Insert Text)  Description: FUNCTIONAL STATUS PRIOR TO ADMISSION: Patient was modified independent using a walker for functional mobility. Patient required minimal assistance for basic and instrumental ADLs. HOME SUPPORT PRIOR TO ADMISSION: The patient lived with daughter and required minimal assistance/contact guard assist for bathing. Physical Therapy Goals  Initiated 12/10/2022  1. Patient will move from supine to sit and sit to supine , scoot up and down, and roll side to side in bed with modified independence within 7 day(s). 2.  Patient will transfer from bed to chair and chair to bed with supervision/set-up using the least restrictive device within 7 day(s). 3.  Patient will perform sit to stand with modified independence within 7 day(s). 4.  Patient will ambulate with standby assistance for 125 feet with the least restrictive device within 7 day(s). Outcome: Progressing Towards Goal   PHYSICAL THERAPY EVALUATION  Patient: Marcello Rucker (84 y.o. female)  Date: 12/10/2022  Primary Diagnosis: Stroke (cerebrum) [facility]) [I63.9]       Precautions:  Fall    ASSESSMENT  Based on the objective data described below, the patient presents with generalized weakness, decreased balance, decreased activity tolerance and decreased mobility skills not far below baseline. Currently at increased risk for falls if transferring or ambulating unassisted. Good home support. CT negative for stroke. Current Level of Function Impacting Discharge (mobility/balance): min a supine to sit; cg sit to stand and bed to chair; cg ambulation with RW 45 feet. Functional Outcome Measure: The patient scored 40 on the Barthel outcome measure which is indicative of being partially dependent with ADL and mobility skills.       Other factors to consider for discharge: good family support at home     Patient will benefit from skilled therapy intervention to address the above noted impairments. PLAN :  Recommendations and Planned Interventions: bed mobility training, transfer training, gait training, therapeutic exercises, neuromuscular re-education, patient and family training/education, and therapeutic activities      Frequency/Duration: Patient will be followed by physical therapy:  4 times a week to address goals. Recommendation for discharge: (in order for the patient to meet his/her long term goals)  Physical therapy at least 2 days/week in the home     This discharge recommendation:  Has not yet been discussed the attending provider and/or case management    IF patient discharges home will need the following DME: patient owns DME required for discharge       SUBJECTIVE:   Patient stated  I feel better today than yesterday.      OBJECTIVE DATA SUMMARY:   HISTORY:    Past Medical History:   Diagnosis Date    CAD (coronary artery disease)     Diverticulitis     Glaucoma     Gout     HTN (hypertension) 5/18/2018    Hx of diverticulitis of colon 5/18/2018    Hyperlipidemia     Hypertension     Other ill-defined conditions(799.89)     high cholesterol    S/P coronary artery stent placement 05/25/2018    [address] [address] 10/2020    TIA     Past Surgical History:   Procedure Laterality Date    HX CAROTID ENDARTERECTOMY Left 11/02/2020    HX HYSTERECTOMY      VT CARDIAC SURG PROCEDURE UNLIST  05/2018    x2 cardiac stent    VT COLONOSCOPY FLX DX W/COLLJ SPEC WHEN PFRMD  10/18/2013            Personal factors and/or comorbidities impacting plan of care: cad    Home Situation  Home Environment: Private residence  Wheelchair Ramp: Yes  One/Two Story Residence: One story  Living Alone: No  Support Systems: Child(jazlyn) (daughter)  Patient Expects to be Discharged to[de-identified] Home with family assistance  Current DME Used/Available at Home: Cane, straight, Tub transfer bench, Walker, rolling, Wheelchair  Tub or Shower Type: Tub/Shower combination    EXAMINATION/PRESENTATION/DECISION MAKING:   Critical Behavior:  Neurologic State: Alert  Orientation Level: Oriented X4  Cognition: Appropriate decision making, Appropriate for age attention/concentration, Appropriate safety awareness, Follows commands  Safety/Judgement: Awareness of environment, Decreased insight into deficits, Fall prevention, Good awareness of safety precautions  Hearing:   decreased  Skin:  no findings  Edema: none noted  Range Of Motion:  AROM: Within functional limits           PROM: Within functional limits           Strength:    Strength: Generally decreased, functional                    Tone & Sensation:   Tone: Normal              Sensation: Impaired (chronic numbness/tingling bilateral feet)               Coordination:  Coordination: Within functional limits  Vision:   Acuity: Within Defined Limits  Corrective Lenses: Glasses  Functional Mobility:  Bed Mobility:     Supine to Sit: Minimum assistance     Scooting: Minimum assistance  Transfers:  Sit to Stand: Contact guard assistance  Stand to Sit: Contact guard assistance        Bed to Chair: Contact guard assistance; Adaptive equipment (RW)              Balance:   Sitting: Impaired  Sitting - Static: Good (unsupported)  Sitting - Dynamic: Fair (occasional)  Standing: Impaired  Standing - Static: Good;Constant support  Standing - Dynamic : Fair;Constant support  Ambulation/Gait Training:  Distance (ft): 45 Feet (ft)  Assistive Device: Gait belt;Walker, rolling  Ambulation - Level of Assistance: Contact guard assistance     Gait Description (WDL): Exceptions to WDL  Gait Abnormalities: Decreased step clearance; Step to gait (fwd trunk lean)        Base of Support: Widened     Speed/Rhonda: Pace decreased (<100 feet/min)  Step Length: Right shortened;Left shortened        Interventions: Safety awareness training;Verbal cues (stay closer to walker to stand taller)    Functional Measure:  Barthel Index:    Bathin  Bladder: 5  Bowels: 10  Groomin  Dressin  Feedin  Mobility: 0  Stairs: 0  Toilet Use: 5  Transfer (Bed to Chair and Back): 10  Total: 40/100       The Barthel ADL Index: Guidelines  1. The index should be used as a record of what a patient does, not as a record of what a patient could do. 2. The main aim is to establish degree of independence from any help, physical or verbal, however minor and for whatever reason. 3. The need for supervision renders the patient not independent. 4. A patient's performance should be established using the best available evidence. Asking the patient, friends/relatives and nurses are the usual sources, but direct observation and common sense are also important. However direct testing is not needed. 5. Usually the patient's performance over the preceding 24-48 hours is important, but occasionally longer periods will be relevant. 6. Middle categories imply that the patient supplies over 50 per cent of the effort. 7. Use of aids to be independent is allowed. Score Interpretation (from [address] [facility])    Independent   60-79 Minimally independent   40-59 Partially dependent   20-39 Very dependent   <20 Totally dependent     -Ganesh Michelle., Barthel, DLenoraW. (1965). Functional evaluation: the Barthel Index. [address] [facility] ([address] [facility] [address] (). The Barthel activities of daily living index: self-reporting versus actual performance in the old (> or = 75 years). Journal of [address] [place] [address] 45(7), [address] [facility], TAMERA, Barry Mcpherson, Rohith Casas. (). Measuring the change in disability after inpatient rehabilitation; comparison of the responsiveness of the Barthel Index and Functional [place] Measure. Journal of Neurology, Neurosurgery, and Psychiatry, 66(4), 686-83. Dragan Browning, N.J.A, Alberto Thayer  W.J.M, & Velma Garcia MLenoraA. (2004) Assessment of post-stroke quality of life in cost-effectiveness studies:  The usefulness of the Barthel Index and the EuroQoL-5D. Quality of Life Research, 15, 713-63          Physical Therapy Evaluation Charge Determination   History Examination Presentation Decision-Making   HIGH Complexity :3+ comorbidities / personal factors will impact the outcome/ POC  MEDIUM Complexity : 3 Standardized tests and measures addressing body structure, function, activity limitation and / or participation in recreation  MEDIUM Complexity : Evolving with changing characteristics  Other outcome measures Barthel  HIGH       Based on the above components, the patient evaluation is determined to be of the following complexity level: MEDIUM    Pain Rating:  No complaints during the session    Activity Tolerance:   Fair, SpO2 stable on RA, and requires rest breaks    After treatment patient left in no apparent distress:   Sitting in chair, Call bell within reach, Bed / chair alarm activated, and Caregiver / family present    COMMUNICATION/EDUCATION:   The patients plan of care was discussed with: Occupational therapist and Registered nurse. Fall prevention education was provided and the patient/caregiver indicated understanding., Patient/family have participated as able in goal setting and plan of care. , and Patient/family agree to work toward stated goals and plan of care.     Thank you for this referral.  Breanne Guzman, PT   Time Calculation: 24 mins

## 2022-12-10 NOTE — PROGRESS NOTES
Patient with near syncope witnessed by family after getting up to chair with PT. Patient reclined and /45. Notified Dr. Dominique Tristan.

## 2022-12-10 NOTE — PROGRESS NOTES
Hospitalist Progress Note    NAME: Tomas Sanchez   :  8/15/1932   MRN:  [id number]       Assessment / Plan:  Dysarthria, right-sided weakness rule out CVA  - symptoms resolved pt back at baseline   - MRI brain showed no acute infarction   CTA head / Neck  neg for no large vessel occulusion , no significant flow limiting stenosis   - CT head w/o contrast no acute intracranial abnormality  - appreciate Neurology input recc oP follow up in 4 weeks     --Consult PT OT. Consult neurology.  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 Severe narrowing of the canal at C1-2, moderate severe narrowing of the canal at C2-3, severe narrowing of the canal at C3-4, C4-5 and C5-6 with multifocal areas of T2 signal abnormality within the cord. No abnormal enhancement  Severe cervical spinal disease with possibility of compression  - pt seen by Neurosurgery no acute urgent intervention  recc treating conservatively in a 80year old . Hypertension  -Allow permissive hypertension.    - sbp 160-170s   - metoprolol initially increased then Sbp was down to 105  - will decrease back to 12,5 hold clonidine tonight   - re assess in am     Dyslipidemia  Gouty arthritis  Coronary disease  History of TIA  Lumbar spinal stenosis  Neuropathy  -Continue statin, aspirin, Zetia        Code Status: Full code  Surrogate Decision Maker:      25.0 - 29.9 Overweight / Body mass index is 27.67 kg/m². Estimated discharge date:   Barriers:    Code status: Full  Prophylaxis: Lovenox  Recommended Disposition: Home w/Family     Subjective:     Chief Complaint / Reason for Physician Visit  \" Patient seen with daughter at bedside reviewed recommendations by neurology discussed blood pressure medications and adjustment needed anticipate discharge\". Discussed with RN events overnight.      Review of Systems:  Symptom Y/N Comments  Symptom Y/N Comments   Fever/Chills n   Chest Pain n    Poor Appetite    Edema     Cough    Abdominal Pain     Sputum    Joint Pain     SOB/RAMSEY n   Pruritis/Rash     Nausea/vomit    Tolerating PT/OT     Diarrhea    Tolerating Diet     Constipation n   Other       Could NOT obtain due to:      Objective:     VITALS:   Last 24hrs VS reviewed since prior progress note. Most recent are:  Patient Vitals for the past 24 hrs:   Temp Pulse Resp BP SpO2   12/10/22 1210 98.1 °F (36.7 °C) 66 18 (!) 105/41 100 %   12/10/22 0817 98.4 °F (36.9 °C) 78 16 (!) 154/55 98 %   12/10/22 0256 98.4 °F (36.9 °C) 72 18 (!) 164/79 100 %   12/09/22 2224 98.1 °F (36.7 °C) 69 18 (!) 172/69 100 %   12/09/22 2137 -- 76 -- (!) 150/57 --   12/09/22 2031 98.2 °F (36.8 °C) 73 18 (!) 152/65 100 %   12/09/22 1615 -- 62 21 (!) 156/46 99 %   12/09/22 1445 -- (!) 56 13 (!) 126/50 98 %     No intake or output data in the 24 hours ending 12/10/22 1421     I had a face to face encounter and independently examined this patient on 12/10/2022, as outlined below:  PHYSICAL EXAM:  General: WD, WN. Alert, cooperative, no acute distress  R arm weakness  EENT:  EOMI. Anicteric sclerae. MMM  Resp:  CTA bilaterally, no wheezing or rales. No accessory muscle use  CV:  Regular  rhythm,  No edema  GI:  Soft, Non distended, Non tender. +Bowel sounds  Neurologic:  Alert and oriented X 3, normal speech,   Psych:   . Not anxious nor agitated  Skin:  No rashes.   No jaundice    Reviewed most current lab test results and cultures  YES  Reviewed most current radiology test results   YES  Review and summation of old records today    NO  Reviewed patient's current orders and MAR    YES  PMH/SH reviewed - no change compared to H&P  ________________________________________________________________________  Care Plan discussed with:    Comments   Patient x    Family      RN x    Care Manager     Consultant                        Multidiciplinary team rounds were held today with , nursing, pharmacist and clinical coordinator. Patient's plan of care was discussed; medications were reviewed and discharge planning was addressed. ________________________________________________________________________  Total NON critical care TIME:  30   Minutes    Total CRITICAL CARE TIME Spent:   Minutes non procedure based      Comments   >50% of visit spent in counseling and coordination of care     ________________________________________________________________________  Shruthi Bennett, NP     Procedures: see electronic medical records for all procedures/Xrays and details which were not copied into this note but were reviewed prior to creation of Plan. LABS:  I reviewed today's most current labs and imaging studies. Pertinent labs include:  Recent Labs     12/10/22  1350 12/09/22  1322   WBC 6.2 7.4   HGB 9.4* 8.8*   HCT 29.2* 26.9*    295     Recent Labs     12/09/22  1322      K 3.5      CO2 30   *   BUN 16   CREA 1.33*   CA 8.4*   ALB 2.7*   TBILI 0.3   ALT 17   INR 1.1       Signed: Anel Bennett, NP

## 2022-12-10 NOTE — CONSULTS
Neurosurgery    Consulted for cervical stenosis, discussed with nursing    Came in with right arm weakness, DYSARTHRIA , AND HEADACHE. I do not think that her DYSARTHRIA is coming from her cervical spine. Does not have neck pain    Appears her symptoms have improved.  She has a history of TIA    Would treat neck conservatively in this 80year old     Please call if needed

## 2022-12-11 ENCOUNTER — APPOINTMENT (OUTPATIENT)
Dept: GENERAL RADIOLOGY | Age: 87
DRG: 069 | End: 2022-12-11
Attending: NURSE PRACTITIONER
Payer: MEDICARE

## 2022-12-11 ENCOUNTER — APPOINTMENT (OUTPATIENT)
Dept: CT IMAGING | Age: 87
DRG: 069 | End: 2022-12-11
Attending: PHYSICIAN ASSISTANT
Payer: MEDICARE

## 2022-12-11 PROCEDURE — 73120 X-RAY EXAM OF HAND: CPT

## 2022-12-11 PROCEDURE — 74011000250 HC RX REV CODE- 250: Performed by: INTERNAL MEDICINE

## 2022-12-11 PROCEDURE — 70486 CT MAXILLOFACIAL W/O DYE: CPT

## 2022-12-11 PROCEDURE — 74011250636 HC RX REV CODE- 250/636: Performed by: NURSE PRACTITIONER

## 2022-12-11 PROCEDURE — 70450 CT HEAD/BRAIN W/O DYE: CPT

## 2022-12-11 PROCEDURE — 74011250637 HC RX REV CODE- 250/637: Performed by: INTERNAL MEDICINE

## 2022-12-11 PROCEDURE — 74011250637 HC RX REV CODE- 250/637: Performed by: NURSE PRACTITIONER

## 2022-12-11 PROCEDURE — 73110 X-RAY EXAM OF WRIST: CPT

## 2022-12-11 PROCEDURE — 74011250636 HC RX REV CODE- 250/636: Performed by: INTERNAL MEDICINE

## 2022-12-11 PROCEDURE — 65270000046 HC RM TELEMETRY

## 2022-12-11 PROCEDURE — 70150 X-RAY EXAM OF FACIAL BONES: CPT

## 2022-12-11 RX ORDER — HYDRALAZINE HYDROCHLORIDE 20 MG/ML
20 INJECTION INTRAMUSCULAR; INTRAVENOUS
Status: DISCONTINUED | OUTPATIENT
Start: 2022-12-11 | End: 2022-12-14 | Stop reason: HOSPADM

## 2022-12-11 RX ORDER — CLONIDINE HYDROCHLORIDE 0.1 MG/1
0.2 TABLET ORAL 2 TIMES DAILY
Status: DISCONTINUED | OUTPATIENT
Start: 2022-12-11 | End: 2022-12-13

## 2022-12-11 RX ADMIN — ACETAMINOPHEN 650 MG: 325 TABLET ORAL at 20:17

## 2022-12-11 RX ADMIN — GABAPENTIN 100 MG: 100 CAPSULE ORAL at 17:19

## 2022-12-11 RX ADMIN — TIMOLOL MALEATE 1 DROP: 5 SOLUTION/ DROPS OPHTHALMIC at 17:21

## 2022-12-11 RX ADMIN — TIMOLOL MALEATE 1 DROP: 5 SOLUTION/ DROPS OPHTHALMIC at 11:13

## 2022-12-11 RX ADMIN — CLONIDINE HYDROCHLORIDE 0.1 MG: 0.1 TABLET ORAL at 08:29

## 2022-12-11 RX ADMIN — ENOXAPARIN SODIUM 30 MG: 100 INJECTION SUBCUTANEOUS at 14:38

## 2022-12-11 RX ADMIN — METOPROLOL TARTRATE 12.5 MG: 25 TABLET ORAL at 23:00

## 2022-12-11 RX ADMIN — ALLOPURINOL 100 MG: 100 TABLET ORAL at 08:29

## 2022-12-11 RX ADMIN — GABAPENTIN 100 MG: 100 CAPSULE ORAL at 23:00

## 2022-12-11 RX ADMIN — EZETIMIBE 10 MG: 10 TABLET ORAL at 08:29

## 2022-12-11 RX ADMIN — ASPIRIN 81 MG: 81 TABLET, COATED ORAL at 08:29

## 2022-12-11 RX ADMIN — LATANOPROST 1 DROP: 50 SOLUTION OPHTHALMIC at 23:01

## 2022-12-11 RX ADMIN — GABAPENTIN 100 MG: 100 CAPSULE ORAL at 08:29

## 2022-12-11 RX ADMIN — CLONIDINE HYDROCHLORIDE 0.1 MG: 0.1 TABLET ORAL at 03:54

## 2022-12-11 RX ADMIN — CLONIDINE HYDROCHLORIDE 0.2 MG: 0.1 TABLET ORAL at 17:18

## 2022-12-11 RX ADMIN — METOPROLOL TARTRATE 12.5 MG: 25 TABLET ORAL at 08:29

## 2022-12-11 RX ADMIN — HYDRALAZINE HYDROCHLORIDE 20 MG: 20 INJECTION INTRAMUSCULAR; INTRAVENOUS at 14:38

## 2022-12-11 NOTE — PROGRESS NOTES
3 attempts to start an IV/blood draw on patient. No success. ED charge nurse called. Said she would send someone up when they are able to.

## 2022-12-11 NOTE — PROGRESS NOTES
5474: Patient used call bell, but did not respond when call bell was answered. This RN alerted by another RN that patient found on hands and knees on floor near bathroom. Patient was assisted to bathroom via this RN and another RN. R lower lip bleeding. Pt. Denied pain; stated she thinks she was sleep walking. NP assessed patient. VS taken, see flow chart. Orders received from NP. Patient assisted back to bed by this RN and another RN. Assessment completed at this time. Bed alarm set to zone 2.       8399: Bedside shift report received from [address]. Report included the following information SBAR, Kardex, MAR, and Recent Results. This RN verbalized understanding of plan of care with opportunity for clarification and questions. Bedside shift report given to  NIDA Cuello. Report included the following information SBAR, Kardex, MAR, and Recent Results. Oncoming RN verbalized understanding of plan of care with opportunity for clarification and questions.

## 2022-12-11 NOTE — PROGRESS NOTES
Patient used her call cody and I answered it at the nurse's station. There was no response when I asked patient what her needs were so I went to the room and I saw another nurse coming the other direction and asked her to go in the room as well. When we arrived patient was on the floor on her hands and knees near the bathroom and she was bleeding from her lower lip. She was helped up and into the bathroom. She was evaluated by the nurse practitioner.

## 2022-12-12 ENCOUNTER — APPOINTMENT (OUTPATIENT)
Dept: GENERAL RADIOLOGY | Age: 87
DRG: 069 | End: 2022-12-12
Attending: PHYSICIAN ASSISTANT
Payer: MEDICARE

## 2022-12-12 PROCEDURE — 74011250636 HC RX REV CODE- 250/636: Performed by: INTERNAL MEDICINE

## 2022-12-12 PROCEDURE — 73560 X-RAY EXAM OF KNEE 1 OR 2: CPT

## 2022-12-12 PROCEDURE — 65270000046 HC RM TELEMETRY

## 2022-12-12 PROCEDURE — 74011250637 HC RX REV CODE- 250/637: Performed by: NURSE PRACTITIONER

## 2022-12-12 PROCEDURE — 74011250637 HC RX REV CODE- 250/637: Performed by: INTERNAL MEDICINE

## 2022-12-12 RX ADMIN — METOPROLOL TARTRATE 12.5 MG: 25 TABLET ORAL at 21:10

## 2022-12-12 RX ADMIN — ASPIRIN 81 MG: 81 TABLET, COATED ORAL at 08:34

## 2022-12-12 RX ADMIN — ACETAMINOPHEN 650 MG: 325 TABLET ORAL at 21:10

## 2022-12-12 RX ADMIN — ENOXAPARIN SODIUM 30 MG: 100 INJECTION SUBCUTANEOUS at 15:04

## 2022-12-12 RX ADMIN — ACETAMINOPHEN 650 MG: 325 TABLET ORAL at 12:03

## 2022-12-12 RX ADMIN — ALLOPURINOL 100 MG: 100 TABLET ORAL at 08:33

## 2022-12-12 RX ADMIN — TIMOLOL MALEATE 1 DROP: 5 SOLUTION/ DROPS OPHTHALMIC at 18:00

## 2022-12-12 RX ADMIN — METOPROLOL TARTRATE 12.5 MG: 25 TABLET ORAL at 08:33

## 2022-12-12 RX ADMIN — LATANOPROST 1 DROP: 50 SOLUTION OPHTHALMIC at 21:13

## 2022-12-12 RX ADMIN — TIMOLOL MALEATE 1 DROP: 5 SOLUTION/ DROPS OPHTHALMIC at 08:34

## 2022-12-12 RX ADMIN — EZETIMIBE 10 MG: 10 TABLET ORAL at 08:33

## 2022-12-12 RX ADMIN — GABAPENTIN 100 MG: 100 CAPSULE ORAL at 08:34

## 2022-12-12 RX ADMIN — GABAPENTIN 100 MG: 100 CAPSULE ORAL at 21:10

## 2022-12-12 RX ADMIN — GABAPENTIN 100 MG: 100 CAPSULE ORAL at 15:04

## 2022-12-12 RX ADMIN — CLONIDINE HYDROCHLORIDE 0.2 MG: 0.1 TABLET ORAL at 08:33

## 2022-12-12 NOTE — PROGRESS NOTES
End of Shift Note    Bedside shift change report given to Erik Bennett RN (oncoming nurse) by Jessica Ovalles RN (offgoing nurse). Report included the following information SBAR, MAR, Recent Results, and Cardiac Rhythm NSR/ST    Shift worked: Nights      Shift summary and any significant changes:     Pt in bed, complaints of headache in beginning of shift. Tylenol given for headache and bilateral leg pain. Pt begins vomiting, MD contacted. CT orders placed and completed. Concerns for physician to address: See above      Zone phone for oncoming shift:   1104       Activity:  Activity Level: Up with Assistance  Number times ambulated in hallways past shift: 0  Number of times OOB to chair past shift: 0    Cardiac:   Cardiac Monitoring: Yes      Cardiac Rhythm: 1\" AV Block    Access:  Current line(s): PIV     Genitourinary:   Urinary status: external catheter    Respiratory:   O2 Device: None (Room air)  Chronic home O2 use?: NO  Incentive spirometer at bedside: N/A       GI:  Last Bowel Movement Date: 12/09/22  Current diet:  ADULT DIET Regular  Passing flatus: YES  Tolerating current diet: YES       Pain Management:   Patient states pain is manageable on current regimen: YES    Skin:  Stevenson Score: 17  Interventions: turn team and increase time out of bed    Patient Safety:  Fall Score:  Total Score: 3  Interventions: bed/chair alarm, gripper socks, and pt to call before getting OOB  High Fall Risk: Yes    Length of Stay:  Expected LOS: - - -  Actual LOS: 3      Jessica Ovalles RN

## 2022-12-12 NOTE — PROGRESS NOTES
End of Shift Note    Bedside shift change report given to Francisca Lee RN (oncoming nurse) by Deisy Linares RN (offgoing nurse). Report included the following information SBAR, MAR, Recent Results, and Cardiac Rhythm NSR/ST    Shift worked:  7a-7p     Shift summary and any significant changes:     IV access is painful. Attempted to get IV access and morning labs. Was unsuccessful. ED called to attempt. Still have not come. Concerns for physician to address:  N/a     [facility] phone for oncoming shift:   1104       Activity:  Activity Level: Up with Assistance  Number times ambulated in hallways past shift: 0  Number of times OOB to chair past shift: 0    Cardiac:   Cardiac Monitoring: Yes      Cardiac Rhythm: 1\" AV Block    Access:  Current line(s): PIV     Genitourinary:   Urinary status: external catheter    Respiratory:   O2 Device: None (Room air)  Chronic home O2 use?: NO  Incentive spirometer at bedside: N/A       GI:  Last Bowel Movement Date: 12/09/22  Current diet:  ADULT DIET Regular  Passing flatus: YES  Tolerating current diet: YES       Pain Management:   Patient states pain is manageable on current regimen: YES    Skin:  Stevenson Score: 17  Interventions: turn team and increase time out of bed    Patient Safety:  Fall Score:  Total Score: 3  Interventions: bed/chair alarm, gripper socks, and pt to call before getting OOB  High Fall Risk: Yes    Length of Stay:  Expected LOS: - - -  Actual LOS: [address], RN

## 2022-12-12 NOTE — PROGRESS NOTES
End of Shift Note    Bedside shift change report given to, RN (oncoming nurse) by Merlin Gerold, RN (offgoing nurse). Report included the following information SBAR, MAR, Recent Results, and Cardiac Rhythm NSR/ST    Shift worked: day     Shift summary and any significant changes:     Pt in bed, complaints of headache in beginning of shift. Tylenol given for headacheP   Concerns for physician to address: See above      Zone phone for oncoming shift:   1104       Activity:  Activity Level: Up with Assistance  Number times ambulated in hallways past shift: 0  Number of times OOB to chair past shift: 0    Cardiac:   Cardiac Monitoring: Yes      Cardiac Rhythm: 1\" AV Block    Access:  Current line(s): PIV     Genitourinary:   Urinary status: external catheter    Respiratory:   O2 Device: None (Room air)  Chronic home O2 use?: NO  Incentive spirometer at bedside: N/A       GI:  Last Bowel Movement Date: 12/09/22  Current diet:  ADULT DIET Regular  Passing flatus: YES  Tolerating current diet: YES       Pain Management:   Patient states pain is manageable on current regimen: YES    Skin:  Stevenson Score: 17  Interventions: turn team and increase time out of bed    Patient Safety:  Fall Score:  Total Score: 6  Interventions: bed/chair alarm, gripper socks, and pt to call before getting OOB  High Fall Risk: Yes    Length of Stay:  Expected LOS: 2d 14h  Actual LOS: 3      Merlin Gerold, RN

## 2022-12-12 NOTE — PROGRESS NOTES
Cross coverage note:    Patient admitted with right-sided weakness and rule out for CVA. Patient had ground-level fall earlier in the day and now with vomiting. She appears to have an injury to her lip. CT scan of the head and facial bones with no acute fracture although there is right sided periorbital swelling. Knee x-rays have also been ordered due to the patient complaining of pain. No intracranial hemorrhage and will follow up with the day team as needed.

## 2022-12-12 NOTE — PROGRESS NOTES
Hospitalist Progress Note    NAME: Adalgisa Leblanc   :  8/15/1932   MRN:  [id number]       Assessment / Plan:  9665 imaging reviewed no acute findings or fracture . Sbp 180s-190s will increase clonidine to 0.2 mg   Give hydralazine now     Recurrent falls  - injury to lip with swelling , open laceration   -Repeat head CT/maxillofacial-negative for bleeding and showed mild right periorbital soft tissue edema  -Knee x-ray negative for acute abnormality, finding of degenerative changes  - denies dizziness   -Patient will need to be re-evaluated by PT    Dysarthria, right-sided weakness ruled out CVA  - symptoms resolved pt back at baseline   - MRI brain showed no acute infarction   CTA head / Neck  neg for no large vessel occulusion , no significant flow limiting stenosis   - CT head w/o contrast no acute intracranial abnormality  - appreciate Neurology input recc OP follow up in 4 weeks   --Consult PT OT.    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 Severe narrowing of the canal at C1-2, moderate severe narrowing of the canal at C2-3, severe narrowing of the canal at C3-4, C4-5 and C5-6 with multifocal areas of T2 signal abnormality within the cord. No abnormal enhancement  Severe cervical spinal disease with possibility of compression  - pt seen by Neurosurgery no acute urgent intervention  recc treating conservatively in a 80year old . Hypertension  sbp 197 at 0300   - metoprolol initially increased then Sbp was down to 105 ( 12/10)   - will decrease back to 12,5 hold clonidine tonight   -    Dyslipidemia  Gouty arthritis  Coronary disease  History of TIA  Lumbar spinal stenosis  Neuropathy  -Continue  aspirin, Zetia        Code Status: Full code  Surrogate Decision Maker:      25.0 - 29.9 Overweight / Body mass index is 27.67 kg/m².     Estimated discharge date:   Barriers: Reevaluation by PT, discussed with case management today    Code status: Full  Prophylaxis: Lovenox  Recommended Disposition: Home w/Family     Subjective:     Chief Complaint / Reason for Physician Visit    Patient was seen and examined this morning. Denies any complaints. Last night, the patient had a fall and had imaging done. Currently, patient denies fever, chills, chest pain, shortness of breath, cough, abdominal pain, nausea, vomiting and diarrhea. Daughter at bedside and treatment plan was discussed with her  Along with her daughter over the phone  Review of Systems:  Symptom Y/N Comments  Symptom Y/N Comments   Fever/Chills N   Chest Pain N    Poor Appetite    Edema     Cough    Abdominal Pain     Sputum    Joint Pain     SOB/RAMSEY N   Pruritis/Rash     Nausea/vomit    Tolerating PT/OT     Diarrhea    Tolerating Diet     Constipation N   Other       Could NOT obtain due to:      Objective:     VITALS:   Last 24hrs VS reviewed since prior progress note. Most recent are:  Patient Vitals for the past 24 hrs:   Temp Pulse Resp BP SpO2   12/12/22 1136 98.8 °F (37.1 °C) 68 20 (!) 110/34 99 %   12/12/22 0831 99.1 °F (37.3 °C) 96 18 (!) 145/41 --   12/12/22 0800 -- 90 -- -- --   12/12/22 0413 99.1 °F (37.3 °C) 96 16 (!) 154/80 98 %   12/11/22 2341 99.8 °F (37.7 °C) 94 17 (!) 109/49 97 %   12/11/22 1952 100 °F (37.8 °C) 89 17 (!) 162/87 100 %   12/11/22 1710 98.6 °F (37 °C) 89 -- (!) 172/53 96 %   12/11/22 1706 98.7 °F (37.1 °C) 87 16 (!) 172/55 98 %   12/11/22 1208 98.4 °F (36.9 °C) 95 17 (!) 187/75 98 %       No intake or output data in the 24 hours ending 12/12/22 1159       I had a face to face encounter and independently examined this patient on 12/12/2022, as outlined below:  PHYSICAL EXAM:  General: WD, WN. Alert, cooperative, no acute distress  R arm weakness  EENT:  EOMI. Anicteric sclerae. MMM lip with laceration   Resp:  CTA bilaterally, no wheezing or rales.   No accessory muscle use  CV:  Regular  rhythm,  No edema  GI:  Soft, Non distended, Non tender. +Bowel sounds  Neurologic:  Alert and oriented X 3, normal speech,   Psych:   . Not anxious nor agitated  Skin:  No rashes. No jaundice    Reviewed most current lab test results and cultures  YES  Reviewed most current radiology test results   YES  Review and summation of old records today    NO  Reviewed patient's current orders and MAR    YES  PMH/SH reviewed - no change compared to H&P  ________________________________________________________________________  Care Plan discussed with:    Comments   Patient x    Family  x    RN x    Care Manager     Consultant                        Multidiciplinary team rounds were held today with , nursing, pharmacist and clinical coordinator. Patient's plan of care was discussed; medications were reviewed and discharge planning was addressed. ________________________________________________________________________  Total NON critical care TIME:  30   Minutes    Total CRITICAL CARE TIME Spent:   Minutes non procedure based      Comments   >50% of visit spent in counseling and coordination of care     ________________________________________________________________________  Si Serum, DO     Procedures: see electronic medical records for all procedures/Xrays and details which were not copied into this note but were reviewed prior to creation of Plan. LABS:  I reviewed today's most current labs and imaging studies. Pertinent labs include:  Recent Labs     12/10/22  1350 12/09/22  1322   WBC 6.2 7.4   HGB 9.4* 8.8*   HCT 29.2* 26.9*    295       Recent Labs     12/10/22  1350 12/09/22  1322    138   K 3.7 3.5    102   CO2 32 30   * 114*   BUN 12 16   CREA 1.30* 1.33*   CA 8.3* 8.4*   ALB  --  2.7*   TBILI  --  0.3   ALT  --  17   INR  --  1.1         Signed:  Si Serum, DO

## 2022-12-12 NOTE — PROGRESS NOTES
Reason for Admission:   Stroke                    RUR Score:   17%               PCP: First and Last name: DANIELE Fortune     Name of Practice:    Are you a current patient: Yes/No:    Approximate date of last visit:    Can you participate in a virtual visit if needed:     Do you (patient/family) have any concerns for transition/discharge? No concerns               Plan for utilizing home health:  Agreeable     Current Advanced Directive/Advance Care Plan:  Full Code  CM Confirmed patient is a Full Code    Healthcare Decision Maker:   Click here to complete [address] including selection of the Healthcare Decision Maker Relationship (ie \"Primary\")            Primary Decision Maker: Vivian Everett - Daughter - [phone]    Transition of Care Plan:        Patient lives at home with her daughter. Patient's daughter has a caregiver for herself. Patient's daughter will not be able to assist patient. Patient's granddaughter will be her ride home at discharge. Patient is already current with a [address] [place]. Granddaughter to call CM back with name of company. Current Dispo: Home with .

## 2022-12-13 ENCOUNTER — APPOINTMENT (OUTPATIENT)
Dept: NON INVASIVE DIAGNOSTICS | Age: 87
DRG: 069 | End: 2022-12-13
Attending: INTERNAL MEDICINE
Payer: MEDICARE

## 2022-12-13 LAB
ALBUMIN SERPL-MCNC: 2.6 G/DL (ref 3.5–5)
ALBUMIN/GLOB SERPL: 0.7 {RATIO} (ref 1.1–2.2)
ALP SERPL-CCNC: 79 U/L (ref 45–117)
ALT SERPL-CCNC: 18 U/L (ref 12–78)
ANION GAP SERPL CALC-SCNC: 7 MMOL/L (ref 5–15)
AST SERPL-CCNC: 23 U/L (ref 15–37)
BASOPHILS # BLD: 0 K/UL (ref 0–0.1)
BASOPHILS NFR BLD: 0 % (ref 0–1)
BILIRUB SERPL-MCNC: 0.7 MG/DL (ref 0.2–1)
BUN SERPL-MCNC: 24 MG/DL (ref 6–20)
BUN/CREAT SERPL: 15 (ref 12–20)
CALCIUM SERPL-MCNC: 8.1 MG/DL (ref 8.5–10.1)
CHLORIDE SERPL-SCNC: 102 MMOL/L (ref 97–108)
CO2 SERPL-SCNC: 27 MMOL/L (ref 21–32)
CREAT SERPL-MCNC: 1.55 MG/DL (ref 0.55–1.02)
DIFFERENTIAL METHOD BLD: ABNORMAL
EOSINOPHIL # BLD: 0.3 K/UL (ref 0–0.4)
EOSINOPHIL NFR BLD: 5 % (ref 0–7)
ERYTHROCYTE [DISTWIDTH] IN BLOOD BY AUTOMATED COUNT: 13.5 % (ref 11.5–14.5)
GLOBULIN SER CALC-MCNC: 3.6 G/DL (ref 2–4)
GLUCOSE SERPL-MCNC: 101 MG/DL (ref 65–100)
HCT VFR BLD AUTO: 26.2 % (ref 35–47)
HGB BLD-MCNC: 8.8 G/DL (ref 11.5–16)
IMM GRANULOCYTES # BLD AUTO: 0 K/UL (ref 0–0.04)
IMM GRANULOCYTES NFR BLD AUTO: 1 % (ref 0–0.5)
LYMPHOCYTES # BLD: 1.7 K/UL (ref 0.8–3.5)
LYMPHOCYTES NFR BLD: 26 % (ref 12–49)
MCH RBC QN AUTO: 30.2 PG (ref 26–34)
MCHC RBC AUTO-ENTMCNC: 33.6 G/DL (ref 30–36.5)
MCV RBC AUTO: 90 FL (ref 80–99)
MONOCYTES # BLD: 0.7 K/UL (ref 0–1)
MONOCYTES NFR BLD: 11 % (ref 5–13)
NEUTS SEG # BLD: 3.7 K/UL (ref 1.8–8)
NEUTS SEG NFR BLD: 57 % (ref 32–75)
NRBC # BLD: 0 K/UL (ref 0–0.01)
NRBC BLD-RTO: 0 PER 100 WBC
PLATELET # BLD AUTO: 258 K/UL (ref 150–400)
PMV BLD AUTO: 8.8 FL (ref 8.9–12.9)
POTASSIUM SERPL-SCNC: 3.4 MMOL/L (ref 3.5–5.1)
PROT SERPL-MCNC: 6.2 G/DL (ref 6.4–8.2)
RBC # BLD AUTO: 2.91 M/UL (ref 3.8–5.2)
SODIUM SERPL-SCNC: 136 MMOL/L (ref 136–145)
WBC # BLD AUTO: 6.4 K/UL (ref 3.6–11)

## 2022-12-13 PROCEDURE — 93308 TTE F-UP OR LMTD: CPT

## 2022-12-13 PROCEDURE — 80053 COMPREHEN METABOLIC PANEL: CPT

## 2022-12-13 PROCEDURE — 97535 SELF CARE MNGMENT TRAINING: CPT

## 2022-12-13 PROCEDURE — 74011250636 HC RX REV CODE- 250/636: Performed by: NURSE PRACTITIONER

## 2022-12-13 PROCEDURE — 65270000046 HC RM TELEMETRY

## 2022-12-13 PROCEDURE — 97116 GAIT TRAINING THERAPY: CPT

## 2022-12-13 PROCEDURE — 74011250636 HC RX REV CODE- 250/636: Performed by: INTERNAL MEDICINE

## 2022-12-13 PROCEDURE — 74011250637 HC RX REV CODE- 250/637: Performed by: INTERNAL MEDICINE

## 2022-12-13 PROCEDURE — 85025 COMPLETE CBC W/AUTO DIFF WBC: CPT

## 2022-12-13 PROCEDURE — 97530 THERAPEUTIC ACTIVITIES: CPT

## 2022-12-13 PROCEDURE — 74011250637 HC RX REV CODE- 250/637: Performed by: NURSE PRACTITIONER

## 2022-12-13 PROCEDURE — 36415 COLL VENOUS BLD VENIPUNCTURE: CPT

## 2022-12-13 RX ADMIN — GABAPENTIN 100 MG: 100 CAPSULE ORAL at 21:39

## 2022-12-13 RX ADMIN — GABAPENTIN 100 MG: 100 CAPSULE ORAL at 09:30

## 2022-12-13 RX ADMIN — LATANOPROST 1 DROP: 50 SOLUTION OPHTHALMIC at 21:39

## 2022-12-13 RX ADMIN — ENOXAPARIN SODIUM 30 MG: 100 INJECTION SUBCUTANEOUS at 16:03

## 2022-12-13 RX ADMIN — ACETAMINOPHEN 650 MG: 325 TABLET ORAL at 21:39

## 2022-12-13 RX ADMIN — HYDRALAZINE HYDROCHLORIDE 20 MG: 20 INJECTION INTRAMUSCULAR; INTRAVENOUS at 04:38

## 2022-12-13 RX ADMIN — ACETAMINOPHEN 650 MG: 325 TABLET ORAL at 09:30

## 2022-12-13 RX ADMIN — HYDRALAZINE HYDROCHLORIDE 20 MG: 20 INJECTION INTRAMUSCULAR; INTRAVENOUS at 21:39

## 2022-12-13 RX ADMIN — POLYETHYLENE GLYCOL 3350 17 G: 17 POWDER, FOR SOLUTION ORAL at 09:30

## 2022-12-13 RX ADMIN — CLONIDINE HYDROCHLORIDE 0.2 MG: 0.1 TABLET ORAL at 09:29

## 2022-12-13 RX ADMIN — TIMOLOL MALEATE 1 DROP: 5 SOLUTION/ DROPS OPHTHALMIC at 17:09

## 2022-12-13 RX ADMIN — TIMOLOL MALEATE 1 DROP: 5 SOLUTION/ DROPS OPHTHALMIC at 09:31

## 2022-12-13 RX ADMIN — ASPIRIN 81 MG: 81 TABLET, COATED ORAL at 09:29

## 2022-12-13 RX ADMIN — GABAPENTIN 100 MG: 100 CAPSULE ORAL at 16:03

## 2022-12-13 RX ADMIN — METOPROLOL TARTRATE 12.5 MG: 25 TABLET ORAL at 21:39

## 2022-12-13 RX ADMIN — ALLOPURINOL 100 MG: 100 TABLET ORAL at 09:30

## 2022-12-13 RX ADMIN — EZETIMIBE 10 MG: 10 TABLET ORAL at 09:29

## 2022-12-13 RX ADMIN — METOPROLOL TARTRATE 12.5 MG: 25 TABLET ORAL at 09:30

## 2022-12-13 NOTE — PROGRESS NOTES
End of Shift Note    Bedside shift change report given to Criss Iraheta RN (oncoming nurse) by Elise Nevarez RN (offgoing nurse). Report included the following information SBAR, MAR, Recent Results, and Cardiac Rhythm NSR/ST    Shift worked: Nights      Shift summary and any significant changes:     Pt in bed, complaints of headache in beginning of shift. Tylenol given for headache. Labs drawn    Concerns for physician to address: See above      Zone phone for oncoming shift:   1104       Activity:  Activity Level: Up with Assistance  Number times ambulated in hallways past shift: 0  Number of times OOB to chair past shift: 0    Cardiac:   Cardiac Monitoring: Yes      Cardiac Rhythm: 1\" AV Block    Access:  Current line(s): PIV     Genitourinary:   Urinary status: external catheter    Respiratory:   O2 Device: None (Room air)  Chronic home O2 use?: NO  Incentive spirometer at bedside: N/A       GI:  Last Bowel Movement Date: 12/09/22  Current diet:  ADULT DIET Regular  Passing flatus: YES  Tolerating current diet: YES       Pain Management:   Patient states pain is manageable on current regimen: YES    Skin:  Stevenson Score: 17  Interventions: turn team and increase time out of bed    Patient Safety:  Fall Score:  Total Score: 5  Interventions: bed/chair alarm, gripper socks, and pt to call before getting OOB  High Fall Risk: Yes    Length of Stay:  Expected LOS: 2d 14h  Actual LOS: 4      Elise Nevarez RN

## 2022-12-13 NOTE — PROGRESS NOTES
Problem: Mobility Impaired (Adult and Pediatric)  Goal: *Acute Goals and Plan of Care (Insert Text)  Description: FUNCTIONAL STATUS PRIOR TO ADMISSION: Patient was modified independent using a walker for functional mobility. Patient required minimal assistance for basic and instrumental ADLs. HOME SUPPORT PRIOR TO ADMISSION: The patient lived with daughter and required minimal assistance/contact guard assist for bathing. Physical Therapy Goals  Initiated 12/10/2022  1. Patient will move from supine to sit and sit to supine , scoot up and down, and roll side to side in bed with modified independence within 7 day(s). 2.  Patient will transfer from bed to chair and chair to bed with supervision/set-up using the least restrictive device within 7 day(s). 3.  Patient will perform sit to stand with modified independence within 7 day(s). 4.  Patient will ambulate with standby assistance for 125 feet with the least restrictive device within 7 day(s). Outcome: Progressing Towards Goal     PHYSICAL THERAPY TREATMENT  Patient: Delores Carias (89 y.o. 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 ASSESSMENT  Patient continues with skilled PT services and is progressing towards goals. Noted fall since previous session with periorbital edema noted but no other acute injuries and reporting she has not been out of bed since fall on Saturday. She mobilizes overall at contact guard assist to minimal assist overall with cues for safety and rolling walker use. Per discussion with family, preference remains for discharge home. Patient will require nearly 24/7 assist (assist for all mobility). Family reports need to acquire additional support for patient. As long as patient able to obtain adequate assist for patient, recommend home with nearly 24/7 and HHPT.  IF unable to acquire necessary assist, would recommend SNF as patient does remain high fall risk. Current Level of Function Impacting Discharge (mobility/balance): minimal assist with rolling walker with fatigue during gait    Other factors to consider for discharge: recent fall, family not able to provide necessary assist but would like to get additional support to take patient home         PLAN :  Patient continues to benefit from skilled intervention to address the above impairments. Continue treatment per established plan of care. to address goals. Recommendation for discharge: (in order for the patient to meet his/her long term goals)  Physical therapy at least 2 days/week in the home AND ensure assist and/or supervision for safety with functional mobility, gait    This discharge recommendation:  Has been made in collaboration with the attending provider and/or case management    IF patient discharges home will need the following DME: patient owns DME required for discharge       SUBJECTIVE:   Patient stated I haven't been up since Saturday.     OBJECTIVE DATA SUMMARY:   Critical Behavior:  Neurologic State: Alert  Orientation Level: Oriented to person, Oriented to place, Oriented to situation  Cognition: Follows commands  Safety/Judgement: Awareness of environment, Decreased awareness of need for safety, Decreased awareness of need for assistance, Fall prevention  Functional Mobility Training:  Bed Mobility:     Supine to Sit: Contact guard assistance; Additional time     Scooting: Stand-by assistance; Additional time        Transfers:  Sit to Stand: Contact guard assistance  Stand to Sit: Contact guard assistance        Bed to Chair: Contact guard assistance; Additional time; Adaptive equipment        =  Balance:  Sitting: Impaired  Sitting - Static: Good (unsupported)  Sitting - Dynamic: Good (unsupported); Fair (occasional)  Standing: Impaired  Standing - Static: Good;Constant support  Standing - Dynamic : Good;Fair;Constant support  Ambulation/Gait Training:  Distance (ft): 30 Feet (ft) (10, 20)  Assistive Device: Gait belt;Walker, rolling  Ambulation - Level of Assistance: Contact guard assistance;Minimal assistance (min A with fatigue)        Gait Abnormalities: Decreased step clearance; Step to gait (forward flexion, cues for increased proximity to assistive device)        Base of Support: Widened     Speed/Rhonda: Pace decreased (<100 feet/min)  Step Length: Left shortened;Right shortened    Pain Rating:  Did not interfere    Activity Tolerance:   Fair    After treatment patient left in no apparent distress:   Sitting in chair, Call bell within reach, Bed / chair alarm activated, and Caregiver / family present    COMMUNICATION/COLLABORATION:   The patients plan of care was discussed with: Occupational therapist and Registered nurse.      Jeremi Wells PT   Time Calculation: 29 mins

## 2022-12-13 NOTE — PROGRESS NOTES
12/13/22 1237   Vitals   Temp 98.2 °F (36.8 °C)   Temp Source Oral   Pulse (Heart Rate) 64   Heart Rate Source Monitor   Resp Rate 16   O2 Sat (%) 97 %   BP (!) 98/44  (Notified Dr. Long Saldana)   MAP (Monitor) (!) 58  (Notified Dr. Long Saldana)   MAP (Calculated) (!) 62   BP 1 Location Left upper arm   BP Patient Position Supine     Notified Dr. Long Saldana of low BP and MAP. Clonidine discontinued. BP upon recheck at 13:14 is 104/44. Patient is asymptomatic. No reported s/s of distress at this time. Patient resting in bed quietly, call light within reach.     Michael Molina RN

## 2022-12-13 NOTE — PROGRESS NOTES
Problem: Self Care Deficits Care Plan (Adult)  Goal: *Acute Goals and Plan of Care (Insert Text)  Description: FUNCTIONAL STATUS PRIOR TO ADMISSION: Patient was modified independent using a walker for functional mobility. HOME SUPPORT: The patient lived with daughter and required max assist for donning of socks. Pt is overall mod I or Independent with support for some IADL tasks. Occupational Therapy Goals  Initiated 12/10/2022  1. Patient will perform bathing with supervision/set-up within 7 day(s). 2.  Patient will perform lower body dressing with moderate assistance  within 7 day(s). 3.  Patient will perform toilet transfers with supervision/set-up within 7 day(s). 5.  Patient will perform all aspects of toileting with supervision/set-up within 7 day(s). 6.  Patient will participate in upper extremity therapeutic exercise/activities with supervision/set-up for 10 minutes within 7 day(s). Outcome: Progressing Towards Goal   OCCUPATIONAL THERAPY TREATMENT  Patient: Freddy Rutherford (17 y.o. 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 ASSESSMENT  Patient continues with skilled OT services and is progressing towards goals. Patient is received resting in bed, agreeable to session and endorsing that she has not been out of bed since a fall over the weekend. From fully flat bed position, patient achieves transfer to EOB with CGA-min assist. She requires increased time for functional mobility, benefiting from reminders for safe hand placement pre-/post-transfer and for appropriate RW positioning, particularly when navigating bathroom. Patient is able to perform all aspects of toileting with up to min assist, requiring mod assist to don new protective brief and manage over hips in standing.  Per discussion with patient and family, preference is for discharge home. Explained recommendation for 24/7 supervision/hands-on-assist as well as HH follow-up for safe discharge; if family unable to provide/obtain 24/7 care, would recommend SNF. Current Level of Function Impacting Discharge (ADLs): CGA - mod A; family endorses desire for discharge home, verbalizes understanding of need for initial 24/7 at discharge for safety    Other factors to consider for discharge:          PLAN :  Patient continues to benefit from skilled intervention to address the above impairments. Continue treatment per established plan of care to address goals. Recommend with staff: OOB to chair and toileting in bathroom with RW, gait belt, and assist x1    Recommendation for discharge: (in order for the patient to meet his/her long term goals)  Occupational therapy at least 2 days/week in the home AND ensure assist and/or supervision for safety with 24/7 activity and mobility    This discharge recommendation:  Has not yet been discussed the attending provider and/or case management    IF patient discharges home will need the following DME: 24/7       SUBJECTIVE:   Patient stated I'm ready to go home.     OBJECTIVE DATA SUMMARY:   Cognitive/Behavioral Status:  Neurologic State: Alert  Orientation Level: Oriented to person;Oriented to place;Oriented to situation  Cognition: Follows commands  Perception: Verbal;Visual  Perseveration: No perseveration noted  Safety/Judgement: Awareness of environment;Decreased awareness of need for safety;Decreased awareness of need for assistance; Fall prevention    Functional Mobility and Transfers for ADLs:  Bed Mobility:  Supine to Sit: Contact guard assistance; Additional time  Scooting: Stand-by assistance; Additional time    Transfers:  Sit to Stand: Contact guard assistance  Functional Transfers  Bathroom Mobility: Contact guard assistance  Toilet Transfer : Contact guard assistance; Additional time; Adaptive equipment  Cues: Verbal cues provided;Visual cues provided (for hand placement, RW management)  Adaptive Equipment: Grab bars  Bed to Chair: Contact guard assistance; Additional time; Adaptive equipment    Balance:  Sitting: Impaired  Sitting - Static: Good (unsupported)  Sitting - Dynamic: Good (unsupported); Fair (occasional)  Standing: Impaired  Standing - Static: Good;Constant support  Standing - Dynamic : Good;Fair;Constant support    ADL Intervention:  Grooming  Grooming Assistance: Contact guard assistance  Position Performed: Standing (at sink, cues for RW positioning)  Washing Face: Contact guard assistance  Washing Hands: Contact guard assistance  Cues: Verbal cues provided    Lower Body Dressing Assistance  Dressing Assistance: Minimum assistance; Moderate assistance  Protective Undergarmet: Minimum assistance; Moderate assistance  Leg Crossed Method Used: No  Position Performed:  (seated on toilet; standing)  Cues: Verbal cues provided;Physical assistance  Adaptive Equipment Used: Walker;Grab bar    Toileting  Toileting Assistance: Minimum assistance  Bladder Hygiene: Stand-by assistance  Bowel Hygiene: Stand-by assistance  Clothing Management: Minimum assistance  Cues: Verbal cues provided (physical assist for gown mgmt)  Adaptive Equipment: Grab bars; Walker    Cognitive Retraining  Orientation Retraining: Awareness of environment  Problem Solving: Identifying the problem; Identifying the task;General alternative solution; Awareness of environment  Safety/Judgement: Awareness of environment;Decreased awareness of need for safety;Decreased awareness of need for assistance; Fall prevention    Pain:  Reports LE pain after ambulation; improves with seated rest.    Activity Tolerance:   Fair and requires rest breaks    After treatment patient left in no apparent distress:   Sitting in chair, Call bell within reach, Bed / chair alarm activated, and Caregiver / family present    COMMUNICATION/COLLABORATION:   The patients plan of care was discussed with: Physical therapist and Registered nurse.      Sathya Cho OT  Time Calculation: 29 mins

## 2022-12-13 NOTE — PROGRESS NOTES
Spiritual Care Partner Volunteer visited patient at [address] in [facility] [address] on 12/13/2022   Documented by:  MARLENE Vail  [address] [facility] [phone] ([phone])

## 2022-12-13 NOTE — PROGRESS NOTES
Problem: Aspiration - Risk of  Goal: *Absence of aspiration  Outcome: Progressing Towards Goal     Problem: Patient Education: Go to Patient Education Activity  Goal: Patient/Family Education  Outcome: Progressing Towards Goal     Problem: Falls - Risk of  Goal: *Absence of Falls  Description: Document Ben Latham Fall Risk and appropriate interventions in the flowsheet.   Outcome: Progressing Towards Goal  Note: Fall Risk Interventions:  Mobility Interventions: Bed/chair exit alarm, Patient to call before getting OOB    Mentation Interventions: Adequate sleep, hydration, pain control, Bed/chair exit alarm, Family/sitter at bedside, Increase mobility, More frequent rounding, Update white board    Medication Interventions: Bed/chair exit alarm, Patient to call before getting OOB, Teach patient to arise slowly    Elimination Interventions: Bed/chair exit alarm, Call light in reach, Patient to call for help with toileting needs    History of Falls Interventions: Bed/chair exit alarm, Consult care management for discharge planning         Problem: Patient Education: Go to Patient Education Activity  Goal: Patient/Family Education  Outcome: Progressing Towards Goal     Problem: Patient Education: Go to Patient Education Activity  Goal: Patient/Family Education  Outcome: Progressing Towards Goal     Problem: TIA/CVA Stroke: Day 2 Until Discharge  Goal: Off Pathway (Use only if patient is Off Pathway)  Outcome: Progressing Towards Goal  Goal: Activity/Safety  Outcome: Progressing Towards Goal  Goal: Diagnostic Test/Procedures  Outcome: Progressing Towards Goal  Goal: Nutrition/Diet  Outcome: Progressing Towards Goal  Goal: Discharge Planning  Outcome: Progressing Towards Goal  Goal: Medications  Outcome: Progressing Towards Goal  Goal: Respiratory  Outcome: Progressing Towards Goal  Goal: Treatments/Interventions/Procedures  Outcome: Progressing Towards Goal  Goal: Psychosocial  Outcome: Progressing Towards Goal  Goal: *Verbalizes anxiety and depression are reduced or absent  Outcome: Progressing Towards Goal  Goal: *Absence of aspiration  Outcome: Progressing Towards Goal  Goal: *Absence of deep venous thrombosis signs and symptoms(Stroke Metric)  Outcome: Progressing Towards Goal  Goal: *Optimal pain control at patient's stated goal  Outcome: Progressing Towards Goal  Goal: *Tolerating diet  Outcome: Progressing Towards Goal  Goal: *Ability to perform ADLs and demonstrates progressive mobility and function  Outcome: Progressing Towards Goal  Goal: *Stroke education continued(Stroke Metric)  Outcome: Progressing Towards Goal     Problem: Patient Education: Go to Patient Education Activity  Goal: Patient/Family Education  Outcome: Progressing Towards Goal     Problem: Patient Education: Go to Patient Education Activity  Goal: Patient/Family Education  Outcome: Progressing Towards Goal     Problem: Pressure Injury - Risk of  Goal: *Prevention of pressure injury  Description: Document Stevenson Scale and appropriate interventions in the flowsheet.   Outcome: Progressing Towards Goal  Note: Pressure Injury Interventions:  Sensory Interventions: Assess changes in LOC, Discuss PT/OT consult with provider, Float heels, Keep linens dry and wrinkle-free, Maintain/enhance activity level, Minimize linen layers, Pressure redistribution bed/mattress (bed type)    Moisture Interventions: Absorbent underpads, Internal/External urinary devices, Apply protective barrier, creams and emollients, Limit adult briefs, Minimize layers    Activity Interventions: Chair cushion, Increase time out of bed, Pressure redistribution bed/mattress(bed type), PT/OT evaluation    Mobility Interventions: Chair cushion, Float heels, HOB 30 degrees or less, Pressure redistribution bed/mattress (bed type), PT/OT evaluation    Nutrition Interventions: Document food/fluid/supplement intake    Friction and Shear Interventions: Apply protective barrier, creams and emollients, Feet elevated on foot rest, HOB 30 degrees or less, Lift sheet, Minimize layers                Problem: Patient Education: Go to Patient Education Activity  Goal: Patient/Family Education  Outcome: Progressing Towards Goal

## 2022-12-13 NOTE — PROGRESS NOTES
Hospitalist Progress Note    NAME: Antonio Carlton   :  8/15/1932   MRN:  [id number]       Assessment / Plan:  9263 imaging reviewed no acute findings or fracture . Sbp 180s-190s will increase clonidine to 0.2 mg   Give hydralazine now     Recurrent falls  - injury to lip with swelling , open laceration   -Repeat head CT/maxillofacial-negative for bleeding and showed mild right periorbital soft tissue edema  -Knee x-ray negative for acute abnormality, finding of degenerative changes  - denies dizziness   -Patient will need to be re-evaluated by PT    Dysarthria, right-sided weakness ruled out CVA  - symptoms resolved pt back at baseline   - MRI brain showed no acute infarction   CTA head / Neck  neg for no large vessel occulusion , no significant flow limiting stenosis   - CT head w/o contrast no acute intracranial abnormality  - appreciate Neurology input recc OP follow up in 4 weeks   --Consult PT OT.    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 Severe narrowing of the canal at C1-2, moderate severe narrowing of the canal at C2-3, severe narrowing of the canal at C3-4, C4-5 and C5-6 with multifocal areas of T2 signal abnormality within the cord. No abnormal enhancement  Severe cervical spinal disease with possibility of compression  - pt seen by Neurosurgery no acute urgent intervention  recc treating conservatively in a 80year old . Hypertension  sbp 197 at 0300   - BP soft  -Discontinue clonidine, continue with Lopressor 12.5 mg twice daily  Continue  Dyslipidemia  Gouty arthritis  Coronary disease  History of TIA  Lumbar spinal stenosis  Neuropathy  -Continue  aspirin, Zetia        Code Status: Full code  Patient was seen and examined this morning. Patient denies any new complaints.   Patient denies fever, chills, shortness of breath, chest pain, abdominal pain, nausea, vomiting, and diarrhea. No overnight events reported by nursing staff. 25.0 - 29.9 Overweight / Body mass index is 27.67 kg/m². Estimated discharge date:   Barriers: Reevaluation by PT, discussed with case management today    Code status: Full  Prophylaxis: Lovenox  Recommended Disposition: Home w/Family     Subjective:     Chief Complaint / Reason for Physician Visit    Patient was seen and examined this morning. Patient denies any new complaints. Patient denies fever, chills, shortness of breath, chest pain, abdominal pain, nausea, vomiting, and diarrhea. No overnight events reported by nursing staff. Review of Systems:  Symptom Y/N Comments  Symptom Y/N Comments   Fever/Chills N   Chest Pain N    Poor Appetite    Edema     Cough    Abdominal Pain     Sputum    Joint Pain     SOB/RAMSEY N   Pruritis/Rash     Nausea/vomit    Tolerating PT/OT     Diarrhea    Tolerating Diet     Constipation N   Other       Could NOT obtain due to:      Objective:     VITALS:   Last 24hrs VS reviewed since prior progress note. Most recent are:  Patient Vitals for the past 24 hrs:   Temp Pulse Resp BP SpO2   12/13/22 1314 98.6 °F (37 °C) 65 16 (!) 104/44 96 %   12/13/22 1237 98.2 °F (36.8 °C) 64 16 (!) 98/44 97 %   12/13/22 1138 98.2 °F (36.8 °C) 65 16 (!) 105/48 99 %   12/13/22 0833 98.2 °F (36.8 °C) 97 16 (!) 152/60 93 %   12/13/22 0415 99.1 °F (37.3 °C) 79 17 (!) 172/60 100 %   12/12/22 2328 98.6 °F (37 °C) 67 17 110/65 94 %   12/12/22 2016 99 °F (37.2 °C) 82 17 (!) 159/55 95 %   12/12/22 1557 99.3 °F (37.4 °C) 64 16 (!) 114/37 98 %       No intake or output data in the 24 hours ending 12/13/22 1316       I had a face to face encounter and independently examined this patient on 12/13/2022, as outlined below:  PHYSICAL EXAM:  General: WD, WN. Alert, cooperative, no acute distress  R arm weakness  EENT:  EOMI. Anicteric sclerae. MMM lip with laceration   Resp:  CTA bilaterally, no wheezing or rales.   No accessory muscle use  CV:  Regular  rhythm,  No edema  GI:  Soft, Non distended, Non tender. +Bowel sounds  Neurologic:  Alert and oriented X 3, normal speech,   Psych:   . Not anxious nor agitated  Skin:  No rashes. No jaundice    Reviewed most current lab test results and cultures  YES  Reviewed most current radiology test results   YES  Review and summation of old records today    NO  Reviewed patient's current orders and MAR    YES  PMH/SH reviewed - no change compared to H&P  ________________________________________________________________________  Care Plan discussed with:    Comments   Patient x    Family  x    RN x    Care Manager     Consultant                        Multidiciplinary team rounds were held today with , nursing, pharmacist and clinical coordinator. Patient's plan of care was discussed; medications were reviewed and discharge planning was addressed. ________________________________________________________________________  Total NON critical care TIME:  30   Minutes    Total CRITICAL CARE TIME Spent:   Minutes non procedure based      Comments   >50% of visit spent in counseling and coordination of care     ________________________________________________________________________  Lanexa Haley, DO     Procedures: see electronic medical records for all procedures/Xrays and details which were not copied into this note but were reviewed prior to creation of Plan. LABS:  I reviewed today's most current labs and imaging studies. Pertinent labs include:  Recent Labs     12/13/22  0115 12/10/22  1350   WBC 6.4 6.2   HGB 8.8* 9.4*   HCT 26.2* 29.2*    313       Recent Labs     12/13/22  0115 12/10/22  1350    137   K 3.4* 3.7    102   CO2 27 32   * 112*   BUN 24* 12   CREA 1.55* 1.30*   CA 8.1* 8.3*   ALB 2.6*  --    TBILI 0.7  --    ALT 18  --          Signed:  Jaylen De Leon, DO

## 2022-12-14 VITALS
BODY MASS INDEX: 27.49 KG/M2 | HEART RATE: 77 BPM | RESPIRATION RATE: 16 BRPM | OXYGEN SATURATION: 99 % | DIASTOLIC BLOOD PRESSURE: 83 MMHG | WEIGHT: 161 LBS | TEMPERATURE: 98.8 F | SYSTOLIC BLOOD PRESSURE: 141 MMHG | HEIGHT: 64 IN

## 2022-12-14 LAB
ALBUMIN SERPL-MCNC: 2.9 G/DL (ref 3.5–5)
ALBUMIN/GLOB SERPL: 0.7 {RATIO} (ref 1.1–2.2)
ALP SERPL-CCNC: 85 U/L (ref 45–117)
ALT SERPL-CCNC: 20 U/L (ref 12–78)
ANION GAP SERPL CALC-SCNC: 8 MMOL/L (ref 5–15)
AST SERPL-CCNC: 29 U/L (ref 15–37)
BASOPHILS # BLD: 0 K/UL (ref 0–0.1)
BASOPHILS NFR BLD: 0 % (ref 0–1)
BILIRUB SERPL-MCNC: 0.4 MG/DL (ref 0.2–1)
BUN SERPL-MCNC: 19 MG/DL (ref 6–20)
BUN/CREAT SERPL: 16 (ref 12–20)
CALCIUM SERPL-MCNC: 8.9 MG/DL (ref 8.5–10.1)
CHLORIDE SERPL-SCNC: 101 MMOL/L (ref 97–108)
CO2 SERPL-SCNC: 24 MMOL/L (ref 21–32)
CREAT SERPL-MCNC: 1.22 MG/DL (ref 0.55–1.02)
DIFFERENTIAL METHOD BLD: ABNORMAL
EOSINOPHIL # BLD: 0.3 K/UL (ref 0–0.4)
EOSINOPHIL NFR BLD: 5 % (ref 0–7)
ERYTHROCYTE [DISTWIDTH] IN BLOOD BY AUTOMATED COUNT: 13.2 % (ref 11.5–14.5)
GLOBULIN SER CALC-MCNC: 4.2 G/DL (ref 2–4)
GLUCOSE SERPL-MCNC: 103 MG/DL (ref 65–100)
HCT VFR BLD AUTO: 30.1 % (ref 35–47)
HGB BLD-MCNC: 9.9 G/DL (ref 11.5–16)
IMM GRANULOCYTES # BLD AUTO: 0.1 K/UL (ref 0–0.04)
IMM GRANULOCYTES NFR BLD AUTO: 1 % (ref 0–0.5)
LYMPHOCYTES # BLD: 1.8 K/UL (ref 0.8–3.5)
LYMPHOCYTES NFR BLD: 26 % (ref 12–49)
MCH RBC QN AUTO: 29.5 PG (ref 26–34)
MCHC RBC AUTO-ENTMCNC: 32.9 G/DL (ref 30–36.5)
MCV RBC AUTO: 89.6 FL (ref 80–99)
MONOCYTES # BLD: 0.8 K/UL (ref 0–1)
MONOCYTES NFR BLD: 12 % (ref 5–13)
NEUTS SEG # BLD: 4 K/UL (ref 1.8–8)
NEUTS SEG NFR BLD: 56 % (ref 32–75)
NRBC # BLD: 0 K/UL (ref 0–0.01)
NRBC BLD-RTO: 0 PER 100 WBC
PLATELET # BLD AUTO: 310 K/UL (ref 150–400)
PMV BLD AUTO: 8.8 FL (ref 8.9–12.9)
POTASSIUM SERPL-SCNC: 3.6 MMOL/L (ref 3.5–5.1)
PROT SERPL-MCNC: 7.1 G/DL (ref 6.4–8.2)
RBC # BLD AUTO: 3.36 M/UL (ref 3.8–5.2)
SODIUM SERPL-SCNC: 133 MMOL/L (ref 136–145)
WBC # BLD AUTO: 7 K/UL (ref 3.6–11)

## 2022-12-14 PROCEDURE — 74011250637 HC RX REV CODE- 250/637: Performed by: NURSE PRACTITIONER

## 2022-12-14 PROCEDURE — 74011250636 HC RX REV CODE- 250/636: Performed by: NURSE PRACTITIONER

## 2022-12-14 PROCEDURE — 85025 COMPLETE CBC W/AUTO DIFF WBC: CPT

## 2022-12-14 PROCEDURE — 36415 COLL VENOUS BLD VENIPUNCTURE: CPT

## 2022-12-14 PROCEDURE — 74011250637 HC RX REV CODE- 250/637: Performed by: INTERNAL MEDICINE

## 2022-12-14 PROCEDURE — 80053 COMPREHEN METABOLIC PANEL: CPT

## 2022-12-14 PROCEDURE — 74011250637 HC RX REV CODE- 250/637: Performed by: STUDENT IN AN ORGANIZED HEALTH CARE EDUCATION/TRAINING PROGRAM

## 2022-12-14 RX ORDER — AMLODIPINE BESYLATE 5 MG/1
10 TABLET ORAL DAILY
Status: DISCONTINUED | OUTPATIENT
Start: 2022-12-14 | End: 2022-12-14 | Stop reason: HOSPADM

## 2022-12-14 RX ORDER — AMLODIPINE BESYLATE 10 MG/1
10 TABLET ORAL DAILY
Qty: 30 TABLET | Refills: 0 | Status: SHIPPED | OUTPATIENT
Start: 2022-12-15

## 2022-12-14 RX ADMIN — ALLOPURINOL 100 MG: 100 TABLET ORAL at 08:36

## 2022-12-14 RX ADMIN — METOPROLOL TARTRATE 12.5 MG: 25 TABLET ORAL at 08:36

## 2022-12-14 RX ADMIN — AMLODIPINE BESYLATE 10 MG: 5 TABLET ORAL at 08:36

## 2022-12-14 RX ADMIN — HYDRALAZINE HYDROCHLORIDE 20 MG: 20 INJECTION INTRAMUSCULAR; INTRAVENOUS at 03:55

## 2022-12-14 RX ADMIN — ACETAMINOPHEN 650 MG: 325 TABLET ORAL at 05:13

## 2022-12-14 RX ADMIN — GABAPENTIN 100 MG: 100 CAPSULE ORAL at 08:36

## 2022-12-14 RX ADMIN — EZETIMIBE 10 MG: 10 TABLET ORAL at 08:36

## 2022-12-14 RX ADMIN — TIMOLOL MALEATE 1 DROP: 5 SOLUTION/ DROPS OPHTHALMIC at 08:36

## 2022-12-14 RX ADMIN — ASPIRIN 81 MG: 81 TABLET, COATED ORAL at 08:36

## 2022-12-14 NOTE — ROUTINE PROCESS
End of Shift Note     Bedside shift change report given to Swati Vargas RN (oncoming nurse) by Jasmine Self RN (offgoing nurse). Report included the following information SBAR, MAR, Recent Results, and Cardiac Rhythm NSR/ST     Shift worked: Nights       Shift summary and any significant changes:     Pt had 3 large watery bowel movements over the night. Pt BP elevated, hydralazine given x2. Pt complaining of headache. Labs drawn      Concerns for physician to address: See above       Zone phone for oncoming shift:   [phone]         Activity:  Activity Level: Up with Assistance  Number times ambulated in hallways past shift: 0  Number of times OOB to chair past shift: 0     Cardiac:   Cardiac Monitoring: Yes      Cardiac Rhythm: 1\" AV Block     Access:  Current line(s): PIV      Genitourinary:   Urinary status: external catheter     Respiratory:   O2 Device: None (Room air)  Chronic home O2 use?: NO  Incentive spirometer at bedside: N/A     GI:  Last Bowel Movement Date: 12/09/22  Current diet:  ADULT DIET Regular  Passing flatus: YES  Tolerating current diet: YES     Pain Management:   Patient states pain is manageable on current regimen: YES     Skin:  Stevenson Score: 17  Interventions: turn team and increase time out of bed    Patient Safety:  Fall Score:  Total Score: 5  Interventions: bed/chair alarm, gripper socks, and pt to call before getting OOB  High Fall Risk: Yes     Length of Stay:  Expected LOS: 2d 14h  Actual LOS: 4

## 2022-12-14 NOTE — PROGRESS NOTES
Problem: Aspiration - Risk of  Goal: *Absence of aspiration  Outcome: Progressing Towards Goal     Problem: Patient Education: Go to Patient Education Activity  Goal: Patient/Family Education  Outcome: Progressing Towards Goal     Problem: Falls - Risk of  Goal: *Absence of Falls  Description: Document Bharti Leary Fall Risk and appropriate interventions in the flowsheet.   Outcome: Progressing Towards Goal  Note: Fall Risk Interventions:  Mobility Interventions: Bed/chair exit alarm    Mentation Interventions: Bed/chair exit alarm, Adequate sleep, hydration, pain control    Medication Interventions: Bed/chair exit alarm, Patient to call before getting OOB    Elimination Interventions: Bed/chair exit alarm, Call light in reach    History of Falls Interventions: Bed/chair exit alarm         Problem: Patient Education: Go to Patient Education Activity  Goal: Patient/Family Education  Outcome: Progressing Towards Goal     Problem: Patient Education: Go to Patient Education Activity  Goal: Patient/Family Education  Outcome: Progressing Towards Goal     Problem: TIA/CVA Stroke: Day 2 Until Discharge  Goal: Off Pathway (Use only if patient is Off Pathway)  Outcome: Progressing Towards Goal  Goal: Activity/Safety  Outcome: Progressing Towards Goal  Goal: Diagnostic Test/Procedures  Outcome: Progressing Towards Goal  Goal: Nutrition/Diet  Outcome: Progressing Towards Goal  Goal: Discharge Planning  Outcome: Progressing Towards Goal  Goal: Medications  Outcome: Progressing Towards Goal  Goal: Respiratory  Outcome: Progressing Towards Goal  Goal: Treatments/Interventions/Procedures  Outcome: Progressing Towards Goal  Goal: Psychosocial  Outcome: Progressing Towards Goal  Goal: *Verbalizes anxiety and depression are reduced or absent  Outcome: Progressing Towards Goal  Goal: *Absence of aspiration  Outcome: Progressing Towards Goal  Goal: *Absence of deep venous thrombosis signs and symptoms(Stroke Metric)  Outcome: Progressing Towards Goal  Goal: *Optimal pain control at patient's stated goal  Outcome: Progressing Towards Goal  Goal: *Tolerating diet  Outcome: Progressing Towards Goal  Goal: *Ability to perform ADLs and demonstrates progressive mobility and function  Outcome: Progressing Towards Goal  Goal: *Stroke education continued(Stroke Metric)  Outcome: Progressing Towards Goal     Problem: Patient Education: Go to Patient Education Activity  Goal: Patient/Family Education  Outcome: Progressing Towards Goal     Problem: Patient Education: Go to Patient Education Activity  Goal: Patient/Family Education  Outcome: Progressing Towards Goal     Problem: Pressure Injury - Risk of  Goal: *Prevention of pressure injury  Description: Document Stevenson Scale and appropriate interventions in the flowsheet.   Outcome: Progressing Towards Goal     Problem: Patient Education: Go to Patient Education Activity  Goal: Patient/Family Education  Outcome: Progressing Towards Goal

## 2022-12-14 NOTE — PROGRESS NOTES
No further CM needs identified. Transition of Care Plan:    RUR: 17% - \"moderate risk\"  LOS: 5 days   Disposition: Home with [facility] (RN/PT/OT), resumed family support, & follow up apts  Follow up appointments: PCP & specialist as indicated  DME needed: Pt owns DME necessary for d/c  Transportation at Discharge: Pt's daughters present at bedside to provide transportation at d/c  [place] or means to access home: Pt's daughters have access to the home      IM Medicare Letter: 2nd IM signed 12/14/22; copy on chart  Is patient a  and connected with the [place]? No               Caregiver Contact: Pt's daughter Gee Tongtan: [phone])  Discharge Caregiver contacted prior to discharge? Daughter present at bedside the day of anticipated d/c (12/14/22)  Care Conference needed?: No, GÓMEZ 12/14/22    Update - 11:50 AM: [facility]  orders uploaded via [employer] to [employer]; agency informed of GÓMEZ for today. Agency's information reflected in AVS. PCP follow up apt secured for 12/19/22 at 2:30 PM; apt information reflected in AVS. [facility]'s information also reflected in AVS for reference. CM will remain accessible for consult if additional needs arise prior to d/c. Initial note: CM acknowledged d/c. Chart reviewed. Therapy team's recommendations for [place] with 24/7 support acknowledged. CM specialist informed of need for PCP follow up apt. Per review of CM initial assessment (completed 12/12/22) pt was open to a [place] agency prior to current admission; family was unable to confirm the name of the [place] agency during initial assessment. CM met with pt and daughters at bedside, introduced role, and informed them of GÓMEZ for today. CM requested the name of the [place] agency pt was open to prior to admission; daughter had to make a phone call to confirm agency's name [facility] [place]).  Pt & daughters agreeable to CM working to have [place] service resumed via [facility] upon d/c. Daughters inquired about private duty care services. CM explained the difference between Medicare vs Medicaid coverage & informed daughters they will have to work to secure private duty care services themselves. CM provided list of private duty care agencies for reference. CM also provided information for [facility]. Daughters will provide transportation at d/c. No additional questions/concerns identified. 2nd IM signed; copy on chart. Once PCP follow up apt is secured, pt will be clear for d/c from CM standpoint. Care Management Interventions  PCP Verified by CM: Yes  Palliative Care Criteria Met (RRAT>21 & CHF Dx)?: No  Mode of Transport at Discharge:  Other (see comment) (Pt's daughters will provide transportation at d/c)  Transition of Care Consult (CM Consult): Discharge Planning, [address]: No  Reason Outside [place]: Patient already serviced by other home care/hospice agency  Discharge Durable Medical Equipment: No  Physical Therapy Consult: Yes  Occupational Therapy Consult: Yes  Speech Therapy Consult: Yes  Support Systems: Child(jazlyn), Other Family Member(s)  Confirm Follow Up Transport: Family  The Plan for Transition of Care is Related to the Following Treatment Goals : [facility]   The Patient and/or Patient Representative was Provided with a Choice of Provider and Agrees with the Discharge Plan?: Yes  Name of the Patient Representative Who was Provided with a Choice of Provider and Agrees with the Discharge Plan: pt and daughters  Freedom of Choice List was Provided with Basic Dialogue that Supports the Patient's Individualized Plan of Care/Goals, Treatment Preferences and Shares the Quality Data Associated with the Providers?: Yes  [place] Resource Information Provided?: No  Discharge Location  Patient Expects to be Discharged to[de-identified] Home with home health ([facility] (RN/PT/OT), 24/7 support, & f/u apts)    Linda Lomeli, [address] [facility], [address] [facility]

## 2022-12-14 NOTE — DISCHARGE SUMMARY
Hospitalist Discharge Summary     Patient ID:  Tomas Sanchez  [id number]  13 y.o.  8/15/1932  12/9/2022    PCP on record: DANIELE Kyle    Admit date: 12/9/2022  Discharge date and time: 12/14/2022    DISCHARGE DIAGNOSIS:    -Fall  -TIA  -Hypertension    CONSULTATIONS:  IP CONSULT TO HOSPITALIST  IP CONSULT TO NEUROLOGY  IP CONSULT TO NEUROSURGERY    Excerpted HPI from H&P of Mayra Méndez MD:    Caroline Barreto is a 80 y.o.  female who presents with past medical history of hypertension, coronary artery disease, TIAs coming the hospital chief complaints of dysarthria and also right-sided weakness. Patient reports that she is not able to use her right hand and also had some speech difficulties. She also reports some associated headache as well. She was working with her physical therapist today who noted her to be weak on the right side. Does not report any vision changes. Does not report any chest pain or shortness of breath. On arrival to ED, her vital signs are within normal limits. On labs hemoglobin is 8.8 BMP shows a creatinine of 1.33, LFTs are within normal limits. CT head shows no acute process. 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 ______________________________________________________________________  DISCHARGE SUMMARY/HOSPITAL COURSE:  for full details see H&P, daily progress notes, labs, consult notes. The patient was admitted for further work-up and treatment. The patient was evaluated by neurology, PT and OT. The patient's symptoms improved significantly during her hospital stay and she was back to her baseline. The treatment plan was discussed at length with the patient family. Antihypertensive medications were adjusted. Patient to check her blood pressure twice daily at home and keep a log to present to her PCP.   The treatment plan was discussed at length prior to discharge with the patient and her family at bedside and they are agreeable with the plan.      _______________________________________________________________________  Patient seen and examined by me on discharge day. Pertinent Findings:  Gen:    Not in distress  Chest: Clear lungs  CVS:   Regular rhythm. No edema  Abd:  Soft, not distended, not tender  Neuro:  Alert,   _______________________________________________________________________  DISCHARGE MEDICATIONS:   Current Discharge Medication List        START taking these medications    Details   amLODIPine (NORVASC) 10 mg tablet Take 1 Tablet by mouth daily. Qty: 30 Tablet, Refills: 0  Start date: 12/15/2022           CONTINUE these medications which have NOT CHANGED    Details   gabapentin (NEURONTIN) 100 mg capsule Take 1 Capsule by mouth three (3) times daily for 60 days. Max Daily Amount: 300 mg. Qty: 90 Capsule, Refills: 1    Associated Diagnoses: Neuropathic pain      butalbital-acetaminophen-caff (FIORICET) -40 mg per capsule Take 1 Capsule by mouth every four (4) hours as needed for Headache. Qty: 12 Capsule, Refills: 0      ezetimibe (ZETIA) 10 mg tablet TAKE 1 TABLET BY MOUTH EVERY DAY  Qty: 90 Tablet, Refills: 1      cholecalciferol (VITAMIN D3) (5000 Units/125 mcg) tab tablet Take 5,000 Units by mouth daily. naloxone (Narcan) 4 mg/actuation nasal spray Use 1 spray intranasally, then discard. Repeat with new spray every 2 min as needed for opioid overdose symptoms, alternating nostrils. Qty: 1 Each, Refills: 0      polyethylene glycol (MIRALAX) 17 gram packet Take 17 g by mouth as needed for Constipation. trolamine salicylate (Aspercreme) 10 % lotion Apply  to affected area as needed. atorvastatin (LIPITOR) 80 mg tablet Take 1 Tab by mouth nightly. Qty: 30 Tab, Refills: 1      metoprolol tartrate (LOPRESSOR) 25 mg tablet Take 0.5 Tabs by mouth every twelve (12) hours.   Qty: 90 Tab, Refills: 2      docusate sodium (STOOL SOFTENER PO) Take  by mouth as needed. ferrous sulfate (IRON PO) Take 65 mg by mouth daily. aspirin delayed-release 81 mg tablet Take 81 mg by mouth daily. timolol (TIMOPTIC-XE) 0.5 % ophthalmic gel-forming Administer 1 Drop to right eye daily. latanoprost (XALATAN) 0.005 % ophthalmic solution Administer 1 Drop to both eyes nightly. Indications: wide-angle glaucoma      fluticasone (FLONASE ALLERGY RELIEF) 50 mcg/actuation nasal spray 1 [place] by Both Nostrils route daily. acetaminophen (TYLENOL EXTRA STRENGTH) 500 mg tablet Take 1,000 mg by mouth every six (6) hours as needed for Pain. fexofenadine (ALLEGRA) 180 mg tablet Take 180 mg by mouth nightly. allopurinol (ZYLOPRIM) 100 mg tablet Take 100 mg by mouth daily. ALPRAZolam (XANAX) 0.25 mg tablet Take 0.25 mg by mouth daily as needed for Anxiety. STOP taking these medications       cloNIDine HCL (CATAPRES) 0.1 mg tablet Comments:   Reason for Stopping:                 Patient Follow Up Instructions:    Activity: Activity as tolerated      Follow-up Information       Follow up With Specialties Details Why Contact Info    McSwain, [place], FNP Nurse Practitioner Follow up in 1 week(s)  [address] [facility]       Tiesha Wyman DO Neurology Follow up in 1 week(s)  [address] [facility]  [phone]            ________________________________________________________________    Risk of deterioration: Low    Condition at Discharge:  Stable  __________________________________________________________________    Disposition  Home with family and home health services    ____________________________________________________________________    Code Status: Full Code  ___________________________________________________________________      Total time in minutes spent coordinating this discharge (includes going over instructions, follow-up, prescriptions, and preparing report for sign off to her PCP) :  >30 minutes    Signed:   Esperanza Auguste,

## 2022-12-14 NOTE — ROUTINE PROCESS
End of Shift Note     Bedside shift change report given to Layo Nation RN (oncoming nurse) by Jorge Castaneda RN (offgoing nurse). Report included the following information SBAR, MAR, Recent Results, and Cardiac Rhythm NSR/ST     Shift worked: [id number]      Shift summary and any significant changes:     Patient became hypotensive 98/44 @ 1237. Up to 131/50 at 1546. Clonidine discontinued. PT eval complete. Echo complete. Concerns for physician to address: See above       Zone phone for oncoming shift:            Activity:  Activity Level: Up with Assistance  Number times ambulated in hallways past shift: 0  Number of times OOB to chair past shift: 0     Cardiac:   Cardiac Monitoring: Yes      Cardiac Rhythm: 1\" AV Block     Access:  Current line(s): PIV      Genitourinary:   Urinary status: external catheter     Respiratory:   O2 Device: None (Room air)  Chronic home O2 use?: NO  Incentive spirometer at bedside: N/A     GI:  Last Bowel Movement Date: 12/09/22  Current diet:  ADULT DIET Regular  Passing flatus: YES  Tolerating current diet: YES     Pain Management:   Patient states pain is manageable on current regimen: YES     Skin:  Stevenson Score: 17  Interventions: turn team and increase time out of bed    Patient Safety:  Fall Score:  Total Score: 5  Interventions: bed/chair alarm, gripper socks, and pt to call before getting OOB  High Fall Risk: Yes     Length of Stay:  Expected LOS: 2d 14h  Actual LOS: 4

## 2022-12-14 NOTE — DISCHARGE INSTRUCTIONS
It is critical that you follow up with your physicians on an outpatient basis. PLEASE TAKE YOUR MEDICATIONS AND FOLLOW UP FOR ANY LABS AS INDICATED ON THIS DISCHARGE NOTE. Please return to the local emergency room if your symptoms worsen. You must check your blood pressure twice daily at home and keep a log to present to your primary care physician during your next visit. Please contact your PCP if you have any questions or concerns. Thank you, it was a pleasure taking care of you.

## 2022-12-14 NOTE — PROGRESS NOTES
PCP hospital follow-up transitional care appointment has been scheduled with JEREMIE Taylor on 12/19/22 at 1430. This is the first available appt due to limited provider availability. Pending patient discharge.  Monica Zazueta, Care Management Assistant

## 2022-12-15 LAB
ECHO AO ROOT DIAM: 2.4 CM
ECHO AO ROOT INDEX: 1.35 CM/M2
ECHO AR MAX VEL PISA: 4.6 M/S
ECHO AV MEAN GRADIENT: 11 MMHG
ECHO AV MEAN VELOCITY: 1.6 M/S
ECHO AV PEAK GRADIENT: 20 MMHG
ECHO AV PEAK VELOCITY: 2.2 M/S
ECHO AV REGURGITANT PHT: 351.9 MILLISECOND
ECHO AV VTI: 45.6 CM
ECHO LA DIAMETER INDEX: 1.63 CM/M2
ECHO LA DIAMETER: 2.9 CM
ECHO LA TO AORTIC ROOT RATIO: 1.21
ECHO LA VOL 4C: 49 ML (ref 22–52)
ECHO LA VOLUME INDEX A4C: 28 ML/M2 (ref 16–34)
ECHO LV EDV A4C: 58 ML
ECHO LV EDV INDEX A4C: 33 ML/M2
ECHO LV EJECTION FRACTION A4C: 46 %
ECHO LV ESV A4C: 31 ML
ECHO LV ESV INDEX A4C: 17 ML/M2
ECHO LV FRACTIONAL SHORTENING: 36 % (ref 28–44)
ECHO LV INTERNAL DIMENSION DIASTOLE INDEX: 2.19 CM/M2
ECHO LV INTERNAL DIMENSION DIASTOLIC: 3.9 CM (ref 3.9–5.3)
ECHO LV INTERNAL DIMENSION SYSTOLIC INDEX: 1.4 CM/M2
ECHO LV INTERNAL DIMENSION SYSTOLIC: 2.5 CM
ECHO LV IVSD: 1.3 CM (ref 0.6–0.9)
ECHO LV MASS 2D: 159.3 G (ref 67–162)
ECHO LV MASS INDEX 2D: 89.5 G/M2 (ref 43–95)
ECHO LV POSTERIOR WALL DIASTOLIC: 1.1 CM (ref 0.6–0.9)
ECHO LV RELATIVE WALL THICKNESS RATIO: 0.56
ECHO LVOT AREA: 3.1 CM2
ECHO LVOT DIAM: 2 CM
ECHO MV MAX VELOCITY: 0.9 M/S
ECHO MV MEAN GRADIENT: 1 MMHG
ECHO MV MEAN VELOCITY: 0.5 M/S
ECHO MV PEAK GRADIENT: 4 MMHG
ECHO MV VTI: 14.5 CM
ECHO TV REGURGITANT MAX VELOCITY: 1.21 M/S
ECHO TV REGURGITANT PEAK GRADIENT: 6 MMHG

## 2023-02-08 ENCOUNTER — OFFICE VISIT (OUTPATIENT)
Dept: NEUROLOGY | Age: 88
End: 2023-02-08

## 2023-02-08 VITALS
DIASTOLIC BLOOD PRESSURE: 52 MMHG | TEMPERATURE: 97 F | BODY MASS INDEX: 26.4 KG/M2 | WEIGHT: 154.6 LBS | HEART RATE: 70 BPM | OXYGEN SATURATION: 98 % | HEIGHT: 64 IN | RESPIRATION RATE: 18 BRPM | SYSTOLIC BLOOD PRESSURE: 110 MMHG

## 2023-02-08 DIAGNOSIS — G45.9 TIA (TRANSIENT ISCHEMIC ATTACK): Primary | ICD-10-CM

## 2023-02-08 PROCEDURE — 99215 OFFICE O/P EST HI 40 MIN: CPT | Performed by: INTERNAL MEDICINE

## 2023-02-08 PROCEDURE — 1123F ACP DISCUSS/DSCN MKR DOCD: CPT | Performed by: INTERNAL MEDICINE

## 2023-02-08 RX ORDER — DEXTROMETHORPHAN HYDROBROMIDE, GUAIFENESIN 5; 100 MG/5ML; MG/5ML
650 LIQUID ORAL EVERY 8 HOURS
COMMUNITY

## 2023-02-08 NOTE — PROGRESS NOTES
Neurology Note    Patient ID:  Ned Galarza  [id number]  80 y.o.  8/15/1932      Date of Consultation:  February 8, 2023      Assessment and Plan:  72-year-old female presenting as a hospital follow-up for TIA with right-sided weakness and dysarthria resolved completely MRI brain without any evidence of stroke. Hemoglobin A1c 5.8%. LDL 28. Patient was started on aspirin 81 mg daily and atorvastatin 80 mg daily. TTE was within normal limits without any shunt. Etiology likely small vessel however cannot rule out cardioembolic. CTA head and neck unremarkable. Recommendations:  - Continue aspirin 81 mg daily  - Continue atorvastatin 80 mg daily  - Cardiac event monitor 30 days      Problem was discussed at length with the patient. We reviewed the results of the study in detail. We also discussed prognosis and treatment options. The patient had opportunity today to ask all questions, expressed understanding of the instructions provided, and agreed with the plan of treatment. Follow up in 1 year. I spent 60 minutes providing care to this patient, reviewing the patient's chart, notes, labs, medications and preparing documentation along with >50% of the time spent counseling patient during the encounter. History of Present Illness:   Ned Galarza is a 80 y.o. female with history of CAD, hypertension, hyperlipidemia, prediabetes presenting as a follow-up from the hospital for TIA. Please see neurology consultation note:  \"Bee Pride is a 80 y.o. female with a history of hypertension, hyperlipidemia and CAD who is currently admitted to the hospital after she developed right hand weakness. Patient reports that she was at her baseline state of health and felt that she was unable to hold anything in her right hand. She denies any paresthesias or numbness in the hands.   She also noted that she had a severe headache located in the bifrontal region that traveled down to her right ear as well as her right jaw. She also reports at times she has the sensation of roaring in her right ear. Several ER visits to the hospital without cause being found. She has been taking Tylenol for her pain with some improvement. Currently she reports that her symptoms have resolved. She denies any neck pain. \"    MRI of the brain was unremarkable for stroke. CTA head and neck unremarkable. TTE was normal without any evidence of shunt. Hemoglobin A1c was 5.8%. LDL 28. Patient was started on aspirin 81 mg daily as well as atorvastatin 80 mg daily. Interval events: The patient is here today with her daughter. The patient is hard of hearing and the daughter helps to provide some history. The patient has not had any further episodes of right arm weakness or slurred speech and has not had any new neurological symptoms. She has been doing well since hospitalization. Shortly after patient's appointment, the patient had a sudden onset very brief period of slurred speech which quickly resolved. NIH stroke scale was 0. The patient's neurological exam was completely unremarkable without any new deficits. Slurred speech immediately resolved. Patient denied any chest pain, shortness of breath, headache, blurred vision, double vision, loss of vision, difficulty with language or speech. Discussed with the patient's daughter that if this reoccurs, immediately take the patient to the ER without hesitation. I also recommended that the patient take aspirin when she gets home 81 mg X 1.     Past Medical History:   Diagnosis Date    CAD (coronary artery disease)     Diverticulitis     Glaucoma     Gout     HTN (hypertension) 5/18/2018    Hx of diverticulitis of colon 5/18/2018    Hyperlipidemia     Hypertension     Other ill-defined conditions(799.89)     high cholesterol    S/P coronary artery stent placement 05/25/2018    Stroke ([address] 10/2020    TIA        Past Surgical History:   Procedure Laterality Date    HX CAROTID ENDARTERECTOMY Left 11/02/2020    HX HYSTERECTOMY      RI CARDIAC SURG PROCEDURE UNLIST  05/2018    x2 cardiac stent    RI COLONOSCOPY FLX DX W/COLLJ SPEC WHEN PFRMD  10/18/2013             Family History   Problem Relation Age of Onset    Stroke Mother     Diabetes Sister     No Known Problems Father         Social History     Tobacco Use    Smoking status: Never    Smokeless tobacco: Never   Substance Use Topics    Alcohol use: No        Allergies   Allergen Reactions    Lisinopril Cough        Prior to Admission medications    Medication Sig Start Date End Date Taking? Authorizing Provider   acetaminophen (Tylenol Arthritis Pain) 650 mg TbER Take 650 mg by mouth every eight (8) hours. Yes Provider, Historical   aspirin delayed-release 81 mg tablet Take 81 mg by mouth daily. Yes Provider, Historical   amLODIPine (NORVASC) 10 mg tablet Take 1 Tablet by mouth daily. 12/15/22   Jeremy Mcintyre,    butalbital-acetaminophen-caff (FIORICET) -40 mg per capsule Take 1 Capsule by mouth every four (4) hours as needed for Headache. 10/30/22   Michel Pichardo MD   ezetimibe (ZETIA) 10 mg tablet TAKE 1 TABLET BY MOUTH EVERY DAY 12/8/21   Julia Booth, JEREMIE   cholecalciferol (VITAMIN D3) (5000 Units/125 mcg) tab tablet Take 5,000 Units by mouth daily. Provider, Historical   naloxone (Narcan) 4 mg/actuation nasal spray Use 1 spray intranasally, then discard. Repeat with new spray every 2 min as needed for opioid overdose symptoms, alternating nostrils. 11/3/20   Jesus Alberto Carlton P, NP   polyethylene glycol (MIRALAX) 17 gram packet Take 17 g by mouth as needed for Constipation. Provider, Historical   trolamine salicylate (Aspercreme) 10 % lotion Apply  to affected area as needed. Provider, Historical   atorvastatin (LIPITOR) 80 mg tablet Take 1 Tab by mouth nightly.  10/19/20   Jorge Freeman MD   metoprolol tartrate (LOPRESSOR) 25 mg tablet Take 0.5 Tabs by mouth every twelve (12) hours. 3/13/19   Yvette Gee MD   docusate sodium (STOOL SOFTENER PO) Take  by mouth as needed. Provider, Historical   ferrous sulfate (IRON PO) Take 65 mg by mouth daily. Provider, Historical   timolol (TIMOPTIC-XE) 0.5 % ophthalmic gel-forming Administer 1 Drop to right eye daily. Provider, Historical   latanoprost (XALATAN) 0.005 % ophthalmic solution Administer 1 Drop to both eyes nightly. Indications: wide-angle glaucoma    Provider, Historical   fluticasone propionate (FLONASE) 50 mcg/actuation nasal spray 1 [place] by Both Nostrils route daily. Provider, Historical   fexofenadine (ALLEGRA) 180 mg tablet Take 180 mg by mouth nightly. Papo Kirkpatrick MD       Review of Systems:    General, constitutional: negative  Eyes, vision: negative  Ears, nose, throat: negative  Cardiovascular, heart: negative  Respiratory: negative  Gastrointestinal: negative  Genitourinary: negative  Musculoskeletal: negative  Skin and integumentary: negative  Psychiatric: negative  Endocrine: negative  Neurological: negative, except for HPI  Hematologic/lymphatic: negative  Allergy/immunology: negative    []Unable to obtain  ROS due to  []mental status change  []sedated   []intubated    Objective:     Visit Vitals  BP (!) 110/52 (BP 1 Location: Left upper arm, BP Patient Position: Sitting, BP Cuff Size: Small adult)   Pulse 70   Temp 97 °F (36.1 °C) (Temporal)   Resp 18   Ht 5' 4\" (1.626 m)   Wt 154 lb 9.6 oz (70.1 kg)   SpO2 98%   BMI 26.54 kg/m²       Physical Exam:  General:  appears well nourished in no acute distress  Neck: no obvious deformity or masses  Lungs: comfortable on room air  Heart:  well-perfused   Lower extremity: no edema  Skin: intact    Neurological exam:  Awake, alert, oriented to person, place and time  Recent and remote memory were normal  Attention and concentration were intact  Language was intact.   There was no aphasia  Speech: no dysarthria  Fund of knowledge was preserved    Cranial nerves:   II-XII were tested    PERRRLA  Visual fields were full to finger counting   EOMI, no evidence of nystagmus  Facial sensation:  normal and symmetric  Facial motor: normal and symmetric  [place]  SCM strength intact  Tongue: midline without fasciculations    Motor: Tone normal in upper and lower extremities     Strength testing:   deltoid triceps biceps Wrist ext. Wrist flex. intrinsics Hip flex. Hip ext. Knee ext. Knee flex Dorsi flex Plantar flex   Right 5 5 5 5 5 5 5 NT 5 5 5 5   Left 5 5 5 5 5 5 5 NT 5 5 5 5     Sensory:  Intact to LT throughout     Reflexes:    Right Left  Biceps  2 2  Triceps  2 2  Brachiorad. 2 2  Patella  1 1  Achilles absent bilaterally     Cerebellar testing:  no tremor apparent, finger/nose and rapid alternating movements were intact    Gait: uses a rollator for steadiness. Cautious gait     Labs:     Lab Results   Component Value Date/Time    Hemoglobin A1c 5.8 (H) 12/10/2022 01:50 PM    Sodium 133 (L) 12/14/2022 04:05 AM    Potassium 3.6 12/14/2022 04:05 AM    Chloride 101 12/14/2022 04:05 AM    Glucose 103 (H) 12/14/2022 04:05 AM    BUN 19 12/14/2022 04:05 AM    Creatinine 1.22 (H) 12/14/2022 04:05 AM    Calcium 8.9 12/14/2022 04:05 AM    WBC 7.0 12/14/2022 04:05 AM    HCT 30.1 (L) 12/14/2022 04:05 AM    HGB 9.9 (L) 12/14/2022 04:05 AM    PLATELET 888 36/36/5465 04:05 AM       Imaging:    Results from Hospital Encounter encounter on 12/09/22    MRI CERV SPINE W WO CONT    Narrative  EXAM: MRI CERV SPINE W WO CONT    INDICATION: spinal cord compression. Exam:  MRI cervical spine. Comparisons:  10/18/2020    Sequences:  Sagittal T1, T2, and STIR. Axial T1, T2. After the intravenous  administration of 15 mL of ProHance sagittal and axial T1-weighted images were  obtained . FINDINGS:  Alignment is normal.  Canal is congenitally narrowed. Multilevel  endplate degenerative change.  Subchondral cyst formation within the posterior  base of the dens unchanged. There are multifocal areas of increased T2 signal  within the cord. No abnormal enhancement. .  Paraspinous soft tissues are within  normal limits. Craniocervical junction: There is low signal material posterior to the dens. On  previous CT this was hyperdense and likely represents chronic soft tissue. May  represent pannus or chronic changes from CPPD. This results in severe narrowing  of the canal with compression of the cord. C2-C3: Disc height loss and degeneration of this disc. Disc osteophytic bulge. Thickening of the ligamentum flavum. Moderate severe narrowing of the canal with  compression of the cord. Moderate narrowing of the foramen    C3-C4: Disc osteophytic bulge. Central disc protrusion. Thickening of the  ligamentum flavum with uncovertebral degenerative change. Severe narrowing of  the canal with compression of the cord. Severe foraminal narrowing    C4-C5: Irregular disc osteophytic bulge with central protrusion with facet  arthropathy and uncovertebral degenerative change. Thickening of the ligamentum  flavum. Canal stenosis is severe with flattening of the cord. Severe narrowing  of the foramen    C5-C6: Disc osteophytic bulge. Bilateral uncovertebral degenerative change and  thickening of the ligamentum flavum. Canal stenosis is severe with flattening of  the cord. Severe bilateral foraminal narrowing    C6-C7: Disc osteophytic bulge with uncovertebral degenerative change left  greater than right. Canal stenosis is moderate to severe. Severe left and  moderate right foraminal narrowing    C7-T1: Congenitally narrowed canal.    Impression  1. 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 Severe narrowing of the canal at C1-2, moderate severe narrowing of the canal at  C2-3, severe narrowing of the canal at C3-4, C4-5 and C5-6 with multifocal areas  of T2 signal abnormality within the cord. No abnormal enhancement  2.  Hypertrophy soft tissue posterior to the dens may represent chronic pannus or  chronic CPPD      Results from Hospital Encounter encounter on 12/09/22    CT MAXILLOFACIAL WO CONT    Narrative  EXAM:  CT MAXILLOFACIAL WO CONT    INDICATION:  Fall with face injury    COMPARISON: None    TECHNIQUE: Helical CT of the facial bones with coronal and sagittal reformats. Images reviewed in soft tissue and bone windows. CT dose reduction was achieved  through use of a standardized protocol tailored for this examination and  automatic exposure control for dose modulation. CONTRAST: None. FINDINGS:  There is no acute fracture. The nasal septum is midline. The temporomandibular  joints are intact. Cervical spine degenerative changes are noted. There is mild right periorbital soft tissue edema. The globes and orbits are  symmetric and within normal limits. The paranasal sinuses and visualized mastoid air cells are clear. Limited intracranial images are unremarkable. Impression  No acute fracture. Mild right periorbital soft tissue edema.              Patient Active Problem List   Diagnosis Code    Hx of diverticulitis of colon Z87.19    Gout M10.9    S/P coronary artery stent placement Z95.5    Coronary artery disease involving native coronary artery of native heart without angina pectoris I25.10    Essential hypertension I10    High cholesterol E78.00    TIA (transient ischemic attack) G45.9    Bilateral carotid artery stenosis I65.23    Carotid stenosis, symptomatic w/o infarct, left I65.22    Carotid stenosis, asymptomatic, right I65.21    Mixed hyperlipidemia E78.2    Meningitis G03.9    High fever R50.9    Accelerated hypertension I10    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 I63.9                   Signed By:   Gisela Yanez DO  Neurophysiology      February 8, 2023

## 2023-02-08 NOTE — PROGRESS NOTES
Chief Complaint   Patient presents with    Hospital Follow Up     ED [facility] 12/9-12/14/22 for CVA- was having terrible pains in head in hosp - taking tyl arth which helps some     Numbness     In feet and they stay cold      1. Have you been to the ER, urgent care clinic since your last visit? Hospitalized since your last visit? No     2. Have you seen or consulted any other health care providers outside of the [address] [place] [address] since your last visit? Include any pap smears or colon screening.   No

## 2023-02-24 ENCOUNTER — HOSPITAL ENCOUNTER (OUTPATIENT)
Dept: NON INVASIVE DIAGNOSTICS | Age: 88
Discharge: HOME | End: 2023-02-24
Attending: INTERNAL MEDICINE
Payer: MEDICARE

## 2023-02-24 DIAGNOSIS — G45.9 TIA (TRANSIENT ISCHEMIC ATTACK): ICD-10-CM

## 2023-02-24 PROCEDURE — 93271 ECG/MONITORING AND ANALYSIS: CPT

## 2023-03-21 ENCOUNTER — TRANSCRIBE ORDER (OUTPATIENT)
Dept: REGISTRATION | Age: 88
End: 2023-03-21

## 2023-03-21 ENCOUNTER — HOSPITAL ENCOUNTER (OUTPATIENT)
Dept: GENERAL RADIOLOGY | Age: 88
Discharge: HOME | End: 2023-03-21
Payer: MEDICARE

## 2023-03-21 DIAGNOSIS — I44.2 ATRIOVENTRICULAR BLOCK, COMPLETE (HCC): Primary | ICD-10-CM

## 2023-03-21 DIAGNOSIS — I44.2 ATRIOVENTRICULAR BLOCK, COMPLETE (HCC): ICD-10-CM

## 2023-03-21 PROCEDURE — 71046 X-RAY EXAM CHEST 2 VIEWS: CPT

## 2023-03-23 ENCOUNTER — TELEPHONE (OUTPATIENT)
Dept: NEUROLOGY | Age: 88
End: 2023-03-23

## 2023-03-24 NOTE — TELEPHONE ENCOUNTER
Patient's daughter, Destiny Bullock, called to see if Dr Zoë Thorne is okay with Pt having the pacemaker put in on Monday 3/27 @6:30 am. Destiny Bullock just wants to make sure that everyone is on the same page and that this will be okay for her mother to have done at this time.  Please call [phone]

## 2023-03-27 ENCOUNTER — ANESTHESIA EVENT (OUTPATIENT)
Dept: CARDIAC CATH/INVASIVE PROCEDURES | Age: 88
End: 2023-03-27
Payer: MEDICARE

## 2023-03-27 ENCOUNTER — ANESTHESIA (OUTPATIENT)
Dept: CARDIAC CATH/INVASIVE PROCEDURES | Age: 88
End: 2023-03-27
Payer: MEDICARE

## 2023-03-27 ENCOUNTER — HOSPITAL ENCOUNTER (OUTPATIENT)
Age: 88
Discharge: HOME | End: 2023-03-27
Attending: INTERNAL MEDICINE | Admitting: INTERNAL MEDICINE
Payer: MEDICARE

## 2023-03-27 VITALS
OXYGEN SATURATION: 94 % | BODY MASS INDEX: 26.12 KG/M2 | HEART RATE: 78 BPM | DIASTOLIC BLOOD PRESSURE: 40 MMHG | HEIGHT: 64 IN | WEIGHT: 153 LBS | TEMPERATURE: 96.5 F | RESPIRATION RATE: 25 BRPM | SYSTOLIC BLOOD PRESSURE: 126 MMHG

## 2023-03-27 DIAGNOSIS — R00.1 BRADYCARDIA: ICD-10-CM

## 2023-03-27 PROBLEM — I44.2 CHB (COMPLETE HEART BLOCK) (HCC): Status: ACTIVE | Noted: 2023-03-27

## 2023-03-27 PROCEDURE — 74011250636 HC RX REV CODE- 250/636: Performed by: INTERNAL MEDICINE

## 2023-03-27 PROCEDURE — 74011000250 HC RX REV CODE- 250: Performed by: INTERNAL MEDICINE

## 2023-03-27 PROCEDURE — 77030029065 HC DRSG HEMO QCLOT ZMED -B: Performed by: INTERNAL MEDICINE

## 2023-03-27 PROCEDURE — 74011250636 HC RX REV CODE- 250/636: Performed by: NURSE ANESTHETIST, CERTIFIED REGISTERED

## 2023-03-27 PROCEDURE — 77030012929 HC DIL URET COOK -C: Performed by: INTERNAL MEDICINE

## 2023-03-27 PROCEDURE — C1894 INTRO/SHEATH, NON-LASER: HCPCS | Performed by: INTERNAL MEDICINE

## 2023-03-27 PROCEDURE — C1786 PMKR, SINGLE, RATE-RESP: HCPCS | Performed by: INTERNAL MEDICINE

## 2023-03-27 PROCEDURE — C1769 GUIDE WIRE: HCPCS | Performed by: INTERNAL MEDICINE

## 2023-03-27 PROCEDURE — 77030013797 HC KT TRNSDUC PRSSR EDWD -A: Performed by: INTERNAL MEDICINE

## 2023-03-27 PROCEDURE — 76060000033 HC ANESTHESIA 1 TO 1.5 HR: Performed by: INTERNAL MEDICINE

## 2023-03-27 PROCEDURE — 77030002996 HC SUT SLK J&J -A: Performed by: INTERNAL MEDICINE

## 2023-03-27 PROCEDURE — 33274 TCAT INSJ/RPL PERM LDLS PM: CPT | Performed by: INTERNAL MEDICINE

## 2023-03-27 PROCEDURE — 2709999900 HC NON-CHARGEABLE SUPPLY: Performed by: INTERNAL MEDICINE

## 2023-03-27 DEVICE — SYS PACING TRANSCATHETER -- BRADY- MICRA AV: Type: IMPLANTABLE DEVICE | Status: FUNCTIONAL

## 2023-03-27 RX ORDER — HEPARIN SODIUM 200 [USP'U]/100ML
INJECTION, SOLUTION INTRAVENOUS
Status: COMPLETED | OUTPATIENT
Start: 2023-03-27 | End: 2023-03-27

## 2023-03-27 RX ORDER — PROPOFOL 10 MG/ML
INJECTION, EMULSION INTRAVENOUS
Status: DISCONTINUED | OUTPATIENT
Start: 2023-03-27 | End: 2023-03-27 | Stop reason: HOSPADM

## 2023-03-27 RX ORDER — PHENYLEPHRINE HCL IN 0.9% NACL 0.4MG/10ML
SYRINGE (ML) INTRAVENOUS AS NEEDED
Status: DISCONTINUED | OUTPATIENT
Start: 2023-03-27 | End: 2023-03-27 | Stop reason: HOSPADM

## 2023-03-27 RX ORDER — SODIUM CHLORIDE 0.9 % (FLUSH) 0.9 %
5-40 SYRINGE (ML) INJECTION AS NEEDED
Status: DISCONTINUED | OUTPATIENT
Start: 2023-03-27 | End: 2023-03-27 | Stop reason: HOSPADM

## 2023-03-27 RX ORDER — GABAPENTIN 100 MG/1
100 CAPSULE ORAL 3 TIMES DAILY
COMMUNITY

## 2023-03-27 RX ORDER — HYDROCODONE BITARTRATE AND ACETAMINOPHEN 5; 325 MG/1; MG/1
1 TABLET ORAL
Status: DISCONTINUED | OUTPATIENT
Start: 2023-03-27 | End: 2023-03-27 | Stop reason: HOSPADM

## 2023-03-27 RX ORDER — HEPARIN SODIUM 1000 [USP'U]/ML
INJECTION, SOLUTION INTRAVENOUS; SUBCUTANEOUS AS NEEDED
Status: DISCONTINUED | OUTPATIENT
Start: 2023-03-27 | End: 2023-03-27 | Stop reason: HOSPADM

## 2023-03-27 RX ORDER — ACETAMINOPHEN 325 MG/1
650 TABLET ORAL
Status: DISCONTINUED | OUTPATIENT
Start: 2023-03-27 | End: 2023-03-27 | Stop reason: HOSPADM

## 2023-03-27 RX ORDER — LIDOCAINE HYDROCHLORIDE 10 MG/ML
INJECTION INFILTRATION; PERINEURAL AS NEEDED
Status: DISCONTINUED | OUTPATIENT
Start: 2023-03-27 | End: 2023-03-27 | Stop reason: HOSPADM

## 2023-03-27 RX ORDER — FENTANYL CITRATE 50 UG/ML
INJECTION, SOLUTION INTRAMUSCULAR; INTRAVENOUS AS NEEDED
Status: DISCONTINUED | OUTPATIENT
Start: 2023-03-27 | End: 2023-03-27 | Stop reason: HOSPADM

## 2023-03-27 RX ORDER — LOSARTAN POTASSIUM 50 MG/1
TABLET ORAL DAILY
COMMUNITY

## 2023-03-27 RX ORDER — CEFAZOLIN SODIUM 1 G/3ML
INJECTION, POWDER, FOR SOLUTION INTRAMUSCULAR; INTRAVENOUS AS NEEDED
Status: DISCONTINUED | OUTPATIENT
Start: 2023-03-27 | End: 2023-03-27 | Stop reason: HOSPADM

## 2023-03-27 RX ORDER — HYDROCHLOROTHIAZIDE 12.5 MG/1
12.5 CAPSULE ORAL DAILY
COMMUNITY

## 2023-03-27 RX ORDER — SODIUM CHLORIDE 0.9 % (FLUSH) 0.9 %
5-40 SYRINGE (ML) INJECTION EVERY 8 HOURS
Status: DISCONTINUED | OUTPATIENT
Start: 2023-03-27 | End: 2023-03-27 | Stop reason: HOSPADM

## 2023-03-27 RX ADMIN — CEFAZOLIN 2 G: 1 INJECTION, POWDER, FOR SOLUTION INTRAMUSCULAR; INTRAVENOUS; PARENTERAL at 08:19

## 2023-03-27 RX ADMIN — FENTANYL CITRATE 25 MCG: 50 INJECTION, SOLUTION INTRAMUSCULAR; INTRAVENOUS at 08:10

## 2023-03-27 RX ADMIN — Medication 40 MCG: at 08:39

## 2023-03-27 RX ADMIN — Medication 40 MCG: at 08:26

## 2023-03-27 RX ADMIN — FENTANYL CITRATE 25 MCG: 50 INJECTION, SOLUTION INTRAMUSCULAR; INTRAVENOUS at 08:37

## 2023-03-27 RX ADMIN — Medication 40 MCG: at 08:37

## 2023-03-27 RX ADMIN — PROPOFOL 50 MCG/KG/MIN: 10 INJECTION, EMULSION INTRAVENOUS at 08:10

## 2023-03-27 RX ADMIN — HEPARIN SODIUM 3000 UNITS: 1000 INJECTION, SOLUTION INTRAVENOUS; SUBCUTANEOUS at 08:42

## 2023-03-27 RX ADMIN — Medication 80 MCG: at 08:46

## 2023-03-27 RX ADMIN — Medication 80 MCG: at 08:53

## 2023-03-27 RX ADMIN — Medication 80 MCG: at 08:50

## 2023-03-27 NOTE — ANESTHESIA POSTPROCEDURE EVALUATION
Post-Anesthesia Evaluation and Assessment    Patient: Sameera Carroll MRN: [id number]  SSN: [id number]    YOB: 1932  Age: 80 y.o. Sex: female      I have evaluated the patient and they are stable and ready for discharge from the PACU. Cardiovascular Function/Vital Signs  Visit Vitals  BP (!) 124/46   Pulse (!) 57   Temp (!) 35.8 °C (96.5 °F)   Resp 12   Ht 5' 4\" (1.626 m)   Wt 69.4 kg (153 lb)   SpO2 98%   BMI 26.26 kg/m²       Patient is status post MAC anesthesia for Procedure(s):  INSERT OR REPLACE TRANSCATH PPM LEADLESS. Nausea/Vomiting: None    Postoperative hydration reviewed and adequate. Pain:  Pain Scale 1: Numeric (0 - 10) (03/27/23 0945)  Pain Intensity 1: 0 (03/27/23 0945)   Managed    Neurological Status: At baseline    Mental Status, Level of Consciousness: Alert and  oriented to person, place, and time    Pulmonary Status:   O2 Device: Nasal cannula (03/27/23 0945)   Adequate oxygenation and airway patent    Complications related to anesthesia: None    Post-anesthesia assessment completed.  No concerns    Signed By: Norma Quinonez MD     March 27, 2023

## 2023-03-27 NOTE — ANESTHESIA PREPROCEDURE EVALUATION
Anesthetic History   No history of anesthetic complications            Review of Systems / Medical History  Patient summary reviewed, nursing notes reviewed and pertinent labs reviewed    Pulmonary  Within defined limits                 Neuro/Psych       CVA  TIA    Comments: Glaucoma Cardiovascular    Hypertension: well controlled          Past MI, CAD, PAD, cardiac stents and hyperlipidemia    Exercise tolerance: <4 METS  Comments: Bilateral Carotid Stenosis S/P b/l CEA     ECG   Sinus  Rhythm    First degree A-V block   Diffuse nonspecific T-abnormality.   Left Ventricle: Normal left ventricular systolic function with a visually estimated EF of 55 - 60%. Left ventricle size is normal. Normal wall thickness. Normal wall motion. Normal diastolic function.   Aortic Valve: Mild regurgitation. Mild stenosis of the aortic valve.      GI/Hepatic/Renal         Renal disease: CRI      Comments: H/O Diverticulosis/Diverticulitis Endo/Other        Arthritis and anemia     Other Findings   Comments: Gout           Physical Exam    Airway  Mallampati: II  TM Distance: 4 - 6 cm  Neck ROM: normal range of motion   Mouth opening: Normal     Cardiovascular  Regular rate and rhythm,  S1 and S2 normal,  no murmur, click, rub, or gallop             Dental    Dentition: Full upper dentures and Lower partial plate     Pulmonary  Breath sounds clear to auscultation               Abdominal  GI exam deferred       Other Findings            Anesthetic Plan    ASA: 3  Anesthesia type: MAC and general - backup          Induction: Intravenous  Anesthetic plan and risks discussed with: Patient

## 2023-03-27 NOTE — DISCHARGE INSTRUCTIONS
[facility]  [address]  [phone]  [place]. [place] [address] [address] INSTRUCTIONS    Patient ID:  Kem Morin  [id number]  80 y.o.  8/15/1932    Admit Date: 3/27/2023    Discharge Date: 3/27/2023     Admitting Physician: [unfilled]     Discharge Physician: Melita Driscoll MD    Admission Diagnoses:   Bradycardia [R00.1]  CHB (complete heart block) [facility]) [I44.2]    Discharge Diagnoses: Active Problems:    CHB (complete heart block) ([address] (3/27/2023)        Discharge Condition: {condition:37685165}    Cardiology Procedures this Admission:  Leadless pacemaker    Disposition: {disposition:79048}    Reference discharge instructions provided by nursing for diet and activity. Follow-up with our pacemaker clinic in 1 week. Call [phone] to make an appointment. Signed:  Melita Driscoll MD  3/27/2023  9:02 AM    S/P LEADLESS PACEMAKER DISCHARGE INSTRUCTIONS    It is normal to feel tired the first couple days. Take it easy and follow the physicians instructions. CHECK THE CATHETER INSERTION SITE DAILY:  You may shower 24 hours after the procedure, remove the bandage during showering. Wash with soap and water and pat dry. Gentle cleaning of the site with soap and water is sufficient, cover with a dry clean dressing or bandage. Do not apply creams or powders to the area. Do not sit in a bathtub or pool of water for 7 days or until wound has completely healed. Temporary bruising and discomfort is normal and may last a few weeks. You may have a  formation of a small lump at the site which may last up to 6 weeks. CALL THE PHYSICIAN:  If the site becomes red, swollen or feels warm to the touch  If there is bleeding or drainage or if there is unusual pain at the groin or down the leg.   If there is any bleeding, lie down, apply pressure or have someone apply pressure with a clean cloth until the bleeding stops. If the bleeding continues, call 911 to be transported to the hospital.  DO NOT DRIVE YOURSELF, [address] Activity:      For the first 24-48 hours or as instructed by the physician:  No lifting, pushing or pulling over 5 pounds and no straining the insertion site. Do not lift grocery bags or the garbage can, do not run the vacuum  or  for 7 days. Start with short walks as in the hospital and gradually increase as tolerated each day. It is recommended to walk 30 minutes 5-7 days per week. Follow your physicians instructions on activity. Avoid walking outside in extremes of heat or cold. Walk inside when it is cold and windy or hot and humid. Things to keep in mind:  No driving for at least 5 days   Limit the number of times you go up and down the stairs  Take rests and pace yourself with activity. Be careful and do not strain with bowel movements. Medications: Take all medications as prescribed  Call your physician if you have any questions  Keep an updated list of your medications with you at all times and give a list to your physician and pharmacist    Signs and Symptoms:  Be cautious of symptoms of angina or recurrent symptoms such as chest discomfort, unusual shortness of breath or fatigue, palpitations. After Care: Follow up with your physician as instructed. Follow a heart healthy diet with proper portion control, daily stress management, daily exercise, blood pressure and cholesterol control , and smoking cessation.

## 2023-03-27 NOTE — PROGRESS NOTES
Patient's activity level returned to baseline. Discharge instructions reviewed with the patient and her daughter. All questions and concerns were answered. Saline locked IV removed without complication. Patient wheeled to hospital entrance where her daughter was waiting to drive home.

## 2023-03-27 NOTE — PROGRESS NOTES
Primary Nurse Lubna Acosta RN and Alejandro Carson RN performed a dual skin assessment on this patient No impairment noted  Stevenson score is 21    Patient refused meplex on sacrum

## 2023-03-27 NOTE — PROGRESS NOTES
Cardiac Cath Lab Recovery Arrival Note:      Viktoria Saucedo arrived to Cardiac Cath Lab, Recovery Area. Staff introduced to patient. Patient identifiers verified with NAME and DATE OF BIRTH. Procedure verified with patient. Consent forms reviewed and signed by patient or authorized representative and verified. Allergies verified. Patient and family oriented to department. Patient and family informed of procedure and plan of care. Questions answered with review. Patient prepped for procedure, per orders from physician, prior to arrival.    Patient on cardiac monitor, non-invasive blood pressure, SPO2 monitor. On room air. Patient is A&Ox 4. Patient reports no complaints. Patient in stretcher, in low position, with side rails up, call bell within reach, patient instructed to call if assistance as needed. Patient prep in: [address], [place] [address]. Patient family has pager # [phone]  Family in: Daughter Arron.    Prep by: Mary Powers RN

## 2023-03-27 NOTE — Clinical Note
Sheath #1: sheath exchanged for [facility] CATH 23F 55. 7CM -- . Hemostasis achieved.  Micra sheath aspirated and flushed

## 2023-03-27 NOTE — PROGRESS NOTES
TRANSFER - IN REPORT:    Verbal report received from NIDA Vora and ALE Oscar on Cisco Buck  being received from EP Lab for routine progression of care. Report consisted of patients Situation, Background, Assessment and Recommendations(SBAR). Information from the following report(s) SBAR, Procedure Summary, MAR, and Recent Results was reviewed with the receiving clinician. Opportunity for questions and clarification was provided. Assessment completed upon patients arrival to [address] [place] [address] and care assumed. Cardiac Cath Lab Recovery Arrival Note:    Cisco Buck arrived to [facility] recovery area. Patient procedure= pacemaker insertion. Patient on cardiac monitor, non-invasive blood pressure, SPO2 monitor. On  O2 @ 5 lpm via nasal cannula. Normal saline at 150 ml/min. Patient status doing well without problems. Patient is A&Ox 4. Patient reports no complaints. PROCEDURE SITE CHECK:    Procedure site:without any bleeding and no hematoma, no pain/discomfort reported at procedure site. No change in patient status. Continue to monitor patient and status.

## 2024-02-01 ENCOUNTER — OFFICE VISIT (OUTPATIENT)
Age: 89
End: 2024-02-01
Payer: MEDICARE

## 2024-02-01 VITALS
BODY MASS INDEX: 25.61 KG/M2 | HEIGHT: 64 IN | RESPIRATION RATE: 16 BRPM | WEIGHT: 150 LBS | OXYGEN SATURATION: 98 % | SYSTOLIC BLOOD PRESSURE: 112 MMHG | HEART RATE: 68 BPM | DIASTOLIC BLOOD PRESSURE: 56 MMHG

## 2024-02-01 DIAGNOSIS — E56.9 VITAMIN DEFICIENCY: ICD-10-CM

## 2024-02-01 DIAGNOSIS — Z91.89 STROKE RISK: ICD-10-CM

## 2024-02-01 DIAGNOSIS — E03.8 TSH DEFICIENCY: ICD-10-CM

## 2024-02-01 DIAGNOSIS — R20.2 PARESTHESIA OF UPPER AND LOWER EXTREMITIES OF BOTH SIDES: ICD-10-CM

## 2024-02-01 DIAGNOSIS — R20.2 PARESTHESIA OF UPPER AND LOWER EXTREMITIES OF BOTH SIDES: Primary | ICD-10-CM

## 2024-02-01 DIAGNOSIS — R73.03 PREDIABETES: ICD-10-CM

## 2024-02-01 DIAGNOSIS — R26.9 GAIT ABNORMALITY: ICD-10-CM

## 2024-02-01 PROCEDURE — 1090F PRES/ABSN URINE INCON ASSESS: CPT

## 2024-02-01 PROCEDURE — 1036F TOBACCO NON-USER: CPT

## 2024-02-01 PROCEDURE — G8427 DOCREV CUR MEDS BY ELIG CLIN: HCPCS

## 2024-02-01 PROCEDURE — 1123F ACP DISCUSS/DSCN MKR DOCD: CPT

## 2024-02-01 PROCEDURE — 99215 OFFICE O/P EST HI 40 MIN: CPT

## 2024-02-01 PROCEDURE — G8484 FLU IMMUNIZE NO ADMIN: HCPCS

## 2024-02-01 PROCEDURE — G8419 CALC BMI OUT NRM PARAM NOF/U: HCPCS

## 2024-02-01 RX ORDER — LOSARTAN POTASSIUM 50 MG/1
50 TABLET ORAL DAILY
COMMUNITY

## 2024-02-01 RX ORDER — HYDROCHLOROTHIAZIDE 12.5 MG/1
12.5 CAPSULE, GELATIN COATED ORAL EVERY MORNING
COMMUNITY

## 2024-02-01 RX ORDER — GABAPENTIN 300 MG/1
300 CAPSULE ORAL NIGHTLY
COMMUNITY

## 2024-02-01 RX ORDER — FERROUS SULFATE 325(65) MG
325 TABLET ORAL
COMMUNITY

## 2024-02-01 RX ORDER — ALPRAZOLAM 0.25 MG/1
0.25 TABLET ORAL DAILY PRN
COMMUNITY

## 2024-02-01 ASSESSMENT — ENCOUNTER SYMPTOMS
RESPIRATORY NEGATIVE: 1
BACK PAIN: 1
ALLERGIC/IMMUNOLOGIC NEGATIVE: 1
GASTROINTESTINAL NEGATIVE: 1
EYES NEGATIVE: 1

## 2024-02-01 ASSESSMENT — PATIENT HEALTH QUESTIONNAIRE - PHQ9
SUM OF ALL RESPONSES TO PHQ QUESTIONS 1-9: 0
SUM OF ALL RESPONSES TO PHQ9 QUESTIONS 1 & 2: 0
SUM OF ALL RESPONSES TO PHQ QUESTIONS 1-9: 0
1. LITTLE INTEREST OR PLEASURE IN DOING THINGS: 0
2. FEELING DOWN, DEPRESSED OR HOPELESS: 0

## 2024-02-01 NOTE — PATIENT INSTRUCTIONS
As per our discussion,    As per TIA's perspective, you are doing well.    Continue on aspirin 81 mg and atorvastatin 80 mg daily.    Continue to work to all of his comorbid conditions as managed by PCP and other specialists as appropriate    RECOMMENDATIONS:  - BP goal is less than 140/90  - Goal HbA1c is less than 7.   - LDL less than 70      Education today in regards to risk factors for stroke and lifestyle modifications to help minimize the risk of future stroke.  This included medication compliance, regular follow up with primary care physician,  and healthy lifestyle habits (nutrition/exercise)     As for your numbness on the legs and the arms, your symptoms most likely related due to neuropathy, will obtain an EMG of the upper and lower extremities to rule out any neuropathy.  I will also order some labs to see if there are any deficiencies that may have contributed to her symptoms.    It was a pleasure to meet you and your daughter today    Will see you back in 6 months or sooner if needed.    Please do not hesitate to reach out for any questions or concerns.

## 2024-02-02 ENCOUNTER — TELEPHONE (OUTPATIENT)
Age: 89
End: 2024-02-02

## 2024-02-02 LAB — ERYTHROCYTE [SEDIMENTATION RATE] IN BLOOD: 46 MM/HR (ref 0–30)

## 2024-02-02 NOTE — ASSESSMENT & PLAN NOTE
Patient was found to be prediabetic with a hemoglobin A1c of 5.8 on 12/10/2022    Will order an updated level of hemoglobin A1c since she has not had it checked since.

## 2024-02-02 NOTE — ASSESSMENT & PLAN NOTE
Patient verbalized experiencing paresthesias in bilateral upper extremity for the past 2 months.    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  Severe narrowing of the canal at C1-2, moderate severe narrowing of the canal at C2-3, severe narrowing of the canal at C3-4, C4-5 and C5-6 with multifocal areas of T2 signal abnormality within the cord.  No abnormal enhancement.    Will obtain EMG of the bilateral upper extremity to rule out any etiology of her symptoms.  Based on what EMG shows, will make recommendation.  May need to repeat MRI of the cervical to evaluate for the degree of stenosis.  If cervical stenosis worsens, may consider evaluation to neurosurgery for evaluation.    Patient verbalized experiencing paresthesias in bilateral lower extremities.  This could possibly related due to neuropathy given she was found to be prediabetic.    Will obtain EMG of bilateral lower extremity to rule out lumbar radiculopathy, peripheral neuropathy, myopathy.  Will make recommendation based on what EMG shows.    Will also obtain labs to rule out any abnormality that may contribute to her worsening in symptoms.    Will not make any changes on her medications at this time.  Patient is to continue to take gabapentin 300 mg in the morning and 900 mg at bedtime.    Patient was advised to continue to follow-up with primary care provider and her other specialists for other comorbid conditions as appropriate.    Diabetic education was also provided to patient.  She was advised to remain active, adopt healthy lifestyles which include nutrition and exercise to help alleviate her symptoms.    Fall safety was also discussed with patient.

## 2024-02-02 NOTE — ASSESSMENT & PLAN NOTE
Her current examination does reveal unsteady gait    Differential diagnosis includes lumbar radiculopathy, peripheral neuropathy, myopathy, degenerative disease    Will obtain EMG of the lower extremities to rule out any neuropathy, lumbar radiculopathy.    Labs were also obtained to rule out any deficiencies or abnormality that may contribute to her symptoms.    Patient denied any lower back pain at this time.  If she start experiencing back pain, no recommend that she goes back to pain management for evaluation and management to see if that helps improve her gait.  If her symptoms worsen, recommend additional physical therapy.    Fall safety was extensively explained to patient and family.

## 2024-02-02 NOTE — ASSESSMENT & PLAN NOTE
Stable without recurrence of symptoms    Patient is to continue on aspirin 81 mg and atorvastatin 80 mg daily.    Patient is to continue to work to all of his comorbid conditions as managed by PCP and other specialists as appropriate    Patient is to continue to follow with cardiology as appropriate.    RECOMMENDATIONS:  - BP goal is less than 140/90  - Goal HbA1c is less than 7.   - LDL less than 70     TIA education was provided today in regards to risk factors for stroke and lifestyle modifications to help minimize the risk of future stroke.    This included medication compliance, regular follow up with primary care physician,  and healthy lifestyle habits (nutrition/exercise).

## 2024-02-02 NOTE — TELEPHONE ENCOUNTER
----- Message from LOULOU Arellano NP sent at 2/1/2024 10:21 PM EST -----  Regarding: Adding hemoglobin A1c  Chetna,    Could you please check to see if the lab can add hemoglobin A1c?    Thank you        Faxed signed lab order for added on Hemoglobin A1C. Faxed to lab at [phone] and received fax confirmation.

## 2024-02-03 LAB
ACE SERPL-CCNC: 83 U/L (ref 14–82)
CENTROMERE B AB SER-ACNC: <0.2 AI (ref 0–0.9)
CHROMATIN AB SERPL-ACNC: <0.2 AI (ref 0–0.9)
DSDNA AB SER-ACNC: <1 IU/ML (ref 0–9)
ENA JO1 AB SER-ACNC: <0.2 AI (ref 0–0.9)
ENA RNP AB SER-ACNC: <0.2 AI (ref 0–0.9)
ENA SCL70 AB SER-ACNC: <0.2 AI (ref 0–0.9)
ENA SM AB SER-ACNC: <0.2 AI (ref 0–0.9)
ENA SM+RNP AB SER-ACNC: <0.2 AI (ref 0–0.9)
ENA SS-A AB SER-ACNC: <0.2 AI (ref 0–0.9)
ENA SS-B AB SER-ACNC: <0.2 AI (ref 0–0.9)
EST. AVERAGE GLUCOSE BLD GHB EST-MCNC: 120 MG/DL
HBA1C MFR BLD: 5.8 % (ref 4–5.6)
RIBOSOMAL P AB SER-ACNC: <0.2 AI (ref 0–0.9)
SEE BELOW:, 164879: NORMAL
TSH SERPL DL<=0.05 MIU/L-ACNC: 1.41 UIU/ML (ref 0.36–3.74)
VIT B12 SERPL-MCNC: 466 PG/ML (ref 193–986)

## 2024-02-04 LAB
CCP IGA+IGG SERPL IA-ACNC: 5 UNITS (ref 0–19)
RHEUMATOID FACT SERPL-ACNC: <10 IU/ML

## 2024-02-06 NOTE — RESULT ENCOUNTER NOTE
Ms. Yip,  The rest of your labs came back unremarkable.  No additional intervention needed at this time.  Will continue with same plan of care.    Thank you

## 2024-02-07 LAB — METHYLMALONATE SERPL-SCNC: 250 NMOL/L (ref 0–378)

## 2024-02-08 ENCOUNTER — TELEPHONE (OUTPATIENT)
Age: 89
End: 2024-02-08

## 2024-02-08 LAB
ALBUMIN SERPL ELPH-MCNC: 3.6 G/DL (ref 2.9–4.4)
ALBUMIN/GLOB SERPL: 1 (ref 0.7–1.7)
ALPHA1 GLOB SERPL ELPH-MCNC: 0.3 G/DL (ref 0–0.4)
ALPHA2 GLOB SERPL ELPH-MCNC: 0.9 G/DL (ref 0.4–1)
B-GLOBULIN SERPL ELPH-MCNC: 1.3 G/DL (ref 0.7–1.3)
GAMMA GLOB SERPL ELPH-MCNC: 1.2 G/DL (ref 0.4–1.8)
GLOBULIN SER-MCNC: 3.7 G/DL (ref 2.2–3.9)
IGA SERPL-MCNC: 506 MG/DL (ref 64–422)
IGG SERPL-MCNC: 1616 MG/DL (ref 586–1602)
IGM SERPL-MCNC: 31 MG/DL (ref 26–217)
INTERPRETATION SERPL IEP-IMP: ABNORMAL
M PROTEIN SERPL ELPH-MCNC: ABNORMAL G/DL
PROT SERPL-MCNC: 7.3 G/DL (ref 6–8.5)

## 2024-02-08 NOTE — TELEPHONE ENCOUNTER
----- Message from LOULOU Arellano NP sent at 2/6/2024  3:00 PM EST -----  Ms. Yip,    The hemoglobin A1c came back at 5.8 which is considered to be prediabetic.  I encouraged that you continue to follow-up with your primary care provider and your other specialists as appropriate.    I also encouraged you to adopt healthy lifestyles which include nutrition and exercise to help managing your comorbidities.    Thank you          Message  Received: 2 days ago  Ahmet Hunter APRN - NP Rhoades, Dawn M, HEMA  Ms. Yip,  The rest of your labs came back unremarkable.  No additional intervention needed at this time.  Will continue with same plan of care.    Thank you            Called pt, verified pt with two pt identifiers, spoke with pt and daughter Giana on the phone together.  Relayed both results to pt and daughter above. They both verbalized understanding and thanked me for the call.

## 2024-02-18 LAB — VIT B6 SERPL-MCNC: 18.6 UG/L (ref 3.4–65.2)

## 2024-02-28 ENCOUNTER — TELEPHONE (OUTPATIENT)
Age: 89
End: 2024-02-28

## 2024-02-28 NOTE — TELEPHONE ENCOUNTER
----- Message from LOULOU Arellano NP sent at 2/23/2024  5:53 PM EST -----  Ms. Yip,    Most of your labs came back unremarkable.  You may have seen some levels are elevated but these levels are not to be worry about and the levels that I am looking for came back normal.    There is no additional recommendation at this time.  Will continue with same plan of care.    Thank you      Called pt,verified pt with two pt identifiers, advised pt her labs came back unremarkable. Advised to keep her current appt tomorrow. Pt verbalized understanding.

## 2024-02-29 ENCOUNTER — PROCEDURE VISIT (OUTPATIENT)
Age: 89
End: 2024-02-29

## 2024-02-29 DIAGNOSIS — G56.02 CARPAL TUNNEL SYNDROME, LEFT: Primary | ICD-10-CM

## 2024-02-29 DIAGNOSIS — M54.16 LUMBAR RADICULOPATHY: ICD-10-CM

## 2024-02-29 DIAGNOSIS — R26.9 GAIT ABNORMALITY: ICD-10-CM

## 2024-02-29 NOTE — PROGRESS NOTES
ELECTRODIANOSTIC REPORT  Test Date:  2024    Patient: Tomeka Yip : 8/15/1932 Physician: Dr. Batres   ID#: [id number] SEX: Female Ref. Phys: Philip Hunter NP     Patient History / Exam:    The patient is a pleasant 89-year-old female who presents with sensory disturbance, numbness and tingling in her bilateral hands as well as in her feet.  Her examination does reveal sensory loss probably in the median distribution.  She does have reduced reflexes and an antalgic gait.    EMG & NCV Findings:    Nerve conduction studies as listed below were normal for the left sural sensory and superficial fibular sensory.  The left ulnar sensory and radial sensory were also normal.  The left median sensory was absent.  The left fibular motor, tibial motor and ulnar motor nerve conduction studies were normal.  The left median motor revealed a prolongation of distal motor latency with slightly reduced amplitude.  The left median mixed nerve study was prolonged with a greater than 0.4 ms difference between the median and ulnar    Disposable concentric needle examination of the muscles listed below in the left upper and lower extremity did reveal increased insertional activity with fibrillations and positive waves and reduced recruitment in the left medial gastroc, peroneus longus, tibialis anterior, tensor fascia alma and lower lumbar paraspinal musculature.  There is also increased insertional activity with fibrillations and positive in the left abductor pollicis brevis.    Impression:    This study was abnormal.  There is electrodiagnostic evidence upon today's examination suggestin.  A lower lumbar radiculopathy with evidence of active denervation.  Please correlate these findings with lumbar spine neuroimaging    2.  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  There is no evidence of other focal neuropathy, a generalized large fiber

## 2024-03-04 ENCOUNTER — TELEPHONE (OUTPATIENT)
Age: 89
End: 2024-03-04

## 2024-03-04 DIAGNOSIS — G56.02 CARPAL TUNNEL SYNDROME, LEFT: ICD-10-CM

## 2024-03-04 DIAGNOSIS — M54.16 LUMBAR RADICULOPATHY: Primary | ICD-10-CM

## 2024-03-04 NOTE — TELEPHONE ENCOUNTER
Ms. Yip,    The EMG that was completed to see what may have caused your symptoms in your hands and feet came back.  It did show carpal tunnel syndrome on the left hand.  For carpal tunnel,  I will send a prescription for a hand brace in which you can wear at bedtime on the left hand.      EMG did not show any neuropathy on the legs but it did show that your symptoms may be coming from your lower back.  It was recommended to obtain an MRI of the lumbar spine to evaluate the degree of the lumbar radiculopathy.  We may need to get physical therapy, but, will wait for the MRI results.    Thank you,  LULY Hunter

## 2024-03-05 NOTE — TELEPHONE ENCOUNTER
Faxed EMG note, brace script to  Clinic at [phone] and received fax confirmation.        Placed MRI and central scheduling # in mail to pt. Placed  Clinic, Rx and fax confirmation in mail to pt.      Called pt and left message-was advised pt is sleeping at this time. Advised to call me back when she wakes me up.

## 2024-04-02 ENCOUNTER — HOSPITAL ENCOUNTER (OUTPATIENT)
Facility: HOSPITAL | Age: 89
Discharge: HOME | End: 2024-04-05
Payer: MEDICARE

## 2024-04-02 DIAGNOSIS — M54.16 LUMBAR RADICULOPATHY: ICD-10-CM

## 2024-04-02 PROCEDURE — 72148 MRI LUMBAR SPINE W/O DYE: CPT

## 2024-04-11 DIAGNOSIS — M54.16 LUMBAR RADICULOPATHY: Primary | ICD-10-CM

## 2024-04-12 ENCOUNTER — CLINICAL DOCUMENTATION (OUTPATIENT)
Age: 89
End: 2024-04-12

## 2024-04-12 NOTE — PROGRESS NOTES
Faxed referral and MRI results to Dr.Abilio Wagoner office at [phone] and received fax confirmation.      Put referral and fax confirmation in mail to pt.

## 2024-07-16 NOTE — PROGRESS NOTES
"Patient states he had difficulty in staying asleep for many years.  He completed CBT-I, which he did not find helpful.  He has tried multiple medications over the years none of which have been helpful.  He states he usually experiences a \"hangover\" effect with medications that help with sleep.  He is hesitant to try any new medication.  We also discussed that his depression and anxiety are likely playing a role with his inability to stay asleep.  He scheduled an appointment with his primary care doctor on Thursday to discuss possible medications to treat his anxiety and depression as likely this would help his sleep.  "
1. Have you been to the ER, urgent care clinic since your last visit?  Hospitalized since your last visit?  No.    2. Have you seen or consulted any other health care providers outside of the [facility] since your last visit?  Include any pap smears or colon screening.   No.      Chief Complaint   Patient presents with    Transient Ischemic Attack     Feet are numb and ankles       
17 g by mouth as needed    timolol (TIMOPTIC-XE) 0.5 % ophthalmic gel-forming Apply 1 drop to eye daily    Trolamine Salicylate (ASPERCREME) 10 % LOTN Apply topically as needed     No current facility-administered medications for this visit.       Past medical history/surgical history, family history, and social history have been reviewed for today's visit      Review of Systems   Constitutional: Negative.    HENT: Negative.     Eyes: Negative.    Respiratory: Negative.     Cardiovascular: Negative.    Gastrointestinal: Negative.    Endocrine: Negative.    Musculoskeletal:  Positive for back pain, gait problem and neck pain.   Skin: Negative.    Allergic/Immunologic: Negative.    Neurological:  Positive for weakness (both legs) and numbness (tingling in both arms and legs).   Hematological: Negative.         A ten system review of constitutional, cardiovascular, respiratory, musculoskeletal, endocrine, skin, SHEENT, genitourinary, psychiatric and neurologic systems was obtained and is unremarkable except as mentioned under HPI          EXAMINATION:     Vitals:    02/01/24 1426   BP: (!) 112/56   Site: Right Upper Arm   Position: Sitting   Cuff Size: Large Adult   Pulse: 68   Resp: 16   SpO2: 98%   Weight: 68 kg (150 lb)   Height: 1.626 m (5' 4\")          General appearance: Patient is well-developed and well-nourished in no apparent distress and well groomed.    Psych/mental health:  Affect: Appropriate    PHQ       No data to display                HEENT:   Normocephalic  With evidence of trauma: No  Full range of motion head neck: Yes  Tenderness to palpation of the head neck region: No      Cardiovascular:     Extremities warm to touch: Yes  Extremity swelling: No  Discoloration: No  Evidence of PVD: No    Respiratory:   Dyspnea on exertion: No   Abnormal effort on casual observation: No   Use of portable oxygen: No   Evidence of cyanosis: No     Musculoskeletal:   Evidence of significant bone deformities: No

## 2024-08-21 ASSESSMENT — ENCOUNTER SYMPTOMS
RESPIRATORY NEGATIVE: 1
ALLERGIC/IMMUNOLOGIC NEGATIVE: 1
EYES NEGATIVE: 1
GASTROINTESTINAL NEGATIVE: 1

## 2024-08-22 ENCOUNTER — CLINICAL DOCUMENTATION (OUTPATIENT)
Age: 89
End: 2024-08-22

## 2024-08-22 ENCOUNTER — OFFICE VISIT (OUTPATIENT)
Age: 89
End: 2024-08-22
Payer: MEDICARE

## 2024-08-22 VITALS
DIASTOLIC BLOOD PRESSURE: 60 MMHG | HEART RATE: 52 BPM | BODY MASS INDEX: 24.24 KG/M2 | OXYGEN SATURATION: 100 % | WEIGHT: 142 LBS | RESPIRATION RATE: 16 BRPM | HEIGHT: 64 IN | SYSTOLIC BLOOD PRESSURE: 120 MMHG

## 2024-08-22 DIAGNOSIS — Z86.73 HISTORY OF TIA (TRANSIENT ISCHEMIC ATTACK): ICD-10-CM

## 2024-08-22 DIAGNOSIS — M48.061 SPINAL STENOSIS OF LUMBAR REGION WITHOUT NEUROGENIC CLAUDICATION: ICD-10-CM

## 2024-08-22 DIAGNOSIS — G56.02 CARPAL TUNNEL SYNDROME ON LEFT: Primary | ICD-10-CM

## 2024-08-22 PROCEDURE — 1090F PRES/ABSN URINE INCON ASSESS: CPT

## 2024-08-22 PROCEDURE — G8427 DOCREV CUR MEDS BY ELIG CLIN: HCPCS

## 2024-08-22 PROCEDURE — 1036F TOBACCO NON-USER: CPT

## 2024-08-22 PROCEDURE — G8420 CALC BMI NORM PARAMETERS: HCPCS

## 2024-08-22 PROCEDURE — 1123F ACP DISCUSS/DSCN MKR DOCD: CPT

## 2024-08-22 PROCEDURE — 99215 OFFICE O/P EST HI 40 MIN: CPT

## 2024-08-22 RX ORDER — DULOXETIN HYDROCHLORIDE 30 MG/1
30 CAPSULE, DELAYED RELEASE ORAL DAILY
COMMUNITY
Start: 2024-06-16 | End: 2024-08-22 | Stop reason: CLARIF

## 2024-08-22 RX ORDER — DULOXETIN HYDROCHLORIDE 60 MG/1
60 CAPSULE, DELAYED RELEASE ORAL DAILY
COMMUNITY
Start: 2024-08-21

## 2024-08-22 RX ORDER — PREGABALIN 50 MG/1
50 CAPSULE ORAL 2 TIMES DAILY
COMMUNITY
Start: 2024-08-21

## 2024-08-22 ASSESSMENT — PATIENT HEALTH QUESTIONNAIRE - PHQ9
1. LITTLE INTEREST OR PLEASURE IN DOING THINGS: NOT AT ALL
SUM OF ALL RESPONSES TO PHQ QUESTIONS 1-9: 0
SUM OF ALL RESPONSES TO PHQ QUESTIONS 1-9: 0
2. FEELING DOWN, DEPRESSED OR HOPELESS: NOT AT ALL
SUM OF ALL RESPONSES TO PHQ QUESTIONS 1-9: 0
SUM OF ALL RESPONSES TO PHQ QUESTIONS 1-9: 0
SUM OF ALL RESPONSES TO PHQ9 QUESTIONS 1 & 2: 0

## 2024-08-22 NOTE — ASSESSMENT & PLAN NOTE
Stable without recurrence of TIA symptoms    Patient is to continue on aspirin 81 mg, atorvastatin 80 mg, and Zetia as prescribed.    Patient is to continue to work to all of her comorbid conditions as managed by PCP and other specialists as appropriate    RECOMMENDATIONS:  - BP goal is less than 140/90  - Goal HbA1c is less than 7.   - LDL less than 70     TIA education was provided today in regards to risk factors for stroke and lifestyle modifications to help minimize the risk of future stroke.    This included medication compliance, regular follow up with primary care physician,  and healthy lifestyle habits (nutrition/exercise).

## 2024-08-22 NOTE — PROGRESS NOTES
1. Have you been to the ER, urgent care clinic since your last visit?  Hospitalized since your last visit?  No.    2. Have you seen or consulted any other health care providers outside of the [facility] since your last visit?  Include any pap smears or colon screening.   No.        Chief Complaint   Patient presents with    Transient Ischemic Attack     Annual- same issues with feet- that is coming from back issues and medication was changed

## 2024-08-22 NOTE — ASSESSMENT & PLAN NOTE
Supported by EMG completed on 2/29/2024    Her symptoms persist in the left upper extremity    -Will refer patient to orthopedics with Dr. Smith for evaluation    - She is to continue to wear her hand brace at bedtime as prescribed    - For pain management, she is to continue Cymbalta 60 mg daily and Lyrica 50 mg twice a day.  This has been managed by her primary care provider.  Given her Cymbalta's dosage was recently increased 2 weeks ago, will not make any changes at this time.    - Patient is to continue to follow-up with primary care provider as appropriate.    - Continue with PT

## 2024-08-22 NOTE — PATIENT INSTRUCTIONS
As per our discussion,    Overall, you seem stable without any worsening in symptoms.    As for your carpal tunnel syndrome on the left, will refer you to Dr. Smith, who is a hand specialist for evaluation.    Continue to wear your hand brace at bedtime to help with the compression of the nerve.    As for lumbar radiculopathy, it looks like you have seen by orthopedics and there is no recommendation at this time.  Continue to follow-up with neurosurgery as appropriate.  Continue to work with physical therapy for muscle strengthening and please take your time when you switching position and when walking to prevent falls.    For pain management, I will not make any changes on your Cymbalta at this time.  Continue Cymbalta 60 mg daily.  Given your dose was just increased 2 weeks ago by your primary care provider, I will defer this to him.    As for a TIA standpoint, you seem stable without any recurrence of TIA symptoms.    Continue on aspirin 81 mg and atorvastatin 80 mg daily.  Continue Zetia as prescribed.    Continue to work to all of your comorbid conditions as managed by PCP and other specialists as appropriate    RECOMMENDATIONS:  - BP goal is less than 140/90  - Goal HbA1c is less than 7.   - LDL less than 70      TIA education today in regards to risk factors for stroke and lifestyle modifications to help minimize the risk of future stroke.  This included medication compliance, regular follow up with primary care physician,  and healthy lifestyle habits (nutrition/exercise)     It was a pleasure to see you and your 2 daughters today    Will see you back in 6 months or sooner if needed    Please do not hesitate to reach out for any questions or concerns

## 2024-08-22 NOTE — PROGRESS NOTES
[facility]  In Office FOLLOW-UP VISIT         Tomeka Yip is a 89 y.o. female who presents today for the following:  Chief Complaint   Patient presents with    Transient Ischemic Attack     Annual- same issues with feet- that is coming from back issues and medication was changed         ASSESSMENT AND PLAN  Patient is known to this practice.  Chart and history reviewed in detail at today's office visit.    1. Carpal tunnel syndrome on left  Assessment & Plan:  Supported by EMG completed on 2/29/2024    Her symptoms persist in the left upper extremity    -Will refer patient to orthopedics with Dr. Smith for evaluation    - She is to continue to wear her hand brace at bedtime as prescribed    - For pain management, she is to continue Cymbalta 60 mg daily and Lyrica 50 mg twice a day.  This has been managed by her primary care provider.  Given her Cymbalta's dosage was recently increased 2 weeks ago, will not make any changes at this time.    - Patient is to continue to follow-up with primary care provider as appropriate.    - Continue with PT    Orders:  -     Amb External Referral To Orthopedic Surgery  2. Spinal stenosis of lumbar region without neurogenic claudication  Assessment & Plan:  Stable without any worsening in symptoms.    EMG completed on 2/29/2024 was reviewed with patient and family.  This study was abnormal suggesting a lower lumbar radiculopathy with evidence of active denervation.  Please correlate these findings with lumbar spine neuroimaging. There is no evidence of other focal neuropathy, a generalized large fiber neuropathy, myopathy.    MRI of the lumbar spine on 4/5/2024 reviewed, revealing Canal stenoses: critical L4-L5, severe L3-L4, moderate to severe L2-L3, moderate L5-S1.Neuroforaminal stenoses: bilateral L2-L3, left and severe right L3-L4, right and severe left L4-L5 and L5-S1.  Patient was referred to neurosurgery, Dr. Wagoner for

## 2024-08-22 NOTE — ASSESSMENT & PLAN NOTE
Stable without any worsening in symptoms.    EMG completed on 2/29/2024 was reviewed with patient and family.  This study was abnormal suggesting a lower lumbar radiculopathy with evidence of active denervation.  Please correlate these findings with lumbar spine neuroimaging. There is no evidence of other focal neuropathy, a generalized large fiber neuropathy, myopathy.    MRI of the lumbar spine on 4/5/2024 reviewed, revealing Canal stenoses: critical L4-L5, severe L3-L4, moderate to severe L2-L3, moderate L5-S1.Neuroforaminal stenoses: bilateral L2-L3, left and severe right L3-L4, right and severe left L4-L5 and L5-S1.  Patient was referred to neurosurgery, Dr. Wagoner for evaluation    Per family, there was no additional intervention needed at this time.  Patient deferred surgery given the risks outweigh the benefits.  She did have bilateral L3-L4 epidural steroid injection without any relief.    - She is to continue to follow-up with neurosurgery as appropriate.    - For pain management, continue Cymbalta 60 mg daily and Lyrica 50 mg twice a day.    - Continue physical therapy    Fall safety was discussed the patient.

## 2024-08-22 NOTE — PROGRESS NOTES
Faxed referral and last office note to Dr.Morgan Smith at Orthopedic surgery. Faxed to [phone] and received fax confirmation.  Placed in fast scan.

## 2024-11-04 ENCOUNTER — APPOINTMENT (OUTPATIENT)
Facility: HOSPITAL | Age: 89
DRG: 312 | End: 2024-11-04
Payer: MEDICARE

## 2024-11-04 ENCOUNTER — HOSPITAL ENCOUNTER (INPATIENT)
Facility: HOSPITAL | Age: 89
LOS: 5 days | Discharge: NURSING, REHAB OR LONG-TERM CARE | DRG: 312 | End: 2024-11-09
Attending: STUDENT IN AN ORGANIZED HEALTH CARE EDUCATION/TRAINING PROGRAM | Admitting: STUDENT IN AN ORGANIZED HEALTH CARE EDUCATION/TRAINING PROGRAM
Payer: MEDICARE

## 2024-11-04 DIAGNOSIS — G45.9 TIA (TRANSIENT ISCHEMIC ATTACK): Primary | ICD-10-CM

## 2024-11-04 LAB
ALBUMIN SERPL-MCNC: 3.2 G/DL (ref 3.5–5)
ALBUMIN/GLOB SERPL: 0.7 (ref 1.1–2.2)
ALP SERPL-CCNC: 106 U/L (ref 45–117)
ALT SERPL-CCNC: 48 U/L (ref 12–78)
ANION GAP SERPL CALC-SCNC: 3 MMOL/L (ref 2–12)
APPEARANCE UR: CLEAR
AST SERPL-CCNC: 42 U/L (ref 15–37)
BACTERIA URNS QL MICRO: ABNORMAL /HPF
BASOPHILS # BLD: 0 K/UL (ref 0–0.1)
BASOPHILS NFR BLD: 0 % (ref 0–1)
BILIRUB SERPL-MCNC: 0.6 MG/DL (ref 0.2–1)
BILIRUB UR QL: NEGATIVE
BUN SERPL-MCNC: 32 MG/DL (ref 6–20)
BUN/CREAT SERPL: 24 (ref 12–20)
CALCIUM SERPL-MCNC: 9.4 MG/DL (ref 8.5–10.1)
CHLORIDE SERPL-SCNC: 96 MMOL/L (ref 97–108)
CHOLEST SERPL-MCNC: 93 MG/DL
CO2 SERPL-SCNC: 30 MMOL/L (ref 21–32)
COLOR UR: ABNORMAL
CREAT SERPL-MCNC: 1.35 MG/DL (ref 0.55–1.02)
DIFFERENTIAL METHOD BLD: ABNORMAL
EOSINOPHIL # BLD: 0.1 K/UL (ref 0–0.4)
EOSINOPHIL NFR BLD: 1 % (ref 0–7)
EPITH CASTS URNS QL MICRO: ABNORMAL /LPF
ERYTHROCYTE [DISTWIDTH] IN BLOOD BY AUTOMATED COUNT: 12.6 % (ref 11.5–14.5)
FLUAV RNA SPEC QL NAA+PROBE: NOT DETECTED
FLUBV RNA SPEC QL NAA+PROBE: NOT DETECTED
GLOBULIN SER CALC-MCNC: 4.7 G/DL (ref 2–4)
GLUCOSE SERPL-MCNC: 85 MG/DL (ref 65–100)
GLUCOSE UR STRIP.AUTO-MCNC: NEGATIVE MG/DL
HCT VFR BLD AUTO: 35.8 % (ref 35–47)
HDLC SERPL-MCNC: 68 MG/DL
HDLC SERPL: 1.4 (ref 0–5)
HGB BLD-MCNC: 11.9 G/DL (ref 11.5–16)
HGB UR QL STRIP: NEGATIVE
HYALINE CASTS URNS QL MICRO: ABNORMAL /LPF (ref 0–2)
IMM GRANULOCYTES # BLD AUTO: 0.1 K/UL (ref 0–0.04)
IMM GRANULOCYTES NFR BLD AUTO: 1 % (ref 0–0.5)
INR PPP: 1 (ref 0.9–1.1)
KETONES UR QL STRIP.AUTO: NEGATIVE MG/DL
LACTATE BLD-SCNC: 0.78 MMOL/L (ref 0.4–2)
LDLC SERPL CALC-MCNC: 18.2 MG/DL (ref 0–100)
LEUKOCYTE ESTERASE UR QL STRIP.AUTO: NEGATIVE
LYMPHOCYTES # BLD: 1.9 K/UL (ref 0.8–3.5)
LYMPHOCYTES NFR BLD: 20 % (ref 12–49)
MCH RBC QN AUTO: 30 PG (ref 26–34)
MCHC RBC AUTO-ENTMCNC: 33.2 G/DL (ref 30–36.5)
MCV RBC AUTO: 90.2 FL (ref 80–99)
MONOCYTES # BLD: 0.8 K/UL (ref 0–1)
MONOCYTES NFR BLD: 8 % (ref 5–13)
NEUTS SEG # BLD: 6.9 K/UL (ref 1.8–8)
NEUTS SEG NFR BLD: 70 % (ref 32–75)
NITRITE UR QL STRIP.AUTO: NEGATIVE
NRBC # BLD: 0 K/UL (ref 0–0.01)
NRBC BLD-RTO: 0 PER 100 WBC
PH UR STRIP: 6 (ref 5–8)
PLATELET # BLD AUTO: 370 K/UL (ref 150–400)
PMV BLD AUTO: 8.7 FL (ref 8.9–12.9)
POTASSIUM SERPL-SCNC: 4.5 MMOL/L (ref 3.5–5.1)
PROT SERPL-MCNC: 7.9 G/DL (ref 6.4–8.2)
PROT UR STRIP-MCNC: NEGATIVE MG/DL
PROTHROMBIN TIME: 10.7 SEC (ref 9–11.1)
RBC # BLD AUTO: 3.97 M/UL (ref 3.8–5.2)
RBC #/AREA URNS HPF: ABNORMAL /HPF (ref 0–5)
SARS-COV-2 RNA RESP QL NAA+PROBE: NOT DETECTED
SODIUM SERPL-SCNC: 129 MMOL/L (ref 136–145)
SOURCE: NORMAL
SP GR UR REFRACTOMETRY: <1.005 (ref 1–1.03)
TRIGL SERPL-MCNC: 34 MG/DL
TROPONIN I SERPL HS-MCNC: 11 NG/L (ref 0–51)
URINE CULTURE IF INDICATED: ABNORMAL
UROBILINOGEN UR QL STRIP.AUTO: 0.2 EU/DL (ref 0.2–1)
VIT B12 SERPL-MCNC: 562 PG/ML (ref 193–986)
VLDLC SERPL CALC-MCNC: 6.8 MG/DL
WBC # BLD AUTO: 9.8 K/UL (ref 3.6–11)
WBC URNS QL MICRO: ABNORMAL /HPF (ref 0–4)

## 2024-11-04 PROCEDURE — 6360000002 HC RX W HCPCS: Performed by: NURSE PRACTITIONER

## 2024-11-04 PROCEDURE — 85025 COMPLETE CBC W/AUTO DIFF WBC: CPT

## 2024-11-04 PROCEDURE — 6370000000 HC RX 637 (ALT 250 FOR IP): Performed by: NURSE PRACTITIONER

## 2024-11-04 PROCEDURE — 81001 URINALYSIS AUTO W/SCOPE: CPT

## 2024-11-04 PROCEDURE — 80061 LIPID PANEL: CPT

## 2024-11-04 PROCEDURE — 0042T CT BRAIN PERFUSION: CPT

## 2024-11-04 PROCEDURE — 36415 COLL VENOUS BLD VENIPUNCTURE: CPT

## 2024-11-04 PROCEDURE — 2580000003 HC RX 258: Performed by: NURSE PRACTITIONER

## 2024-11-04 PROCEDURE — 70450 CT HEAD/BRAIN W/O DYE: CPT

## 2024-11-04 PROCEDURE — 99285 EMERGENCY DEPT VISIT HI MDM: CPT

## 2024-11-04 PROCEDURE — 80053 COMPREHEN METABOLIC PANEL: CPT

## 2024-11-04 PROCEDURE — 1100000003 HC PRIVATE W/ TELEMETRY

## 2024-11-04 PROCEDURE — 6360000004 HC RX CONTRAST MEDICATION: Performed by: STUDENT IN AN ORGANIZED HEALTH CARE EDUCATION/TRAINING PROGRAM

## 2024-11-04 PROCEDURE — 83605 ASSAY OF LACTIC ACID: CPT

## 2024-11-04 PROCEDURE — 85610 PROTHROMBIN TIME: CPT

## 2024-11-04 PROCEDURE — 87636 SARSCOV2 & INF A&B AMP PRB: CPT

## 2024-11-04 PROCEDURE — 83036 HEMOGLOBIN GLYCOSYLATED A1C: CPT

## 2024-11-04 PROCEDURE — 2580000003 HC RX 258: Performed by: STUDENT IN AN ORGANIZED HEALTH CARE EDUCATION/TRAINING PROGRAM

## 2024-11-04 PROCEDURE — 70498 CT ANGIOGRAPHY NECK: CPT

## 2024-11-04 PROCEDURE — 82607 VITAMIN B-12: CPT

## 2024-11-04 PROCEDURE — 71045 X-RAY EXAM CHEST 1 VIEW: CPT

## 2024-11-04 PROCEDURE — 96361 HYDRATE IV INFUSION ADD-ON: CPT

## 2024-11-04 PROCEDURE — 93005 ELECTROCARDIOGRAM TRACING: CPT | Performed by: STUDENT IN AN ORGANIZED HEALTH CARE EDUCATION/TRAINING PROGRAM

## 2024-11-04 PROCEDURE — 84484 ASSAY OF TROPONIN QUANT: CPT

## 2024-11-04 PROCEDURE — 4A03X5D MEASUREMENT OF ARTERIAL FLOW, INTRACRANIAL, EXTERNAL APPROACH: ICD-10-PCS | Performed by: STUDENT IN AN ORGANIZED HEALTH CARE EDUCATION/TRAINING PROGRAM

## 2024-11-04 RX ORDER — LANOLIN ALCOHOL/MO/W.PET/CERES
400 CREAM (GRAM) TOPICAL DAILY
Status: DISCONTINUED | OUTPATIENT
Start: 2024-11-04 | End: 2024-11-09 | Stop reason: HOSPADM

## 2024-11-04 RX ORDER — ATORVASTATIN CALCIUM 40 MG/1
80 TABLET, FILM COATED ORAL DAILY
Status: DISCONTINUED | OUTPATIENT
Start: 2024-11-04 | End: 2024-11-09 | Stop reason: HOSPADM

## 2024-11-04 RX ORDER — PREGABALIN 50 MG/1
50 CAPSULE ORAL 2 TIMES DAILY
Status: DISCONTINUED | OUTPATIENT
Start: 2024-11-04 | End: 2024-11-09 | Stop reason: HOSPADM

## 2024-11-04 RX ORDER — AMLODIPINE BESYLATE 5 MG/1
10 TABLET ORAL DAILY
Status: DISCONTINUED | OUTPATIENT
Start: 2024-11-04 | End: 2024-11-06

## 2024-11-04 RX ORDER — ACETAMINOPHEN 325 MG/1
650 TABLET ORAL EVERY 4 HOURS PRN
Status: DISCONTINUED | OUTPATIENT
Start: 2024-11-04 | End: 2024-11-09 | Stop reason: HOSPADM

## 2024-11-04 RX ORDER — DULOXETIN HYDROCHLORIDE 30 MG/1
60 CAPSULE, DELAYED RELEASE ORAL DAILY
Status: DISCONTINUED | OUTPATIENT
Start: 2024-11-04 | End: 2024-11-09 | Stop reason: HOSPADM

## 2024-11-04 RX ORDER — HYDROCHLOROTHIAZIDE 12.5 MG/1
12.5 CAPSULE ORAL EVERY MORNING
Status: DISCONTINUED | OUTPATIENT
Start: 2024-11-05 | End: 2024-11-06

## 2024-11-04 RX ORDER — TIMOLOL MALEATE 2.5 MG/ML
1 SOLUTION/ DROPS OPHTHALMIC 2 TIMES DAILY
Status: DISCONTINUED | OUTPATIENT
Start: 2024-11-04 | End: 2024-11-09 | Stop reason: HOSPADM

## 2024-11-04 RX ORDER — ASPIRIN 81 MG/1
81 TABLET ORAL DAILY
Status: DISCONTINUED | OUTPATIENT
Start: 2024-11-04 | End: 2024-11-09 | Stop reason: HOSPADM

## 2024-11-04 RX ORDER — ATORVASTATIN CALCIUM 40 MG/1
80 TABLET, FILM COATED ORAL NIGHTLY
Status: DISCONTINUED | OUTPATIENT
Start: 2024-11-04 | End: 2024-11-04

## 2024-11-04 RX ORDER — CETIRIZINE HYDROCHLORIDE 10 MG/1
5 TABLET ORAL DAILY
Status: DISCONTINUED | OUTPATIENT
Start: 2024-11-04 | End: 2024-11-09 | Stop reason: HOSPADM

## 2024-11-04 RX ORDER — 0.9 % SODIUM CHLORIDE 0.9 %
500 INTRAVENOUS SOLUTION INTRAVENOUS ONCE
Status: COMPLETED | OUTPATIENT
Start: 2024-11-04 | End: 2024-11-04

## 2024-11-04 RX ORDER — ENOXAPARIN SODIUM 100 MG/ML
30 INJECTION SUBCUTANEOUS EVERY 24 HOURS
Status: DISCONTINUED | OUTPATIENT
Start: 2024-11-04 | End: 2024-11-07

## 2024-11-04 RX ORDER — METOPROLOL TARTRATE 25 MG/1
12.5 TABLET, FILM COATED ORAL EVERY 12 HOURS
Status: DISCONTINUED | OUTPATIENT
Start: 2024-11-04 | End: 2024-11-09 | Stop reason: HOSPADM

## 2024-11-04 RX ORDER — LATANOPROST 50 UG/ML
1 SOLUTION/ DROPS OPHTHALMIC NIGHTLY
Status: DISCONTINUED | OUTPATIENT
Start: 2024-11-04 | End: 2024-11-09 | Stop reason: HOSPADM

## 2024-11-04 RX ORDER — EZETIMIBE 10 MG/1
10 TABLET ORAL DAILY
Status: DISCONTINUED | OUTPATIENT
Start: 2024-11-04 | End: 2024-11-09 | Stop reason: HOSPADM

## 2024-11-04 RX ORDER — ALPRAZOLAM 0.5 MG
0.25 TABLET ORAL DAILY PRN
Status: DISCONTINUED | OUTPATIENT
Start: 2024-11-04 | End: 2024-11-09 | Stop reason: HOSPADM

## 2024-11-04 RX ORDER — ONDANSETRON 2 MG/ML
4 INJECTION INTRAMUSCULAR; INTRAVENOUS EVERY 6 HOURS PRN
Status: DISCONTINUED | OUTPATIENT
Start: 2024-11-04 | End: 2024-11-09 | Stop reason: HOSPADM

## 2024-11-04 RX ORDER — LOSARTAN POTASSIUM 25 MG/1
50 TABLET ORAL DAILY
Status: DISCONTINUED | OUTPATIENT
Start: 2024-11-04 | End: 2024-11-06

## 2024-11-04 RX ORDER — IOPAMIDOL 755 MG/ML
100 INJECTION, SOLUTION INTRAVASCULAR
Status: COMPLETED | OUTPATIENT
Start: 2024-11-04 | End: 2024-11-04

## 2024-11-04 RX ORDER — FLUTICASONE PROPIONATE 50 MCG
1 SPRAY, SUSPENSION (ML) NASAL DAILY
Status: DISCONTINUED | OUTPATIENT
Start: 2024-11-05 | End: 2024-11-09 | Stop reason: HOSPADM

## 2024-11-04 RX ORDER — VITAMIN B COMPLEX
5000 TABLET ORAL DAILY
Status: DISCONTINUED | OUTPATIENT
Start: 2024-11-04 | End: 2024-11-09 | Stop reason: HOSPADM

## 2024-11-04 RX ADMIN — PREGABALIN 50 MG: 50 CAPSULE ORAL at 21:14

## 2024-11-04 RX ADMIN — ATORVASTATIN CALCIUM 80 MG: 40 TABLET, FILM COATED ORAL at 21:09

## 2024-11-04 RX ADMIN — DULOXETINE HYDROCHLORIDE 60 MG: 30 CAPSULE, DELAYED RELEASE ORAL at 21:10

## 2024-11-04 RX ADMIN — SODIUM CHLORIDE 500 ML: 9 INJECTION, SOLUTION INTRAVENOUS at 16:28

## 2024-11-04 RX ADMIN — LATANOPROST 1 DROP: 50 SOLUTION OPHTHALMIC at 22:59

## 2024-11-04 RX ADMIN — SODIUM CHLORIDE 500 ML: 900 INJECTION, SOLUTION INTRAVENOUS at 15:00

## 2024-11-04 RX ADMIN — TIMOLOL MALEATE 1 DROP: 2.5 SOLUTION OPHTHALMIC at 22:59

## 2024-11-04 RX ADMIN — Medication 400 MG: at 21:12

## 2024-11-04 RX ADMIN — PANTOPRAZOLE SODIUM 40 MG: 40 INJECTION, POWDER, FOR SOLUTION INTRAVENOUS at 21:18

## 2024-11-04 RX ADMIN — ASPIRIN 81 MG: 81 TABLET, COATED ORAL at 21:07

## 2024-11-04 RX ADMIN — IOPAMIDOL 100 ML: 755 INJECTION, SOLUTION INTRAVENOUS at 14:13

## 2024-11-04 RX ADMIN — ENOXAPARIN SODIUM 30 MG: 100 INJECTION SUBCUTANEOUS at 21:12

## 2024-11-04 ASSESSMENT — PAIN SCALES - GENERAL
PAINLEVEL_OUTOF10: 0
PAINLEVEL_OUTOF10: 0

## 2024-11-04 NOTE — ED NOTES
Pt BIBEMS from home for cc of sudden slurred speech and confusion started at 1200 today. All symptoms have now resolved. Upon arrival, pt alert and oriented x4 with clear speech. Daughter states pt has a previous hx of a TIA, denies any blood thinners.     MD French states level 2 code stroke att. Pt transported to CT with this RN.

## 2024-11-04 NOTE — ED PROVIDER NOTES
MRM 1 NEUROSCIENCE TELEMETRY  EMERGENCY DEPARTMENT ENCOUNTER       Pt Name: Tomeka Yip  MRN: [id number]  Birthdate 8/15/1932  Date of evaluation: 11/4/2024  Provider: Sumanth French DO   PCP: Orestes Xiong DO  Note Started: 10:40 PM 11/4/24     CHIEF COMPLAINT       Chief Complaint   Patient presents with    Cerebrovascular Accident        HISTORY OF PRESENT ILLNESS: 1 or more elements      History From: Patient, Sister   None     Tomeka Yip is a 89 y.o. female who presents with cc of slurred speech, confusion. These occurred at 12 and slowly improved per daughter.  History of similar with previous TIAs.  EMS reports they were patient was hypotensive when they picked her up, blood pressure slowly improved.     Nursing Notes were all reviewed and agreed with or any disagreements were addressed in the HPI.       PAST HISTORY     Past Medical History:  Past Medical History:   Diagnosis Date    CAD (coronary artery disease)     Diverticulitis     Glaucoma     Gout     HTN (hypertension) 5/18/2018    Hx of diverticulitis of colon 5/18/2018    Hyperlipidemia     Hypertension     Other ill-defined conditions(799.89)     high cholesterol    S/P coronary artery stent placement 05/25/2018    Stroke (HCC) 10/2020    TIA         Past Surgical History:  Past Surgical History:   Procedure Laterality Date    CAROTID ENDARTERECTOMY Left 11/02/2020    COLONOSCOPY FLX DX W/COLLJ SPEC WHEN PFRMD  10/18/2013         HYSTERECTOMY (CERVIX STATUS UNKNOWN)      MO UNLISTED PROCEDURE CARDIAC SURGERY  05/2018    x2 cardiac stent       Family History:  Family History   Problem Relation Age of Onset    Diabetes Sister     Stroke Mother     No Known Problems Father        Social History:  Social History     Tobacco Use    Smoking status: Never    Smokeless tobacco: Never   Vaping Use    Vaping status: Never Used   Substance Use Topics    Alcohol use: No    Drug use: No       Allergies:  Allergies   Allergen Reactions  large vessel occlusion. No hemodynamic   significant stenosis. Normal perfusion.   2. Severe degenerative changes around the atlantoaxial joint, with thickening of   the transverse ligament and associated mild spinal canal narrowing.      Electronically signed by KATY OCHOA      CT HEAD WO CONTRAST   Final Result   No acute abnormality or interval change.            Electronically signed by CANDY RUELAS              PROCEDURES   Unless otherwise noted below, none  Procedures       CRITICAL CARE TIME     SCREENINGS   NIH Stroke Score  NIH Stroke Scale  NIH Stroke Scale Assessed: Yes  Interval: Reassessment  Level of Consciousness (1a): Alert  LOC Questions (1b): Answers both correctly  LOC Commands (1c): Performs both tasks correctly  Best Gaze (2): Normal  Visual (3): No visual loss  Facial Palsy (4): Normal symmetrical movement  Motor Arm, Left (5a): No drift  Motor Arm, Right (5b): No drift  Motor Leg, Left (6a): Drift, but does not hit bed  Motor Leg, Right (6b): Drift, but does not hit bed  Limb Ataxia (7): Absent  Sensory (8): Normal  Best Language (9): No aphasia  Dysarthria (10): Normal  Extinction and Inattention (11): No abnormality  Total: 2  Heart Score     Curb-65        EMERGENCY DEPARTMENT COURSE, COUNSELING and DIFFERENTIAL DIAGNOSIS/MDM   Vitals:    Vitals:    11/04/24 2121 11/04/24 2124 11/04/24 2133 11/04/24 2226   BP: (!) 137/45  (!) 115/49 (!) 145/47   Pulse: 70 67 75 66   Resp: 20 22 19 18   Temp:    98.1 °F (36.7 °C)   TempSrc:       SpO2: 99%  98% 100%   Weight:       Height:                Patient was given the following medications:  Medications   ondansetron (ZOFRAN) injection 4 mg (has no administration in time range)   acetaminophen (TYLENOL) tablet 650 mg (has no administration in time range)   amLODIPine (NORVASC) tablet 10 mg ( Oral Automatically Held 11/7/24 0900)   aspirin EC tablet 81 mg (81 mg Oral Given 11/4/24 2107)   atorvastatin (LIPITOR) tablet 80 mg (80 mg Oral Given

## 2024-11-05 ENCOUNTER — APPOINTMENT (OUTPATIENT)
Facility: HOSPITAL | Age: 89
DRG: 312 | End: 2024-11-05
Payer: MEDICARE

## 2024-11-05 LAB
ANION GAP SERPL CALC-SCNC: 6 MMOL/L (ref 2–12)
BASOPHILS # BLD: 0 K/UL (ref 0–0.1)
BASOPHILS NFR BLD: 0 % (ref 0–1)
BUN SERPL-MCNC: 22 MG/DL (ref 6–20)
BUN/CREAT SERPL: 21 (ref 12–20)
CALCIUM SERPL-MCNC: 8.8 MG/DL (ref 8.5–10.1)
CHLORIDE SERPL-SCNC: 99 MMOL/L (ref 97–108)
CO2 SERPL-SCNC: 28 MMOL/L (ref 21–32)
CREAT SERPL-MCNC: 1.06 MG/DL (ref 0.55–1.02)
DIFFERENTIAL METHOD BLD: ABNORMAL
EOSINOPHIL # BLD: 0.2 K/UL (ref 0–0.4)
EOSINOPHIL NFR BLD: 2 % (ref 0–7)
ERYTHROCYTE [DISTWIDTH] IN BLOOD BY AUTOMATED COUNT: 12.7 % (ref 11.5–14.5)
EST. AVERAGE GLUCOSE BLD GHB EST-MCNC: 120 MG/DL
GLUCOSE SERPL-MCNC: 105 MG/DL (ref 65–100)
HBA1C MFR BLD: 5.8 % (ref 4–5.6)
HCT VFR BLD AUTO: 33.7 % (ref 35–47)
HGB BLD-MCNC: 11.6 G/DL (ref 11.5–16)
IMM GRANULOCYTES # BLD AUTO: 0.1 K/UL (ref 0–0.04)
IMM GRANULOCYTES NFR BLD AUTO: 0 % (ref 0–0.5)
LYMPHOCYTES # BLD: 2 K/UL (ref 0.8–3.5)
LYMPHOCYTES NFR BLD: 17 % (ref 12–49)
MCH RBC QN AUTO: 30.3 PG (ref 26–34)
MCHC RBC AUTO-ENTMCNC: 34.4 G/DL (ref 30–36.5)
MCV RBC AUTO: 88 FL (ref 80–99)
MONOCYTES # BLD: 1 K/UL (ref 0–1)
MONOCYTES NFR BLD: 9 % (ref 5–13)
NEUTS SEG # BLD: 8.2 K/UL (ref 1.8–8)
NEUTS SEG NFR BLD: 72 % (ref 32–75)
NRBC # BLD: 0 K/UL (ref 0–0.01)
NRBC BLD-RTO: 0 PER 100 WBC
PLATELET # BLD AUTO: 323 K/UL (ref 150–400)
PMV BLD AUTO: 8.6 FL (ref 8.9–12.9)
POTASSIUM SERPL-SCNC: 3.9 MMOL/L (ref 3.5–5.1)
RBC # BLD AUTO: 3.83 M/UL (ref 3.8–5.2)
SODIUM SERPL-SCNC: 133 MMOL/L (ref 136–145)
WBC # BLD AUTO: 11.5 K/UL (ref 3.6–11)

## 2024-11-05 PROCEDURE — 97161 PT EVAL LOW COMPLEX 20 MIN: CPT

## 2024-11-05 PROCEDURE — 85025 COMPLETE CBC W/AUTO DIFF WBC: CPT

## 2024-11-05 PROCEDURE — 36415 COLL VENOUS BLD VENIPUNCTURE: CPT

## 2024-11-05 PROCEDURE — 70450 CT HEAD/BRAIN W/O DYE: CPT

## 2024-11-05 PROCEDURE — 97166 OT EVAL MOD COMPLEX 45 MIN: CPT | Performed by: OCCUPATIONAL THERAPIST

## 2024-11-05 PROCEDURE — 2580000003 HC RX 258: Performed by: INTERNAL MEDICINE

## 2024-11-05 PROCEDURE — 6360000002 HC RX W HCPCS: Performed by: NURSE PRACTITIONER

## 2024-11-05 PROCEDURE — 1100000003 HC PRIVATE W/ TELEMETRY

## 2024-11-05 PROCEDURE — 97530 THERAPEUTIC ACTIVITIES: CPT | Performed by: OCCUPATIONAL THERAPIST

## 2024-11-05 PROCEDURE — 2580000003 HC RX 258: Performed by: NURSE PRACTITIONER

## 2024-11-05 PROCEDURE — 6370000000 HC RX 637 (ALT 250 FOR IP): Performed by: STUDENT IN AN ORGANIZED HEALTH CARE EDUCATION/TRAINING PROGRAM

## 2024-11-05 PROCEDURE — 80048 BASIC METABOLIC PNL TOTAL CA: CPT

## 2024-11-05 PROCEDURE — 97530 THERAPEUTIC ACTIVITIES: CPT

## 2024-11-05 PROCEDURE — 6370000000 HC RX 637 (ALT 250 FOR IP): Performed by: NURSE PRACTITIONER

## 2024-11-05 PROCEDURE — 99222 1ST HOSP IP/OBS MODERATE 55: CPT | Performed by: PSYCHIATRY & NEUROLOGY

## 2024-11-05 RX ORDER — MIDODRINE HYDROCHLORIDE 5 MG/1
2.5 TABLET ORAL 3 TIMES DAILY PRN
Status: DISCONTINUED | OUTPATIENT
Start: 2024-11-05 | End: 2024-11-06

## 2024-11-05 RX ORDER — METOPROLOL TARTRATE 25 MG/1
12.5 TABLET, FILM COATED ORAL EVERY 12 HOURS
Qty: 60 TABLET | Refills: 3 | Status: SHIPPED | OUTPATIENT
Start: 2024-11-05

## 2024-11-05 RX ORDER — 0.9 % SODIUM CHLORIDE 0.9 %
250 INTRAVENOUS SOLUTION INTRAVENOUS ONCE
Status: COMPLETED | OUTPATIENT
Start: 2024-11-05 | End: 2024-11-05

## 2024-11-05 RX ADMIN — CETIRIZINE HYDROCHLORIDE 5 MG: 10 TABLET, FILM COATED ORAL at 08:52

## 2024-11-05 RX ADMIN — LATANOPROST 1 DROP: 50 SOLUTION OPHTHALMIC at 20:13

## 2024-11-05 RX ADMIN — TIMOLOL MALEATE 1 DROP: 2.5 SOLUTION OPHTHALMIC at 08:52

## 2024-11-05 RX ADMIN — Medication 5000 UNITS: at 08:51

## 2024-11-05 RX ADMIN — DULOXETINE HYDROCHLORIDE 60 MG: 30 CAPSULE, DELAYED RELEASE ORAL at 08:51

## 2024-11-05 RX ADMIN — PREGABALIN 50 MG: 50 CAPSULE ORAL at 20:12

## 2024-11-05 RX ADMIN — PREGABALIN 50 MG: 50 CAPSULE ORAL at 08:52

## 2024-11-05 RX ADMIN — ENOXAPARIN SODIUM 30 MG: 100 INJECTION SUBCUTANEOUS at 20:11

## 2024-11-05 RX ADMIN — Medication 400 MG: at 08:52

## 2024-11-05 RX ADMIN — ACETAMINOPHEN 650 MG: 325 TABLET ORAL at 22:17

## 2024-11-05 RX ADMIN — TIMOLOL MALEATE 1 DROP: 2.5 SOLUTION OPHTHALMIC at 20:12

## 2024-11-05 RX ADMIN — ASPIRIN 81 MG: 81 TABLET, COATED ORAL at 08:52

## 2024-11-05 RX ADMIN — SODIUM CHLORIDE 250 ML: 9 INJECTION, SOLUTION INTRAVENOUS at 18:00

## 2024-11-05 RX ADMIN — PANTOPRAZOLE SODIUM 40 MG: 40 INJECTION, POWDER, FOR SOLUTION INTRAVENOUS at 08:52

## 2024-11-05 RX ADMIN — EZETIMIBE 10 MG: 10 TABLET ORAL at 08:52

## 2024-11-05 RX ADMIN — ATORVASTATIN CALCIUM 80 MG: 40 TABLET, FILM COATED ORAL at 08:52

## 2024-11-05 RX ADMIN — ALPRAZOLAM 0.25 MG: 0.5 TABLET ORAL at 22:17

## 2024-11-05 ASSESSMENT — PAIN DESCRIPTION - LOCATION: LOCATION: FOOT

## 2024-11-05 ASSESSMENT — PAIN SCALES - GENERAL
PAINLEVEL_OUTOF10: 7
PAINLEVEL_OUTOF10: 0
PAINLEVEL_OUTOF10: 0

## 2024-11-05 ASSESSMENT — PAIN DESCRIPTION - DESCRIPTORS: DESCRIPTORS: ACHING;NUMBNESS

## 2024-11-05 ASSESSMENT — PAIN DESCRIPTION - ORIENTATION: ORIENTATION: RIGHT;LEFT

## 2024-11-05 NOTE — PROGRESS NOTES
End of Shift Note    Bedside shift change report given to  YOAV Cornejo  (oncoming nurse) by Haydee Bill RN .        Shift worked:  Nights    Shift summary and any significant changes:    New admit for TIA work up. Admission database complete. Assessment per flowsheet. Aox4. Denies pain. Passed isreal. Educated on CVA risk factors. Spoke with [facility] via telephone with update.      Concerns for physician to address: None    Zone phone for oncoming shift:  [phone]       Patient Information  Tomeka Yip  89 y.o.  11/4/2024  1:41 PM by Mason Blackwell DO. Tomeka Yip was admitted from [facility]    Problem List  Patient Active Problem List    Diagnosis Date Noted    Spinal stenosis of lumbar region without neurogenic claudication 08/22/2024    History of TIA (transient ischemic attack) 08/22/2024    Carpal tunnel syndrome on left 08/22/2024    Stroke risk 02/01/2024    Paresthesia of upper and lower extremities of both sides 02/01/2024    Gait abnormality 02/01/2024    Prediabetes 02/01/2024    CHB (complete heart block) ([facility]) 03/27/2023    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[facility]) 12/09/2022    Meningitis 11/01/2022    High fever 11/01/2022    Accelerated hypertension 11/01/2022    Mixed hyperlipidemia 10/04/2021    Carotid stenosis, asymptomatic, right 12/16/2020    Carotid stenosis, symptomatic w/o infarct, left 11/02/2020    Bilateral carotid artery stenosis 10/19/2020    TIA (transient ischemic attack) 10/17/2020    High cholesterol 09/21/2020    Essential hypertension 12/28/2018    Coronary artery disease involving native coronary artery of native heart without angina pectoris 06/12/2018    S/P coronary artery stent placement 05/25/2018    Hx of diverticulitis of colon 05/18/2018    Gout      Past Medical History:   Diagnosis Date    CAD (coronary artery disease)     Diverticulitis     Glaucoma     Gout     HTN (hypertension) 5/18/2018    Hx of diverticulitis of colon 5/18/2018    Hyperlipidemia     Hypertension

## 2024-11-05 NOTE — PROGRESS NOTES
Hospitalist Progress Note    NAME:   Tomeka Yip   : 8/15/1932   MRN: [id number]     Date/Time: 2024 4:09 PM  Patient PCP: Orestes Xiong DO    Estimated discharge date:   Barriers: Blood pressure improvement      Assessment / Plan:    Slurred speech secondary to hypotension  TIA unlikely    -CT head negative for bleeding  Seen by neurologist  -MRI deferred.  Repeat CT head negative for any changes  -Continue aspirin  -Appreciate PT/OT eval    Hypotension  Hyponatremia  -Possibly from medications  -Received IVF in the ED, still orthostatic, give another 250 cc of NS bolus  -Assess orthostatic tomorrow  -Continue to hold antihypertensive medication     Systolic murmur, has history of mild to moderate aortic stenosis, advised that she should follow-up with cardiology to get echocardiogram.     Neuropathy  -cymbalta   -pregablin      HLD  -resume statin   -resume ASA   -resume Zetia     Supplement   VIT D 3     Seasonal allergies  -flonase  -allegra       DVT prophylaxis, Lovenox  CODE STATUS, full code    Subjective:     Chief Complaint / Reason for Physician Visit  \" She wants to go home but after she worked with PT, she was orthostatic\".  Discussed with RN events overnight.       Objective:     VITALS:   Last 24hrs VS reviewed since prior progress note. Most recent are:  Patient Vitals for the past 24 hrs:   BP Temp Temp src Pulse Resp SpO2   24 1507 (!) 111/51 -- -- -- -- --   24 1504 (!) 80/45 -- -- -- -- --   24 1500 (!) 103/49 -- -- -- -- --   24 1454 (!) 141/51 98.2 °F (36.8 °C) Oral 77 16 98 %   24 1140 (!) 127/54 97.9 °F (36.6 °C) Oral 68 18 100 %   24 0803 (!) 118/46 98.2 °F (36.8 °C) Oral 76 16 99 %   24 0513 (!) 120/41 98.2 °F (36.8 °C) Oral 73 16 100 %   24 0210 (!) 123/46 97.9 °F (36.6 °C) Oral 67 18 96 %   24 (!) 145/47 98.1 °F (36.7 °C) Oral 66 18 100 %   24 (!) 115/49 -- -- 75 19 98 %   24 --

## 2024-11-05 NOTE — PLAN OF CARE
Problem: Occupational Therapy - Adult  Goal: By Discharge: Performs self-care activities at highest level of function for planned discharge setting.  See evaluation for individualized goals.  Description: FUNCTIONAL STATUS PRIOR TO ADMISSION:    Receives Help From: Family, ADL Assistance:  (Pt reports that daughter assists her with tub),  ,  ,  ,  ,  , Homemaking Assistance: Needs assistance, Ambulation Assistance:  (uses RW), Transfer Assistance: Independent, Active : No     HOME SUPPORT: Patient lived with her daughter.  Her daughter assisted her prn..    Occupational Therapy Goals:  Initiated 11/5/2024  1.  Patient will perform grooming in sitting with Set-up within 7 day(s).  2.  Patient will perform seated sponge  bathing with Minimal Assist within 7 day(s).  3.  Patient will perform upper body dressing and lower body dressing with Minimal Assist within 7 day(s).  4.  Patient will perform toilet transfers with Moderate Assist  within 7 day(s).  5.  Patient will perform all aspects of toileting with Minimal Assist within 7 day(s).  6.  Patient will participate in upper extremity therapeutic exercise/activities with Supervision for 5 minutes within 7 day(s).    Outcome: Not Progressing    OCCUPATIONAL THERAPY EVALUATION    Patient: Tomeka Yip (89 y.o. female)  Date: 11/5/2024  Primary Diagnosis: TIA (transient ischemic attack) [G45.9]         Precautions: Bed Alarm, Fall Risk                  ASSESSMENT :  The patient is limited by decreased functional mobility, independence in ADLs, high-level IADLs, ROM, strength, activity tolerance, endurance, coordination, balance, decreased BP in standing (asymptmatic) .  Pt reports decreased B shoulder ROM at baseline and tended to keep her shoulders elevated and quite tight during mobility efforts this session.  Pt needed assistance for mobilizing to the EOB and BP was low in standing in preparation for transfer.  No symptoms, but BP too low (80/45) to

## 2024-11-05 NOTE — CARE COORDINATION
Care Management Initial Assessment       RUR: 14% (low RUR)   Readmission? No  1st IM letter given? Yes - 11/04  1st  letter given: No    CM met with pt and daughter at bedside. CM introduced self and role and completed initial assessment. CM verified demographic and clinical information.     PCP is Dr. Xiong, preferred pharmacy is [facility] in [place].     Pt lives with daughter in a 1 level home, ?2- FELIX (will confirm with daughter when present). Reports being independent in ADLs. Reports RW at home. Denies O2/CPAP at home. Reports they are supported by their daughters. Reports hx of IPR at [facility].    CM received verbal hand-off from PT/OT recommending SNF. CM then noted d/c order. Made MD aware of rec for SNF and concerns for d/c home at this time (PT/OT notes were pending at the time). Discussed with patient and daughter, agreed that they would like to see therapy again in the AM. Are open to considering SNF. MD agreed to hold d/c. CM will follow-up in the AM to discuss SNF further. Patient will need [employer] MCR auth.     CM will continue to follow.        11/05/24 1990   Service Assessment   Patient Orientation Alert and Oriented;Person;Place;Situation;Self   Cognition Alert   History Provided By Patient;Child/Family   Primary Caregiver Family   Accompanied By/Relationship Daughter   Support Systems Children   Patient's Healthcare Decision Maker is: Legal Next of Kin   PCP Verified by CM Yes   Last Visit to PCP Within last 6 months   Prior Functional Level Independent in ADLs/IADLs   Current Functional Level Independent in ADLs/IADLs   Can patient return to prior living arrangement Yes   Ability to make needs known: Fair   Family able to assist with home care needs: Yes   Would you like for me to discuss the discharge plan with any other family members/significant others, and if so, who? Yes  (Discuss with daughterAbigail)   Financial Resources Medicaid;Medicare   Social/Functional History   Lives With Daughter    Type of Home House   Home Layout One level   Home Access Stairs to enter with rails   Home Equipment Walker - Rolling   ADL Assistance Independent   Homemaking Assistance Independent   Ambulation Assistance Independent   Transfer Assistance Independent   Active  No   Patient's  Info Family   Discharge Planning   Type of Residence House   Living Arrangements Children   Current Services Prior To Admission None   Potential Assistance Needed Skilled Nursing Facility   DME Ordered? No   Type of Home Care Services None   Patient expects to be discharged to: Skilled nursing facility   Services At/After Discharge   [place] Resource Information Provided? No  (N/a)   Mode of Transport at Discharge Other (see comment)  (Stretcher vs family)   Confirm Follow Up Transport Family         Advance Care Planning     General Advance Care Planning (ACP) Conversation    Date of Conversation: 11/5/2024  Conducted with: Patient with Decision Making Capacity  Other persons present: None    Healthcare Decision Maker:   Primary Decision Maker: Giana Yip - Child - [phone]    Primary Decision Maker: TheodoraGertrudis - Child - [phone]    Secondary Decision Maker: TheodoraHolly - Grandchild - [phone]     Today we documented Decision Maker(s) consistent with Legal Next of Kin hierarchy.  Content/Action Overview:  Has NO ACP documents-Information provided  Reviewed DNR/DNI and patient elects Full Code (Attempt Resuscitation)    Length of Voluntary ACP Conversation in minutes:  <16 minutes (Non-Billable)    GUILLERMINA Ware MSW  Care Management  [facility]  [phone]

## 2024-11-05 NOTE — ED NOTES
ED TO INPATIENT SBAR HANDOFF        Verbal report given to YOAV Hubbard on Tomeka Yip  being transferred to [facility] for routine progression of patient care       Patient Name: Tomeka Yip   Preferred Name: Tomeka  : 8/15/1932  89 y.o.   Family/Caregiver Present: no   Code Status Order: Full Code  PO Status: NPO:No  Telemetry Order:   C-SSRS: Risk of Suicide: No Risk      Information from the following report(s) Nurse Handoff Report, ED Encounter Summary, ED SBAR, Adult Overview, MAR, and Neuro Assessment was reviewed with the receiving nurse.    Marli Fall Assessment:    Presents to emergency department  because of falls (Syncope, seizure, or loss of consciousness): No  Age > 70: Yes  Altered Mental Status, Intoxication with alcohol or substance confusion (Disorientation, impaired judgment, poor safety awaremess, or inability to follow instructions): No  Impaired Mobility: Ambulates or transfers with assistive devices or assistance; Unable to ambulate or transer.: Yes  Nursing Judgement: Yes          Situation  Chief Complaint   Patient presents with    Cerebrovascular Accident       Mental Status: oriented, alert, coherent, logical, thought processes intact, and able to concentrate and follow conversation  Arrived from:Home  Imaging:   XR CHEST PORTABLE   Final Result      No acute cardiopulmonary findings.      Electronically signed by Yakov Oh      CTA HEAD NECK W CONTRAST   Final Result      1. No acute vascular abnormality, no large vessel occlusion. No hemodynamic   significant stenosis. Normal perfusion.   2. Severe degenerative changes around the atlantoaxial joint, with thickening of   the transverse ligament and associated mild spinal canal narrowing.      Electronically signed by KAYT OCHOA      CT BRAIN PERFUSION   Final Result      1. No acute vascular abnormality, no large vessel occlusion. No hemodynamic   significant stenosis. Normal perfusion.   2. Severe degenerative  changes around the atlantoaxial joint, with thickening of   the transverse ligament and associated mild spinal canal narrowing.      Electronically signed by KATY OCHOA      CT HEAD WO CONTRAST   Final Result   No acute abnormality or interval change.            Electronically signed by CANDY RUELAS        Abnormal labs:   Abnormal Labs Reviewed   CBC WITH AUTO DIFFERENTIAL - Abnormal; Notable for the following components:       Result Value    MPV 8.7 (*)     Immature Granulocytes % 1 (*)     Immature Granulocytes Absolute 0.1 (*)     All other components within normal limits   COMPREHENSIVE METABOLIC PANEL - Abnormal; Notable for the following components:    Sodium 129 (*)     Chloride 96 (*)     BUN 32 (*)     Creatinine 1.35 (*)     BUN/Creatinine Ratio 24 (*)     Est, Glom Filt Rate 37 (*)     AST 42 (*)     Albumin 3.2 (*)     Globulin 4.7 (*)     Albumin/Globulin Ratio 0.7 (*)     All other components within normal limits   URINALYSIS WITH REFLEX TO CULTURE - Abnormal; Notable for the following components:    Epithelial Cells, UA MODERATE (*)     BACTERIA, URINE 1+ (*)     All other components within normal limits       Background  Allergies:   Allergies   Allergen Reactions    Lisinopril Cough     History:   Past Medical History:   Diagnosis Date    CAD (coronary artery disease)     Diverticulitis     Glaucoma     Gout     HTN (hypertension) 5/18/2018    Hx of diverticulitis of colon 5/18/2018    Hyperlipidemia     Hypertension     Other ill-defined conditions(799.89)     high cholesterol    S/P coronary artery stent placement 05/25/2018    Stroke (HCC) 10/2020    TIA       Assessment  Vitals:    Level of Consciousness: Alert (0)   Vitals:    11/04/24 2111 11/04/24 2121 11/04/24 2124 11/04/24 2133   BP: (!) 121/50 (!) 137/45  (!) 115/49   Pulse: 70 70 67 75   Resp: 15 20 22 19   Temp:       TempSrc:       SpO2: 98% 99%  98%   Weight:       Height:           O2 Flow Rate:    O2 Device: O2 Device: None (Room

## 2024-11-05 NOTE — PROGRESS NOTES
MRI ON HOLD - TELEMETRY ORDERS AND SCREENING SHEET    Please complete screening and sign electronically or fax to [phone].    Telemetry order must be changed to allow the patient to leave the unit without telemetry.    Telemetry box cannot come to MRI with the patient.    Please call MRI at [phone] when this has been done.    If this patient needs monitored while off the unit, please call MRI at [phone] to coordinate a time for an RN to accompany the patient to MRI.

## 2024-11-05 NOTE — H&P
significant stenosis. Left cavernous internal carotid artery: Moderate atherosclerotic disease without hemodynamically significant stenosis. Anterior cerebral arteries: Patent Anterior communicating artery: Patent Right middle cerebral artery: Patent Left middle cerebral artery: Patent Posterior communicating arteries: Widely patent on the left. Diminutive on the right Posterior cerebral arteries: Patent Basilar artery: Patent Distal vertebral arteries: Patent No evidence for intracranial aneurysm or hemodynamically significant stenosis. CT Perfusion: Normal CT perfusion.     1. No acute vascular abnormality, no large vessel occlusion. No hemodynamic significant stenosis. Normal perfusion. 2. Severe degenerative changes around the atlantoaxial joint, with thickening of the transverse ligament and associated mild spinal canal narrowing. Electronically signed by KATY OCHOA    CT HEAD WO CONTRAST    Result Date: 11/4/2024  EXAM: CT CODE NEURO HEAD WO CONTRAST INDICATION: Stroke, slurred speech COMPARISON: CT head 12/11/2022, MRI brain 12/9/2022. CONTRAST: None. TECHNIQUE: Unenhanced CT of the head was performed using 5 mm images. Brain and bone windows were generated. Coronal and sagittal reformats. CT dose reduction was achieved through use of a standardized protocol tailored for this examination and automatic exposure control for dose modulation.  FINDINGS: Generalized volume loss.. White matter hypodensities may reflect chronic microangiopathic change. Bilateral basal ganglia calcifications. There is no intracranial hemorrhage, extra-axial collection, or mass effect. The basilar cisterns are open. No CT evidence of acute infarct. 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 The visualized portions of the paranasal sinuses and mastoid air cells are clear. Bilateral lens replacements.     No acute abnormality  or interval change. Electronically signed by CANDY RUELAS     _______________________________________________________________________    TOTAL TIME:  76 Minutes    Critical Care Provided     Minutes non procedure based    Signed: TIARA Montalvo    Procedures: see electronic medical records for all procedures/Xrays and details which were not copied into this note but were reviewed prior to creation of Plan.

## 2024-11-05 NOTE — PLAN OF CARE
Problem: Physical Therapy - Adult  Goal: By Discharge: Performs mobility at highest level of function for planned discharge setting.  See evaluation for individualized goals.  Description: FUNCTIONAL STATUS PRIOR TO ADMISSION: Pt reports mod I with RW PTA for gait in home. Has some help from daughter at baseline.     HOME SUPPORT PRIOR TO ADMISSION: Lives with daughter in private home, stairs to enter but could not confirm amount. Has tub-shower and seat inside shower. Pt/daughter deny falls in last year    Physical Therapy Goals  Initiated 11/5/2024  1.  Patient will move from supine to sit and sit to supine, scoot up and down, and roll side to side in bed with contact guard assist within 7 day(s).    2.  Patient will perform sit to stand with minimal assistance within 7 day(s).  3.  Patient will transfer from bed to chair and chair to bed with minimal assistance using the least restrictive device within 7 day(s).  4.  Patient will ambulate with moderate assistance for 15 feet with the least restrictive device within 7 day(s).     Outcome: Progressing   PHYSICAL THERAPY EVALUATION    Patient: Tomeka Yip (89 y.o. female)  Date: 11/5/2024  Primary Diagnosis: TIA (transient ischemic attack) [G45.9]       Precautions: Restrictions/Precautions: Bed Alarm, Fall Risk                      ASSESSMENT :   DEFICITS/IMPAIRMENTS:   The patient is limited by decreased functional mobility, strength, activity tolerance, endurance, safety awareness, balance, orthostatic hypotension     Based on the impairments listed above pt presented to ED with sudden slurred speech and AMS, Bp in 80s systolic at home, admitted for CVA/TIA workup. Initial ED head CT negative at time of PT eval, unable to receive MRI but repeat head CT negative at time of eval.     Pt received supine in bed, agreeable to mobility. She required min A to come EOB with rail and extra time. Near 70 point systolic BP drop during session as follows: BP in  ill-defined conditions(799.89)     high cholesterol    S/P coronary artery stent placement 05/25/2018    Stroke (HCC) 10/2020    TIA     Past Surgical History:   Procedure Laterality Date    CAROTID ENDARTERECTOMY Left 11/02/2020    COLONOSCOPY FLX DX W/COLLJ SPEC WHEN PFRMD  10/18/2013         HYSTERECTOMY (CERVIX STATUS UNKNOWN)      MD UNLISTED PROCEDURE CARDIAC SURGERY  05/2018    x2 cardiac stent       Home Situation:  Social/Functional History  Lives With: Daughter  Type of Home: House  Home Layout: One level  Home Access: Stairs to enter with rails  Entrance Stairs - Number of Steps: please confirm, pt unable to confirm  Bathroom Shower/Tub: Tub/Shower unit, Shower chair with back  Home Equipment: Walker - Rolling  Has the patient had two or more falls in the past year or any fall with injury in the past year?: No           Skin: appears intact    Edema: none noted      Strength:    Strength: Generally decreased, functional    Tone & Sensation:   Tone: Normal  Sensation: Impaired (B feet neuropathy at baseline)    Coordination:  Coordination: Generally decreased, functional    Range Of Motion:  AROM: Generally decreased, functional       Functional Mobility:  Bed Mobility:     Bed Mobility Training  Bed Mobility Training: Yes  Interventions: Safety awareness training;Verbal cues  Supine to Sit: Minimum assistance  Sit to Supine: Minimum assistance;Assist X2  Scooting: Minimum assistance;Assist X2  Transfers:     Transfer Training  Transfer Training: Yes  Interventions: Safety awareness training;Verbal cues  Sit to Stand: Moderate assistance;Assist X2  Stand to Sit: Moderate assistance;Assist X2  Balance:               Balance  Sitting: Impaired  Sitting - Static: Fair (occasional)  Sitting - Dynamic: Fair (occasional)  Standing: Impaired  Standing - Static: Constant support;Poor  Standing - Dynamic: Not tested  Ambulation/Gait Training:      Unsafe to attempt 2/2 severe orthostatic BP

## 2024-11-05 NOTE — PROGRESS NOTES
Physical Therapy     Chart reviewed up to date and orders acknowledged. Attempted to see pt for PT eval. Pt headed [facility] for repeat CT. Will defer and follow up later.     Chantal Sharpe PT, DPT, NCS

## 2024-11-05 NOTE — CONSULTS
[facility]  [address] [place] [address]    Neurology Consultation    Date: November 5, 2024    Tomeka Yip  [id number]  8/15/1932  [address] [address] [facility] [address] [facility] [address]    Primary Care Physician:  Orestes Xiong, DO  [unfilled]  [phone]    Referring Physician:  Mason Blackwell DO    Impression: This patient had a nonspecific episode of confusion and slurred speech yesterday around the time she had a documented low blood pressure in the 80s over 40s at a local clinic.  Her symptoms were short-lived and she feels back to normal.  When she had the episode after having left the clinic her symptoms resolved by the time the EMTs arrived.  She denies any focal or lateralizing neurologic symptoms.  My suspicion is that her symptoms are simply a reflection of global hypoperfusion of the brain.  She had a similar episode a year ago according to the daughter which was managed by reduction in her blood pressure medicines.    Plan and Recommendations: Will repeat the CT scan of the head to see if there has been any indication of an area of evolving ischemia given the fact she has a pacemaker and getting the MRI will be a significantly protracted process.  If the repeat CT of the head is unremarkable I believe she can be discharged home perhaps with some adjustments in her antihypertensive regimen.    Barrier to Discharge from Neurologic Perspective: CT of the head.      Lucero Olivares., M.D.      Diplomate, American Board of Neurology and Psychiatry  Diplomate, American Board of Electrodiagnostic Medicine  Subspecialty Certification, Headache Medicine, [facility]  ______________________________________________________________________    Reason for Consultation: Tomeka Yip is a 89 y.o. y/o female who we are asked to see in consultation for an episode of confusion and slurred speech.    History of Present Illness: This is an elderly patient who

## 2024-11-05 NOTE — DISCHARGE INSTRUCTIONS
HOSPITALIST DISCHARGE INSTRUCTIONS    NAME: Tomeka Yip   :  8/15/1932   MRN:  [id number]     Date/Time:  2024 3:23 PM    ADMIT DATE: 2024   DISCHARGE DATE: 2024     Slurred speech, presyncope POA  secondary to Orthostatic Hypotension POA- BP med adjusted, midodrine prn, compression stockings, rehab as arranged  TIA ruled out per neurology  Hyponatremia  Neuropathy  HLD  VIT D 3 def  Seasonal allergies  Full code      It is important that you take the medication exactly as they are prescribed.   Keep your medication in the bottles provided by the pharmacist and keep a list of the medication names, dosages, and times to be taken in your wallet.   Do not take other medications without consulting your doctor.       Make sure to Check BP daily, If your systolic number/Top number is <100 then do not take metoprolol and call your PCP for guidance    What to do at Home    Recommended diet:  cardiac diet    Recommended activity: activity as tolerated      If you have questions regarding the hospital related prescriptions or hospital related issues please call [facility]' office at . You can always direct your questions to your primary care doctor if you are unable to reach your hospital physician; your PCP works as an extension of your hospital doctor just like your hospital doctor is an extension of your PCP for your time at the hospital ([facility])    If you experience any of the following symptoms then please call your primary care physician or return to the emergency room if you cannot get hold of your doctor:    Fever, chills, nausea, vomiting, or persistent diarrhea  Worsening weakness or new problems with your speech or balance  Dark stools or visible blood in your stools  New Leg swelling or shortness of breath as these could be signs of a clot    Additional Instructions:      Bring these papers with you to your follow up appointments. The papers will help your

## 2024-11-05 NOTE — PLAN OF CARE
Problem: Chronic Conditions and Co-morbidities  Goal: Patient's chronic conditions and co-morbidity symptoms are monitored and maintained or improved  11/5/2024 1143 by Deanne Morales RN  Outcome: Progressing  11/4/2024 2242 by Haydee Bill RN  Outcome: Progressing     Problem: Discharge Planning  Goal: Discharge to home or other facility with appropriate resources  11/5/2024 1143 by Deanne Morales RN  Outcome: Progressing  11/4/2024 2242 by Haydee Bill RN  Outcome: Progressing     Problem: Pain  Goal: Verbalizes/displays adequate comfort level or baseline comfort level  11/5/2024 1143 by Deanne Morales RN  Outcome: Progressing  11/4/2024 2242 by Haydee Bill RN  Outcome: Progressing     Problem: Skin/Tissue Integrity  Goal: Absence of new skin breakdown  Description: 1.  Monitor for areas of redness and/or skin breakdown  2.  Assess vascular access sites hourly  3.  Every 4-6 hours minimum:  Change oxygen saturation probe site  4.  Every 4-6 hours:  If on nasal continuous positive airway pressure, respiratory therapy assess nares and determine need for appliance change or resting period.  11/5/2024 1143 by Deanne Morales RN  Outcome: Progressing  11/4/2024 2242 by Haydee Bill RN  Outcome: Progressing     Problem: ABCDS Injury Assessment  Goal: Absence of physical injury  11/5/2024 1143 by Deanne Morales RN  Outcome: Progressing  11/4/2024 2242 by Haydee Bill RN  Outcome: Progressing     Problem: Safety - Adult  Goal: Free from fall injury  11/5/2024 1143 by Deanne Morales RN  Outcome: Progressing  11/4/2024 2242 by Haydee Bill RN  Outcome: Progressing     Problem: Neurosensory - Adult  Goal: Achieves stable or improved neurological status  11/5/2024 1143 by Deanne Morales RN  Outcome: Progressing  11/4/2024 2242 by Haydee Bill RN  Outcome: Progressing  Goal: Absence of seizures  11/5/2024 1143 by Deanne Morales RN  Outcome: Progressing  11/4/2024 2242 by Haydee Bill

## 2024-11-05 NOTE — PROGRESS NOTES
End of Shift Note    Bedside shift change report given to  HEMA Churchill  (oncoming nurse) by Deanne Morales, YOAV .        Shift worked:  [id number]    Shift summary and any significant changes:    MRI and echo cancelled. Patient hypotensive w/ therapy, asymptomatic. PT/OT recommending SNF, CM to f/u tomorrow.     Concerns for physician to address: None    Zone phone for oncoming shift:  [phone]       Patient Information  Tomeka Yip  89 y.o.  11/4/2024  1:41 PM by Mason Blackwell DO. 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    Meningitis 11/01/2022    High fever 11/01/2022    Accelerated hypertension 11/01/2022    Mixed hyperlipidemia 10/04/2021    Carotid stenosis, asymptomatic, right 12/16/2020    Carotid stenosis, symptomatic w/o infarct, left 11/02/2020    Bilateral carotid artery stenosis 10/19/2020    TIA (transient ischemic attack) 10/17/2020    High cholesterol 09/21/2020    Essential hypertension 12/28/2018    Coronary artery disease involving native coronary artery of native heart without angina pectoris 06/12/2018    S/P coronary artery stent placement 05/25/2018    Hx of diverticulitis of colon 05/18/2018    Gout      Past Medical History:   Diagnosis Date    CAD (coronary artery disease)     Diverticulitis     Glaucoma     Gout     HTN (hypertension) 5/18/2018    Hx of diverticulitis of colon 5/18/2018    Hyperlipidemia     Hypertension     Other ill-defined conditions(799.89)     high cholesterol    S/P coronary

## 2024-11-06 PROBLEM — I95.9 HYPOTENSION: Status: ACTIVE | Noted: 2024-11-06

## 2024-11-06 LAB
ANION GAP SERPL CALC-SCNC: 7 MMOL/L (ref 2–12)
BASOPHILS # BLD: 0 K/UL (ref 0–0.1)
BASOPHILS NFR BLD: 0 % (ref 0–1)
BUN SERPL-MCNC: 19 MG/DL (ref 6–20)
BUN/CREAT SERPL: 18 (ref 12–20)
CALCIUM SERPL-MCNC: 9.2 MG/DL (ref 8.5–10.1)
CHLORIDE SERPL-SCNC: 97 MMOL/L (ref 97–108)
CO2 SERPL-SCNC: 26 MMOL/L (ref 21–32)
CREAT SERPL-MCNC: 1.07 MG/DL (ref 0.55–1.02)
DIFFERENTIAL METHOD BLD: ABNORMAL
EKG ATRIAL RATE: 59 BPM
EKG DIAGNOSIS: NORMAL
EKG P AXIS: 78 DEGREES
EKG P-R INTERVAL: 170 MS
EKG Q-T INTERVAL: 484 MS
EKG QRS DURATION: 172 MS
EKG QTC CALCULATION (BAZETT): 479 MS
EKG R AXIS: -62 DEGREES
EKG T AXIS: 86 DEGREES
EKG VENTRICULAR RATE: 59 BPM
EOSINOPHIL # BLD: 0.2 K/UL (ref 0–0.4)
EOSINOPHIL NFR BLD: 2 % (ref 0–7)
ERYTHROCYTE [DISTWIDTH] IN BLOOD BY AUTOMATED COUNT: 12.8 % (ref 11.5–14.5)
GLUCOSE SERPL-MCNC: 109 MG/DL (ref 65–100)
HCT VFR BLD AUTO: 33.5 % (ref 35–47)
HGB BLD-MCNC: 10.9 G/DL (ref 11.5–16)
IMM GRANULOCYTES # BLD AUTO: 0 K/UL (ref 0–0.04)
IMM GRANULOCYTES NFR BLD AUTO: 0 % (ref 0–0.5)
LYMPHOCYTES # BLD: 2.4 K/UL (ref 0.8–3.5)
LYMPHOCYTES NFR BLD: 26 % (ref 12–49)
MCH RBC QN AUTO: 29.3 PG (ref 26–34)
MCHC RBC AUTO-ENTMCNC: 32.5 G/DL (ref 30–36.5)
MCV RBC AUTO: 90.1 FL (ref 80–99)
MONOCYTES # BLD: 1.1 K/UL (ref 0–1)
MONOCYTES NFR BLD: 12 % (ref 5–13)
NEUTS SEG # BLD: 5.6 K/UL (ref 1.8–8)
NEUTS SEG NFR BLD: 60 % (ref 32–75)
NRBC # BLD: 0 K/UL (ref 0–0.01)
NRBC BLD-RTO: 0 PER 100 WBC
PLATELET # BLD AUTO: 316 K/UL (ref 150–400)
PMV BLD AUTO: 8.5 FL (ref 8.9–12.9)
POTASSIUM SERPL-SCNC: 3.9 MMOL/L (ref 3.5–5.1)
RBC # BLD AUTO: 3.72 M/UL (ref 3.8–5.2)
SODIUM SERPL-SCNC: 130 MMOL/L (ref 136–145)
WBC # BLD AUTO: 9.4 K/UL (ref 3.6–11)

## 2024-11-06 PROCEDURE — 96372 THER/PROPH/DIAG INJ SC/IM: CPT

## 2024-11-06 PROCEDURE — 2580000003 HC RX 258: Performed by: NURSE PRACTITIONER

## 2024-11-06 PROCEDURE — 85025 COMPLETE CBC W/AUTO DIFF WBC: CPT

## 2024-11-06 PROCEDURE — 6370000000 HC RX 637 (ALT 250 FOR IP): Performed by: NURSE PRACTITIONER

## 2024-11-06 PROCEDURE — 97530 THERAPEUTIC ACTIVITIES: CPT

## 2024-11-06 PROCEDURE — G0378 HOSPITAL OBSERVATION PER HR: HCPCS

## 2024-11-06 PROCEDURE — 1100000003 HC PRIVATE W/ TELEMETRY

## 2024-11-06 PROCEDURE — 96374 THER/PROPH/DIAG INJ IV PUSH: CPT

## 2024-11-06 PROCEDURE — 6370000000 HC RX 637 (ALT 250 FOR IP): Performed by: INTERNAL MEDICINE

## 2024-11-06 PROCEDURE — 36415 COLL VENOUS BLD VENIPUNCTURE: CPT

## 2024-11-06 PROCEDURE — 6360000002 HC RX W HCPCS: Performed by: NURSE PRACTITIONER

## 2024-11-06 PROCEDURE — 80048 BASIC METABOLIC PNL TOTAL CA: CPT

## 2024-11-06 RX ORDER — MIDODRINE HYDROCHLORIDE 5 MG/1
2.5 TABLET ORAL 2 TIMES DAILY WITH MEALS
Status: DISCONTINUED | OUTPATIENT
Start: 2024-11-06 | End: 2024-11-06

## 2024-11-06 RX ORDER — MIDODRINE HYDROCHLORIDE 5 MG/1
2.5 TABLET ORAL
Status: DISCONTINUED | OUTPATIENT
Start: 2024-11-06 | End: 2024-11-08

## 2024-11-06 RX ORDER — MIDODRINE HYDROCHLORIDE 2.5 MG/1
2.5 TABLET ORAL 2 TIMES DAILY PRN
Qty: 60 TABLET | Refills: 0 | Status: SHIPPED | OUTPATIENT
Start: 2024-11-06

## 2024-11-06 RX ADMIN — Medication 5000 UNITS: at 09:02

## 2024-11-06 RX ADMIN — MIDODRINE HYDROCHLORIDE 2.5 MG: 5 TABLET ORAL at 17:23

## 2024-11-06 RX ADMIN — Medication 400 MG: at 09:02

## 2024-11-06 RX ADMIN — MIDODRINE HYDROCHLORIDE 2.5 MG: 5 TABLET ORAL at 09:19

## 2024-11-06 RX ADMIN — EZETIMIBE 10 MG: 10 TABLET ORAL at 09:03

## 2024-11-06 RX ADMIN — PREGABALIN 50 MG: 50 CAPSULE ORAL at 09:03

## 2024-11-06 RX ADMIN — TIMOLOL MALEATE 1 DROP: 2.5 SOLUTION OPHTHALMIC at 09:10

## 2024-11-06 RX ADMIN — ATORVASTATIN CALCIUM 80 MG: 40 TABLET, FILM COATED ORAL at 09:02

## 2024-11-06 RX ADMIN — PREGABALIN 50 MG: 50 CAPSULE ORAL at 20:47

## 2024-11-06 RX ADMIN — ASPIRIN 81 MG: 81 TABLET, COATED ORAL at 09:03

## 2024-11-06 RX ADMIN — PANTOPRAZOLE SODIUM 40 MG: 40 INJECTION, POWDER, FOR SOLUTION INTRAVENOUS at 09:03

## 2024-11-06 RX ADMIN — CETIRIZINE HYDROCHLORIDE 5 MG: 10 TABLET, FILM COATED ORAL at 09:02

## 2024-11-06 RX ADMIN — DULOXETINE HYDROCHLORIDE 60 MG: 30 CAPSULE, DELAYED RELEASE ORAL at 09:03

## 2024-11-06 RX ADMIN — LATANOPROST 1 DROP: 50 SOLUTION OPHTHALMIC at 20:02

## 2024-11-06 RX ADMIN — TIMOLOL MALEATE 1 DROP: 2.5 SOLUTION OPHTHALMIC at 20:02

## 2024-11-06 RX ADMIN — MIDODRINE HYDROCHLORIDE 2.5 MG: 5 TABLET ORAL at 12:27

## 2024-11-06 RX ADMIN — FLUTICASONE PROPIONATE 1 SPRAY: 50 SPRAY, METERED NASAL at 09:10

## 2024-11-06 RX ADMIN — ENOXAPARIN SODIUM 30 MG: 100 INJECTION SUBCUTANEOUS at 20:10

## 2024-11-06 ASSESSMENT — PAIN SCALES - GENERAL
PAINLEVEL_OUTOF10: 2
PAINLEVEL_OUTOF10: 0

## 2024-11-06 NOTE — PROGRESS NOTES
End of Shift Note    Bedside shift change report given to  YOAV Stewart  (oncoming nurse) by Kerline Frost LPN .        Shift worked:  Nights   Shift summary and any significant changes:    No acute changes during the night, Prn Tylenol and Xanax was giving, Labs done     Concerns for physician to address: See above notes   Zone phone for oncoming shift:  [phone]       Patient Information  Tomeka Yip  89 y.o.  11/4/2024  1:41 PM by Mason Blackwell DO. Tomeka Yip was admitted from [facility]    Problem List  Patient Active Problem List    Diagnosis Date Noted    Spinal stenosis of lumbar region without neurogenic claudication 08/22/2024    History of TIA (transient ischemic attack) 08/22/2024    Carpal tunnel syndrome on left 08/22/2024    Stroke risk 02/01/2024    Paresthesia of upper and lower extremities of both sides 02/01/2024    Gait abnormality 02/01/2024    Prediabetes 02/01/2024    CHB (complete heart block) ([facility]) 03/27/2023    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[facility]) 12/09/2022    Meningitis 11/01/2022    High fever 11/01/2022    Accelerated hypertension 11/01/2022    Mixed hyperlipidemia 10/04/2021    Carotid stenosis, asymptomatic, right 12/16/2020    Carotid stenosis, symptomatic w/o infarct, left 11/02/2020    Bilateral carotid artery stenosis 10/19/2020    TIA (transient ischemic attack) 10/17/2020    High cholesterol 09/21/2020    Essential hypertension 12/28/2018    Coronary artery disease involving native coronary artery of native heart without angina pectoris 06/12/2018    S/P coronary artery stent placement 05/25/2018    Hx of diverticulitis of colon 05/18/2018    Gout      Past Medical History:   Diagnosis Date    CAD (coronary artery disease)     Diverticulitis     Glaucoma     Gout     HTN (hypertension) 5/18/2018    Hx of diverticulitis of colon 5/18/2018    Hyperlipidemia     Hypertension     Other ill-defined conditions(799.89)     high cholesterol    S/P coronary artery stent placement 05/25/2018

## 2024-11-06 NOTE — PLAN OF CARE
Problem: Chronic Conditions and Co-morbidities  Goal: Patient's chronic conditions and co-morbidity symptoms are monitored and maintained or improved  11/6/2024 1332 by Jaquelin James RN  Outcome: Progressing  11/6/2024 0052 by Kerline Frost LPN  Outcome: Progressing     Problem: Discharge Planning  Goal: Discharge to home or other facility with appropriate resources  11/6/2024 1332 by Jaquelin James RN  Outcome: Progressing  11/6/2024 0052 by Kerline Frost LPN  Outcome: Progressing     Problem: Pain  Goal: Verbalizes/displays adequate comfort level or baseline comfort level  11/6/2024 1332 by Jaquelin James RN  Outcome: Progressing  11/6/2024 0052 by Kerline Frost LPN  Outcome: Progressing     Problem: Skin/Tissue Integrity  Goal: Absence of new skin breakdown  Description: 1.  Monitor for areas of redness and/or skin breakdown  2.  Assess vascular access sites hourly  3.  Every 4-6 hours minimum:  Change oxygen saturation probe site  4.  Every 4-6 hours:  If on nasal continuous positive airway pressure, respiratory therapy assess nares and determine need for appliance change or resting period.  11/6/2024 1332 by Jaquelin James RN  Outcome: Progressing  11/6/2024 0052 by Kerline Frost LPN  Outcome: Progressing     Problem: ABCDS Injury Assessment  Goal: Absence of physical injury  11/6/2024 1332 by Jaquelin James RN  Outcome: Progressing  11/6/2024 0052 by Kerline Frost LPN  Outcome: Progressing     Problem: Safety - Adult  Goal: Free from fall injury  11/6/2024 1332 by Jaquelin James RN  Outcome: Progressing  11/6/2024 0052 by Kerline Frost LPN  Outcome: Progressing     Problem: Neurosensory - Adult  Goal: Achieves stable or improved neurological status  11/6/2024 1332 by Jaquelin James RN  Outcome: Progressing  11/6/2024 0052 by Kerline Frost LPN  Outcome: Progressing  Goal: Absence of seizures  11/6/2024 1332 by Jaquelin James RN  Outcome: Progressing  11/6/2024 0052 by  Frost, Kerline, LPN  Outcome: Progressing  Goal: Remains free of injury related to seizures activity  11/6/2024 1332 by Jaquelin James RN  Outcome: Progressing  11/6/2024 0052 by Kerline Frost LPN  Outcome: Progressing  Goal: Achieves maximal functionality and self care  11/6/2024 1332 by Jaquelin James RN  Outcome: Progressing  11/6/2024 0052 by Kerline Frost LPN  Outcome: Progressing     Problem: Musculoskeletal - Adult  Goal: Return mobility to safest level of function  11/6/2024 1332 by Jaquelin James RN  Outcome: Progressing  11/6/2024 0052 by Kerline Frost LPN  Outcome: Progressing  Goal: Maintain proper alignment of affected body part  11/6/2024 1332 by Jaquelin James RN  Outcome: Progressing  11/6/2024 0052 by Kerline Frost LPN  Outcome: Progressing  Goal: Return ADL status to a safe level of function  11/6/2024 1332 by Jaquelin James RN  Outcome: Progressing  11/6/2024 0052 by Kerline Frost LPN  Outcome: Progressing

## 2024-11-06 NOTE — PLAN OF CARE
Problem: Physical Therapy - Adult  Goal: By Discharge: Performs mobility at highest level of function for planned discharge setting.  See evaluation for individualized goals.  Description: FUNCTIONAL STATUS PRIOR TO ADMISSION: Pt reports mod I with RW PTA for gait in home. Has some help from daughter at baseline.     HOME SUPPORT PRIOR TO ADMISSION: Lives with daughter in private home, stairs to enter but could not confirm amount. Has tub-shower and seat inside shower. Pt/daughter deny falls in last year    Physical Therapy Goals  Initiated 11/5/2024  1.  Patient will move from supine to sit and sit to supine, scoot up and down, and roll side to side in bed with contact guard assist within 7 day(s).    2.  Patient will perform sit to stand with minimal assistance within 7 day(s).  3.  Patient will transfer from bed to chair and chair to bed with minimal assistance using the least restrictive device within 7 day(s).  4.  Patient will ambulate with moderate assistance for 15 feet with the least restrictive device within 7 day(s).     Outcome: Progressing   PHYSICAL THERAPY TREATMENT    Patient: Tomeka Yip (89 y.o. female)  Date: 11/6/2024  Diagnosis: TIA (transient ischemic attack) [G45.9]  Hypotension [I95.9] TIA (transient ischemic attack)      Precautions: Bed Alarm, Fall Risk                      ASSESSMENT:  Patient continues to benefit from skilled PT services and is slowly progressing towards goals. Pt received for PT session supine in bed, daughter at bedside, agreeable to therapy session. Improved bed mobility this date with pt requiring min A x1-2 and rail. Sit <> stand EOB with RW and Min A x2. Pt with significant BP drop with each change in position, see below, RN/MD made aware. Pt asymptomatic, able to step with mod A x2 and RW bed > Chair, difficulty to advance R >L LE towards chair requiring controlled lowering into recliner. Pt boosted up in chair, left with all needs met, call bell in reach,  chair alarm on, daughter at bedside. Pt is far below a safe mobility baseline to [facility] home with family assist. Will need moderate intensity rehab at [facility] prior to returning home. Will continue to follow.    Vitals:    11/06/24 1125 11/06/24 1130 11/06/24 1133    Vital Signs   BP (!) 156/55 (!) 115/53 106/62   MAP (Calculated) 89 74 77   BP Location Left upper arm Left upper arm Left upper arm   BP Method Automatic Automatic Automatic   Patient Position Supine Sitting Standing        11/06/24 1140   Vital Signs   BP (!) 140/53   MAP (Calculated) 82   BP Location Left upper arm   BP Method Automatic   Patient Position Sitting;Up in chair  (BLE elevated)           PLAN:  Patient continues to benefit from skilled intervention to address the above impairments.  Continue treatment per established plan of care.    Recommend for next PT session: gait, transfer training    Recommendation for discharge: (in order for the patient to meet his/her long term goals):   Moderate intensity short-term skilled physical therapy up to 5x/week    Other factors to consider for discharge: poor safety awareness, impaired cognition, high risk for falls, and not safe to be alone    IF patient discharges home will need the following DME: continuing to assess with progress       SUBJECTIVE:   Patient stated, \"I can try (standing).\"    OBJECTIVE DATA SUMMARY:     Functional Mobility Training:  Bed Mobility:  Bed Mobility Training  Bed Mobility Training: Yes  Supine to Sit: Minimum assistance;Additional time  Scooting: Minimum assistance;Additional time  Transfers:  Transfer Training  Transfer Training: Yes  Interventions: Safety awareness training;Verbal cues  Sit to Stand: Minimum assistance;Assist X2  Stand to Sit: Minimum assistance;Assist X2  Bed to Chair: Assist X2;Moderate assistance  Balance:  Balance  Sitting: Impaired  Sitting - Static: Fair (occasional)  Sitting - Dynamic: Fair (occasional)  Standing: Impaired  Standing - Static: Constant

## 2024-11-06 NOTE — PLAN OF CARE
Problem: Occupational Therapy - Adult  Goal: By Discharge: Performs self-care activities at highest level of function for planned discharge setting.  See evaluation for individualized goals.  Description: FUNCTIONAL STATUS PRIOR TO ADMISSION:    Receives Help From: Family, ADL Assistance:  (Pt reports that daughter assists her with tub),  ,  ,  ,  ,  , Homemaking Assistance: Needs assistance, Ambulation Assistance:  (uses RW), Transfer Assistance: Independent, Active : No     HOME SUPPORT: Patient lived with her daughter.  Her daughter assisted her prn..    Occupational Therapy Goals:  Initiated 11/5/2024  1.  Patient will perform grooming in sitting with Set-up within 7 day(s).  2.  Patient will perform seated sponge  bathing with Minimal Assist within 7 day(s).  3.  Patient will perform upper body dressing and lower body dressing with Minimal Assist within 7 day(s).  4.  Patient will perform toilet transfers with Moderate Assist  within 7 day(s).  5.  Patient will perform all aspects of toileting with Minimal Assist within 7 day(s).  6.  Patient will participate in upper extremity therapeutic exercise/activities with Supervision for 5 minutes within 7 day(s).    11/5/2024 1740 by Yesika Santo, OTR/L  Outcome: Not Progressing     Problem: Chronic Conditions and Co-morbidities  Goal: Patient's chronic conditions and co-morbidity symptoms are monitored and maintained or improved  11/6/2024 0052 by Kerline Frost LPN  Outcome: Progressing  11/5/2024 1143 by Deanne Morales RN  Outcome: Progressing     Problem: Discharge Planning  Goal: Discharge to home or other facility with appropriate resources  11/6/2024 0052 by Kerline Frost LPN  Outcome: Progressing  11/5/2024 1143 by Deanne Morales, RN  Outcome: Progressing     Problem: Pain  Goal: Verbalizes/displays adequate comfort level or baseline comfort level  11/6/2024 0052 by Kerline Frost LPN  Outcome: Progressing  11/5/2024 1143 by Deanne Morales,  RN  Outcome: Progressing     Problem: Skin/Tissue Integrity  Goal: Absence of new skin breakdown  Description: 1.  Monitor for areas of redness and/or skin breakdown  2.  Assess vascular access sites hourly  3.  Every 4-6 hours minimum:  Change oxygen saturation probe site  4.  Every 4-6 hours:  If on nasal continuous positive airway pressure, respiratory therapy assess nares and determine need for appliance change or resting period.  11/6/2024 0052 by Kerline Frost LPN  Outcome: Progressing  11/5/2024 1143 by Deanne Morales RN  Outcome: Progressing     Problem: ABCDS Injury Assessment  Goal: Absence of physical injury  11/6/2024 0052 by Kerline Frost LPN  Outcome: Progressing  11/5/2024 1143 by Deanne Morales RN  Outcome: Progressing     Problem: Safety - Adult  Goal: Free from fall injury  11/6/2024 0052 by Kerline Frost LPN  Outcome: Progressing  11/5/2024 1143 by Deanne Morales RN  Outcome: Progressing     Problem: Neurosensory - Adult  Goal: Achieves stable or improved neurological status  11/6/2024 0052 by Kerline Frost LPN  Outcome: Progressing  11/5/2024 1143 by Deanne Morales RN  Outcome: Progressing  Goal: Absence of seizures  11/6/2024 0052 by Kerline Frost LPN  Outcome: Progressing  11/5/2024 1143 by Deanne Morales RN  Outcome: Progressing  Goal: Remains free of injury related to seizures activity  11/6/2024 0052 by Kerline Frost LPN  Outcome: Progressing  11/5/2024 1143 by Deanne Morales RN  Outcome: Progressing  Goal: Achieves maximal functionality and self care  11/6/2024 0052 by Kerline Frost LPN  Outcome: Progressing  11/5/2024 1143 by Deanne Morales RN  Outcome: Progressing     Problem: Musculoskeletal - Adult  Goal: Return mobility to safest level of function  11/6/2024 0052 by Kerline Frost LPN  Outcome: Progressing  11/5/2024 1143 by Deanne Morales RN  Outcome: Progressing  Goal: Maintain proper alignment of affected body part  11/6/2024 0052 by Kerline Frost,

## 2024-11-06 NOTE — PROGRESS NOTES
Neurology    11/6/2024    The patient's CT scan of the head showed no evidence of any evolving infarct.  We will sign off.  The reader is referred to the consultation note from 11/5/2024.    Lucero Olivares., M.D.    Diplomate, American Board of Neurology and Psychiatry  Diplomate, American Board of Electrodiagnostic Medicine  Subspecialty Certification, Headache Medicine, [facility]

## 2024-11-06 NOTE — PROGRESS NOTES
Physical Therapy     Pt seen for PT session, very orthostatic with mobility but asymptomatic. Required assist of 2 for bed > chair and unsafe to DC home with current assist needs. Will need moderate intensity rehab at [facility]. Full note to follow.        11/06/24 1125 11/06/24 1130 11/06/24 1133   Vital Signs   BP (!) 156/55 (!) 115/53 106/62   MAP (Calculated) 89 74 77   BP Location Left upper arm Left upper arm Left upper arm   BP Method Automatic Automatic Automatic   Patient Position Supine Sitting Standing      11/06/24 1140   Vital Signs   BP (!) 140/53   MAP (Calculated) 82   BP Location Left upper arm   BP Method Automatic   Patient Position Sitting;Up in chair  (BLE elevated)     Chantal Sharpe PT, DPT, NCS

## 2024-11-06 NOTE — PROGRESS NOTES
End of Shift Note    Bedside shift change report given to  Alvarez PinoRN  (oncoming nurse) by Jaquelin James RN .        Shift worked:  Days    Shift summary and any significant changes:    No acute changes during this shift     Concerns for physician to address: See above notes   Zone phone for oncoming shift:  [phone]       Patient Information  Tomeka Yip  89 y.o.  11/4/2024  1:41 PM by Mason Blackwell DO. Tomeka Yip was admitted from [facility]    Problem List  Patient Active Problem List    Diagnosis Date Noted    Hypotension 11/06/2024    Spinal stenosis of lumbar region without neurogenic claudication 08/22/2024    History of TIA (transient ischemic attack) 08/22/2024    Carpal tunnel syndrome on left 08/22/2024    Stroke risk 02/01/2024    Paresthesia of upper and lower extremities of both sides 02/01/2024    Gait abnormality 02/01/2024    Prediabetes 02/01/2024    CHB (complete heart block) ([facility]) 03/27/2023    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[facility]) 12/09/2022    Meningitis 11/01/2022    High fever 11/01/2022    Accelerated hypertension 11/01/2022    Mixed hyperlipidemia 10/04/2021    Carotid stenosis, asymptomatic, right 12/16/2020    Carotid stenosis, symptomatic w/o infarct, left 11/02/2020    Bilateral carotid artery stenosis 10/19/2020    TIA (transient ischemic attack) 10/17/2020    High cholesterol 09/21/2020    Essential hypertension 12/28/2018    Coronary artery disease involving native coronary artery of native heart without angina pectoris 06/12/2018    S/P coronary artery stent placement 05/25/2018    Hx of diverticulitis of colon 05/18/2018    Gout      Past Medical History:   Diagnosis Date    CAD (coronary artery disease)     Diverticulitis     Glaucoma     Gout     HTN (hypertension) 5/18/2018    Hx of diverticulitis of colon 5/18/2018    Hyperlipidemia     Hypertension     Other ill-defined conditions(799.89)     high cholesterol    S/P coronary artery stent placement 05/25/2018    Stroke ([facility])

## 2024-11-06 NOTE — CARE COORDINATION
Transition of Care Plan:    RUR: 13% (low RUR)   Prior Level of Functioning: Independent  Disposition: SNF vs Home with HH  JESUS: 11/07  If SNF or IPR: Date FOC offered: 11/06/24  Date FOC received: Pending family to review  Accepting facility: Pending choice  Date authorization started with reference number:   Date authorization received and expires:   Follow up appointments: PCP/Specialists as indicated  DME needed: None at this time   Transportation at discharge: Stretcher vs daughter  IM/IMM Medicare/ letter given: Obs status  Is patient a  and connected with [facility]? N/a   If yes, was [facility] transfer form completed and [facility] notified? N/a  Caregiver Contact: Giana Yip; daughter; [phone]  Discharge Caregiver contacted prior to discharge? CM to contact  Care Conference needed? Not at this time  Barriers to discharge: Therapy, ?placement, ?auth vs HH set-up     0835 - Chart reviewed. Noted conditional d/c order in place. Will see if therapy can see patient today, will follow-up on previous recommendations for SNF.     1400 - Met with patient at bedside to discuss SNF. She is agreeable. Would like to review options with her daughter who she said is coming back soon. CM left FOC list at bedside and will revisit once daughter has returned.     1527 - Daughter still not at bedside. Attempted to call and LVM, unable to reach. Asked patient to review list with daughter this evening.     1403 - Daughter, Abigail at bedside now. Reviewing list with patient, asked CM to follow-up.     GUILLERMINA Oro  Care Management  [facility]  [phone]

## 2024-11-06 NOTE — PROGRESS NOTES
Hospitalist Progress Note    NAME:   Tomeka Yip   : 8/15/1932   MRN: [id number]     Date/Time: 2024 12:05 PM  Patient PCP: Orestes Xiong DO    Estimated discharge date:   Barriers: Blood pressure improvement on midodrine TID now      Assessment / Plan:    Slurred speech secondary to hypotension  TIA unlikely    -CT head negative for bleeding  Seen by neurologist  -MRI deferred.  Repeat CT head negative for any changes  -Continue aspirin  -Appreciate PT/OT eval- still orthostatic today but improved    Hypotension  Hyponatremia  -Possibly from medications  -Received IVF in the ED, still orthostatic, give another 250 cc of NS bolus  -Assess orthostatic tomorrow  -Continue to hold antihypertensive medications- resume Metoprolol at lower dose on DC as planned with Likely midodrine support now     Systolic murmur, has history of mild to moderate aortic stenosis, advised that she should follow-up with cardiology to get echocardiogram.     Neuropathy  -cymbalta   -pregablin      HLD  -resume statin   -resume ASA   -resume Zetia     Supplement   VIT D 3     Seasonal allergies  -flonase  -allegra       DVT prophylaxis, Lovenox  CODE STATUS, full code    Subjective:     Chief Complaint / Reason for Physician Visit  \" She wants to go home but after she worked with PT, she was orthostatic\".  Discussed with RN events overnight.       Objective:     VITALS:   Last 24hrs VS reviewed since prior progress note. Most recent are:  Patient Vitals for the past 24 hrs:   BP Temp Temp src Pulse Resp SpO2   24 1140 (!) 140/53 -- -- -- -- --   24 1133 106/62 -- -- -- -- --   24 1130 (!) 115/53 -- -- -- -- --   24 1125 (!) 156/55 -- -- -- -- --   24 0931 -- -- -- 69 -- --   24 0845 (!) 131/52 97.5 °F (36.4 °C) Oral 69 14 98 %   240 (!) 133/48 97.9 °F (36.6 °C) Oral 70 20 99 %   24 (!) 140/50 97.7 °F (36.5 °C) Oral 74 20 100 %   24 0175 (!) 134/48

## 2024-11-07 PROBLEM — I95.1 ORTHOSTATIC HYPOTENSION: Status: ACTIVE | Noted: 2024-11-07

## 2024-11-07 LAB
ANION GAP SERPL CALC-SCNC: 6 MMOL/L (ref 2–12)
BASOPHILS # BLD: 0 K/UL (ref 0–0.1)
BASOPHILS NFR BLD: 0 % (ref 0–1)
BUN SERPL-MCNC: 15 MG/DL (ref 6–20)
BUN/CREAT SERPL: 15 (ref 12–20)
CALCIUM SERPL-MCNC: 9.2 MG/DL (ref 8.5–10.1)
CHLORIDE SERPL-SCNC: 97 MMOL/L (ref 97–108)
CO2 SERPL-SCNC: 28 MMOL/L (ref 21–32)
CREAT SERPL-MCNC: 0.99 MG/DL (ref 0.55–1.02)
DIFFERENTIAL METHOD BLD: ABNORMAL
EOSINOPHIL # BLD: 0.2 K/UL (ref 0–0.4)
EOSINOPHIL NFR BLD: 3 % (ref 0–7)
ERYTHROCYTE [DISTWIDTH] IN BLOOD BY AUTOMATED COUNT: 12.7 % (ref 11.5–14.5)
GLUCOSE SERPL-MCNC: 83 MG/DL (ref 65–100)
HCT VFR BLD AUTO: 32.9 % (ref 35–47)
HGB BLD-MCNC: 10.7 G/DL (ref 11.5–16)
IMM GRANULOCYTES # BLD AUTO: 0 K/UL (ref 0–0.04)
IMM GRANULOCYTES NFR BLD AUTO: 0 % (ref 0–0.5)
LYMPHOCYTES # BLD: 2 K/UL (ref 0.8–3.5)
LYMPHOCYTES NFR BLD: 28 % (ref 12–49)
MCH RBC QN AUTO: 29.6 PG (ref 26–34)
MCHC RBC AUTO-ENTMCNC: 32.5 G/DL (ref 30–36.5)
MCV RBC AUTO: 91.1 FL (ref 80–99)
MONOCYTES # BLD: 0.6 K/UL (ref 0–1)
MONOCYTES NFR BLD: 9 % (ref 5–13)
NEUTS SEG # BLD: 4.2 K/UL (ref 1.8–8)
NEUTS SEG NFR BLD: 60 % (ref 32–75)
NRBC # BLD: 0 K/UL (ref 0–0.01)
NRBC BLD-RTO: 0 PER 100 WBC
PLATELET # BLD AUTO: 339 K/UL (ref 150–400)
PMV BLD AUTO: 9 FL (ref 8.9–12.9)
POTASSIUM SERPL-SCNC: 3.9 MMOL/L (ref 3.5–5.1)
RBC # BLD AUTO: 3.61 M/UL (ref 3.8–5.2)
SODIUM SERPL-SCNC: 131 MMOL/L (ref 136–145)
WBC # BLD AUTO: 7.1 K/UL (ref 3.6–11)

## 2024-11-07 PROCEDURE — 6360000002 HC RX W HCPCS: Performed by: NURSE PRACTITIONER

## 2024-11-07 PROCEDURE — 6360000002 HC RX W HCPCS: Performed by: INTERNAL MEDICINE

## 2024-11-07 PROCEDURE — 36415 COLL VENOUS BLD VENIPUNCTURE: CPT

## 2024-11-07 PROCEDURE — 80048 BASIC METABOLIC PNL TOTAL CA: CPT

## 2024-11-07 PROCEDURE — 97535 SELF CARE MNGMENT TRAINING: CPT | Performed by: OCCUPATIONAL THERAPIST

## 2024-11-07 PROCEDURE — 85025 COMPLETE CBC W/AUTO DIFF WBC: CPT

## 2024-11-07 PROCEDURE — 96376 TX/PRO/DX INJ SAME DRUG ADON: CPT

## 2024-11-07 PROCEDURE — 2580000003 HC RX 258: Performed by: NURSE PRACTITIONER

## 2024-11-07 PROCEDURE — 97116 GAIT TRAINING THERAPY: CPT

## 2024-11-07 PROCEDURE — 6370000000 HC RX 637 (ALT 250 FOR IP): Performed by: NURSE PRACTITIONER

## 2024-11-07 PROCEDURE — 6370000000 HC RX 637 (ALT 250 FOR IP): Performed by: INTERNAL MEDICINE

## 2024-11-07 PROCEDURE — 1100000003 HC PRIVATE W/ TELEMETRY

## 2024-11-07 RX ORDER — SENNA AND DOCUSATE SODIUM 50; 8.6 MG/1; MG/1
2 TABLET, FILM COATED ORAL DAILY
Status: DISCONTINUED | OUTPATIENT
Start: 2024-11-07 | End: 2024-11-09 | Stop reason: HOSPADM

## 2024-11-07 RX ORDER — POLYETHYLENE GLYCOL 3350 17 G/17G
17 POWDER, FOR SOLUTION ORAL DAILY
Status: DISCONTINUED | OUTPATIENT
Start: 2024-11-07 | End: 2024-11-09 | Stop reason: HOSPADM

## 2024-11-07 RX ORDER — POLYETHYLENE GLYCOL 3350 17 G/17G
17 POWDER, FOR SOLUTION ORAL DAILY
COMMUNITY

## 2024-11-07 RX ORDER — ENOXAPARIN SODIUM 100 MG/ML
40 INJECTION SUBCUTANEOUS EVERY 24 HOURS
Status: DISCONTINUED | OUTPATIENT
Start: 2024-11-07 | End: 2024-11-09 | Stop reason: HOSPADM

## 2024-11-07 RX ORDER — PANTOPRAZOLE SODIUM 40 MG/1
40 TABLET, DELAYED RELEASE ORAL
Status: DISCONTINUED | OUTPATIENT
Start: 2024-11-08 | End: 2024-11-09 | Stop reason: HOSPADM

## 2024-11-07 RX ADMIN — ASPIRIN 81 MG: 81 TABLET, COATED ORAL at 08:24

## 2024-11-07 RX ADMIN — PANTOPRAZOLE SODIUM 40 MG: 40 INJECTION, POWDER, FOR SOLUTION INTRAVENOUS at 08:40

## 2024-11-07 RX ADMIN — MIDODRINE HYDROCHLORIDE 2.5 MG: 5 TABLET ORAL at 16:49

## 2024-11-07 RX ADMIN — MIDODRINE HYDROCHLORIDE 2.5 MG: 5 TABLET ORAL at 11:59

## 2024-11-07 RX ADMIN — EZETIMIBE 10 MG: 10 TABLET ORAL at 08:24

## 2024-11-07 RX ADMIN — TIMOLOL MALEATE 1 DROP: 2.5 SOLUTION OPHTHALMIC at 21:19

## 2024-11-07 RX ADMIN — TIMOLOL MALEATE 1 DROP: 2.5 SOLUTION OPHTHALMIC at 08:29

## 2024-11-07 RX ADMIN — Medication 400 MG: at 08:27

## 2024-11-07 RX ADMIN — PREGABALIN 50 MG: 50 CAPSULE ORAL at 21:19

## 2024-11-07 RX ADMIN — ATORVASTATIN CALCIUM 80 MG: 40 TABLET, FILM COATED ORAL at 08:25

## 2024-11-07 RX ADMIN — SENNOSIDES AND DOCUSATE SODIUM 2 TABLET: 50; 8.6 TABLET ORAL at 13:41

## 2024-11-07 RX ADMIN — POLYETHYLENE GLYCOL 3350 17 G: 17 POWDER, FOR SOLUTION ORAL at 13:41

## 2024-11-07 RX ADMIN — MIDODRINE HYDROCHLORIDE 2.5 MG: 5 TABLET ORAL at 08:26

## 2024-11-07 RX ADMIN — CETIRIZINE HYDROCHLORIDE 5 MG: 10 TABLET, FILM COATED ORAL at 08:24

## 2024-11-07 RX ADMIN — PREGABALIN 50 MG: 50 CAPSULE ORAL at 08:28

## 2024-11-07 RX ADMIN — ENOXAPARIN SODIUM 40 MG: 100 INJECTION SUBCUTANEOUS at 21:19

## 2024-11-07 RX ADMIN — LATANOPROST 1 DROP: 50 SOLUTION OPHTHALMIC at 21:20

## 2024-11-07 RX ADMIN — DULOXETINE HYDROCHLORIDE 60 MG: 30 CAPSULE, DELAYED RELEASE ORAL at 08:26

## 2024-11-07 RX ADMIN — Medication 5000 UNITS: at 08:27

## 2024-11-07 RX ADMIN — FLUTICASONE PROPIONATE 1 SPRAY: 50 SPRAY, METERED NASAL at 08:29

## 2024-11-07 ASSESSMENT — PAIN SCALES - GENERAL
PAINLEVEL_OUTOF10: 0
PAINLEVEL_OUTOF10: 0

## 2024-11-07 NOTE — PLAN OF CARE
Problem: Chronic Conditions and Co-morbidities  Goal: Patient's chronic conditions and co-morbidity symptoms are monitored and maintained or improved  11/6/2024 2112 by Cony Rogers RN  Outcome: Progressing  11/6/2024 1332 by Jaquelin James RN  Outcome: Progressing     Problem: Discharge Planning  Goal: Discharge to home or other facility with appropriate resources  11/6/2024 2112 by Cony Rogers RN  Outcome: Progressing  11/6/2024 1332 by Jaquelin James RN  Outcome: Progressing     Problem: Pain  Goal: Verbalizes/displays adequate comfort level or baseline comfort level  11/6/2024 2112 by Cony Rogers RN  Outcome: Progressing  11/6/2024 1332 by Jaquelin James RN  Outcome: Progressing     Problem: Skin/Tissue Integrity  Goal: Absence of new skin breakdown  Description: 1.  Monitor for areas of redness and/or skin breakdown  2.  Assess vascular access sites hourly  3.  Every 4-6 hours minimum:  Change oxygen saturation probe site  4.  Every 4-6 hours:  If on nasal continuous positive airway pressure, respiratory therapy assess nares and determine need for appliance change or resting period.  11/6/2024 2112 by Cony Rogers RN  Outcome: Progressing  11/6/2024 1332 by Jaquelin James RN  Outcome: Progressing     Problem: ABCDS Injury Assessment  Goal: Absence of physical injury  11/6/2024 2112 by Cony Rogers RN  Outcome: Progressing  11/6/2024 1332 by Jaquelin James RN  Outcome: Progressing     Problem: Safety - Adult  Goal: Free from fall injury  11/6/2024 2112 by Cony Rogers RN  Outcome: Progressing  11/6/2024 1332 by Jaquelin James RN  Outcome: Progressing     Problem: Neurosensory - Adult  Goal: Achieves stable or improved neurological status  11/6/2024 2112 by Cony Rogers RN  Outcome: Progressing  11/6/2024 1332 by Jaquelin James RN  Outcome: Progressing  Goal: Absence of seizures  11/6/2024 2112 by Cony Rogers RN  Outcome:

## 2024-11-07 NOTE — PROGRESS NOTES
P&T-Approved DVT Prophylaxis Dosing    Per P&T Committee-approved protocol enxoaparin 30mg daily has been adjusted to enoxaparin 40mg daily based on weight and renal function as shown in the table below.         Jose E Solorzano RPH

## 2024-11-07 NOTE — PROGRESS NOTES
Hospitalist Progress Note    NAME:   Tomeka Yip   : 8/15/1932   MRN: [id number]     Date/Time: 2024 1:54 PM  Patient PCP: Orestes Xiong DO    Estimated discharge date:   Barriers: Blood pressure improvement on midodrine TID now      Assessment / Plan:    Slurred speech secondary to hypotension  TIA unlikely    -CT head negative for bleeding  Seen by neurologist  -MRI deferred.  Repeat CT head negative for any changes  -Continue aspirin  -Appreciate PT/OT eval- much better today but recommends SNF    Hypotension  Hyponatremia  -Possibly from medications  -Received IVF in the ED, still orthostatic, give another 250 cc of NS bolus  -Assess orthostatic tomorrow  -Continue to hold antihypertensive medications-  plan to resume Metoprolol at lower dose on DC  with Likely midodrine support now  Add Compression stockings     Systolic murmur, has history of mild to moderate aortic stenosis, advised that she should follow-up with cardiology to get echocardiogram.     Neuropathy  -cymbalta   -pregablin      HLD  -resume statin   -resume ASA   -resume Zetia     Supplement   VIT D 3     Seasonal allergies  -flonase  -allegra       DVT prophylaxis, Lovenox  CODE STATUS, full code    Subjective:     Chief Complaint / Reason for Physician Visit  \" She wants to go home but after she worked with PT, she was orthostatic\".  Discussed with RN events overnight.       Objective:     VITALS:   Last 24hrs VS reviewed since prior progress note. Most recent are:  Patient Vitals for the past 24 hrs:   BP Temp Temp src Pulse Resp SpO2   24 1051 (!) 162/66 -- -- 88 -- --   24 1039 (!) 125/59 -- -- 91 -- --   24 1037 (!) 177/153 -- -- (!) 113 -- --   24 1033 (!) 141/53 -- -- 81 -- --   24 1032 (!) 141/53 -- -- 81 -- --   24 1027 (!) 163/59 -- -- 76 -- --   24 0803 (!) 144/56 98.8 °F (37.1 °C) Oral 83 17 99 %   11/06/24 2004 (!) 155/58 98.2 °F (36.8 °C) Oral 71 18 98 %  most current labs and imaging studies.  Pertinent labs include:  Recent Labs     11/05/24 0223 11/06/24 0637 11/07/24 0446   WBC 11.5* 9.4 7.1   HGB 11.6 10.9* 10.7*   HCT 33.7* 33.5* 32.9*    316 339     Recent Labs     11/04/24  1407 11/05/24 0223 11/06/24 0637 11/07/24 0446   * 133* 130* 131*   K 4.5 3.9 3.9 3.9   CL 96* 99 97 97   CO2 30 28 26 28   GLUCOSE 85 105* 109* 83   BUN 32* 22* 19 15   CREATININE 1.35* 1.06* 1.07* 0.99   CALCIUM 9.4 8.8 9.2 9.2   BILITOT 0.6  --   --   --    AST 42*  --   --   --    ALT 48  --   --   --    INR 1.0  --   --   --        Signed: Delroy Maria MD

## 2024-11-07 NOTE — PLAN OF CARE
Problem: Physical Therapy - Adult  Goal: By Discharge: Performs mobility at highest level of function for planned discharge setting.  See evaluation for individualized goals.  Description: FUNCTIONAL STATUS PRIOR TO ADMISSION: Pt reports mod I with RW PTA for gait in home. Has some help from daughter at baseline.     HOME SUPPORT PRIOR TO ADMISSION: Lives with daughter in private home, stairs to enter but could not confirm amount. Has tub-shower and seat inside shower. Pt/daughter deny falls in last year    Physical Therapy Goals  Initiated 11/5/2024  1.  Patient will move from supine to sit and sit to supine, scoot up and down, and roll side to side in bed with contact guard assist within 7 day(s).    2.  Patient will perform sit to stand with minimal assistance within 7 day(s).  3.  Patient will transfer from bed to chair and chair to bed with minimal assistance using the least restrictive device within 7 day(s).  4.  Patient will ambulate with moderate assistance for 15 feet with the least restrictive device within 7 day(s).     Outcome: Progressing   PHYSICAL THERAPY TREATMENT    Patient: Tomeka Yip (89 y.o. female)  Date: 11/7/2024  Diagnosis: TIA (transient ischemic attack) [G45.9]  Hypotension [I95.9]  Orthostatic hypotension [I95.1] TIA (transient ischemic attack)      Precautions: Bed Alarm, Fall Risk                      ASSESSMENT:  Patient continues to benefit from skilled PT services and is slowly progressing towards goals however continues to present with impaired functional mobility due to significant BLE weakness, impaired activity tolerance, decreased endurance, and impaired standing balance. Pt exhibits significant BLE tremors/jerking/buckling during sit>>stand transfers, initial static stance, and ambulation. Pt requires significant x2 person physical assist with transfers and mobility. Pt fatigues quickly, only able to tolerate 2 short ambulation trials of 2 ft w/ RW and x2 person

## 2024-11-07 NOTE — PLAN OF CARE
Problem: Chronic Conditions and Co-morbidities  Goal: Patient's chronic conditions and co-morbidity symptoms are monitored and maintained or improved  11/7/2024 1028 by Jaquelin James RN  Outcome: Progressing  11/6/2024 2112 by Cony Rogers RN  Outcome: Progressing     Problem: Discharge Planning  Goal: Discharge to home or other facility with appropriate resources  11/7/2024 1028 by Jaquelin James RN  Outcome: Progressing  11/6/2024 2112 by Cony Rogers RN  Outcome: Progressing     Problem: Pain  Goal: Verbalizes/displays adequate comfort level or baseline comfort level  11/7/2024 1028 by Jaquelin James RN  Outcome: Progressing  11/6/2024 2112 by Cony Rogers RN  Outcome: Progressing     Problem: Skin/Tissue Integrity  Goal: Absence of new skin breakdown  Description: 1.  Monitor for areas of redness and/or skin breakdown  2.  Assess vascular access sites hourly  3.  Every 4-6 hours minimum:  Change oxygen saturation probe site  4.  Every 4-6 hours:  If on nasal continuous positive airway pressure, respiratory therapy assess nares and determine need for appliance change or resting period.  11/7/2024 1028 by Jaquelin James RN  Outcome: Progressing  11/6/2024 2112 by Cony Rogers RN  Outcome: Progressing     Problem: ABCDS Injury Assessment  Goal: Absence of physical injury  11/7/2024 1028 by Jaquelin James RN  Outcome: Progressing  11/6/2024 2112 by Cony Rogers RN  Outcome: Progressing     Problem: Safety - Adult  Goal: Free from fall injury  11/7/2024 1028 by Jaquelin James RN  Outcome: Progressing  11/6/2024 2112 by Cony Rogers RN  Outcome: Progressing     Problem: Neurosensory - Adult  Goal: Achieves stable or improved neurological status  11/7/2024 1028 by Jaquelin James RN  Outcome: Progressing  11/6/2024 2112 by Cony Rogers RN  Outcome: Progressing  Goal: Absence of seizures  11/7/2024 1028 by Jaquelin James RN  Outcome:

## 2024-11-07 NOTE — PROGRESS NOTES
End of Shift Note    Bedside shift change report given to YOAV Hammer  (oncoming nurse) by Cony Rogers RN .        Shift worked:  Nights   Shift summary and any significant changes:    Pending Placement     Concerns for physician to address:    Zone phone for oncoming shift:  [phone]       Patient Information  Tomeka Yip  89 y.o.  11/4/2024  1:41 PM by Mason Blackwell DO. 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/09/2022    Meningitis 11/01/2022    High fever 11/01/2022    Accelerated hypertension 11/01/2022    Mixed hyperlipidemia 10/04/2021    Carotid stenosis, asymptomatic, right 12/16/2020    Carotid stenosis, symptomatic w/o infarct, left 11/02/2020    Bilateral carotid artery stenosis 10/19/2020    TIA (transient ischemic attack) 10/17/2020    High cholesterol 09/21/2020    Essential hypertension 12/28/2018    Coronary artery disease involving native coronary artery of native heart without angina pectoris 06/12/2018    S/P coronary artery stent placement 05/25/2018    Hx of diverticulitis of colon 05/18/2018    Gout      Past Medical History:   Diagnosis Date    CAD (coronary artery disease)     Diverticulitis     Glaucoma     Gout     HTN (hypertension) 5/18/2018    Hx of diverticulitis of colon 5/18/2018    Hyperlipidemia     Hypertension     Other ill-defined conditions(799.89)     high cholesterol    S/P coronary artery stent placement 05/25/2018    Stroke ([facility]) 10/2020    TIA       Core

## 2024-11-07 NOTE — PLAN OF CARE
Problem: Occupational Therapy - Adult  Goal: By Discharge: Performs self-care activities at highest level of function for planned discharge setting.  See evaluation for individualized goals.  Description: FUNCTIONAL STATUS PRIOR TO ADMISSION:    Receives Help From: Family, ADL Assistance:  (Pt reports that daughter assists her with tub),  ,  ,  ,  ,  , Homemaking Assistance: Needs assistance, Ambulation Assistance:  (uses RW), Transfer Assistance: Independent, Active : No     HOME SUPPORT: Patient lived with her daughter.  Her daughter assisted her prn..    Occupational Therapy Goals:  Initiated 11/5/2024  1.  Patient will perform grooming in sitting with Set-up within 7 day(s).  2.  Patient will perform seated sponge  bathing with Minimal Assist within 7 day(s).  3.  Patient will perform upper body dressing and lower body dressing with Minimal Assist within 7 day(s).  4.  Patient will perform toilet transfers with Moderate Assist  within 7 day(s).  5.  Patient will perform all aspects of toileting with Minimal Assist within 7 day(s).  6.  Patient will participate in upper extremity therapeutic exercise/activities with Supervision for 5 minutes within 7 day(s).    Outcome: Progressing    OCCUPATIONAL THERAPY TREATMENT  Patient: Tomeka Yip (89 y.o. female)  Date: 11/7/2024  Primary Diagnosis: TIA (transient ischemic attack) [G45.9]  Hypotension [I95.9]  Orthostatic hypotension [I95.1]       Precautions: Bed Alarm, Fall Risk                Chart, occupational therapy assessment, plan of care, and goals were reviewed.    ASSESSMENT  Patient continues to benefit from skilled OT services and is slowly progressing towards goals. Pt was pleasant and agreeable to treatment session.  Worked on bed mobility, standing adls,  LB adls,  transfer training, and completed all with Min A up to X2 assistance.  BP was generally stable, pt asymptomatic.  Continues to demonstrate decreased LB strength/ballistic tremors

## 2024-11-07 NOTE — CARE COORDINATION
Transition of Care Plan:     RUR: 12% (low RUR)   Prior Level of Functioning: Independent  Disposition: Plan A: SNF @ [facility], pending auth   JESUS: 11/08  If SNF or IPR: Date FOC offered: 11/06/24  Date FOC received: 11/07/24  Accepting facility: [facility]   Date authorization started with reference number: Requested facility start [facility] auth 11/07  Date authorization received and expires:   Follow up appointments: PCP/Specialists as indicated  DME needed: None at this time   Transportation at discharge: Stretcher vs daughter  IM/IMM Medicare/ letter given: Last given 11/4  Is patient a [place] and connected with [facility]? N/a              If yes, was  transfer form completed and [facility] notified? N/a  Caregiver Contact: Abigail Thompsoniver; daughter; [phone]  Discharge Caregiver contacted prior to discharge? CM to contact  Care Conference needed? Not at this time  Barriers to discharge: Auth     0827 - Chart reviewed. Plans for SNF, family requested time yesterday to review options. Will follow-up this AM and place referrals. Goal to start auth this afternoon if possible.     0914 - Spoke with daughter, Abigail, by phone. Answered some questions about SNF. Abigail and family have been reviewing. She will be here later this AM to make decisions with patient. CM requested initial referrals by early this afternoon to avoid further delays.     1250 - Met with pt and daughter at bedside. They identified referrals below. Would prefer to d/c home with HH if possible, will see therapy today and see how she is progressing. Referrals sent below to avoid any further delays.     [facility] - Accepted  [facility] - Accepted, pending bed availability   [facility] - Denied  [facility] - Pending  [facility] - Accepted  [facility] - Accepted    2639 - Met with patient and daughter. They have selected [facility]. Agreeable for CM to request auth start from facility. CM made facility aware.     1609 -

## 2024-11-08 PROCEDURE — 1100000003 HC PRIVATE W/ TELEMETRY

## 2024-11-08 PROCEDURE — 6370000000 HC RX 637 (ALT 250 FOR IP): Performed by: INTERNAL MEDICINE

## 2024-11-08 PROCEDURE — 6360000002 HC RX W HCPCS: Performed by: INTERNAL MEDICINE

## 2024-11-08 PROCEDURE — 6370000000 HC RX 637 (ALT 250 FOR IP): Performed by: NURSE PRACTITIONER

## 2024-11-08 RX ORDER — MIDODRINE HYDROCHLORIDE 5 MG/1
2.5 TABLET ORAL 2 TIMES DAILY WITH MEALS
Status: DISCONTINUED | OUTPATIENT
Start: 2024-11-08 | End: 2024-11-09 | Stop reason: HOSPADM

## 2024-11-08 RX ADMIN — PANTOPRAZOLE SODIUM 40 MG: 40 TABLET, DELAYED RELEASE ORAL at 06:11

## 2024-11-08 RX ADMIN — EZETIMIBE 10 MG: 10 TABLET ORAL at 09:23

## 2024-11-08 RX ADMIN — CETIRIZINE HYDROCHLORIDE 5 MG: 10 TABLET, FILM COATED ORAL at 09:22

## 2024-11-08 RX ADMIN — PREGABALIN 50 MG: 50 CAPSULE ORAL at 09:23

## 2024-11-08 RX ADMIN — MIDODRINE HYDROCHLORIDE 2.5 MG: 5 TABLET ORAL at 09:20

## 2024-11-08 RX ADMIN — TIMOLOL MALEATE 1 DROP: 2.5 SOLUTION OPHTHALMIC at 20:37

## 2024-11-08 RX ADMIN — PREGABALIN 50 MG: 50 CAPSULE ORAL at 20:36

## 2024-11-08 RX ADMIN — ENOXAPARIN SODIUM 40 MG: 100 INJECTION SUBCUTANEOUS at 20:35

## 2024-11-08 RX ADMIN — DULOXETINE HYDROCHLORIDE 60 MG: 30 CAPSULE, DELAYED RELEASE ORAL at 09:21

## 2024-11-08 RX ADMIN — LATANOPROST 1 DROP: 50 SOLUTION OPHTHALMIC at 20:37

## 2024-11-08 RX ADMIN — METOPROLOL TARTRATE 12.5 MG: 25 TABLET, FILM COATED ORAL at 20:36

## 2024-11-08 RX ADMIN — MIDODRINE HYDROCHLORIDE 2.5 MG: 5 TABLET ORAL at 16:44

## 2024-11-08 RX ADMIN — ATORVASTATIN CALCIUM 80 MG: 40 TABLET, FILM COATED ORAL at 09:23

## 2024-11-08 RX ADMIN — Medication 400 MG: at 09:22

## 2024-11-08 RX ADMIN — POLYETHYLENE GLYCOL 3350 17 G: 17 POWDER, FOR SOLUTION ORAL at 09:23

## 2024-11-08 RX ADMIN — SENNOSIDES AND DOCUSATE SODIUM 2 TABLET: 50; 8.6 TABLET ORAL at 09:21

## 2024-11-08 RX ADMIN — FLUTICASONE PROPIONATE 1 SPRAY: 50 SPRAY, METERED NASAL at 09:24

## 2024-11-08 RX ADMIN — Medication 5000 UNITS: at 09:22

## 2024-11-08 RX ADMIN — ASPIRIN 81 MG: 81 TABLET, COATED ORAL at 09:22

## 2024-11-08 RX ADMIN — TIMOLOL MALEATE 1 DROP: 2.5 SOLUTION OPHTHALMIC at 09:24

## 2024-11-08 ASSESSMENT — PAIN SCALES - GENERAL
PAINLEVEL_OUTOF10: 0
PAINLEVEL_OUTOF10: 0

## 2024-11-08 NOTE — PROGRESS NOTES
End of Shift Note    Bedside shift change report given to SharifRN  (oncoming nurse) by Stella Dillon RN .        Shift worked: 7a-7p   Shift summary and any significant changes:    No acute changes. Patient had small, soft bowel movement.     Pending insurance auth for [facility].      Concerns for physician to address:    Zone phone for oncoming shift:  [phone]       Patient Information  Tomeka Yip  89 y.o.  11/4/2024  1:41 PM by Maosn Blackwell DO. Tomeka Yip was admitted from [facility]    Problem List  Patient Active Problem List    Diagnosis Date Noted    Orthostatic hypotension 11/07/2024    Hypotension 11/06/2024    Spinal stenosis of lumbar region without neurogenic claudication 08/22/2024    History of TIA (transient ischemic attack) 08/22/2024    Carpal tunnel syndrome on left 08/22/2024    Stroke risk 02/01/2024    Paresthesia of upper and lower extremities of both sides 02/01/2024    Gait abnormality 02/01/2024    Prediabetes 02/01/2024    CHB (complete heart block) ([facility]) 03/27/2023    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[facility]) 12/09/2022    Meningitis 11/01/2022    High fever 11/01/2022    Accelerated hypertension 11/01/2022    Mixed hyperlipidemia 10/04/2021    Carotid stenosis, asymptomatic, right 12/16/2020    Carotid stenosis, symptomatic w/o infarct, left 11/02/2020    Bilateral carotid artery stenosis 10/19/2020    TIA (transient ischemic attack) 10/17/2020    High cholesterol 09/21/2020    Essential hypertension 12/28/2018    Coronary artery disease involving native coronary artery of native heart without angina pectoris 06/12/2018    S/P coronary artery stent placement 05/25/2018    Hx of diverticulitis of colon 05/18/2018    Gout      Past Medical History:   Diagnosis Date    CAD (coronary artery disease)     Diverticulitis     Glaucoma     Gout     HTN (hypertension) 5/18/2018    Hx of diverticulitis of colon 5/18/2018    Hyperlipidemia     Hypertension     Other ill-defined conditions(799.89)      high cholesterol    S/P coronary artery stent placement 05/25/2018    Stroke (HCC) 10/2020    TIA       Core Measures:  CVA: yes  CHF: no  PNA: no    Activity:  Level of Assistance: Contact guard assist, steading assist  Number times ambulated in hallways past shift: 0  Number of times OOB to chair past shift: 0    Cardiac:   Cardiac Monitoring: yes, NSR     Access:   Current line(s): PIV    Respiratory:   O2 Device: None (Room air)    GI:  Last BM (including prior to admit): 11/03/24  Current diet:  ADULT DIET; Regular  Tolerating current diet: Yes    Pain Management:   Patient states pain is manageable on current regimen: yes    Skin:  Brooks Scale Score: 19  Interventions: dual skin   Pressure injury: no    Patient Safety:  Fall Score: [place] Total Score: 60  Interventions: bed alarm  Self-release roll belt: No  Dexterity to release roll belt: N/A   (must document dexterity  here by stating Yes or No here, otherwise this is a restraint and must follow restraint documentation policy.)    DVT prophylaxis:  DVT prophylaxis: meds    Active Consults:  IP CONSULT TO TELE-NEUROLOGY  IP CONSULT TO HOSPITALIST  IP CONSULT TO NEUROLOGY    Length of Stay:  Expected LOS: 5  Actual LOS: 3    Stella Dillon RN

## 2024-11-08 NOTE — CARE COORDINATION
Reviewed chart. Patient waiting for insurance authorization to come back. [facility] started auth.    CM spoke with patient and explained that we are still waiting on auth. States that her daughter will be in later today.    1540 Call from [facility] that authorization received from insurance. Requested transport tomorrow at 1100.    Spoke with patient's daughter and in agreement.    The transport set for 1100 on 11/09/24 with [facility].     2nd IM letter completed.       11/08/24 3689   Services At/After Discharge   Transition of Care Consult (CM Consult) Discharge Planning   Services At/After Discharge Skilled Nursing Facility (SNF);Transport    Resource Information Provided? No   Mode of Transport at Discharge [facility] Transport Time of Discharge 1100  (leaving on 11/8/24)   Confirm Follow Up Transport Other (see comment)  ([facility] scheduled)   Condition of Participation: Discharge Planning   The Plan for Transition of Care is related to the following treatment goals: Patient scheduled to leave at 1100 on 11/9/24 for [facility]. Will continue rehab.   The Patient and/or Patient Representative was provided with a Choice of Provider? Patient Representative  (daughter)   Name of the Patient Representative who was provided with the Choice of Provider and agrees with the Discharge Plan?  Abigail Theodora   The Patient and/Or Patient Representative agree with the Discharge Plan? Yes   Freedom of Choice list was provided with basic dialogue that supports the patient's individualized plan of care/goals, treatment preferences, and shares the quality data associated with the providers?    (na)     Transition of Care Plan to SNF/Rehab    Communication to Patient/Family:  Met with patient and family and they are agreeable to the transition plan. The Plan for Transition of Care is related to the following treatment goals:     The Patient and/or patient representative was provided with a choice of provider and agrees  with

## 2024-11-08 NOTE — PROGRESS NOTES
End of Shift Note    Bedside shift change report given to YOAV Douglas  (oncoming nurse) by Sharif Samuel RN .        Shift worked: Night   Shift summary and any significant changes:    Pending Placement  No acute changes     Concerns for physician to address: See above   Zone phone for oncoming shift:  [phone]       Patient Information  Tomeka Yip  89 y.o.  11/4/2024  1:41 PM by Mason Blackwell DO. 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    Meningitis 11/01/2022    High fever 11/01/2022    Accelerated hypertension 11/01/2022    Mixed hyperlipidemia 10/04/2021    Carotid stenosis, asymptomatic, right 12/16/2020    Carotid stenosis, symptomatic w/o infarct, left 11/02/2020    Bilateral carotid artery stenosis 10/19/2020    TIA (transient ischemic attack) 10/17/2020    High cholesterol 09/21/2020    Essential hypertension 12/28/2018    Coronary artery disease involving native coronary artery of native heart without angina pectoris 06/12/2018    S/P coronary artery stent placement 05/25/2018    Hx of diverticulitis of colon 05/18/2018    Gout      Past Medical History:   Diagnosis Date    CAD (coronary artery disease)     Diverticulitis     Glaucoma     Gout     HTN (hypertension) 5/18/2018    Hx of diverticulitis of colon 5/18/2018    Hyperlipidemia     Hypertension     Other ill-defined conditions(799.89)     high cholesterol    S/P coronary artery stent placement

## 2024-11-08 NOTE — PROGRESS NOTES
End of Shift Note    Bedside shift change report given to SharifRN  (oncoming nurse) by Jaquelin James RN .        Shift worked:  Days   Shift summary and any significant changes:    Pending Placement     Concerns for physician to address: See above   Zone phone for oncoming shift:  [phone]       Patient Information  Tomeka Yip  89 y.o.  11/4/2024  1:41 PM by Mason Blackwell DO. 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    Meningitis 11/01/2022    High fever 11/01/2022    Accelerated hypertension 11/01/2022    Mixed hyperlipidemia 10/04/2021    Carotid stenosis, asymptomatic, right 12/16/2020    Carotid stenosis, symptomatic w/o infarct, left 11/02/2020    Bilateral carotid artery stenosis 10/19/2020    TIA (transient ischemic attack) 10/17/2020    High cholesterol 09/21/2020    Essential hypertension 12/28/2018    Coronary artery disease involving native coronary artery of native heart without angina pectoris 06/12/2018    S/P coronary artery stent placement 05/25/2018    Hx of diverticulitis of colon 05/18/2018    Gout      Past Medical History:   Diagnosis Date    CAD (coronary artery disease)     Diverticulitis     Glaucoma     Gout     HTN (hypertension) 5/18/2018    Hx of diverticulitis of colon 5/18/2018    Hyperlipidemia     Hypertension     Other ill-defined conditions(799.89)     high cholesterol    S/P coronary artery stent placement 05/25/2018

## 2024-11-09 VITALS
DIASTOLIC BLOOD PRESSURE: 61 MMHG | WEIGHT: 145.7 LBS | HEART RATE: 75 BPM | TEMPERATURE: 98.8 F | BODY MASS INDEX: 24.87 KG/M2 | SYSTOLIC BLOOD PRESSURE: 152 MMHG | HEIGHT: 64 IN | RESPIRATION RATE: 16 BRPM | OXYGEN SATURATION: 96 %

## 2024-11-09 PROCEDURE — 6370000000 HC RX 637 (ALT 250 FOR IP): Performed by: INTERNAL MEDICINE

## 2024-11-09 PROCEDURE — 6370000000 HC RX 637 (ALT 250 FOR IP): Performed by: NURSE PRACTITIONER

## 2024-11-09 RX ADMIN — Medication 400 MG: at 09:33

## 2024-11-09 RX ADMIN — EZETIMIBE 10 MG: 10 TABLET ORAL at 09:31

## 2024-11-09 RX ADMIN — POLYETHYLENE GLYCOL 3350 17 G: 17 POWDER, FOR SOLUTION ORAL at 09:29

## 2024-11-09 RX ADMIN — SENNOSIDES AND DOCUSATE SODIUM 2 TABLET: 50; 8.6 TABLET ORAL at 09:30

## 2024-11-09 RX ADMIN — METOPROLOL TARTRATE 12.5 MG: 25 TABLET, FILM COATED ORAL at 06:46

## 2024-11-09 RX ADMIN — PANTOPRAZOLE SODIUM 40 MG: 40 TABLET, DELAYED RELEASE ORAL at 06:46

## 2024-11-09 RX ADMIN — DULOXETINE HYDROCHLORIDE 60 MG: 30 CAPSULE, DELAYED RELEASE ORAL at 09:32

## 2024-11-09 RX ADMIN — MIDODRINE HYDROCHLORIDE 2.5 MG: 5 TABLET ORAL at 09:31

## 2024-11-09 RX ADMIN — Medication 5000 UNITS: at 09:31

## 2024-11-09 RX ADMIN — CETIRIZINE HYDROCHLORIDE 5 MG: 10 TABLET, FILM COATED ORAL at 09:33

## 2024-11-09 RX ADMIN — PREGABALIN 50 MG: 50 CAPSULE ORAL at 09:29

## 2024-11-09 RX ADMIN — ATORVASTATIN CALCIUM 80 MG: 40 TABLET, FILM COATED ORAL at 09:32

## 2024-11-09 RX ADMIN — FLUTICASONE PROPIONATE 1 SPRAY: 50 SPRAY, METERED NASAL at 09:35

## 2024-11-09 RX ADMIN — ASPIRIN 81 MG: 81 TABLET, COATED ORAL at 09:32

## 2024-11-09 RX ADMIN — TIMOLOL MALEATE 1 DROP: 2.5 SOLUTION OPHTHALMIC at 09:35

## 2024-11-09 NOTE — PROGRESS NOTES
End of Shift Note    Bedside shift change report given to GraceRN  (oncoming nurse) by Sharif Samuel, RN .        Shift worked: Night   Shift summary and any significant changes:    No acute changes. Patient had small, soft bowel movement.     Pending insurance auth for [facility].      Concerns for physician to address: none   Zone phone for oncoming shift:  [phone]       Patient Information  Tomeka Yip  89 y.o.  11/4/2024  1:41 PM by Mason Blackwell DO. 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    Meningitis 11/01/2022    High fever 11/01/2022    Accelerated hypertension 11/01/2022    Mixed hyperlipidemia 10/04/2021    Carotid stenosis, asymptomatic, right 12/16/2020    Carotid stenosis, symptomatic w/o infarct, left 11/02/2020    Bilateral carotid artery stenosis 10/19/2020    TIA (transient ischemic attack) 10/17/2020    High cholesterol 09/21/2020    Essential hypertension 12/28/2018    Coronary artery disease involving native coronary artery of native heart without angina pectoris 06/12/2018    S/P coronary artery stent placement 05/25/2018    Hx of diverticulitis of colon 05/18/2018    Gout      Past Medical History:   Diagnosis Date    CAD (coronary artery disease)     Diverticulitis     Glaucoma     Gout     HTN (hypertension) 5/18/2018    Hx of diverticulitis of colon 5/18/2018    Hyperlipidemia     Hypertension     Other ill-defined

## 2024-11-09 NOTE — DISCHARGE SUMMARY
Discharge Summary    Name: Tomeka Yip  [id number]  YOB: 1932 (Age: 89 y.o.)   Date of Admission: 11/4/2024  Date of Discharge: 11/9/2024  Attending Physician: Delroy Maria MD    Discharge Diagnosis:   Slurred speech POA  secondary to Orthostatic Hypotension POA- BP med adjusted, midodrine prn, compression stockings, rehab as arranged  TIA ruled out per neurology  Hyponatremia  Neuropathy  HLD  VIT D 3 def  Seasonal allergies  Full code      Consultations:  IP CONSULT TO TELE-NEUROLOGY  IP CONSULT TO HOSPITALIST  IP CONSULT TO NEUROLOGY      Brief Admission History/Reason for Admission Per Mason Blackwell, DO:   \"89 y.o.  female with PMHx significant for,  CAD, w/ stent placement, Glaucoma, GOUT, HTN, HLD, CVA , went to the clinic for shock therapy of bilat lower ext, there is when she was told her blood pressure was \" a little low\" . At home her family noted her speech was slurred ,pt seemed confused.  Family activated EMS who noted hypotension 80's. Upon ED arrival pt was oriented x 3 with cl speech BP 90's.  ON ED exam awake alert social, equal  and pushes.      We were asked to admit for work up and evaluation of the above problems.\"    Brief Hospital Course by Main Problems:   Slurred speech secondary to hypotension  TIA unlikely     -CT head negative for bleeding  Seen by neurologist  -MRI deferred.  Repeat CT head negative for any changes  -Continue aspirin  -Appreciate PT/OT eval- much better today but recommends SNF     Hypotension  Hyponatremia  -Possibly from medications  -Received IVF in the ED, still orthostatic, give another 250 cc of NS bolus  -Assess orthostatic tomorrow  -Continue to hold antihypertensive medications-  plan to resume Metoprolol at lower dose on DC  with Likely midodrine support now  Add Compression stockings     Systolic murmur, has history of mild to moderate aortic stenosis, advised that she should follow-up

## 2024-12-17 ENCOUNTER — HOSPITAL ENCOUNTER (EMERGENCY)
Facility: HOSPITAL | Age: 88
Discharge: HOME | End: 2024-12-17
Attending: EMERGENCY MEDICINE
Payer: MEDICARE

## 2024-12-17 ENCOUNTER — APPOINTMENT (OUTPATIENT)
Facility: HOSPITAL | Age: 88
End: 2024-12-17
Payer: MEDICARE

## 2024-12-17 VITALS
SYSTOLIC BLOOD PRESSURE: 174 MMHG | OXYGEN SATURATION: 98 % | RESPIRATION RATE: 16 BRPM | HEART RATE: 64 BPM | WEIGHT: 145 LBS | DIASTOLIC BLOOD PRESSURE: 64 MMHG | BODY MASS INDEX: 24.89 KG/M2 | TEMPERATURE: 97.6 F

## 2024-12-17 DIAGNOSIS — R44.3 HALLUCINATIONS: ICD-10-CM

## 2024-12-17 DIAGNOSIS — R41.82 ALTERED MENTAL STATUS, UNSPECIFIED ALTERED MENTAL STATUS TYPE: Primary | ICD-10-CM

## 2024-12-17 LAB
ALBUMIN SERPL-MCNC: 3 G/DL (ref 3.5–5)
ALBUMIN/GLOB SERPL: 0.8 (ref 1.1–2.2)
ALP SERPL-CCNC: 116 U/L (ref 45–117)
ALT SERPL-CCNC: 44 U/L (ref 12–78)
AMPHET UR QL SCN: NEGATIVE
ANION GAP SERPL CALC-SCNC: 1 MMOL/L (ref 2–12)
APPEARANCE UR: CLEAR
AST SERPL-CCNC: 43 U/L (ref 15–37)
BACTERIA URNS QL MICRO: NEGATIVE /HPF
BARBITURATES UR QL SCN: NEGATIVE
BENZODIAZ UR QL: NEGATIVE
BILIRUB SERPL-MCNC: 0.4 MG/DL (ref 0.2–1)
BILIRUB UR QL: NEGATIVE
BUN SERPL-MCNC: 18 MG/DL (ref 6–20)
BUN/CREAT SERPL: 15 (ref 12–20)
CALCIUM SERPL-MCNC: 9.2 MG/DL (ref 8.5–10.1)
CANNABINOIDS UR QL SCN: NEGATIVE
CHLORIDE SERPL-SCNC: 100 MMOL/L (ref 97–108)
CO2 SERPL-SCNC: 32 MMOL/L (ref 21–32)
COCAINE UR QL SCN: NEGATIVE
COLOR UR: ABNORMAL
CREAT SERPL-MCNC: 1.17 MG/DL (ref 0.55–1.02)
EPITH CASTS URNS QL MICRO: ABNORMAL /LPF
ERYTHROCYTE [DISTWIDTH] IN BLOOD BY AUTOMATED COUNT: 14.1 % (ref 11.5–14.5)
ETHANOL SERPL-MCNC: <10 MG/DL (ref 0–0.08)
GLOBULIN SER CALC-MCNC: 3.9 G/DL (ref 2–4)
GLUCOSE BLD STRIP.AUTO-MCNC: 111 MG/DL (ref 65–117)
GLUCOSE SERPL-MCNC: 105 MG/DL (ref 65–100)
GLUCOSE UR STRIP.AUTO-MCNC: NEGATIVE MG/DL
HCT VFR BLD AUTO: 34.5 % (ref 35–47)
HGB BLD-MCNC: 11.3 G/DL (ref 11.5–16)
HGB UR QL STRIP: NEGATIVE
HYALINE CASTS URNS QL MICRO: ABNORMAL /LPF (ref 0–2)
KETONES UR QL STRIP.AUTO: NEGATIVE MG/DL
LEUKOCYTE ESTERASE UR QL STRIP.AUTO: NEGATIVE
Lab: NORMAL
MCH RBC QN AUTO: 29.3 PG (ref 26–34)
MCHC RBC AUTO-ENTMCNC: 32.8 G/DL (ref 30–36.5)
MCV RBC AUTO: 89.4 FL (ref 80–99)
METHADONE UR QL: NEGATIVE
NITRITE UR QL STRIP.AUTO: NEGATIVE
NRBC # BLD: 0 K/UL (ref 0–0.01)
NRBC BLD-RTO: 0 PER 100 WBC
OPIATES UR QL: NEGATIVE
PCP UR QL: NEGATIVE
PH UR STRIP: 6.5 (ref 5–8)
PLATELET # BLD AUTO: 308 K/UL (ref 150–400)
PMV BLD AUTO: 8.7 FL (ref 8.9–12.9)
POTASSIUM SERPL-SCNC: 4.7 MMOL/L (ref 3.5–5.1)
PROT SERPL-MCNC: 6.9 G/DL (ref 6.4–8.2)
PROT UR STRIP-MCNC: NEGATIVE MG/DL
RBC # BLD AUTO: 3.86 M/UL (ref 3.8–5.2)
RBC #/AREA URNS HPF: ABNORMAL /HPF (ref 0–5)
SERVICE CMNT-IMP: NORMAL
SODIUM SERPL-SCNC: 133 MMOL/L (ref 136–145)
SP GR UR REFRACTOMETRY: 1.02
URINE CULTURE IF INDICATED: ABNORMAL
UROBILINOGEN UR QL STRIP.AUTO: 0.2 EU/DL (ref 0.2–1)
WBC # BLD AUTO: 5.8 K/UL (ref 3.6–11)
WBC URNS QL MICRO: ABNORMAL /HPF (ref 0–4)

## 2024-12-17 PROCEDURE — 70450 CT HEAD/BRAIN W/O DYE: CPT

## 2024-12-17 PROCEDURE — 93005 ELECTROCARDIOGRAM TRACING: CPT | Performed by: EMERGENCY MEDICINE

## 2024-12-17 PROCEDURE — 85027 COMPLETE CBC AUTOMATED: CPT

## 2024-12-17 PROCEDURE — 82962 GLUCOSE BLOOD TEST: CPT

## 2024-12-17 PROCEDURE — 81001 URINALYSIS AUTO W/SCOPE: CPT

## 2024-12-17 PROCEDURE — 80307 DRUG TEST PRSMV CHEM ANLYZR: CPT

## 2024-12-17 PROCEDURE — 99284 EMERGENCY DEPT VISIT MOD MDM: CPT

## 2024-12-17 PROCEDURE — 36415 COLL VENOUS BLD VENIPUNCTURE: CPT

## 2024-12-17 PROCEDURE — 82077 ASSAY SPEC XCP UR&BREATH IA: CPT

## 2024-12-17 PROCEDURE — 80053 COMPREHEN METABOLIC PANEL: CPT

## 2024-12-17 ASSESSMENT — LIFESTYLE VARIABLES
HOW OFTEN DO YOU HAVE A DRINK CONTAINING ALCOHOL: NEVER
HOW MANY STANDARD DRINKS CONTAINING ALCOHOL DO YOU HAVE ON A TYPICAL DAY: PATIENT DOES NOT DRINK

## 2024-12-17 NOTE — ED PROVIDER NOTES
Normal sinus rhythm, left bundle branch block      Progress note:    The patient is now alert and oriented x 3.  Given that all of her labs are unremarkable and CT is unremarkable, will have her follow-up    CLINICAL MANAGEMENT TOOLS:    NIHSS: 0      FINAL IMPRESSION     1. Altered mental status, unspecified altered mental status type    2. Hallucinations          DISPOSITION/PLAN   Tomeka Yip's  results have been reviewed with her.  She has been counseled regarding her diagnosis, treatment, and plan.  She verbally conveys understanding and agreement of the signs, symptoms, diagnosis, treatment and prognosis and additionally agrees to follow up as discussed.  She also agrees with the care-plan and conveys that all of her questions have been answered.  I have also provided discharge instructions for her that include: educational information regarding their diagnosis and treatment, and list of reasons why they would want to return to the ED prior to their follow-up appointment, should her condition change.     CLINICAL IMPRESSION    Discharge Note: The patient is stable for discharge home. The signs, symptoms, diagnosis, and discharge instructions have been discussed, understanding conveyed, and agreed upon. The patient is to follow up as recommended or return to ER should their symptoms worsen.      PATIENT REFERRED TO:  Orestes Xiong [place]  [address] [facility] [address] [phone]    In 1 day      [facility] EMERGENCY DEPT  [address] [facility] [address]  [phone]    If symptoms worsen       DISCHARGE MEDICATIONS:     Medication List        ASK your doctor about these medications      acetaminophen 650 MG extended release tablet  Commonly known as: TYLENOL     ALPRAZolam 0.25 MG tablet  Commonly known as: XANAX     Aspercreme 10 % Lotn  Generic drug: Trolamine Salicylate     aspirin 81 MG EC tablet     atorvastatin 80 MG tablet  Commonly known as: LIPITOR     DULoxetine 60 MG

## 2024-12-17 NOTE — ED TRIAGE NOTES
BIBEMS from home for hallucinations for 2 days per daughter. Patient sts that she is \"dreaming\" when she has her hallucinations. Oriented to place and self. Reports ear pain but denies cp. Sob, fevers, nvd. Vss, NAD

## 2024-12-19 LAB
EKG ATRIAL RATE: 66 BPM
EKG DIAGNOSIS: NORMAL
EKG P AXIS: 69 DEGREES
EKG P-R INTERVAL: 176 MS
EKG Q-T INTERVAL: 446 MS
EKG QRS DURATION: 156 MS
EKG QTC CALCULATION (BAZETT): 467 MS
EKG R AXIS: -58 DEGREES
EKG T AXIS: 98 DEGREES
EKG VENTRICULAR RATE: 66 BPM

## 2025-01-15 ENCOUNTER — APPOINTMENT (OUTPATIENT)
Facility: HOSPITAL | Age: 89
End: 2025-01-15
Payer: MEDICARE

## 2025-01-15 ENCOUNTER — HOSPITAL ENCOUNTER (EMERGENCY)
Facility: HOSPITAL | Age: 89
Discharge: HOME | End: 2025-01-15
Attending: STUDENT IN AN ORGANIZED HEALTH CARE EDUCATION/TRAINING PROGRAM
Payer: MEDICARE

## 2025-01-15 VITALS
OXYGEN SATURATION: 95 % | SYSTOLIC BLOOD PRESSURE: 96 MMHG | DIASTOLIC BLOOD PRESSURE: 70 MMHG | BODY MASS INDEX: 24.89 KG/M2 | TEMPERATURE: 98.4 F | RESPIRATION RATE: 21 BRPM | WEIGHT: 145 LBS | HEART RATE: 64 BPM

## 2025-01-15 DIAGNOSIS — R53.1 GENERALIZED WEAKNESS: ICD-10-CM

## 2025-01-15 DIAGNOSIS — G44.89 OTHER HEADACHE SYNDROME: Primary | ICD-10-CM

## 2025-01-15 LAB
ALBUMIN SERPL-MCNC: 2.5 G/DL (ref 3.5–5)
ALBUMIN/GLOB SERPL: 0.7 (ref 1.1–2.2)
ALP SERPL-CCNC: 79 U/L (ref 45–117)
ALT SERPL-CCNC: 23 U/L (ref 12–78)
ANION GAP SERPL CALC-SCNC: 4 MMOL/L (ref 2–12)
APPEARANCE UR: ABNORMAL
AST SERPL-CCNC: 26 U/L (ref 15–37)
BACTERIA URNS QL MICRO: ABNORMAL /HPF
BASOPHILS # BLD: 0.01 K/UL (ref 0–0.1)
BASOPHILS NFR BLD: 0.1 % (ref 0–1)
BILIRUB SERPL-MCNC: 0.4 MG/DL (ref 0.2–1)
BILIRUB UR QL: NEGATIVE
BUN SERPL-MCNC: 21 MG/DL (ref 6–20)
BUN/CREAT SERPL: 19 (ref 12–20)
CALCIUM SERPL-MCNC: 8.6 MG/DL (ref 8.5–10.1)
CHLORIDE SERPL-SCNC: 98 MMOL/L (ref 97–108)
CO2 SERPL-SCNC: 32 MMOL/L (ref 21–32)
COLOR UR: ABNORMAL
COMMENT:: NORMAL
CREAT SERPL-MCNC: 1.12 MG/DL (ref 0.55–1.02)
DIFFERENTIAL METHOD BLD: ABNORMAL
EOSINOPHIL # BLD: 0.18 K/UL (ref 0–0.4)
EOSINOPHIL NFR BLD: 2.4 % (ref 0–7)
EPITH CASTS URNS QL MICRO: ABNORMAL /LPF
ERYTHROCYTE [DISTWIDTH] IN BLOOD BY AUTOMATED COUNT: 14.2 % (ref 11.5–14.5)
FLUAV RNA SPEC QL NAA+PROBE: NOT DETECTED
FLUBV RNA SPEC QL NAA+PROBE: NOT DETECTED
GLOBULIN SER CALC-MCNC: 3.4 G/DL (ref 2–4)
GLUCOSE SERPL-MCNC: 104 MG/DL (ref 65–100)
GLUCOSE UR STRIP.AUTO-MCNC: NEGATIVE MG/DL
HCT VFR BLD AUTO: 30.1 % (ref 35–47)
HGB BLD-MCNC: 9.9 G/DL (ref 11.5–16)
HGB UR QL STRIP: NEGATIVE
IMM GRANULOCYTES # BLD AUTO: 0.03 K/UL (ref 0–0.04)
IMM GRANULOCYTES NFR BLD AUTO: 0.4 % (ref 0–0.5)
KETONES UR QL STRIP.AUTO: NEGATIVE MG/DL
LEUKOCYTE ESTERASE UR QL STRIP.AUTO: ABNORMAL
LYMPHOCYTES # BLD: 1.68 K/UL (ref 0.8–3.5)
LYMPHOCYTES NFR BLD: 22.3 % (ref 12–49)
MAGNESIUM SERPL-MCNC: 2.3 MG/DL (ref 1.6–2.4)
MCH RBC QN AUTO: 29 PG (ref 26–34)
MCHC RBC AUTO-ENTMCNC: 32.9 G/DL (ref 30–36.5)
MCV RBC AUTO: 88.3 FL (ref 80–99)
MONOCYTES # BLD: 0.83 K/UL (ref 0–1)
MONOCYTES NFR BLD: 11 % (ref 5–13)
NEUTS SEG # BLD: 4.79 K/UL (ref 1.8–8)
NEUTS SEG NFR BLD: 63.8 % (ref 32–75)
NITRITE UR QL STRIP.AUTO: NEGATIVE
NRBC # BLD: 0 K/UL (ref 0–0.01)
NRBC BLD-RTO: 0 PER 100 WBC
PH UR STRIP: 5.5 (ref 5–8)
PLATELET # BLD AUTO: 325 K/UL (ref 150–400)
PMV BLD AUTO: 8.4 FL (ref 8.9–12.9)
POTASSIUM SERPL-SCNC: 4.3 MMOL/L (ref 3.5–5.1)
PROT SERPL-MCNC: 5.9 G/DL (ref 6.4–8.2)
PROT UR STRIP-MCNC: NEGATIVE MG/DL
RBC # BLD AUTO: 3.41 M/UL (ref 3.8–5.2)
RBC #/AREA URNS HPF: ABNORMAL /HPF (ref 0–5)
SARS-COV-2 RNA RESP QL NAA+PROBE: NOT DETECTED
SODIUM SERPL-SCNC: 134 MMOL/L (ref 136–145)
SOURCE: NORMAL
SP GR UR REFRACTOMETRY: 1.02
SPECIMEN HOLD: NORMAL
URINE CULTURE IF INDICATED: ABNORMAL
UROBILINOGEN UR QL STRIP.AUTO: 1 EU/DL (ref 0.2–1)
WBC # BLD AUTO: 7.5 K/UL (ref 3.6–11)
WBC URNS QL MICRO: ABNORMAL /HPF (ref 0–4)

## 2025-01-15 PROCEDURE — 87086 URINE CULTURE/COLONY COUNT: CPT

## 2025-01-15 PROCEDURE — 2500000003 HC RX 250 WO HCPCS: Performed by: STUDENT IN AN ORGANIZED HEALTH CARE EDUCATION/TRAINING PROGRAM

## 2025-01-15 PROCEDURE — 81001 URINALYSIS AUTO W/SCOPE: CPT

## 2025-01-15 PROCEDURE — 83735 ASSAY OF MAGNESIUM: CPT

## 2025-01-15 PROCEDURE — 87636 SARSCOV2 & INF A&B AMP PRB: CPT

## 2025-01-15 PROCEDURE — 36415 COLL VENOUS BLD VENIPUNCTURE: CPT

## 2025-01-15 PROCEDURE — 6360000002 HC RX W HCPCS: Performed by: STUDENT IN AN ORGANIZED HEALTH CARE EDUCATION/TRAINING PROGRAM

## 2025-01-15 PROCEDURE — 99284 EMERGENCY DEPT VISIT MOD MDM: CPT

## 2025-01-15 PROCEDURE — 70450 CT HEAD/BRAIN W/O DYE: CPT

## 2025-01-15 PROCEDURE — 80053 COMPREHEN METABOLIC PANEL: CPT

## 2025-01-15 PROCEDURE — 96374 THER/PROPH/DIAG INJ IV PUSH: CPT

## 2025-01-15 PROCEDURE — 6370000000 HC RX 637 (ALT 250 FOR IP): Performed by: STUDENT IN AN ORGANIZED HEALTH CARE EDUCATION/TRAINING PROGRAM

## 2025-01-15 PROCEDURE — 85025 COMPLETE CBC W/AUTO DIFF WBC: CPT

## 2025-01-15 RX ORDER — ACETAMINOPHEN 500 MG
1000 TABLET ORAL
Status: COMPLETED | OUTPATIENT
Start: 2025-01-15 | End: 2025-01-15

## 2025-01-15 RX ORDER — CEPHALEXIN 500 MG/1
500 CAPSULE ORAL 3 TIMES DAILY
Qty: 15 CAPSULE | Refills: 0 | Status: SHIPPED | OUTPATIENT
Start: 2025-01-15 | End: 2025-01-20

## 2025-01-15 RX ADMIN — ACETAMINOPHEN 1000 MG: 500 TABLET, FILM COATED ORAL at 15:27

## 2025-01-15 RX ADMIN — WATER 1000 MG: 1 INJECTION INTRAMUSCULAR; INTRAVENOUS; SUBCUTANEOUS at 19:13

## 2025-01-15 ASSESSMENT — PAIN DESCRIPTION - LOCATION: LOCATION: HEAD

## 2025-01-15 ASSESSMENT — LIFESTYLE VARIABLES
HOW MANY STANDARD DRINKS CONTAINING ALCOHOL DO YOU HAVE ON A TYPICAL DAY: PATIENT DOES NOT DRINK
HOW OFTEN DO YOU HAVE A DRINK CONTAINING ALCOHOL: NEVER

## 2025-01-15 ASSESSMENT — PAIN DESCRIPTION - ORIENTATION: ORIENTATION: LEFT;RIGHT

## 2025-01-15 ASSESSMENT — PAIN SCALES - GENERAL: PAINLEVEL_OUTOF10: 6

## 2025-01-15 ASSESSMENT — PAIN DESCRIPTION - DESCRIPTORS: DESCRIPTORS: POUNDING

## 2025-01-15 NOTE — ED PROVIDER NOTES
capsule  Commonly known as: Keflex  Take 1 capsule by mouth 3 times daily for 5 days            ASK your doctor about these medications      acetaminophen 650 MG extended release tablet  Commonly known as: TYLENOL     ALPRAZolam 0.25 MG tablet  Commonly known as: XANAX     Aspercreme 10 % Lotn  Generic drug: Trolamine Salicylate     aspirin 81 MG EC tablet     atorvastatin 80 MG tablet  Commonly known as: LIPITOR     DULoxetine 60 MG extended release capsule  Commonly known as: CYMBALTA     ezetimibe 10 MG tablet  Commonly known as: ZETIA     fexofenadine 180 MG tablet  Commonly known as: ALLEGRA     fluticasone 50 MCG/ACT nasal spray  Commonly known as: FLONASE     latanoprost 0.005 % ophthalmic solution  Commonly known as: XALATAN     Magnesium 400 MG Caps     metoprolol tartrate 25 MG tablet  Commonly known as: LOPRESSOR  Take 0.5 tablets by mouth in the morning and 0.5 tablets in the evening. Hold if SBP<100.     midodrine 2.5 MG tablet  Commonly known as: PROAMATINE  Take 1 tablet by mouth 2 times daily as needed (if SBP<100)     Narcan 4 MG/0.1ML Liqd nasal spray  Generic drug: naloxone     * polyethylene glycol 17 GM/SCOOP powder  Commonly known as: GLYCOLAX     * MiraLax 17 GM/SCOOP powder  Generic drug: polyethylene glycol     pregabalin 50 MG capsule  Commonly known as: LYRICA     timolol 0.5 % ophthalmic gel-forming  Commonly known as: TIMOPTIC-XE     vitamin D3 125 MCG (5000 UT) Tabs tablet  Commonly known as: CHOLECALCIFEROL           * This list has 2 medication(s) that are the same as other medications prescribed for you. Read the directions carefully, and ask your doctor or other care provider to review them with you.                   Where to Get Your Medications        These medications were sent to [id number] - [place], [place] - [address] - P [phone] - F [phone]  [address] [place] [id number]      Phone: [phone]   cephALEXin 500 MG capsule           DISCONTINUED

## 2025-01-16 LAB
BACTERIA SPEC CULT: NORMAL
CC UR VC: NORMAL
SERVICE CMNT-IMP: NORMAL

## 2025-01-16 NOTE — DISCHARGE INSTRUCTIONS
Absolute 0.83 0.00 - 1.00 K/UL    Eosinophils Absolute 0.18 0.00 - 0.40 K/UL    Basophils Absolute 0.01 0.00 - 0.10 K/UL    Immature Granulocytes Absolute 0.03 0.00 - 0.04 K/UL    Differential Type AUTOMATED     Comprehensive Metabolic Panel    Collection Time: 01/15/25  4:13 PM   Result Value Ref Range    Sodium 134 (L) 136 - 145 mmol/L    Potassium 4.3 3.5 - 5.1 mmol/L    Chloride 98 97 - 108 mmol/L    CO2 32 21 - 32 mmol/L    Anion Gap 4 2 - 12 mmol/L    Glucose 104 (H) 65 - 100 mg/dL    BUN 21 (H) 6 - 20 MG/DL    Creatinine 1.12 (H) 0.55 - 1.02 MG/DL    BUN/Creatinine Ratio 19 12 - 20      Est, Glom Filt Rate 46 (L) >60 ml/min/1.73m2    Calcium 8.6 8.5 - 10.1 MG/DL    Total Bilirubin 0.4 0.2 - 1.0 MG/DL    ALT 23 12 - 78 U/L    AST 26 15 - 37 U/L    Alk Phosphatase 79 45 - 117 U/L    Total Protein 5.9 (L) 6.4 - 8.2 g/dL    Albumin 2.5 (L) 3.5 - 5.0 g/dL    Globulin 3.4 2.0 - 4.0 g/dL    Albumin/Globulin Ratio 0.7 (L) 1.1 - 2.2     Magnesium    Collection Time: 01/15/25  4:13 PM   Result Value Ref Range    Magnesium 2.3 1.6 - 2.4 mg/dL   Extra Tubes Hold    Collection Time: 01/15/25  4:13 PM   Result Value Ref Range    Specimen HOld 1 GRN RED     Comment:        Add-on orders for these samples will be processed based on acceptable specimen integrity and analyte stability, which may vary by analyte.   Urinalysis with Reflex to Culture    Collection Time: 01/15/25  6:17 PM    Specimen: Urine   Result Value Ref Range    Color, UA YELLOW/STRAW      Appearance CLOUDY (A) CLEAR      Specific Gravity, UA 1.021      pH, Urine 5.5 5.0 - 8.0      Protein, UA Negative NEG mg/dL    Glucose, Ur Negative NEG mg/dL    Ketones, Urine Negative NEG mg/dL    Bilirubin, Urine Negative NEG      Blood, Urine Negative NEG      Urobilinogen, Urine 1.0 0.2 - 1.0 EU/dL    Nitrite, Urine Negative NEG      Leukocyte Esterase, Urine SMALL (A) NEG      WBC, UA 0-4 0 - 4 /hpf    RBC, UA 0-5 0 - 5 /hpf    Epithelial Cells, UA FEW FEW /lpf

## 2025-01-20 ENCOUNTER — TELEPHONE (OUTPATIENT)
Age: 89
End: 2025-01-20

## 2025-01-20 NOTE — TELEPHONE ENCOUNTER
HIPAA Verified (if caller is someone other than patient): yes       Reason for Call:     Office notes for hospice      Details from Caller:  Christine Best/[facility] called stating that the family is placing pt in hospice and they would like to have a copy of patient medical records and demo faxed to there office at [phone]      Message: (any additional details from patient/caller not covered above)   can be reached at [phone]

## 2025-01-20 NOTE — TELEPHONE ENCOUNTER
Faxed last 2 office notes and demographics to Attn: Christine Jewell at [facility] at [phone] and received fax confirmation.  Placed copy in fast scan.

## 2025-02-24 ENCOUNTER — OFFICE VISIT (OUTPATIENT)
Age: 89
End: 2025-02-24
Payer: MEDICARE

## 2025-02-24 ENCOUNTER — TELEPHONE (OUTPATIENT)
Age: 89
End: 2025-02-24

## 2025-02-24 VITALS
OXYGEN SATURATION: 97 % | BODY MASS INDEX: 24.88 KG/M2 | DIASTOLIC BLOOD PRESSURE: 64 MMHG | SYSTOLIC BLOOD PRESSURE: 134 MMHG | HEIGHT: 64 IN | TEMPERATURE: 97.4 F | HEART RATE: 87 BPM | RESPIRATION RATE: 16 BRPM

## 2025-02-24 DIAGNOSIS — N39.0 URINARY TRACT INFECTION WITHOUT HEMATURIA, SITE UNSPECIFIED: ICD-10-CM

## 2025-02-24 DIAGNOSIS — R20.2 PARESTHESIA OF UPPER AND LOWER EXTREMITIES OF BOTH SIDES: ICD-10-CM

## 2025-02-24 DIAGNOSIS — R26.9 GAIT ABNORMALITY: ICD-10-CM

## 2025-02-24 DIAGNOSIS — R41.3 MEMORY LOSS: ICD-10-CM

## 2025-02-24 DIAGNOSIS — Z91.89 STROKE RISK: ICD-10-CM

## 2025-02-24 DIAGNOSIS — R53.1 GENERALIZED WEAKNESS: Primary | ICD-10-CM

## 2025-02-24 PROCEDURE — 1160F RVW MEDS BY RX/DR IN RCRD: CPT

## 2025-02-24 PROCEDURE — 1090F PRES/ABSN URINE INCON ASSESS: CPT

## 2025-02-24 PROCEDURE — G8427 DOCREV CUR MEDS BY ELIG CLIN: HCPCS

## 2025-02-24 PROCEDURE — 1036F TOBACCO NON-USER: CPT

## 2025-02-24 PROCEDURE — 1126F AMNT PAIN NOTED NONE PRSNT: CPT

## 2025-02-24 PROCEDURE — 99215 OFFICE O/P EST HI 40 MIN: CPT

## 2025-02-24 PROCEDURE — 1159F MED LIST DOCD IN RCRD: CPT

## 2025-02-24 PROCEDURE — 1123F ACP DISCUSS/DSCN MKR DOCD: CPT

## 2025-02-24 PROCEDURE — G8420 CALC BMI NORM PARAMETERS: HCPCS

## 2025-02-24 RX ORDER — MEMANTINE HYDROCHLORIDE 5 MG/1
5 TABLET ORAL DAILY
Qty: 30 TABLET | Refills: 5 | Status: SHIPPED | OUTPATIENT
Start: 2025-02-24

## 2025-02-24 ASSESSMENT — PATIENT HEALTH QUESTIONNAIRE - PHQ9
SUM OF ALL RESPONSES TO PHQ QUESTIONS 1-9: 1
SUM OF ALL RESPONSES TO PHQ9 QUESTIONS 1 & 2: 1
SUM OF ALL RESPONSES TO PHQ QUESTIONS 1-9: 1
2. FEELING DOWN, DEPRESSED OR HOPELESS: SEVERAL DAYS
SUM OF ALL RESPONSES TO PHQ QUESTIONS 1-9: 1
1. LITTLE INTEREST OR PLEASURE IN DOING THINGS: NOT AT ALL
SUM OF ALL RESPONSES TO PHQ QUESTIONS 1-9: 1

## 2025-02-24 NOTE — PROGRESS NOTES
[facility] NEUROLOGY CLINIC  In Office FOLLOW-UP VISIT         Tomeka Yip is a 89 y.o. female who presents today for the following:  Chief Complaint   Patient presents with    Extremity Weakness     Weakness in the legs         ASSESSMENT AND PLAN  Patient is known to this practice.  Chart and history reviewed in detail at today's office visit.    1. Generalized weakness  Assessment & Plan:  Patient verbalized experiencing weakness in bilateral lower extremities.    This could be multifactorial in etiology which include lumbar radiculopathy, underlying systemic infection, metabolic abnormality.    Her current neurological examination revealed no focal motor deficits.    We will not repeat any imaging at this time. Recent head CT without contrast from 1/15/2025 revealed no acute intracranial abnormality.  Incidental note is made of basal ganglial and cerebellar minimalization.    Will check her urine to rule out any urinary tract infection that may contribute to her symptoms.    Will refer patient to PT for evaluation and management of her leg weakness.    Patient is to optimize nutritional status.    Fall safety was discussed.    Orders:  -     [facility] by Bobby Carlton  2. Memory loss  Assessment & Plan:  Onset: December 2024    Differential diagnosis include normal aging process, new onset of dementia (Alzheimer's vs vascular) underlying systemic infection.    Her current neurological examination revealed moderate to severe cognitive impairment.    Recent head CT did not show any intracranial abnormality.  Incidental note was made of basal ganglial and cerebellar mineralization.    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    Will order UA with reflex to rule out any urinary tract infection.    Neuropsych testing was discussed with patient's family today, but, her daughter deferred at this time.    Her daughter did agree of starting her on something for memory.

## 2025-02-24 NOTE — TELEPHONE ENCOUNTER
Message  Received: Today  Ahmet Hunter, LOULOU - Chetna Ulloa, LPN  Caller: Unspecified (Today,  3:16 PM)  Chetna,    No eyes, awaiting head in place to order to check her urine to rule out any UTI.    Thank you,  Elsa        Called pt but she was asleep, advised I could not relay any info to Elle that answered the phone. Advised the PHI form has no one listed I can talk to, I would need to get a verbal from the pt her self. Asked when would be a good time to call back. She advised in the morning around 11 am. Thanked her and advised I would try tomorrow.

## 2025-02-24 NOTE — PATIENT INSTRUCTIONS
As per our discussion,    Given you continue to experience weakness on the legs, will refer you to home health PT/OT for evaluation and management.    Please take your time when you are walking or when you are switching position to prevent falls.    I encourage you to continue to follow-up with your primary care provider and your other specialists as appropriate.    You seemed stable without any recurrence of TIA or strokelike symptoms.    Continue aspirin 81 mg and atorvastatin 80 mg daily.    RECOMMENDATIONS:  - BP goal is less than 140/90  - Goal HbA1c is less than 7.   - LDL less than 70       Given you have been experiencing memory loss since December, we will start you on a low-dose of memantine 5 mg which is a memory medication which help with slowing down the memory loss process.    We did discuss about neuropsych evaluation to evaluate for possible dementia.  Please let us know if you want to proceed with testing.    I will recommend that we do get your urine tested for possible urinary tract infection given that can also cause memory loss.  Please reach out to your primary care provider for that.  If you change your mind about me ordering the urine test, please let me know.    I encouraged you to engage in approximately 2.5 hours of physician approved physical activity on a weekly basis.    To maintain a healthy diet. Also was informed of the Mediterranean diet, which has been associated with reduced risk for neurodegenerative conditions as well as heart disease    To maintain a cognitively stimulated lifestyle, also incorporating social interaction.    It was a pleasure to see you and your daughter today    I will see you back in 6 months or sooner if needed    Please do not hesitate to reach out for any questions or concerns

## 2025-02-24 NOTE — TELEPHONE ENCOUNTER
Patient's daughter, Gertrudis, called stating that they came in today for an appt and Np Elsa advised she would write an order for patient to have lab work done to check for a UTI. She asked that we please put that order in. Please advise.

## 2025-02-24 NOTE — ASSESSMENT & PLAN NOTE
Stable without any worsening of symptoms.    EMG from 2/29/2024 revealed left-sided carpal tunnel syndrome without cervical radiculopathy.      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  Severe narrowing of the canal at C1-2, moderate severe narrowing of the canal at C2-3, severe narrowing of the canal at C3-4, C4-5 and C5-6 with multifocal areas of T2 signal abnormality within the cord.  No abnormal enhancement.    Patient was previously followed by Dr. Wagoner and deferred any surgical intervention.    Patient is to continue to follow-up with neurosurgery if her symptoms worsen.    She is to continue Cymbalta 60 mg daily and Lyrica 50 mg twice daily for pain management.

## 2025-02-24 NOTE — ASSESSMENT & PLAN NOTE
No longer ambulatory.  She has been wheelchair-bound.    This is most likely related due to lumbar radiculopathy.    EMG from 2/29/2024 suggest a lower lumbar radiculopathy with evidence of active denervation.    MRI lumbar spine without contrast from 4/5/2024 revealed canal stenoses: critical L4-L5, severe L3-L4, moderate to severe L2-L3, moderate L5-S1.Neuroforaminal stenoses: bilateral L2-L3, left and severe right L3-L4, right and severe left L4-L5 and L5-S1.    Patient is to continue to follow-up with neurosurgery as appropriate.    Fall safety was discussed with patient and family.

## 2025-02-25 NOTE — ASSESSMENT & PLAN NOTE
Patient was experiencing weakness in bilateral lower extremities.  This could be multifactorial in etiology which include lumbar radiculopathy, underlying systemic infection, metabolic abnormality.    Her current neurological examination revealed no focal motor deficits.    We will not repeat any imaging at this time. Recent head CT without contrast from 1/15/2025 revealed no acute intracranial abnormality.  Incidental note is made of basal ganglial and cerebellar minimalization.    Will check her urine to rule out any urinary tract infection that may contribute to her symptoms.    Will refer patient to PT for evaluation and management of her leg weakness.    Patient is to optimize nutritional status.    Fall safety was discussed.

## 2025-02-25 NOTE — ASSESSMENT & PLAN NOTE
Stable without recurrence of TIA or stroke symptoms    Patient is to continue on aspirin 81 mg and atorvastatin 80 mg daily.    Patient is to continue to work to all of her comorbid conditions as managed by PCP and other specialists as appropriate    Patient is to continue to follow with cardiology as appropriate.    RECOMMENDATIONS:  - BP goal is less than 140/90  - Goal HbA1c is less than 7.   - LDL less than 70     TIA/stroke education was provided today in regards to risk factors for stroke and lifestyle modifications to help minimize the risk of future stroke.    This included medication compliance, regular follow up with primary care physician,  and healthy lifestyle habits (nutrition/exercise).

## 2025-02-25 NOTE — ASSESSMENT & PLAN NOTE
Onset: December 2024    Differential diagnosis include normal aging process, new onset of dementia (Alzheimer's vs vascular) underlying systemic infection.    Her current neurological examination revealed moderate to severe cognitive impairment.    Recent head CT did not show any intracranial abnormality.  Incidental note was made of basal ganglial and cerebellar mineralization.    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    Will order UA with reflex to rule out any urinary tract infection.    Neuropsych testing was discussed with patient's family today, but, her daughter deferred at this time.    Her daughter did agree of starting her on something for memory.  Will start her on an low-dose of memantine 5 mg daily.  Will titrate dosing at the next visit.  Side effects reviewed.    Vitamin B12, vitamin B6, and TSH were within normal limits.    Patient was encouraged to engage in approximately 2.5 hours of physician approved physical activity on a weekly basis.    Patient was encouraged to maintain a healthy diet. Also was informed of the Mediterranean diet, which has been associated with reduced risk for neurodegenerative conditions as well as heart disease.    Patient was encouraged to maintain a cognitively stimulated lifestyle, also incorporating social interaction.

## 2025-02-25 NOTE — ASSESSMENT & PLAN NOTE
Patient was found to have urinary tract infection recently and was started on antibiotics.    Given she continued to experience memory loss, we will check her urine to see if her UTI has resolved.    Patient was encouraged to optimize hydration status.    She is to adopt healthy lifestyles which include handwashing, proper perineal care to prevent UTI in the future.

## 2025-02-25 NOTE — TELEPHONE ENCOUNTER
Called pt, verified pt with two pt identifiers, pt advised I could speak to her daughter Gertrudis on her behalf. Advised Gertrudis that Ahmet placed the order for the urine screening. Gave her the lab location and advised walk in for test. Advised if she wants to come back by the office and fill out the PHI form so we have her name and any one else we can speak to that would be great. Gertrudis verbalized understanding.

## 2025-03-06 ENCOUNTER — TELEPHONE (OUTPATIENT)
Age: 89
End: 2025-03-06

## 2025-03-06 NOTE — TELEPHONE ENCOUNTER
Marleny with [facility] calling to ask for the office note from February 24, 2025. She states the one they have is not signed. They need one signed and faxed to them.     PHONE    [phone]

## 2025-03-18 ENCOUNTER — CLINICAL DOCUMENTATION (OUTPATIENT)
Age: 89
End: 2025-03-18

## 2025-03-18 NOTE — PROGRESS NOTES
Faxed signed and reviewed Home Health Certification and Plan of Care to [facility] at [phone] and received fax confirmation. Placed in fast scan.

## 2025-03-27 DIAGNOSIS — R41.3 MEMORY LOSS: ICD-10-CM

## 2025-03-27 RX ORDER — MEMANTINE HYDROCHLORIDE 5 MG/1
5 TABLET ORAL DAILY
Qty: 90 TABLET | Refills: 1 | Status: SHIPPED | OUTPATIENT
Start: 2025-03-27

## 2025-03-31 ENCOUNTER — CLINICAL DOCUMENTATION (OUTPATIENT)
Age: 89
End: 2025-03-31

## 2025-04-23 ENCOUNTER — CLINICAL DOCUMENTATION (OUTPATIENT)
Age: 89
End: 2025-04-23

## 2025-04-23 NOTE — PROGRESS NOTES
Faxed signed and reviewed home health PT plan. Faxed to [phone] and received fax confirmation.  Placed in fast scan.

## 2025-05-20 ENCOUNTER — CLINICAL DOCUMENTATION (OUTPATIENT)
Age: 89
End: 2025-05-20

## 2025-05-20 NOTE — PROGRESS NOTES
Received home health certification and plan of care. Ahmet reviewed and signed each page. Faxed to [facility] at [phone] and received fax confirmation.   Placed in fast scan.

## 2025-06-27 ENCOUNTER — CLINICAL DOCUMENTATION (OUTPATIENT)
Age: 89
End: 2025-06-27

## 2025-06-27 NOTE — PROGRESS NOTES
Received Home Health note to extend PT orders. Ahmet reviewed and signed. Faxed to [facility] at [phone] and received fax confirmation. Placed in fast scan.

## 2025-07-19 ENCOUNTER — HOSPITAL ENCOUNTER (INPATIENT)
Facility: HOSPITAL | Age: 89
LOS: 3 days | Discharge: HOME HEALTH | DRG: 393 | End: 2025-07-22
Attending: EMERGENCY MEDICINE | Admitting: STUDENT IN AN ORGANIZED HEALTH CARE EDUCATION/TRAINING PROGRAM
Payer: MEDICARE

## 2025-07-19 ENCOUNTER — APPOINTMENT (OUTPATIENT)
Facility: HOSPITAL | Age: 89
DRG: 393 | End: 2025-07-19
Payer: MEDICARE

## 2025-07-19 DIAGNOSIS — K92.2 GASTROINTESTINAL HEMORRHAGE, UNSPECIFIED GASTROINTESTINAL HEMORRHAGE TYPE: Primary | ICD-10-CM

## 2025-07-19 DIAGNOSIS — N17.9 AKI (ACUTE KIDNEY INJURY): ICD-10-CM

## 2025-07-19 LAB
ABO + RH BLD: NORMAL
ALBUMIN SERPL-MCNC: 3.1 G/DL (ref 3.5–5)
ALBUMIN/GLOB SERPL: 0.6 (ref 1.1–2.2)
ALP SERPL-CCNC: 118 U/L (ref 45–117)
ALT SERPL-CCNC: 18 U/L (ref 12–78)
ANION GAP SERPL CALC-SCNC: 7 MMOL/L (ref 2–12)
AST SERPL-CCNC: 22 U/L (ref 15–37)
BASOPHILS # BLD: 0.14 K/UL (ref 0–0.1)
BASOPHILS NFR BLD: 1 % (ref 0–1)
BILIRUB SERPL-MCNC: 0.7 MG/DL (ref 0.2–1)
BLOOD GROUP ANTIBODIES SERPL: NORMAL
BUN SERPL-MCNC: 43 MG/DL (ref 6–20)
BUN/CREAT SERPL: 15 (ref 12–20)
CALCIUM SERPL-MCNC: 9.5 MG/DL (ref 8.5–10.1)
CHLORIDE SERPL-SCNC: 97 MMOL/L (ref 97–108)
CO2 SERPL-SCNC: 28 MMOL/L (ref 21–32)
COMMENT:: NORMAL
CREAT SERPL-MCNC: 2.78 MG/DL (ref 0.55–1.02)
DIFFERENTIAL METHOD BLD: ABNORMAL
EOSINOPHIL # BLD: 0.14 K/UL (ref 0–0.4)
EOSINOPHIL NFR BLD: 1 % (ref 0–7)
ERYTHROCYTE [DISTWIDTH] IN BLOOD BY AUTOMATED COUNT: 14.3 % (ref 11.5–14.5)
GLOBULIN SER CALC-MCNC: 4.8 G/DL (ref 2–4)
GLUCOSE SERPL-MCNC: 143 MG/DL (ref 65–100)
HCT VFR BLD AUTO: 35.2 % (ref 35–47)
HEMOCCULT STL QL: POSITIVE
HGB BLD-MCNC: 11.5 G/DL (ref 11.5–16)
IMM GRANULOCYTES # BLD AUTO: 0 K/UL (ref 0–0.04)
IMM GRANULOCYTES NFR BLD AUTO: 0 % (ref 0–0.5)
LYMPHOCYTES # BLD: 1.56 K/UL (ref 0.8–3.5)
LYMPHOCYTES NFR BLD: 11 % (ref 12–49)
MCH RBC QN AUTO: 29.5 PG (ref 26–34)
MCHC RBC AUTO-ENTMCNC: 32.7 G/DL (ref 30–36.5)
MCV RBC AUTO: 90.3 FL (ref 80–99)
MONOCYTES # BLD: 0.71 K/UL (ref 0–1)
MONOCYTES NFR BLD: 5 % (ref 5–13)
NEUTS BAND NFR BLD MANUAL: 13 %
NEUTS SEG # BLD: 11.65 K/UL (ref 1.8–8)
NEUTS SEG NFR BLD: 69 % (ref 32–75)
NRBC # BLD: 0 K/UL (ref 0–0.01)
NRBC BLD-RTO: 0 PER 100 WBC
PLATELET # BLD AUTO: 330 K/UL (ref 150–400)
PMV BLD AUTO: 8.6 FL (ref 8.9–12.9)
POTASSIUM SERPL-SCNC: 5.5 MMOL/L (ref 3.5–5.1)
PROT SERPL-MCNC: 7.9 G/DL (ref 6.4–8.2)
RBC # BLD AUTO: 3.9 M/UL (ref 3.8–5.2)
RBC MORPH BLD: ABNORMAL
SODIUM SERPL-SCNC: 132 MMOL/L (ref 136–145)
SPECIMEN EXP DATE BLD: NORMAL
SPECIMEN HOLD: NORMAL
WBC # BLD AUTO: 14.2 K/UL (ref 3.6–11)

## 2025-07-19 PROCEDURE — 6360000002 HC RX W HCPCS: Performed by: EMERGENCY MEDICINE

## 2025-07-19 PROCEDURE — 6360000002 HC RX W HCPCS: Performed by: STUDENT IN AN ORGANIZED HEALTH CARE EDUCATION/TRAINING PROGRAM

## 2025-07-19 PROCEDURE — 74176 CT ABD & PELVIS W/O CONTRAST: CPT

## 2025-07-19 PROCEDURE — 85025 COMPLETE CBC W/AUTO DIFF WBC: CPT

## 2025-07-19 PROCEDURE — 82272 OCCULT BLD FECES 1-3 TESTS: CPT

## 2025-07-19 PROCEDURE — 99285 EMERGENCY DEPT VISIT HI MDM: CPT

## 2025-07-19 PROCEDURE — 96374 THER/PROPH/DIAG INJ IV PUSH: CPT

## 2025-07-19 PROCEDURE — 2500000003 HC RX 250 WO HCPCS: Performed by: STUDENT IN AN ORGANIZED HEALTH CARE EDUCATION/TRAINING PROGRAM

## 2025-07-19 PROCEDURE — 1100000003 HC PRIVATE W/ TELEMETRY

## 2025-07-19 PROCEDURE — 2580000003 HC RX 258: Performed by: EMERGENCY MEDICINE

## 2025-07-19 PROCEDURE — 86850 RBC ANTIBODY SCREEN: CPT

## 2025-07-19 PROCEDURE — 80053 COMPREHEN METABOLIC PANEL: CPT

## 2025-07-19 PROCEDURE — 36415 COLL VENOUS BLD VENIPUNCTURE: CPT

## 2025-07-19 PROCEDURE — 6370000000 HC RX 637 (ALT 250 FOR IP): Performed by: STUDENT IN AN ORGANIZED HEALTH CARE EDUCATION/TRAINING PROGRAM

## 2025-07-19 PROCEDURE — 86901 BLOOD TYPING SEROLOGIC RH(D): CPT

## 2025-07-19 PROCEDURE — 86900 BLOOD TYPING SEROLOGIC ABO: CPT

## 2025-07-19 PROCEDURE — 6370000000 HC RX 637 (ALT 250 FOR IP): Performed by: EMERGENCY MEDICINE

## 2025-07-19 RX ORDER — POLYETHYLENE GLYCOL 3350 17 G/17G
17 POWDER, FOR SOLUTION ORAL DAILY
Status: DISCONTINUED | OUTPATIENT
Start: 2025-07-19 | End: 2025-07-22 | Stop reason: HOSPADM

## 2025-07-19 RX ORDER — ONDANSETRON 4 MG/1
4 TABLET, ORALLY DISINTEGRATING ORAL EVERY 8 HOURS PRN
Status: DISCONTINUED | OUTPATIENT
Start: 2025-07-19 | End: 2025-07-22 | Stop reason: HOSPADM

## 2025-07-19 RX ORDER — ALPRAZOLAM 0.5 MG
0.25 TABLET ORAL DAILY PRN
Status: DISCONTINUED | OUTPATIENT
Start: 2025-07-19 | End: 2025-07-22 | Stop reason: HOSPADM

## 2025-07-19 RX ORDER — SODIUM CHLORIDE 9 MG/ML
INJECTION, SOLUTION INTRAVENOUS PRN
Status: DISCONTINUED | OUTPATIENT
Start: 2025-07-19 | End: 2025-07-22 | Stop reason: HOSPADM

## 2025-07-19 RX ORDER — ATORVASTATIN CALCIUM 40 MG/1
80 TABLET, FILM COATED ORAL DAILY
Status: DISCONTINUED | OUTPATIENT
Start: 2025-07-19 | End: 2025-07-22 | Stop reason: HOSPADM

## 2025-07-19 RX ORDER — MEMANTINE HYDROCHLORIDE 5 MG/1
5 TABLET ORAL DAILY
Status: DISCONTINUED | OUTPATIENT
Start: 2025-07-19 | End: 2025-07-22 | Stop reason: HOSPADM

## 2025-07-19 RX ORDER — TIMOLOL MALEATE 5 MG/ML
1 SOLUTION/ DROPS OPHTHALMIC 2 TIMES DAILY
Status: DISCONTINUED | OUTPATIENT
Start: 2025-07-19 | End: 2025-07-22 | Stop reason: HOSPADM

## 2025-07-19 RX ORDER — BISACODYL 10 MG
10 SUPPOSITORY, RECTAL RECTAL DAILY PRN
Status: DISCONTINUED | OUTPATIENT
Start: 2025-07-19 | End: 2025-07-22 | Stop reason: HOSPADM

## 2025-07-19 RX ORDER — EZETIMIBE 10 MG/1
10 TABLET ORAL DAILY
Status: DISCONTINUED | OUTPATIENT
Start: 2025-07-19 | End: 2025-07-20

## 2025-07-19 RX ORDER — SODIUM CHLORIDE 0.9 % (FLUSH) 0.9 %
5-40 SYRINGE (ML) INJECTION PRN
Status: DISCONTINUED | OUTPATIENT
Start: 2025-07-19 | End: 2025-07-22 | Stop reason: HOSPADM

## 2025-07-19 RX ORDER — DULOXETIN HYDROCHLORIDE 30 MG/1
60 CAPSULE, DELAYED RELEASE ORAL DAILY
Status: DISCONTINUED | OUTPATIENT
Start: 2025-07-19 | End: 2025-07-22 | Stop reason: HOSPADM

## 2025-07-19 RX ORDER — POLYETHYLENE GLYCOL 3350 17 G/17G
17 POWDER, FOR SOLUTION ORAL DAILY PRN
Status: DISCONTINUED | OUTPATIENT
Start: 2025-07-19 | End: 2025-07-22 | Stop reason: HOSPADM

## 2025-07-19 RX ORDER — SENNOSIDES 8.6 MG/1
2 TABLET ORAL NIGHTLY
Status: DISCONTINUED | OUTPATIENT
Start: 2025-07-19 | End: 2025-07-22 | Stop reason: HOSPADM

## 2025-07-19 RX ORDER — 0.9 % SODIUM CHLORIDE 0.9 %
500 INTRAVENOUS SOLUTION INTRAVENOUS ONCE
Status: COMPLETED | OUTPATIENT
Start: 2025-07-19 | End: 2025-07-19

## 2025-07-19 RX ORDER — ONDANSETRON 2 MG/ML
4 INJECTION INTRAMUSCULAR; INTRAVENOUS EVERY 6 HOURS PRN
Status: DISCONTINUED | OUTPATIENT
Start: 2025-07-19 | End: 2025-07-22 | Stop reason: HOSPADM

## 2025-07-19 RX ORDER — CETIRIZINE HYDROCHLORIDE 10 MG/1
5 TABLET ORAL DAILY
Status: DISCONTINUED | OUTPATIENT
Start: 2025-07-19 | End: 2025-07-22 | Stop reason: HOSPADM

## 2025-07-19 RX ORDER — ACETAMINOPHEN 325 MG/1
650 TABLET ORAL EVERY 6 HOURS PRN
Status: DISCONTINUED | OUTPATIENT
Start: 2025-07-19 | End: 2025-07-22 | Stop reason: HOSPADM

## 2025-07-19 RX ORDER — MIDODRINE HYDROCHLORIDE 2.5 MG/1
2.5 TABLET ORAL 2 TIMES DAILY PRN
Status: DISCONTINUED | OUTPATIENT
Start: 2025-07-19 | End: 2025-07-22 | Stop reason: HOSPADM

## 2025-07-19 RX ORDER — FLUTICASONE PROPIONATE 50 MCG
1 SPRAY, SUSPENSION (ML) NASAL DAILY PRN
Status: DISCONTINUED | OUTPATIENT
Start: 2025-07-19 | End: 2025-07-22 | Stop reason: HOSPADM

## 2025-07-19 RX ORDER — ACETAMINOPHEN 650 MG/1
650 SUPPOSITORY RECTAL EVERY 6 HOURS PRN
Status: DISCONTINUED | OUTPATIENT
Start: 2025-07-19 | End: 2025-07-22 | Stop reason: HOSPADM

## 2025-07-19 RX ORDER — PREGABALIN 50 MG/1
50 CAPSULE ORAL 2 TIMES DAILY
Status: DISCONTINUED | OUTPATIENT
Start: 2025-07-19 | End: 2025-07-22 | Stop reason: HOSPADM

## 2025-07-19 RX ORDER — SODIUM CHLORIDE 0.9 % (FLUSH) 0.9 %
5-40 SYRINGE (ML) INJECTION EVERY 12 HOURS SCHEDULED
Status: DISCONTINUED | OUTPATIENT
Start: 2025-07-19 | End: 2025-07-22 | Stop reason: HOSPADM

## 2025-07-19 RX ORDER — MIDODRINE HYDROCHLORIDE 5 MG/1
2.5 TABLET ORAL
Status: COMPLETED | OUTPATIENT
Start: 2025-07-19 | End: 2025-07-19

## 2025-07-19 RX ORDER — ONDANSETRON 2 MG/ML
4 INJECTION INTRAMUSCULAR; INTRAVENOUS ONCE
Status: COMPLETED | OUTPATIENT
Start: 2025-07-19 | End: 2025-07-19

## 2025-07-19 RX ORDER — LATANOPROST 50 UG/ML
1 SOLUTION/ DROPS OPHTHALMIC DAILY
Status: DISCONTINUED | OUTPATIENT
Start: 2025-07-19 | End: 2025-07-22 | Stop reason: HOSPADM

## 2025-07-19 RX ADMIN — SODIUM CHLORIDE 500 ML: 0.9 INJECTION, SOLUTION INTRAVENOUS at 18:14

## 2025-07-19 RX ADMIN — MEMANTINE 5 MG: 10 TABLET ORAL at 20:44

## 2025-07-19 RX ADMIN — SODIUM CHLORIDE, PRESERVATIVE FREE 10 ML: 5 INJECTION INTRAVENOUS at 20:45

## 2025-07-19 RX ADMIN — WATER 1000 MG: 1 INJECTION INTRAMUSCULAR; INTRAVENOUS; SUBCUTANEOUS at 20:40

## 2025-07-19 RX ADMIN — POLYETHYLENE GLYCOL 3350 17 G: 17 POWDER, FOR SOLUTION ORAL at 20:51

## 2025-07-19 RX ADMIN — EZETIMIBE 10 MG: 10 TABLET ORAL at 20:42

## 2025-07-19 RX ADMIN — CETIRIZINE HYDROCHLORIDE 5 MG: 10 TABLET, FILM COATED ORAL at 20:43

## 2025-07-19 RX ADMIN — DULOXETINE 60 MG: 30 CAPSULE, DELAYED RELEASE ORAL at 20:42

## 2025-07-19 RX ADMIN — ONDANSETRON 4 MG: 2 INJECTION, SOLUTION INTRAMUSCULAR; INTRAVENOUS at 16:46

## 2025-07-19 RX ADMIN — TIMOLOL MALEATE 1 DROP: 5 SOLUTION OPHTHALMIC at 20:47

## 2025-07-19 RX ADMIN — LATANOPROST 1 DROP: 50 SOLUTION OPHTHALMIC at 20:47

## 2025-07-19 RX ADMIN — MIDODRINE HYDROCHLORIDE 2.5 MG: 5 TABLET ORAL at 16:30

## 2025-07-19 RX ADMIN — SENNOSIDES 17.2 MG: 8.6 TABLET, FILM COATED ORAL at 20:44

## 2025-07-19 RX ADMIN — ATORVASTATIN CALCIUM 80 MG: 40 TABLET, FILM COATED ORAL at 20:42

## 2025-07-19 RX ADMIN — PREGABALIN 50 MG: 50 CAPSULE ORAL at 20:45

## 2025-07-19 ASSESSMENT — PAIN SCALES - PAIN ASSESSMENT IN ADVANCED DEMENTIA (PAINAD)
CONSOLABILITY: NO NEED TO CONSOLE
FACIALEXPRESSION: SMILING OR INEXPRESSIVE
TOTALSCORE: 1
BODYLANGUAGE: RELAXED
NEGVOCALIZATION: OCCASIONAL MOAN/GROAN, LOW SPEECH, NEGATIVE/DISAPPROVING QUALITY
BREATHING: NORMAL

## 2025-07-19 ASSESSMENT — PAIN - FUNCTIONAL ASSESSMENT: PAIN_FUNCTIONAL_ASSESSMENT: PAIN ASSESSMENT IN ADVANCED DEMENTIA (PAINAD)

## 2025-07-19 NOTE — ED PROVIDER NOTES
[facility] EMERGENCY DEPARTMENT  EMERGENCY DEPARTMENT ENCOUNTER       Pt Name: Tomeka Yip  MRN: [id number]  Birthdate 8/15/1932  Date of evaluation: 7/19/2025  Provider: Katelyn Rodgers MD   PCP: Orestes Xiong DO  Note Started: 4:16 PM EDT 7/19/25     CHIEF COMPLAINT       Chief Complaint   Patient presents with    Rectal Bleeding     Pt BIBEMS from home with a chief complaint of rectal bleeding x2 today. Pt family reports first instance of bleeding was minimal, however they were reporting dark blood in her brief when she was changed earlier today. Hx constipation. Earlier today, pt's sister gave pt Miralax. BP per EMS 92/73 with a HR of 95. Pt has hx hypotension and usually takes midodrine but pt has not had any of her meds today. Pt is bedbound at baseline but is more lethargic and generally weak than normal per family. Pt A&O x4.         HISTORY OF PRESENT ILLNESS: 1 or more elements      History From: Patient and family, History limited by: none     Tomeka Yip is a 89 y.o. female patient with history of diverticulitis, coronary artery disease, hypercholesterolemia, TIA, hypertension, complete heart block, memory loss, here for rectal bleeding and fatigue.  Patient's symptoms started this morning.  She was constipated yesterday.  Family member says she removed a piece of stool from the rectum.  She gave her MiraLAX after that.  The patient had 2 maroon-colored stools today.  Family denies diarrhea or vomiting.  She is normally bedbound, but has had less energy recently, and is unable to assist the family with ADLs.  They deny fever.  Patient denies chest pain, abdominal pain, shortness of breath.  The only blood thinner she is on is baby aspirin.       Please See MDM for Additional Details of the HPI/PMH  Nursing Notes were all reviewed and agreed with or any disagreements were addressed in the HPI.     REVIEW OF SYSTEMS        Positives and Pertinent negatives as per HPI.    PAST

## 2025-07-19 NOTE — H&P
Hospitalist Admission Note    NAME:   Tomeka Yip   : 8/15/1932   MRN: [id number]     Date/Time: 2025 6:49 PM    Patient PCP: Orestes Xiong, DO    ______________________________________________________________________  Given the patient's current clinical presentation, I have a high level of concern for decompensation if discharged from the emergency department.  Complex decision making was performed, which includes reviewing the patient's available past medical records, laboratory results, and x-ray films.       My assessment of this patient's clinical condition and my plan of care is as follows.    Assessment / Plan:        Lower GI bleed suspect could be hemorrhoidal in origin  2 episodes of rectal bleeding, maroon-colored stool  Constipation    CTAP showed: Rectocele with edema in the fat surrounding the distal rectum, left-sided diverticulosis.  No source of bleeding present  Admission hemoglobin 11.5, closer to baseline      Plan:  Admit to med telemetry  H&H every 12 hours  Clear liquid diet  2 large-bore IV line  Goal hemoglobin above 7, transfuse to reach the goal  GI consult placed  Hemorrhoidal cream ordered  Status post midodrine 2.5 in the emergency  Started on bowel regimen, along 1 as needed suppository          Sepsis possibly secondary to  Leukocytosis  Hypotension  Admission WBC of 14.2, patient afebrile, history positive for dysuria  Patient meeting the sepsis criteria based on hypotension and leukocytosis.  Pending urinalysis  Starting on prophylactic ceftriaxone for urine tract  CBC tomorrow  Blood pressure stable now continue to monitor        RAHCEL on CKD  Baseline creatinine 1.3-1.5, admission creatinine 2.78   CT abdomen: No calculus or hydronephrosis, small exophytic cyst left kidney  Status post 500 mL IV fluid in the emergency department    Plan:   Urine electrolytes,   Bladder scan  RFP tomorrow  If creatinine continue to worsen, will involve nephrology  Follow-up

## 2025-07-20 ENCOUNTER — APPOINTMENT (OUTPATIENT)
Facility: HOSPITAL | Age: 89
DRG: 393 | End: 2025-07-20
Payer: MEDICARE

## 2025-07-20 LAB
APPEARANCE UR: CLEAR
BACTERIA URNS QL MICRO: NEGATIVE /HPF
BASOPHILS # BLD: 0.03 K/UL (ref 0–0.1)
BASOPHILS NFR BLD: 0.2 % (ref 0–1)
BILIRUB UR QL: NEGATIVE
COLOR UR: ABNORMAL
COMMENT:: NORMAL
CREAT UR-MCNC: 44.2 MG/DL
DIFFERENTIAL METHOD BLD: ABNORMAL
EOSINOPHIL # BLD: 0.25 K/UL (ref 0–0.4)
EOSINOPHIL NFR BLD: 2 % (ref 0–7)
EPITH CASTS URNS QL MICRO: ABNORMAL /LPF
ERYTHROCYTE [DISTWIDTH] IN BLOOD BY AUTOMATED COUNT: 14.3 % (ref 11.5–14.5)
GLUCOSE UR STRIP.AUTO-MCNC: NEGATIVE MG/DL
HCT VFR BLD AUTO: 29.2 % (ref 35–47)
HGB BLD-MCNC: 9.4 G/DL (ref 11.5–16)
HGB UR QL STRIP: ABNORMAL
IMM GRANULOCYTES # BLD AUTO: 0.07 K/UL (ref 0–0.04)
IMM GRANULOCYTES NFR BLD AUTO: 0.6 % (ref 0–0.5)
KETONES UR QL STRIP.AUTO: NEGATIVE MG/DL
LEUKOCYTE ESTERASE UR QL STRIP.AUTO: ABNORMAL
LYMPHOCYTES # BLD: 1.85 K/UL (ref 0.8–3.5)
LYMPHOCYTES NFR BLD: 14.7 % (ref 12–49)
MAGNESIUM SERPL-MCNC: 2.2 MG/DL (ref 1.6–2.4)
MCH RBC QN AUTO: 29.8 PG (ref 26–34)
MCHC RBC AUTO-ENTMCNC: 32.2 G/DL (ref 30–36.5)
MCV RBC AUTO: 92.7 FL (ref 80–99)
MONOCYTES # BLD: 1.28 K/UL (ref 0–1)
MONOCYTES NFR BLD: 10.2 % (ref 5–13)
NEUTS SEG # BLD: 9.11 K/UL (ref 1.8–8)
NEUTS SEG NFR BLD: 72.3 % (ref 32–75)
NITRITE UR QL STRIP.AUTO: NEGATIVE
NRBC # BLD: 0 K/UL (ref 0–0.01)
NRBC BLD-RTO: 0 PER 100 WBC
PH UR STRIP: 6 (ref 5–8)
PLATELET # BLD AUTO: 283 K/UL (ref 150–400)
PMV BLD AUTO: 9 FL (ref 8.9–12.9)
PROT UR STRIP-MCNC: ABNORMAL MG/DL
PROT UR-MCNC: 24 MG/DL (ref 0–11.9)
PROT/CREAT UR-RTO: 0.5
RBC # BLD AUTO: 3.15 M/UL (ref 3.8–5.2)
RBC #/AREA URNS HPF: ABNORMAL /HPF (ref 0–5)
SP GR UR REFRACTOMETRY: 1.01
SPECIMEN HOLD: NORMAL
URINE CULTURE IF INDICATED: ABNORMAL
UROBILINOGEN UR QL STRIP.AUTO: 0.2 EU/DL (ref 0.2–1)
WBC # BLD AUTO: 12.6 K/UL (ref 3.6–11)
WBC URNS QL MICRO: ABNORMAL /HPF (ref 0–4)

## 2025-07-20 PROCEDURE — 2700000000 HC OXYGEN THERAPY PER DAY

## 2025-07-20 PROCEDURE — 81001 URINALYSIS AUTO W/SCOPE: CPT

## 2025-07-20 PROCEDURE — 97165 OT EVAL LOW COMPLEX 30 MIN: CPT | Performed by: OCCUPATIONAL THERAPIST

## 2025-07-20 PROCEDURE — 2500000003 HC RX 250 WO HCPCS: Performed by: STUDENT IN AN ORGANIZED HEALTH CARE EDUCATION/TRAINING PROGRAM

## 2025-07-20 PROCEDURE — 1100000003 HC PRIVATE W/ TELEMETRY

## 2025-07-20 PROCEDURE — 83735 ASSAY OF MAGNESIUM: CPT

## 2025-07-20 PROCEDURE — 97530 THERAPEUTIC ACTIVITIES: CPT | Performed by: OCCUPATIONAL THERAPIST

## 2025-07-20 PROCEDURE — 84300 ASSAY OF URINE SODIUM: CPT

## 2025-07-20 PROCEDURE — 94760 N-INVAS EAR/PLS OXIMETRY 1: CPT

## 2025-07-20 PROCEDURE — 84156 ASSAY OF PROTEIN URINE: CPT

## 2025-07-20 PROCEDURE — 84133 ASSAY OF URINE POTASSIUM: CPT

## 2025-07-20 PROCEDURE — 36415 COLL VENOUS BLD VENIPUNCTURE: CPT

## 2025-07-20 PROCEDURE — 2580000003 HC RX 258: Performed by: INTERNAL MEDICINE

## 2025-07-20 PROCEDURE — 2580000003 HC RX 258: Performed by: STUDENT IN AN ORGANIZED HEALTH CARE EDUCATION/TRAINING PROGRAM

## 2025-07-20 PROCEDURE — 97535 SELF CARE MNGMENT TRAINING: CPT | Performed by: OCCUPATIONAL THERAPIST

## 2025-07-20 PROCEDURE — 83935 ASSAY OF URINE OSMOLALITY: CPT

## 2025-07-20 PROCEDURE — 82570 ASSAY OF URINE CREATININE: CPT

## 2025-07-20 PROCEDURE — 85025 COMPLETE CBC W/AUTO DIFF WBC: CPT

## 2025-07-20 PROCEDURE — 6370000000 HC RX 637 (ALT 250 FOR IP): Performed by: STUDENT IN AN ORGANIZED HEALTH CARE EDUCATION/TRAINING PROGRAM

## 2025-07-20 PROCEDURE — 71045 X-RAY EXAM CHEST 1 VIEW: CPT

## 2025-07-20 PROCEDURE — 51798 US URINE CAPACITY MEASURE: CPT

## 2025-07-20 RX ORDER — SODIUM CHLORIDE 9 MG/ML
INJECTION, SOLUTION INTRAVENOUS CONTINUOUS
Status: ACTIVE | OUTPATIENT
Start: 2025-07-20 | End: 2025-07-21

## 2025-07-20 RX ORDER — SODIUM CHLORIDE, SODIUM LACTATE, POTASSIUM CHLORIDE, AND CALCIUM CHLORIDE .6; .31; .03; .02 G/100ML; G/100ML; G/100ML; G/100ML
500 INJECTION, SOLUTION INTRAVENOUS ONCE
Status: COMPLETED | OUTPATIENT
Start: 2025-07-20 | End: 2025-07-20

## 2025-07-20 RX ORDER — METOPROLOL TARTRATE 25 MG/1
12.5 TABLET, FILM COATED ORAL 2 TIMES DAILY
Status: DISCONTINUED | OUTPATIENT
Start: 2025-07-20 | End: 2025-07-22 | Stop reason: HOSPADM

## 2025-07-20 RX ORDER — HYDROCORTISONE 25 MG/G
CREAM TOPICAL 2 TIMES DAILY
Status: DISCONTINUED | OUTPATIENT
Start: 2025-07-20 | End: 2025-07-22 | Stop reason: HOSPADM

## 2025-07-20 RX ADMIN — ACETAMINOPHEN 650 MG: 325 TABLET ORAL at 10:31

## 2025-07-20 RX ADMIN — MEMANTINE 5 MG: 10 TABLET ORAL at 10:33

## 2025-07-20 RX ADMIN — EZETIMIBE 10 MG: 10 TABLET ORAL at 10:33

## 2025-07-20 RX ADMIN — DULOXETINE 60 MG: 30 CAPSULE, DELAYED RELEASE ORAL at 10:31

## 2025-07-20 RX ADMIN — SODIUM CHLORIDE: 0.9 INJECTION, SOLUTION INTRAVENOUS at 18:39

## 2025-07-20 RX ADMIN — HYDROCORTISONE: 25 CREAM TOPICAL at 22:25

## 2025-07-20 RX ADMIN — TIMOLOL MALEATE 1 DROP: 5 SOLUTION OPHTHALMIC at 22:26

## 2025-07-20 RX ADMIN — SENNOSIDES 17.2 MG: 8.6 TABLET, FILM COATED ORAL at 22:25

## 2025-07-20 RX ADMIN — ATORVASTATIN CALCIUM 80 MG: 40 TABLET, FILM COATED ORAL at 10:31

## 2025-07-20 RX ADMIN — CETIRIZINE HYDROCHLORIDE 5 MG: 10 TABLET, FILM COATED ORAL at 10:32

## 2025-07-20 RX ADMIN — TIMOLOL MALEATE 1 DROP: 5 SOLUTION OPHTHALMIC at 11:25

## 2025-07-20 RX ADMIN — SODIUM CHLORIDE, SODIUM LACTATE, POTASSIUM CHLORIDE, AND CALCIUM CHLORIDE 500 ML: .6; .31; .03; .02 INJECTION, SOLUTION INTRAVENOUS at 09:30

## 2025-07-20 RX ADMIN — METOPROLOL TARTRATE 12.5 MG: 25 TABLET, FILM COATED ORAL at 14:03

## 2025-07-20 RX ADMIN — LATANOPROST 1 DROP: 50 SOLUTION OPHTHALMIC at 22:27

## 2025-07-20 RX ADMIN — SODIUM CHLORIDE, PRESERVATIVE FREE 10 ML: 5 INJECTION INTRAVENOUS at 21:30

## 2025-07-20 RX ADMIN — SODIUM CHLORIDE, PRESERVATIVE FREE 10 ML: 5 INJECTION INTRAVENOUS at 09:00

## 2025-07-20 RX ADMIN — POLYETHYLENE GLYCOL 3350 17 G: 17 POWDER, FOR SOLUTION ORAL at 10:34

## 2025-07-20 RX ADMIN — METOPROLOL TARTRATE 12.5 MG: 25 TABLET, FILM COATED ORAL at 22:25

## 2025-07-20 RX ADMIN — PREGABALIN 50 MG: 50 CAPSULE ORAL at 10:33

## 2025-07-20 ASSESSMENT — PAIN SCALES - GENERAL
PAINLEVEL_OUTOF10: 0
PAINLEVEL_OUTOF10: 0
PAINLEVEL_OUTOF10: 5

## 2025-07-20 ASSESSMENT — PAIN DESCRIPTION - DESCRIPTORS: DESCRIPTORS: ACHING

## 2025-07-20 ASSESSMENT — PAIN DESCRIPTION - LOCATION: LOCATION: HEAD

## 2025-07-20 ASSESSMENT — PAIN DESCRIPTION - ORIENTATION: ORIENTATION: ANTERIOR

## 2025-07-20 NOTE — CONSULTS
[address] [facility], [place] [address]  Phone: [phone]   [phone]       NEPHROLOGY CONSULT NOTE     Patient: Tomkea Yip MRN: [id number]  PCP: Orestes Xiong DO   :     8/15/1932  Age:   89 y.o.  Sex:  female      Referring physician: Rosana Harmon MD  Reason for consultation: 89 y.o. female with GIB (gastrointestinal bleeding) [K92.2] complicated by RACHEL   Admission Date: 2025  3:42 PM  LOS: 1 day      ASSESSMENT and PLAN :   1 Rachel/CKD 3a   - rachel in setting off intravascular depletion and Obstructive uropathy   - agree with astudillo cath placement   '- will dc Zetia   - will get chest xray   - gentle hydration       2 CKD 3a   - baseline cr is at 1.5   - from Htn and advanced aging       3 Urinary retention   - has astudillo and has consult Urology   - rectocele seen on Ct scan could cause urinary retension       4 UTI  - on ceftriaxone and cx ongoing       5 HTN  - on metoprolol       5 HLD  - stopped Zetia and continue the Statin     6 Neuropathy  - on pregabalin and will dc it for now due to RACHEL   - restart when cr improves           Care Plan discussed with:  Pts family member at bed side      Thank you for consulting [facility] in the care of your patient.      Subjective:   HPI: Tomeka Yip is a 89 y.o.  female who has been admitted to the hospital for concern for bright red blood  per rectum. Pt had significant blood after bowel movement. Pt on presentation was hypotensive . Albs showed high k and cr. Nep was consulted today.   No new labs today. Pt was unable to void and so astudillo cath to be placed. Pt is unable to give  any history. Pt has niece at bed side . Pt seess Dr Jean Maria at RNA         Past Medical Hx:   Past Medical History:   Diagnosis Date    CAD (coronary artery disease)     Diverticulitis     Glaucoma     Gout     HTN (hypertension) 2018    Hx of diverticulitis of colon 
See consult note from  for today  
(LYRICA) capsule 50 mg  50 mg Oral BID    timolol (TIMOPTIC) 0.5 % ophthalmic solution 1 drop  1 drop Both Eyes BID    sodium chloride flush 0.9 % injection 5-40 mL  5-40 mL IntraVENous 2 times per day    sodium chloride flush 0.9 % injection 5-40 mL  5-40 mL IntraVENous PRN    0.9 % sodium chloride infusion   IntraVENous PRN    ondansetron (ZOFRAN-ODT) disintegrating tablet 4 mg  4 mg Oral Q8H PRN    Or    ondansetron (ZOFRAN) injection 4 mg  4 mg IntraVENous Q6H PRN    polyethylene glycol (GLYCOLAX) packet 17 g  17 g Oral Daily PRN    acetaminophen (TYLENOL) tablet 650 mg  650 mg Oral Q6H PRN    Or    acetaminophen (TYLENOL) suppository 650 mg  650 mg Rectal Q6H PRN    polyethylene glycol (GLYCOLAX) packet 17 g  17 g Oral Daily    senna (SENOKOT) tablet 17.2 mg  2 tablet Oral Nightly    bisacodyl (DULCOLAX) suppository 10 mg  10 mg Rectal Daily PRN    cefTRIAXone (ROCEPHIN) 1,000 mg in sterile water 10 mL IV syringe  1,000 mg IntraVENous Q24H

## 2025-07-20 NOTE — PROGRESS NOTES
Hospitalist Progress Note    NAME:   Tomeka Yip   : 8/15/1932   MRN: [id number]     Date/Time: 2025 11:47 AM  Patient PCP: Orestes Xiong DO    Estimated discharge date:  Barriers:       Assessment / Plan:      Lower GI bleed suspect could be hemorrhoidal in origin  2 episodes of rectal bleeding, maroon-colored stool, POA   Constipation     CTAP showed: Rectocele with edema in the fat surrounding the distal rectum, left-sided diverticulosis.  No source of bleeding present  Admission hemoglobin 11.5, closer to baseline        Plan:  Continue to monitor on telemetry  H&H every 12 hours,  Clear liquid diet  Maintain 2 large-bore IV line  Goal hemoglobin above 7, transfuse to reach the goal  GI consult placed, pending  Hemorrhoidal cream ordered  Status post midodrine 2.5 in the emergency, blood pressure still  Continue bowel regimen, along with  as needed suppository  : No lab since this morning, all labs are ordered including CBC, renal function panel, magnesium, H&H every 12 hours.  Talk to the nurse at 11:48 AM for the stat drawl of all labs, we will follow-up on the labs              Sepsis possibly secondary to urine tract infection  Leukocytosis  Hypotension,, improved  Concern urine tract infection    Admission WBC of 14.2, patient afebrile, history positive for dysuria  Patient meeting the sepsis criteria based on hypotension and leukocytosis.  Pending urinalysis, patient not able to pee, now placing the Nieves catheter  Continue prophylactic ceftriaxone for urine tract, will complete the 5 days  CBC from today, asked the nurse to draw it  Blood pressure stable now continue to monitor             RACHEL on CKD  Urine retention  Baseline creatinine 1.3-1.5, admission creatinine 2.78   CT abdomen: No calculus or hydronephrosis, small exophytic cyst left kidney  Status post 500 mL IV fluid in the emergency department  : Still not able to urinate, ordered 500 mL of Ringer lactate

## 2025-07-20 NOTE — ED NOTES
This RN informed Bre with Surg Tele at this time regarding patient sundowning at night per patient's daughter. Bre informed this RN that she will forward this information to YOAV Rosenberg.

## 2025-07-20 NOTE — PROGRESS NOTES
End of Shift Note    Bedside shift change report given to YOAV Chamberlain (oncoming nurse) by Shakira Yoder RN (offgoing nurse).  Report included the following information SBAR, Kardex, MAR, and Recent Results    Shift worked:  [id number]     Shift summary and any significant changes:     Admitted pt who was oriented disoriented to the unit from the ED was settled in bed had not yet voided AND was on pure wick . Pt who is bed bound baseline with passive movement of the lower limbs, skin assessment done she had an area of hypopigmentation on the perineum pink in Colour and intact . She has boggy heels with blanchable redness on them was elevated . Has brand en score of 16 hence on two hourly turning.    0400: pt was desaturating at 84% put on oxygen via nasal cannula 3 l/ min saturation went up to 95% Other VSS during the shift.    0220 bladder scan done bladder had 266 ml's of urine.    0630 bladder scan done the bladder had 305 ml's of urine perfect serve done to the Dr who said we just observe and encourage oral    intake noted. Pt took approximately 600 ml's of water during the shift.    Pt on clear liquid diet    Has not yet had a bowel movement just minimal  pink Coloured mucous fluid from the rectum noted while changing the briefs    Handed the pt to day staff to continue monitoring and care      Concerns for physician to address:       Zone phone for oncoming shift:   Pt confused and has not voided yet       Activity:  Level of Assistance: Moderate assist, patient does 50-74%  Number times ambulated in hallways past shift: 0  Number of times OOB to chair past shift: 0    Cardiac:   Cardiac Monitoring: Yes      Cardiac Rhythm: Atrial paced    Access:  Current line(s): PIV    Genitourinary:   Urinary Status: Has not voided    Respiratory:   O2 Device: None (Room air)  Chronic home O2 use?: NO  Incentive spirometer at bedside: NO    GI:  Last BM (including prior to admit): 07/19/25  Current diet:  ADULT DIET;

## 2025-07-20 NOTE — PROGRESS NOTES
.End of Shift Note    Bedside shift change report given to YOAV Jarvis (oncoming nurse) by LAITH GRIDER LPN (offgoing nurse).  Report included the following information SBAR, Kardex, ED Summary, Procedure Summary, Intake/Output, MAR, and Recent Results    Shift worked:  7-7     Shift summary and any significant changes:     Patient has external catheter on today, but urinating around this catheter. Bladder scanned done and patient only retained 177ml. Briefs very wet today and changed. No skin breakdown noted. Turning in bed with assistance. Nephrology and GI has evaluated patient today. Minimal bloody drainage from rectum today, some mucous. Patient confused at baseline. Family  in to visit and feed. Fluid intake very good this shift. Patient received IV bolus as ordered.        Concerns for physician to address:  Plan of care     Zone phone for oncoming shift:   0       Activity:  Level of Assistance: Moderate assist, patient does 50-74%  Number times ambulated in hallways past shift: 0  Number of times OOB to chair past shift: 0    Cardiac:   Cardiac Monitoring: Yes      Cardiac Rhythm: Atrial paced    Access:  Current line(s): PIV     Genitourinary:   Urinary Status: Voiding, External catheter    Respiratory:   O2 Device: Nasal cannula  Chronic home O2 use?: NO  Incentive spirometer at bedside: N/A    GI:  Last BM (including prior to admit): 07/19/25  Current diet:  ADULT DIET; Clear Liquid  Passing flatus: YES    Pain Management:   Patient states pain is manageable on current regimen: YES    Skin:  Brooks Scale Score: 16  Interventions: Wound Offloading (Prevention Methods): Turning, Pillows    Patient Safety:  Fall Risk: Nursing Judgement-Fall Risk High(Add Comments): Yes  Fall Risk Interventions  Nursing Judgement-Fall Risk High(Add Comments): Yes  Toilet Every 2 Hours-In Advance of Need: Yes  Hourly Visual Checks: In bed, Awake, Quiet  Fall Visual Posted: Socks  Room Door Open: Deferred to promote

## 2025-07-20 NOTE — ED NOTES
TRANSFER - OUT REPORT:    Verbal report given to YOAV Rosenberg on Tomeka Yip  being transferred to Surg Tele 3118 for routine progression of patient care       Report consisted of patient's Situation, Background, Assessment and   Recommendations(SBAR).     Information from the following report(s) Nurse Handoff Report, Index, ED Encounter Summary, ED SBAR, Adult Overview, Surgery Report, Intake/Output, MAR, Recent Results, Med Rec Status, and Cardiac Rhythm NSR was reviewed with the receiving nurse.    Bernard Fall Assessment:    Presents to emergency department  because of falls (Syncope, seizure, or loss of consciousness): No  Age > 70: Yes  Altered Mental Status, Intoxication with alcohol or substance confusion (Disorientation, impaired judgment, poor safety awaremess, or inability to follow instructions): No  Impaired Mobility: Ambulates or transfers with assistive devices or assistance; Unable to ambulate or transer.: Yes (bedbound)  Nursing Judgement: Yes        Lines:   Peripheral IV 07/19/25 Left Basilic (Active)   Site Assessment Clean, dry & intact 07/19/25 1641   Line Status Blood return noted 07/19/25 1641   Phlebitis Assessment No symptoms 07/19/25 1641   Infiltration Assessment 0 07/19/25 1641      Opportunity for questions and clarification was provided.      Patient transported with:  Monitor and Patient-specific medications from Pharmacy

## 2025-07-20 NOTE — PLAN OF CARE
Problem: Occupational Therapy - Adult  Goal: By Discharge: Performs self-care activities at highest level of function for planned discharge setting.  See evaluation for individualized goals.  Description: FUNCTIONAL STATUS PRIOR TO ADMISSION:  Limited functional baseline due to a steady decline over the past year. The patient is max A for bed mobility and stand pivot transfers (bed to WC and WC to toilet), she is total A for all dressing, bathing (sponge bathed) and toileting (no incontinent), and is supervision/setup for seated grooming and self feeding. She is able to propel herself some in the WC. On a good day the patient cane maintain standing for several seconds.  Her younger daughter was present to provide prior level of function information.  HOME SUPPORT: Patient was living with her daughter. Her oldest daughter is her main caregiver, but she has a second daughter who also comes in to provide additional assistance and they also have a part time paid caregiver.     Occupational Therapy Goals:  Initiated 7/20/2025  1.  Patient will perform grooming seated EOB with Minimal Assist within 7 day(s).  2.  Patient will perform toilet/BSC transfers with Maximal Assist and Assist x1  within 7 day(s).  3.  Patient will perform self feeding with Set-up and Supervision within 7 day(s).     Outcome: Progressing    OCCUPATIONAL THERAPY EVALUATION    Patient: Tomeka Yip (89 y.o. female)  Date: 7/20/2025  Primary Diagnosis: GIB (gastrointestinal bleeding) [K92.2]         Precautions: Falls    ASSESSMENT :  The patient is currently limited by significant acute confusion, GW, poor activity tolerance, decreased safety awareness and decreased balance, with B knee buckling occurring in standing, which is impairing her functional independence. Per discussion with her daughter who was present for this evaluation, the patient has had a steady functional decline over the past year, most recently max A for bed mobility and

## 2025-07-20 NOTE — ED NOTES
Attempted to call report to Surg Tele at this time. Receiving RN states she is unable to take report at this time. This RN asked if any other nurse is available to take report and receiving RN replied that \"everyone else is in a room, [she] will call back\".

## 2025-07-21 ENCOUNTER — APPOINTMENT (OUTPATIENT)
Facility: HOSPITAL | Age: 89
DRG: 393 | End: 2025-07-21
Payer: MEDICARE

## 2025-07-21 LAB
ALBUMIN SERPL-MCNC: 2.5 G/DL (ref 3.5–5)
ANION GAP SERPL CALC-SCNC: 5 MMOL/L (ref 2–12)
BASOPHILS # BLD: 0.01 K/UL (ref 0–0.1)
BASOPHILS NFR BLD: 0.1 % (ref 0–1)
BUN SERPL-MCNC: 28 MG/DL (ref 6–20)
BUN/CREAT SERPL: 21 (ref 12–20)
CALCIUM SERPL-MCNC: 9.2 MG/DL (ref 8.5–10.1)
CHLORIDE SERPL-SCNC: 99 MMOL/L (ref 97–108)
CO2 SERPL-SCNC: 27 MMOL/L (ref 21–32)
CREAT SERPL-MCNC: 1.32 MG/DL (ref 0.55–1.02)
DIFFERENTIAL METHOD BLD: ABNORMAL
EOSINOPHIL # BLD: 0.23 K/UL (ref 0–0.4)
EOSINOPHIL NFR BLD: 2.8 % (ref 0–7)
ERYTHROCYTE [DISTWIDTH] IN BLOOD BY AUTOMATED COUNT: 13.9 % (ref 11.5–14.5)
GLUCOSE SERPL-MCNC: 107 MG/DL (ref 65–100)
HCT VFR BLD AUTO: 30.9 % (ref 35–47)
HGB BLD-MCNC: 10.1 G/DL (ref 11.5–16)
IMM GRANULOCYTES # BLD AUTO: 0.04 K/UL (ref 0–0.04)
IMM GRANULOCYTES NFR BLD AUTO: 0.5 % (ref 0–0.5)
LYMPHOCYTES # BLD: 1.41 K/UL (ref 0.8–3.5)
LYMPHOCYTES NFR BLD: 17.3 % (ref 12–49)
MAGNESIUM SERPL-MCNC: 2.2 MG/DL (ref 1.6–2.4)
MCH RBC QN AUTO: 29.3 PG (ref 26–34)
MCHC RBC AUTO-ENTMCNC: 32.7 G/DL (ref 30–36.5)
MCV RBC AUTO: 89.6 FL (ref 80–99)
MONOCYTES # BLD: 0.79 K/UL (ref 0–1)
MONOCYTES NFR BLD: 9.7 % (ref 5–13)
NEUTS SEG # BLD: 5.67 K/UL (ref 1.8–8)
NEUTS SEG NFR BLD: 69.6 % (ref 32–75)
NRBC # BLD: 0 K/UL (ref 0–0.01)
NRBC BLD-RTO: 0 PER 100 WBC
OSMOLALITY UR: 231 MOSM/KG H2O
PHOSPHATE SERPL-MCNC: 3.6 MG/DL (ref 2.6–4.7)
PLATELET # BLD AUTO: 299 K/UL (ref 150–400)
PMV BLD AUTO: 9 FL (ref 8.9–12.9)
POTASSIUM SERPL-SCNC: 4.6 MMOL/L (ref 3.5–5.1)
POTASSIUM UR-SCNC: 17 MMOL/L
RBC # BLD AUTO: 3.45 M/UL (ref 3.8–5.2)
SODIUM SERPL-SCNC: 131 MMOL/L (ref 136–145)
SODIUM UR-SCNC: 21 MMOL/L
WBC # BLD AUTO: 8.2 K/UL (ref 3.6–11)

## 2025-07-21 PROCEDURE — 94760 N-INVAS EAR/PLS OXIMETRY 1: CPT

## 2025-07-21 PROCEDURE — 85025 COMPLETE CBC W/AUTO DIFF WBC: CPT

## 2025-07-21 PROCEDURE — 1100000003 HC PRIVATE W/ TELEMETRY

## 2025-07-21 PROCEDURE — 6370000000 HC RX 637 (ALT 250 FOR IP): Performed by: STUDENT IN AN ORGANIZED HEALTH CARE EDUCATION/TRAINING PROGRAM

## 2025-07-21 PROCEDURE — 83735 ASSAY OF MAGNESIUM: CPT

## 2025-07-21 PROCEDURE — 97530 THERAPEUTIC ACTIVITIES: CPT | Performed by: PHYSICAL THERAPIST

## 2025-07-21 PROCEDURE — 2580000003 HC RX 258: Performed by: STUDENT IN AN ORGANIZED HEALTH CARE EDUCATION/TRAINING PROGRAM

## 2025-07-21 PROCEDURE — 80069 RENAL FUNCTION PANEL: CPT

## 2025-07-21 PROCEDURE — 36415 COLL VENOUS BLD VENIPUNCTURE: CPT

## 2025-07-21 PROCEDURE — 2700000000 HC OXYGEN THERAPY PER DAY

## 2025-07-21 PROCEDURE — 6360000002 HC RX W HCPCS: Performed by: STUDENT IN AN ORGANIZED HEALTH CARE EDUCATION/TRAINING PROGRAM

## 2025-07-21 PROCEDURE — 97161 PT EVAL LOW COMPLEX 20 MIN: CPT | Performed by: PHYSICAL THERAPIST

## 2025-07-21 PROCEDURE — 2500000003 HC RX 250 WO HCPCS: Performed by: STUDENT IN AN ORGANIZED HEALTH CARE EDUCATION/TRAINING PROGRAM

## 2025-07-21 PROCEDURE — 76770 US EXAM ABDO BACK WALL COMP: CPT

## 2025-07-21 RX ORDER — BUTALBITAL, ACETAMINOPHEN AND CAFFEINE 50; 325; 40 MG/1; MG/1; MG/1
1 TABLET ORAL EVERY 4 HOURS PRN
Status: DISCONTINUED | OUTPATIENT
Start: 2025-07-21 | End: 2025-07-22 | Stop reason: HOSPADM

## 2025-07-21 RX ORDER — SUMATRIPTAN SUCCINATE 25 MG/1
50 TABLET ORAL ONCE
Status: COMPLETED | OUTPATIENT
Start: 2025-07-21 | End: 2025-07-21

## 2025-07-21 RX ORDER — SODIUM CHLORIDE 9 MG/ML
INJECTION, SOLUTION INTRAVENOUS CONTINUOUS
Status: ACTIVE | OUTPATIENT
Start: 2025-07-21 | End: 2025-07-21

## 2025-07-21 RX ADMIN — BUTALBITAL, ACETAMINOPHEN, AND CAFFEINE 1 TABLET: 325; 50; 40 TABLET ORAL at 18:04

## 2025-07-21 RX ADMIN — ATORVASTATIN CALCIUM 80 MG: 40 TABLET, FILM COATED ORAL at 09:46

## 2025-07-21 RX ADMIN — SODIUM CHLORIDE: 9 INJECTION, SOLUTION INTRAVENOUS at 09:54

## 2025-07-21 RX ADMIN — TIMOLOL MALEATE 1 DROP: 5 SOLUTION OPHTHALMIC at 22:11

## 2025-07-21 RX ADMIN — MEMANTINE 5 MG: 10 TABLET ORAL at 09:46

## 2025-07-21 RX ADMIN — WATER 1000 MG: 1 INJECTION INTRAMUSCULAR; INTRAVENOUS; SUBCUTANEOUS at 22:07

## 2025-07-21 RX ADMIN — SENNOSIDES 17.2 MG: 8.6 TABLET, FILM COATED ORAL at 22:07

## 2025-07-21 RX ADMIN — SUMATRIPTAN SUCCINATE 50 MG: 25 TABLET ORAL at 18:04

## 2025-07-21 RX ADMIN — DULOXETINE 60 MG: 30 CAPSULE, DELAYED RELEASE ORAL at 09:45

## 2025-07-21 RX ADMIN — TIMOLOL MALEATE 1 DROP: 5 SOLUTION OPHTHALMIC at 09:50

## 2025-07-21 RX ADMIN — LATANOPROST 1 DROP: 50 SOLUTION OPHTHALMIC at 22:11

## 2025-07-21 RX ADMIN — SODIUM CHLORIDE, PRESERVATIVE FREE 10 ML: 5 INJECTION INTRAVENOUS at 09:49

## 2025-07-21 RX ADMIN — POLYETHYLENE GLYCOL 3350 17 G: 17 POWDER, FOR SOLUTION ORAL at 09:46

## 2025-07-21 RX ADMIN — WATER 1000 MG: 1 INJECTION INTRAMUSCULAR; INTRAVENOUS; SUBCUTANEOUS at 01:43

## 2025-07-21 RX ADMIN — HYDROCORTISONE: 25 CREAM TOPICAL at 09:50

## 2025-07-21 RX ADMIN — METOPROLOL TARTRATE 12.5 MG: 25 TABLET, FILM COATED ORAL at 09:46

## 2025-07-21 RX ADMIN — HYDROCORTISONE: 25 CREAM TOPICAL at 22:11

## 2025-07-21 RX ADMIN — CETIRIZINE HYDROCHLORIDE 5 MG: 10 TABLET, FILM COATED ORAL at 09:46

## 2025-07-21 RX ADMIN — ACETAMINOPHEN 650 MG: 325 TABLET ORAL at 10:03

## 2025-07-21 RX ADMIN — SODIUM CHLORIDE, PRESERVATIVE FREE 10 ML: 5 INJECTION INTRAVENOUS at 22:10

## 2025-07-21 RX ADMIN — METOPROLOL TARTRATE 12.5 MG: 25 TABLET, FILM COATED ORAL at 22:08

## 2025-07-21 ASSESSMENT — PAIN DESCRIPTION - PAIN TYPE: TYPE: ACUTE PAIN

## 2025-07-21 ASSESSMENT — PAIN DESCRIPTION - DESCRIPTORS
DESCRIPTORS: ACHING
DESCRIPTORS: ACHING;DISCOMFORT

## 2025-07-21 ASSESSMENT — PAIN SCALES - GENERAL
PAINLEVEL_OUTOF10: 4
PAINLEVEL_OUTOF10: 0
PAINLEVEL_OUTOF10: 0
PAINLEVEL_OUTOF10: 10
PAINLEVEL_OUTOF10: 0

## 2025-07-21 ASSESSMENT — PAIN DESCRIPTION - LOCATION
LOCATION: HEAD
LOCATION: HEAD

## 2025-07-21 ASSESSMENT — PAIN DESCRIPTION - FREQUENCY: FREQUENCY: INTERMITTENT

## 2025-07-21 ASSESSMENT — PAIN - FUNCTIONAL ASSESSMENT: PAIN_FUNCTIONAL_ASSESSMENT: ACTIVITIES ARE NOT PREVENTED

## 2025-07-21 ASSESSMENT — PAIN DESCRIPTION - ORIENTATION: ORIENTATION: MID

## 2025-07-21 NOTE — PLAN OF CARE
Problem: Safety - Adult  Goal: Free from fall injury  Outcome: Progressing     Problem: Chronic Conditions and Co-morbidities  Goal: Patient's chronic conditions and co-morbidity symptoms are monitored and maintained or improved  Outcome: Progressing     Problem: Discharge Planning  Goal: Discharge to home or other facility with appropriate resources  Outcome: Progressing     Problem: Pain  Goal: Verbalizes/displays adequate comfort level or baseline comfort level  Outcome: Progressing     Problem: ABCDS Injury Assessment  Goal: Absence of physical injury  Outcome: Progressing     Problem: Confusion  Goal: Confusion, delirium, dementia, or psychosis is improved or at baseline  Description: INTERVENTIONS:  1. Assess for possible contributors to thought disturbance, including medications, impaired vision or hearing, underlying metabolic abnormalities, dehydration, psychiatric diagnoses, and notify attending LIP  2. [place] high risk fall precautions, as indicated  3. Provide frequent short contacts to provide reality reorientation, refocusing and direction  4. Decrease environmental stimuli, including noise as appropriate  5. Monitor and intervene to maintain adequate nutrition, hydration, elimination, sleep and activity  6. If unable to ensure safety without constant attention obtain sitter and review sitter guidelines with assigned personnel  7. Initiate Psychosocial CNS and Spiritual Care consult, as indicated  Outcome: Progressing

## 2025-07-21 NOTE — PROGRESS NOTES
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)  CKD stage IIIb(baseline creatinine 1.0)  Hyponatremia  Hyperkalemia(improved)  Rectal bleeding  Urinary retention  History of rectocele  UTI  Hypertension      PLAN-  Creatinine continues to improve  Can stop IV fluids by tomorrow  Ongoing GI workup  Check daily labs     Subjective:  Interval History:   - Seen and examined today  - Spoke to daughter at bedside    Objective:   Vitals:    07/21/25 0315 07/21/25 0730 07/21/25 0954 07/21/25 1120   BP: 130/74 (!) 150/48  (!) 130/45   Pulse: 98 74  71   Resp: 16 15  14   Temp: 99.5 °F (37.5 °C) 97.3 °F (36.3 °C)  99 °F (37.2 °C)   TempSrc: Axillary Oral  Oral   SpO2: 95% 97% 98% 98%   Weight:       Height:          I/O last 3 completed shifts:  In: 1470 [P.O.:1470]  Out: 600 [Urine:600]  I/O this shift:  In: 160 [P.O.:160]  Out: 300 [Urine:300]      Physical exam:    GEN: NAD  NECK- no mass  RESP: No wheezing, decreased BS b/l  CVS: S1,S2  RRR  NEURO: Normal speech, Non focal  EXT: No Edema   PSYCH: Normal Mood    Chart reviewed.         Pertinent Notes reviewed.       Data Review :  Lab Results   Component Value Date/Time     07/21/2025 11:34 AM    K 4.6 07/21/2025 11:34 AM    CL 99 07/21/2025 11:34 AM    CO2 27 07/21/2025 11:34 AM    BUN 28 07/21/2025 11:34 AM    CREATININE 1.32 07/21/2025 11:34 AM    GLUCOSE 107 07/21/2025 11:34 AM    CALCIUM 9.2 07/21/2025 11:34 AM       Lab Results   Component Value Date    WBC 8.2 07/21/2025    HGB 10.1 (L) 07/21/2025    HCT 30.9 (L) 07/21/2025    MCV 89.6 07/21/2025

## 2025-07-21 NOTE — PROGRESS NOTES
GI PROGRESS NOTE  Georgiana Alvarado PA-C  [phone] PA in-hospital cell phone M-F until 4:30  After 5pm or on weekends, please call  for physician on call    NAME:Tomeka Yip :8/15/1932 MRN:[id number]   ATTG: [unfilled]   PCP: Orestes Xiong DO  Date/Time:  2025 5:06 PM     Reason for following: BRBPR    Assessment:     89yo F presented with reported large amount of BRBPR on Saturday witnessed by family. Hx of constipation, has had trace hemtochezia in the past. RACHEL on chemistries. CT w/ concern for rectocele & local edema but no e/o LBO. Last CLN unknown by pt or family. Normocytic anemia - Hgb 11.5 POA > 10.1.     Plan:   Monitor BM for further signs of bleeding   Discussed possibility of flex sig with pt and family today. Will follow up tomorrow morning for pt and family decision on whether to pursue flex sig. Would not recommend full colonoscopy given her age and high risk for complications from procedure.     Discussed with Dr. Cannon  Subjective:   Pt lying in bed, sleeping when I arrived. Family at bedside. No real BM today, RN reports clear mucous per rectum w/ pink tinge this morning. Pt denies abdominal or rectal pain.     Complaint Y/N Description   Abdominal Pain N    Hematemesis N    Hematochezia Y    Melena     Constipation     Diarrhea     Dyspepsia     Dysphagia     Jaundiced     Nausea/vomiting N      Review of Systems: see HPI   Objective:   VITALS:   Last 24hrs VS reviewed since prior progress note. Most recent are:  Vitals:    25 1547   BP: (!) 121/47   Pulse: 67   Resp:    Temp: 97.7 °F (36.5 °C)   SpO2: 100%       Intake/Output Summary (Last 24 hours) at 2025 1706  Last data filed at 2025 1200  Gross per 24 hour   Intake 540 ml   Output 900 ml   Net -360 ml     PHYSICAL EXAM:  General: Alert, lethargic, cooperative, no acute distress    HEENT: NC, Atraumatic. Anicteric sclerae.  Abdomen: Soft, Non distended, Non tender.  +Bowel sounds, no

## 2025-07-21 NOTE — PROGRESS NOTES
Hospitalist Progress Note    NAME:   Tomeka Yip   : 8/15/1932   MRN: [id number]     Date/Time: 2025 3:58 PM  Patient PCP: Orestes Xiong DO      Assessment / Plan:    Lower GI bleed suspect could be hemorrhoidal in origin  2 episodes of rectal bleeding, maroon-colored stool, POA   Constipation     CTAP showed: Rectocele with edema in the fat surrounding the distal rectum, left-sided diverticulosis.  No source of bleeding present  Admission hemoglobin 11.5, closer to baseline        Plan:  Continue to monitor on telemetry  H&H every 12 hours,  Clear liquid diet  Maintain 2 large-bore IV line  Goal hemoglobin above 7, transfuse to reach the goal  GI consult placed, appreciate evaluation  Hemorrhoidal cream ordered  Continue bowel regimen, along with  as needed suppository  : No lab since this morning, all labs are ordered including CBC, renal function panel, magnesium, H&H every 12 hours.  Talk to the nurse at 11:48 AM for the stat drawl of all labs, we will follow-up on the labs   : Discussed with Dr. Rudd, plan for possible flex sigmoidoscopy tomorrow if the family wants to.  Discussed with the daughter, she will talk to other sister and niece and with the patient and will make a decision.            Sepsis possibly secondary to urine tract infection  Leukocytosis, resolved  Hypotension,, improved  Concern urine tract infection    Admission WBC of 14.2, patient afebrile, history positive for dysuria  Patient meeting the sepsis criteria based on hypotension and leukocytosis.  Urine analysis showed small leukocyte esterase, moderate amount of blood  Status post Nieves catheter because of urine retention  Continue prophylactic ceftriaxone for urine tract, will complete the 5-7 days  CBC tomorrow, leukocytosis improved             RACHEL on CKD, improving  Urine retention  Baseline creatinine 1.3-1.5, admission creatinine 2.78   CT abdomen: No calculus or hydronephrosis, small exophytic

## 2025-07-21 NOTE — PLAN OF CARE
Problem: Physical Therapy - Adult  Goal: By Discharge: Performs mobility at highest level of function for planned discharge setting.  See evaluation for individualized goals.  Description: FUNCTIONAL STATUS PRIOR TO ADMISSION: Patient with declining functional status over last several months. Mostly bedbound now requiring assist for transfers to w/c. Patient reports she has been working in walking with HHPT    HOME SUPPORT PRIOR TO ADMISSION: The patient lived with family who provide assistance for all aspects of care. Patient reports she has been working with HHPT on walking.    Physical Therapy Goals  Initiated 7/21/2025  1.  Patient will move from supine to sit and sit to supine , scoot up and down, and roll side to side in bed with minimal assistance/contact guard assist within 7 day(s).    2.  Patient will transfer from bed to chair and chair to bed with moderate assistance  using the least restrictive device within 7 day(s).  3.  Patient will perform sit to stand with moderate assistance  within 7 day(s).  4.  Goals for ambulation to be established in next 1-2 sessions if appropriate.  Outcome: Progressing   PHYSICAL THERAPY EVALUATION    Patient: Tomeka Yip (89 y.o. female)  Date: 7/21/2025  Primary Diagnosis: GIB (gastrointestinal bleeding) [K92.2]       Precautions:    falls          ASSESSMENT :   Patient seen for PT evaluation following admission for GI bleed. Patient in bed upon arrival and agreeable to therapy. She is oriented to self and is able to provide limited PLOF with slowed processing noted. Patient presents with generalized weakness and impaired functional mobility, balance, endurance and activity tolerance.        07/21/25 1519 07/21/25 1525 07/21/25 1528   Vital Signs   Pulse 68 69  --    BP (!) 127/46 (!) 74/51 (!) 83/45   MAP (Calculated) 73 59 58   BP Location Left upper arm Left upper arm Left upper arm   BP Method Automatic Automatic Automatic   Patient Position Supine Sitting

## 2025-07-21 NOTE — PROGRESS NOTES
07/21/25 1519 07/21/25 1525 07/21/25 1528   Vital Signs   Pulse 68 69  --    BP (!) 127/46 (!) 74/51 (!) 83/45   MAP (Calculated) 73 59 58   BP Location Left upper arm Left upper arm Left upper arm   BP Method Automatic Automatic Automatic   Patient Position Supine Sitting Sitting  (post LE exercises)      07/21/25 1531   Vital Signs   Pulse 83   BP (!) 133/36   MAP (Calculated) 68   BP Location Right upper arm   BP Method Automatic   Patient Position Lying left side

## 2025-07-21 NOTE — PLAN OF CARE
End of Shift Note    Bedside shift change report given to YOAV Ontiveros (oncoming nurse) by Batsheva Richards LPN (offgoing nurse).  Report included the following information SBAR    Shift worked:  7a-7p     Shift summary and any significant changes:    Pt rested in bed, q2 turned. Pt tolerated clears, Pt having brown Bm today x1. Pt complaining of headache, prn pain meds given. Pt states it \"eased up some\"     Concerns for physician to address:  She wants different diet      Zone phone for oncoming shift:   none           Batsheva Richards LPN

## 2025-07-21 NOTE — PROGRESS NOTES
End of Shift Note    Bedside shift change report given to Batsheva (oncoming nurse) by Jaye Vargas RN (offgoing nurse).  Report included the following information SBAR, Kardex, and MAR    Shift worked:  7p-7a     Shift summary and any significant changes:     Scheduled medications were given, see MAR.  Patient' IV was occluded from 2030, patient is a very hard stick.  I did call [facility] to place another IV has the patient was getting maintenance fluid.  Family members were present a bedside at change of shift.  Patient was repositioned during my shift.  Patient teaching and routine rounding has been done.     Concerns for physician to address:       [facility]          Activity:  Level of Assistance: Maximum assist, patient does 25-49%  Number times ambulated in hallways past shift: 0  Number of times OOB to chair past shift: 0    Cardiac:   Cardiac Monitoring: Yes      Cardiac Rhythm: Atrial paced    Access:  Current line(s): PIV     Genitourinary:   Urinary Status: Voiding, External catheter    Respiratory:   O2 Device: Nasal cannula  Chronic home O2 use?: NO  Incentive spirometer at bedside: NO    GI:  Last BM (including prior to admit): 07/19/25  Current diet:  ADULT DIET; Clear Liquid  Passing flatus: YES    Pain Management:   Patient states pain is manageable on current regimen: YES    Skin:  Brooks Scale Score: 16  Interventions: Wound Offloading (Prevention Methods): Blankets, Pillows, Repositioning, Turning    Patient Safety:  Fall Risk: Nursing Judgement-Fall Risk High(Add Comments): Yes  Fall Risk Interventions  Nursing Judgement-Fall Risk High(Add Comments): Yes  Toilet Every 2 Hours-In Advance of Need: Yes  Hourly Visual Checks: In bed, Awake, Quiet  Fall Visual Posted: Armband, Socks  Room Door Open: Yes  Alarm On: Bed  Patient Moved Closer to Nursing Station: No    Active Consults:   IP CONSULT TO GI  IP CONSULT TO NEPHROLOGY    Length of Stay:  Expected LOS: 5  Actual

## 2025-07-22 VITALS
BODY MASS INDEX: 25.1 KG/M2 | WEIGHT: 147.05 LBS | OXYGEN SATURATION: 87 % | TEMPERATURE: 98.6 F | HEIGHT: 64 IN | HEART RATE: 78 BPM | RESPIRATION RATE: 18 BRPM | SYSTOLIC BLOOD PRESSURE: 119 MMHG | DIASTOLIC BLOOD PRESSURE: 43 MMHG

## 2025-07-22 LAB
ALBUMIN SERPL-MCNC: 2.4 G/DL (ref 3.5–5)
ANION GAP SERPL CALC-SCNC: 5 MMOL/L (ref 2–12)
ANION GAP SERPL CALC-SCNC: 6 MMOL/L (ref 2–12)
BASOPHILS # BLD: 0.02 K/UL (ref 0–0.1)
BASOPHILS NFR BLD: 0.3 % (ref 0–1)
BUN SERPL-MCNC: 19 MG/DL (ref 6–20)
BUN SERPL-MCNC: 21 MG/DL (ref 6–20)
BUN/CREAT SERPL: 19 (ref 12–20)
BUN/CREAT SERPL: 20 (ref 12–20)
CALCIUM SERPL-MCNC: 9 MG/DL (ref 8.5–10.1)
CALCIUM SERPL-MCNC: 9.3 MG/DL (ref 8.5–10.1)
CHLORIDE SERPL-SCNC: 96 MMOL/L (ref 97–108)
CHLORIDE SERPL-SCNC: 98 MMOL/L (ref 97–108)
CO2 SERPL-SCNC: 25 MMOL/L (ref 21–32)
CO2 SERPL-SCNC: 27 MMOL/L (ref 21–32)
CREAT SERPL-MCNC: 0.99 MG/DL (ref 0.55–1.02)
CREAT SERPL-MCNC: 1.05 MG/DL (ref 0.55–1.02)
DIFFERENTIAL METHOD BLD: ABNORMAL
EOSINOPHIL # BLD: 0.23 K/UL (ref 0–0.4)
EOSINOPHIL NFR BLD: 3.5 % (ref 0–7)
ERYTHROCYTE [DISTWIDTH] IN BLOOD BY AUTOMATED COUNT: 13.6 % (ref 11.5–14.5)
GLUCOSE SERPL-MCNC: 82 MG/DL (ref 65–100)
GLUCOSE SERPL-MCNC: 86 MG/DL (ref 65–100)
HCT VFR BLD AUTO: 29.9 % (ref 35–47)
HCT VFR BLD AUTO: 31 % (ref 35–47)
HGB BLD-MCNC: 10.2 G/DL (ref 11.5–16)
HGB BLD-MCNC: 9.6 G/DL (ref 11.5–16)
IMM GRANULOCYTES # BLD AUTO: 0.03 K/UL (ref 0–0.04)
IMM GRANULOCYTES NFR BLD AUTO: 0.5 % (ref 0–0.5)
LYMPHOCYTES # BLD: 1.52 K/UL (ref 0.8–3.5)
LYMPHOCYTES NFR BLD: 23.1 % (ref 12–49)
MAGNESIUM SERPL-MCNC: 1.9 MG/DL (ref 1.6–2.4)
MCH RBC QN AUTO: 29.3 PG (ref 26–34)
MCHC RBC AUTO-ENTMCNC: 32.1 G/DL (ref 30–36.5)
MCV RBC AUTO: 91.2 FL (ref 80–99)
MONOCYTES # BLD: 0.98 K/UL (ref 0–1)
MONOCYTES NFR BLD: 14.9 % (ref 5–13)
NEUTS SEG # BLD: 3.79 K/UL (ref 1.8–8)
NEUTS SEG NFR BLD: 57.7 % (ref 32–75)
NRBC # BLD: 0 K/UL (ref 0–0.01)
NRBC BLD-RTO: 0 PER 100 WBC
PHOSPHATE SERPL-MCNC: 3.3 MG/DL (ref 2.6–4.7)
PLATELET # BLD AUTO: 293 K/UL (ref 150–400)
PMV BLD AUTO: 9 FL (ref 8.9–12.9)
POTASSIUM SERPL-SCNC: 4.6 MMOL/L (ref 3.5–5.1)
POTASSIUM SERPL-SCNC: 4.6 MMOL/L (ref 3.5–5.1)
RBC # BLD AUTO: 3.28 M/UL (ref 3.8–5.2)
SODIUM SERPL-SCNC: 128 MMOL/L (ref 136–145)
SODIUM SERPL-SCNC: 129 MMOL/L (ref 136–145)
WBC # BLD AUTO: 6.6 K/UL (ref 3.6–11)

## 2025-07-22 PROCEDURE — 6370000000 HC RX 637 (ALT 250 FOR IP): Performed by: STUDENT IN AN ORGANIZED HEALTH CARE EDUCATION/TRAINING PROGRAM

## 2025-07-22 PROCEDURE — 80048 BASIC METABOLIC PNL TOTAL CA: CPT

## 2025-07-22 PROCEDURE — 80069 RENAL FUNCTION PANEL: CPT

## 2025-07-22 PROCEDURE — 85014 HEMATOCRIT: CPT

## 2025-07-22 PROCEDURE — 2500000003 HC RX 250 WO HCPCS: Performed by: STUDENT IN AN ORGANIZED HEALTH CARE EDUCATION/TRAINING PROGRAM

## 2025-07-22 PROCEDURE — 94760 N-INVAS EAR/PLS OXIMETRY 1: CPT

## 2025-07-22 PROCEDURE — 83735 ASSAY OF MAGNESIUM: CPT

## 2025-07-22 PROCEDURE — 2700000000 HC OXYGEN THERAPY PER DAY

## 2025-07-22 PROCEDURE — 6370000000 HC RX 637 (ALT 250 FOR IP): Performed by: INTERNAL MEDICINE

## 2025-07-22 PROCEDURE — 85018 HEMOGLOBIN: CPT

## 2025-07-22 PROCEDURE — 85025 COMPLETE CBC W/AUTO DIFF WBC: CPT

## 2025-07-22 PROCEDURE — 36415 COLL VENOUS BLD VENIPUNCTURE: CPT

## 2025-07-22 RX ORDER — LOSARTAN POTASSIUM 50 MG/1
50 TABLET ORAL DAILY
Qty: 30 TABLET | Refills: 3 | Status: SHIPPED | OUTPATIENT
Start: 2025-07-23

## 2025-07-22 RX ORDER — CEFPODOXIME PROXETIL 200 MG/1
200 TABLET, FILM COATED ORAL 2 TIMES DAILY
Qty: 6 TABLET | Refills: 0 | Status: SHIPPED | OUTPATIENT
Start: 2025-07-22 | End: 2025-07-25

## 2025-07-22 RX ORDER — HYDROCORTISONE 25 MG/G
CREAM TOPICAL 2 TIMES DAILY
Qty: 3 EACH | Refills: 2 | Status: SHIPPED | OUTPATIENT
Start: 2025-07-22

## 2025-07-22 RX ORDER — LOSARTAN POTASSIUM 50 MG/1
50 TABLET ORAL DAILY
Status: DISCONTINUED | OUTPATIENT
Start: 2025-07-22 | End: 2025-07-22 | Stop reason: HOSPADM

## 2025-07-22 RX ADMIN — SODIUM CHLORIDE, PRESERVATIVE FREE 10 ML: 5 INJECTION INTRAVENOUS at 08:34

## 2025-07-22 RX ADMIN — METOPROLOL TARTRATE 12.5 MG: 25 TABLET, FILM COATED ORAL at 08:32

## 2025-07-22 RX ADMIN — TIMOLOL MALEATE 1 DROP: 5 SOLUTION OPHTHALMIC at 08:34

## 2025-07-22 RX ADMIN — BUTALBITAL, ACETAMINOPHEN, AND CAFFEINE 1 TABLET: 325; 50; 40 TABLET ORAL at 04:39

## 2025-07-22 RX ADMIN — ATORVASTATIN CALCIUM 80 MG: 40 TABLET, FILM COATED ORAL at 08:31

## 2025-07-22 RX ADMIN — MEMANTINE 5 MG: 10 TABLET ORAL at 08:32

## 2025-07-22 RX ADMIN — LOSARTAN POTASSIUM 50 MG: 50 TABLET, FILM COATED ORAL at 11:09

## 2025-07-22 RX ADMIN — POLYETHYLENE GLYCOL 3350 17 G: 17 POWDER, FOR SOLUTION ORAL at 08:33

## 2025-07-22 RX ADMIN — HYDROCORTISONE: 25 CREAM TOPICAL at 08:31

## 2025-07-22 RX ADMIN — CETIRIZINE HYDROCHLORIDE 5 MG: 10 TABLET, FILM COATED ORAL at 08:32

## 2025-07-22 ASSESSMENT — PAIN SCALES - GENERAL
PAINLEVEL_OUTOF10: 7
PAINLEVEL_OUTOF10: 0

## 2025-07-22 NOTE — DISCHARGE SUMMARY
Discharge Summary    Name: Tomeka Yip  [id number]  YOB: 1932 (Age: 89 y.o.)   Date of Admission: 7/19/2025  Date of Discharge: 7/22/2025  Attending Physician: Silvina Perez MD    Discharge Diagnosis:   Lower GI bleed suspect could be hemorrhoidal in origin  2 episodes of rectal bleeding, maroon-colored stool, POA   Constipation  Sepsis possibly secondary to urine tract infection  Leukocytosis, resolved  Hypotension,, improved  Concern urine tract infection  RACHEL on CKD, improving  Urine retention  Hypertension  Hyperlipidemia  Chronic artery disease  Chronic pain    Consultations:  IP CONSULT TO GI  IP CONSULT TO NEPHROLOGY  IP CONSULT TO CASE MANAGEMENT  IP CONSULT TO CASE MANAGEMENT      Brief Admission History/Reason for Admission Per Silvina Perez MD:   Tomeka Yip is a 89 y.o.  female with PMHx described below brought by the daughter for the concern of bright after the bowel movement per rectum.      Patient has a history of constipation.  Per family usually there is small amount of bright red blood after the bowel movement, but today there was a significant amount of bright red blood after the bowel movement.      Patient denies abdominal pain dizziness, chest pain, lightheadedness.  History significant for dysuria  We were asked to admit for work up and evaluation of the above problems.         On initial presentation patient hypotensive with maps 63  Labs significant for low sodium 132, high potassium 5.5  Creatinine 2.7    Brief Hospital Course by Main Problems:     Lower GI bleed suspect could be hemorrhoidal in origin  2 episodes of rectal bleeding, maroon-colored stool, POA   Constipation     CTAP showed: Rectocele with edema in the fat surrounding the distal rectum, left-sided diverticulosis.  No source of bleeding present  Admission hemoglobin 11.5, closer to baseline  Hemoglobin remained stable over serial hemoglobin

## 2025-07-22 NOTE — PROGRESS NOTES
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 intake  Hyperkalemia(improved)  Rectal bleeding  Urinary retention  History of rectocele  UTI  Hypertension      PLAN-  Creatinine continues to improve  DC IV fluids  Encourage p.o. intake to improve hyponatremia  Check labs daily  Noticed plan for flex sig per GI  Spoke to family at bedside     Subjective:  Interval History:   - Seen and examined today  - Spoke to daughter at bedside    Objective:   Vitals:    07/21/25 2208 07/21/25 2348 07/22/25 0406 07/22/25 0725   BP: (!) 153/58 (!) 166/59 (!) 135/98 (!) 155/55   Pulse: 80 68 79 69   Resp:  18 16 14   Temp:  98.2 °F (36.8 °C) 98.8 °F (37.1 °C) 97.7 °F (36.5 °C)   TempSrc:  Oral Oral Oral   SpO2:  98% 99% 99%   Weight:       Height:          I/O last 3 completed shifts:  In: 540 [P.O.:240; I.V.:300]  Out: 900 [Urine:900]  I/O this shift:  In: 240 [P.O.:240]  Out: 100 [Urine:100]      Physical exam:    GEN: NAD  NECK- no mass  RESP: No wheezing, decreased BS b/l  CVS: S1,S2  RRR  NEURO: Normal speech, Non focal  EXT: No Edema   PSYCH: Normal Mood    Chart reviewed.         Pertinent Notes reviewed.       Data Review :  Lab Results   Component Value Date/Time     07/22/2025 05:09 AM    K 4.6 07/22/2025 05:09 AM    CL 98 07/22/2025 05:09 AM    CO2 25 07/22/2025 05:09 AM    BUN 21 07/22/2025 05:09 AM    CREATININE 1.05 07/22/2025 05:09 AM    GLUCOSE 86 07/22/2025 05:09 AM    CALCIUM 9.0 07/22/2025 05:09 AM       Lab Results   Component

## 2025-07-22 NOTE — PROGRESS NOTES
GI PROGRESS NOTE  Georgiana Alvarado PA-C  [phone] PA in-hospital cell phone M-F until 4:30  After 5pm or on weekends, please call  for physician on call    NAME:Tomeka Yip :8/15/1932 MRN:[id number]   ATTG: [unfilled]   PCP: Orestes Xiong DO  Date/Time:  2025 12:21 PM     Reason for following: BRBPR    Assessment:   89yo F presented with reported large amount of BRBPR on Saturday witnessed by family. Hx of constipation, has had trace hemtochezia in the past. RACHEL on chemistries. CT w/ concern for rectocele & local edema but no e/o LBO. Last CLN unknown by pt or family.   Normocytic anemia - Hgb 11.5 POA > 10.1 > 10.2    Plan:   Discussed risks and benefits of pursuing flexible sigmoidoscopy with pt and her daughter Gertrudis (medical POA) - they have decided to forego scope at this time due to potential risks of procedure, patient age, no further bleeding per rectum.   Daughter prefers to monitor for future bleeding from this point   Need miralax daily for constipation.   Can follow up as out-patient (or re-engage hospital team if still admitted) if bleeding recurs  Nothing further to add from a GI standpoint.  GI signing-off.  Please call with any questions.  Thank you for this consult.      Plan discussed with Dr. Cannon   Subjective:   Pt lying in bed, She is getting blood drawn. She denies abdominal pain. Per nurse, no BM as of yesterday.    Complaint Y/N Description   Abdominal Pain N    Hematemesis N    Hematochezia N    Melena N    Constipation     Diarrhea     Dyspepsia     Dysphagia     Jaundiced     Nausea/vomiting N      Review of Systems: see HPI   Objective:   VITALS:   Last 24hrs VS reviewed since prior progress note. Most recent are:  Vitals:    25 1122   BP: (!) 157/52   Pulse: 76   Resp: 13   Temp: 98.8 °F (37.1 °C)   SpO2: 98%       Intake/Output Summary (Last 24 hours) at 2025 1221  Last data filed at 2025 1109  Gross per 24 hour   Intake 420 ml

## 2025-07-22 NOTE — DISCHARGE INSTRUCTIONS
PLEASE follow up with PCP and GI on dischrage     Please discuss with PCP the need to continue aspirin given age and recent bleeding     If you have bleeding per rectum , dizziness, lightheadedness , please come back to emergency depart ment

## 2025-07-22 NOTE — CARE COORDINATION
Care Management Initial Assessment       RUR:  16%  Readmission? No  1st IM letter given? Yes -   1st  letter given: No       CM completed assessment w/pt & daughter at bedside.   Has had HH in the past, but unable to recall provider.   DME use includes a r/w & a shower chair.   Patient requires assistance w/ADLs & transportation.   Patient uses [facility] in [place].   Patient has a private caregiver 9am-5pm & family spends the night with pt.   Daughter is in agreement w/HH & a alfonso lift at d/c.   No provider preference.   CM sent referrals to [facility] & [email or web address].   Daughter is at bedside & will transport pt home.   No other needs or concerns identified.     [email or web address] is processing order for alfonso lift & will deliver to pt's home.    [facility] has accepted for HH    Patient is non-ambulatory & max assist x2 w/dementia.  CM scheduled stretcher transport w/[employer] [facility] - Trip#[id number] -  time is 3:55 or sooner     07/22/25 1242   Service Assessment   Patient Orientation Alert and Oriented   Cognition Alert   History Provided By Patient;Child/Family  (daughter)   Primary Caregiver Private caregiver  (and family)   Support Systems Children;Family Members;Home Care Staff   PCP Verified by CM Yes   Last Visit to PCP Within last 3 months   Prior Functional Level Assistance with the following:   Current Functional Level Assistance with the following:   Can patient return to prior living arrangement Yes   Family able to assist with home care needs: Yes   Would you like for me to discuss the discharge plan with any other family members/significant others, and if so, who? Yes  (daughter)   Financial Resources Medicaid;Medicare   Community Resources None   Social/Functional History   Lives With Daughter   Type of Home House  (one story home w/4-5 FELIX)   Bathroom Equipment Shower chair   Home Equipment Walker - Rolling   Active  No   Patient's  Info family     Galina Ramirez  Ext [phone]

## 2025-07-22 NOTE — PROGRESS NOTES
End of Shift Note    Bedside shift change report given to YOAV Cuello (oncoming nurse) by Rama Perdomo RN (offgoing nurse).  Report included the following information SBAR    Shift worked:  7P-7A     Shift summary and any significant changes:    Patient given Fioricet X1 for headache.  Q2 turns and incontinence care completed.  Tolerating current diet  Had BM x1 , brown in color, no bloody stool noted.   Concerns for physician to address:       Zone phone for oncoming shift:          Activity:  Level of Assistance: Maximum assist, patient does 25-49%  Number times ambulated in hallways past shift: 0  Number of times OOB to chair past shift: 0    Cardiac:   Cardiac Monitoring: Yes      Cardiac Rhythm: Atrial paced    Access:  Current line(s): PIV     Genitourinary:   Urinary Status: External catheter    Respiratory:   O2 Device: Nasal cannula  Chronic home O2 use?: NO  Incentive spirometer at bedside: NO    GI:  Last BM (including prior to admit): 07/20/25  Current diet:  Diet NPO Exceptions are: Sips of Water with Meds  Passing flatus: YES    Pain Management:   Patient states pain is manageable on current regimen: YES    Skin:  Brooks Scale Score: 15  Interventions: Wound Offloading (Prevention Methods): Repositioning, Pillows, Turning    Patient Safety:  Fall Risk: Nursing Judgement-Fall Risk High(Add Comments): Yes  Fall Risk Interventions  Nursing Judgement-Fall Risk High(Add Comments): Yes  Toilet Every 2 Hours-In Advance of Need: Yes  Hourly Visual Checks: In bed, Eyes closed  Fall Visual Posted: Fall sign posted  Room Door Open: Deferred to promote rest  Alarm On: Bed  Patient Moved Closer to Nursing Station: No    Active Consults:   IP CONSULT TO GI  IP CONSULT TO NEPHROLOGY    Length of Stay:  Expected LOS: 5  Actual LOS: 3    Rama Perdomo RN

## 2025-07-22 NOTE — PROGRESS NOTES
Hospital follow-up PCP transitional care appointment has been scheduled with Dr. Los Xiong on 7/29/25 at 1000. This is a previously scheduled appt. [facility] placed [facility] information AVS for patient resource. Pending patient discharge.

## 2025-07-22 NOTE — PLAN OF CARE
Problem: Chronic Conditions and Co-morbidities  Goal: Patient's chronic conditions and co-morbidity symptoms are monitored and maintained or improved  7/22/2025 1106 by Khushboo Parker RN  Outcome: Progressing  7/22/2025 0229 by Rama Perdomo RN  Outcome: Progressing  7/22/2025 0225 by Rama Perdomo RN  Outcome: Progressing  Flowsheets (Taken 7/21/2025 2046)  Care Plan - Patient's Chronic Conditions and Co-Morbidity Symptoms are Monitored and Maintained or Improved: Monitor and assess patient's chronic conditions and comorbid symptoms for stability, deterioration, or improvement     Problem: Discharge Planning  Goal: Discharge to home or other facility with appropriate resources  7/22/2025 1106 by Khushboo Parker RN  Outcome: Progressing  7/22/2025 0229 by Rama Perdomo RN  Outcome: Progressing  7/22/2025 0225 by Rama Perdomo RN  Outcome: Progressing  Flowsheets (Taken 7/21/2025 2046)  Discharge to home or other facility with appropriate resources:   Identify barriers to discharge with patient and caregiver   Arrange for needed discharge resources and transportation as appropriate   Identify discharge learning needs (meds, wound care, etc)

## 2025-07-22 NOTE — PROGRESS NOTES
DISCHARGE NOTE FROM SURGICAL-TELEMETRY NURSE    Patient determined to be stable for discharge by attending provider. I have reviewed the discharge instructions and follow-up appointments with the Caregiver. They verbalized understanding and all questions were answered to their satisfaction. No complaints or further questions were expressed.      Medications sent to pharmacy Appropriate educational materials and medication side effect teaching were provided.      PIV were removed prior to discharge.     Patient did not discharge with any line, astudillo, or drain.    All personal items collected during admission were returned to the patient prior to discharge.    Post-op patient: Rosemary Parker RN

## 2025-07-22 NOTE — PLAN OF CARE
Problem: Safety - Adult  Goal: Free from fall injury  7/22/2025 0229 by Rama Perdomo RN  Outcome: Progressing  7/22/2025 0225 by Rama Perdomo RN  Outcome: Progressing  7/21/2025 1746 by Batsheva Richards LPN  Outcome: Progressing     Problem: Chronic Conditions and Co-morbidities  Goal: Patient's chronic conditions and co-morbidity symptoms are monitored and maintained or improved  7/22/2025 0229 by Rama Perdomo RN  Outcome: Progressing  7/22/2025 0225 by Rama Perdomo RN  Outcome: Progressing  Flowsheets (Taken 7/21/2025 2046)  Care Plan - Patient's Chronic Conditions and Co-Morbidity Symptoms are Monitored and Maintained or Improved: Monitor and assess patient's chronic conditions and comorbid symptoms for stability, deterioration, or improvement  7/21/2025 1746 by Batsheva Richards LPN  Outcome: Progressing     Problem: Discharge Planning  Goal: Discharge to home or other facility with appropriate resources  7/22/2025 0229 by Rama Pedromo RN  Outcome: Progressing  7/22/2025 0225 by Rama Perdomo RN  Outcome: Progressing  Flowsheets (Taken 7/21/2025 2046)  Discharge to home or other facility with appropriate resources:   Identify barriers to discharge with patient and caregiver   Arrange for needed discharge resources and transportation as appropriate   Identify discharge learning needs (meds, wound care, etc)  7/21/2025 1746 by Batsheva Richards LPN  Outcome: Progressing  Flowsheets (Taken 7/21/2025 0954)  Discharge to home or other facility with appropriate resources:   Identify barriers to discharge with patient and caregiver   Arrange for needed discharge resources and transportation as appropriate   Identify discharge learning needs (meds, wound care, etc)     Problem: Pain  Goal: Verbalizes/displays adequate comfort level or baseline comfort level  7/22/2025 0229 by Rama Perdomo RN  Outcome: Progressing  7/22/2025 0225 by Rama Perdomo RN  Outcome:

## 2025-08-05 ENCOUNTER — TRANSCRIBE ORDERS (OUTPATIENT)
Facility: HOSPITAL | Age: 89
End: 2025-08-05

## 2025-08-05 DIAGNOSIS — I35.0 NONRHEUMATIC AORTIC VALVE STENOSIS: Primary | ICD-10-CM

## 2025-08-25 ENCOUNTER — OFFICE VISIT (OUTPATIENT)
Age: 89
End: 2025-08-25
Payer: MEDICARE

## 2025-08-25 VITALS
RESPIRATION RATE: 15 BRPM | DIASTOLIC BLOOD PRESSURE: 58 MMHG | SYSTOLIC BLOOD PRESSURE: 106 MMHG | OXYGEN SATURATION: 99 % | BODY MASS INDEX: 25.24 KG/M2 | HEART RATE: 71 BPM | HEIGHT: 64 IN

## 2025-08-25 DIAGNOSIS — M54.2 NECK PAIN: ICD-10-CM

## 2025-08-25 DIAGNOSIS — R53.1 GENERALIZED WEAKNESS: ICD-10-CM

## 2025-08-25 DIAGNOSIS — R41.3 MEMORY LOSS: Primary | ICD-10-CM

## 2025-08-25 DIAGNOSIS — Z91.89 STROKE RISK: ICD-10-CM

## 2025-08-25 PROCEDURE — 1090F PRES/ABSN URINE INCON ASSESS: CPT

## 2025-08-25 PROCEDURE — 1123F ACP DISCUSS/DSCN MKR DOCD: CPT

## 2025-08-25 PROCEDURE — 1126F AMNT PAIN NOTED NONE PRSNT: CPT

## 2025-08-25 PROCEDURE — G8427 DOCREV CUR MEDS BY ELIG CLIN: HCPCS

## 2025-08-25 PROCEDURE — 1159F MED LIST DOCD IN RCRD: CPT

## 2025-08-25 PROCEDURE — 99214 OFFICE O/P EST MOD 30 MIN: CPT

## 2025-08-25 PROCEDURE — 1036F TOBACCO NON-USER: CPT

## 2025-08-25 PROCEDURE — G8419 CALC BMI OUT NRM PARAM NOF/U: HCPCS

## 2025-08-25 PROCEDURE — 1160F RVW MEDS BY RX/DR IN RCRD: CPT

## 2025-08-25 RX ORDER — MEMANTINE HYDROCHLORIDE 10 MG/1
10 TABLET ORAL DAILY
Qty: 90 TABLET | Refills: 3 | Status: SHIPPED | OUTPATIENT
Start: 2025-08-25

## 2025-08-25 RX ORDER — QUETIAPINE FUMARATE 25 MG/1
25 TABLET, FILM COATED ORAL AS NEEDED
COMMUNITY
Start: 2025-04-03

## 2025-08-25 RX ORDER — LIDOCAINE 50 MG/G
OINTMENT TOPICAL AS NEEDED
COMMUNITY
Start: 2025-08-20

## 2025-08-25 ASSESSMENT — PATIENT HEALTH QUESTIONNAIRE - PHQ9
SUM OF ALL RESPONSES TO PHQ QUESTIONS 1-9: 0
1. LITTLE INTEREST OR PLEASURE IN DOING THINGS: NOT AT ALL
2. FEELING DOWN, DEPRESSED OR HOPELESS: NOT AT ALL
SUM OF ALL RESPONSES TO PHQ QUESTIONS 1-9: 0

## 2025-08-25 ASSESSMENT — ENCOUNTER SYMPTOMS
ALLERGIC/IMMUNOLOGIC NEGATIVE: 1
RESPIRATORY NEGATIVE: 1
GASTROINTESTINAL NEGATIVE: 1
EYES NEGATIVE: 1

## (undated) DEVICE — SHEAR RMFG HARMONIC FOCUS 9CM -- OEM ITEM L#322125

## (undated) DEVICE — SUTURE MCRYL SZ 4-0 L27IN ABSRB UD L19MM PS-2 1/2 CIR PRIM Y426H

## (undated) DEVICE — PROBE VASC 8MHZ WTRPRF

## (undated) DEVICE — PREP SKN CHLRAPRP APL 26ML STR --

## (undated) DEVICE — DRAIN SURG W10MMXL20CM SIL FULL PERF HUBLESS FLAT RADPQ

## (undated) DEVICE — PINNACLE INTRODUCER SHEATH: Brand: PINNACLE

## (undated) DEVICE — SPONGE: SPECIALTY PEANUT XR 100/CS: Brand: MEDICAL ACTION INDUSTRIES

## (undated) DEVICE — SUTURE VCRL SZ 3-0 L27IN ABSRB UD L26MM SH 1/2 CIR J416H

## (undated) DEVICE — SUT PROL 6-0 24IN BV1 DA BLU --

## (undated) DEVICE — SUT SLK 0 30IN SH BLK --

## (undated) DEVICE — PAD, DEFIB, ADULT, RADIOTRANS, PHYSIO: Brand: MEDLINE

## (undated) DEVICE — SUTURE VCRL SZ 3-0 L27IN ABSRB UD L36MM CT-1 1/2 CIR J258H

## (undated) DEVICE — GUIDEWIRE VASC L180CM DIA0.035IN L7CM DIA3MM J TIP PTFE S

## (undated) DEVICE — PRESSURE MONITORING SET: Brand: TRUWAVE

## (undated) DEVICE — 450 ML BOTTLE OF 0.05% CHLORHEXIDINE GLUCONATE IN 99.95% STERILE WATER FOR IRRIGATION, USP AND APPLICATOR.: Brand: IRRISEPT ANTIMICROBIAL WOUND LAVAGE

## (undated) DEVICE — VASCULAR-RICHMOND-LF: Brand: MEDLINE INDUSTRIES, INC.

## (undated) DEVICE — SUTURE PROL SZ 5-0 L24IN NONABSORBABLE BLU RB-2 L13IN 1/2 8554H

## (undated) DEVICE — LOOP,VESSEL,MAXI,BLUE,2/PK,STERILE: Brand: MEDLINE

## (undated) DEVICE — INTRO SHTH CATH 23F 55.7CM --

## (undated) DEVICE — INFECTION CONTROL KIT SYS

## (undated) DEVICE — GOWN,SIRUS,NONRNF,SETINSLV,2XL,18/CS: Brand: MEDLINE

## (undated) DEVICE — STERILE POLYISOPRENE POWDER-FREE SURGICAL GLOVES: Brand: PROTEXIS

## (undated) DEVICE — NEEDLE HYPO 18GA L1.5IN PNK S STL HUB POLYPR SHLD REG BVL

## (undated) DEVICE — SOLUTION IV 500ML 0.9% SOD CHL FLX CONT

## (undated) DEVICE — BLADE OPHTH 180DEG CUT SURF BLU STR SHRP DBL BVL GRINDLESS

## (undated) DEVICE — REM POLYHESIVE ADULT PATIENT RETURN ELECTRODE: Brand: VALLEYLAB

## (undated) DEVICE — DRESSING HEMOSTATIC SFT INTVENT W/O SLT DBL WRP QUIKCLOT LF

## (undated) DEVICE — HEART CATH-MRMC: Brand: MEDLINE INDUSTRIES, INC.

## (undated) DEVICE — SUT PROL 7-0 18IN BV1 DA BLU --

## (undated) DEVICE — MAGNETIC INSTR DRAPE 20X16: Brand: MEDLINE INDUSTRIES, INC.

## (undated) DEVICE — AGENT HEMSTAT W4XL4IN OXIDIZED REGENERATED CELOS ABSRB SFT

## (undated) DEVICE — LABEL MED CARD MRMC STRL

## (undated) DEVICE — SYR 10ML LUER LOK 1/5ML GRAD --

## (undated) DEVICE — 3M™ TEGADERM™ TRANSPARENT FILM DRESSING FRAME STYLE, 1626W, 4 IN X 4-3/4 IN (10 CM X 12 CM), 50/CT 4CT/CASE: Brand: 3M™ TEGADERM™

## (undated) DEVICE — BOWL UTIL GRAD 32OZ STRL --

## (undated) DEVICE — ZINACTIVE USE 2641837 CLIP LIG M BLU TI HRT SHP WIRE HORZ 600 PER BX

## (undated) DEVICE — CATHETER DEL PACEMKR 7 FRX105 CM PTFE MICRA AV

## (undated) DEVICE — Z DISCONTINUED USE 2131664 WIPE INSTR W3XL3IN NONLINTING

## (undated) DEVICE — STRAP,POSITIONING,KNEE/BODY,FOAM,4X60": Brand: MEDLINE

## (undated) DEVICE — SUT ETHLN 3-0 18IN PS1 BLK --

## (undated) DEVICE — SPONGE GZ W4XL4IN COT 12 PLY TYP VII WVN C FLD DSGN

## (undated) DEVICE — STERILE COTTON BALLS LARGE 5/P: Brand: MEDLINE

## (undated) DEVICE — BLADE ASSEMB CLP HAIR FINE --

## (undated) DEVICE — INTENDED FOR TISSUE SEPARATION, AND OTHER PROCEDURES THAT REQUIRE A SHARP SURGICAL BLADE TO PUNCTURE OR CUT.: Brand: BARD-PARKER ®  SAFETY SCALPED

## (undated) DEVICE — SUTURE GORTX SZ 4-0 L24IN NONABSORBABLE L13MM PT-13 3/8 CIR 5K08A

## (undated) DEVICE — SUT ETHLN 3-0 18IN PS2 BLK --

## (undated) DEVICE — Device

## (undated) DEVICE — NEEDLE HYPO 25GA L5/8IN ORNG HUB S STL LATCH BVL UP

## (undated) DEVICE — SOL INJ SOD CL 0.9% 500ML BG --

## (undated) DEVICE — LABEL MED VASC MRMC STRL

## (undated) DEVICE — FASCIAL DILATOR SET: Brand: COOK

## (undated) DEVICE — HANDLE LT SNAP ON ULT DURABLE LENS FOR TRUMPF ALC DISPOSABLE

## (undated) DEVICE — SYRINGE TB 1ML TRNSLUC BRL WHT PLUNG BLK MRK CONVENTIONAL

## (undated) DEVICE — AEGIS 1" DISK 4MM HOLE, PEEL OPEN: Brand: MEDLINE

## (undated) DEVICE — DERMABOND SKIN ADH 0.7ML -- DERMABOND ADVANCED 12/BX